# Patient Record
Sex: MALE | Race: ASIAN | NOT HISPANIC OR LATINO
[De-identification: names, ages, dates, MRNs, and addresses within clinical notes are randomized per-mention and may not be internally consistent; named-entity substitution may affect disease eponyms.]

---

## 2017-05-03 ENCOUNTER — APPOINTMENT (OUTPATIENT)
Dept: CARDIOLOGY | Facility: CLINIC | Age: 82
End: 2017-05-03

## 2017-05-03 ENCOUNTER — NON-APPOINTMENT (OUTPATIENT)
Age: 82
End: 2017-05-03

## 2017-05-03 ENCOUNTER — APPOINTMENT (OUTPATIENT)
Dept: CV DIAGNOSITCS | Facility: HOSPITAL | Age: 82
End: 2017-05-03

## 2017-05-03 ENCOUNTER — OUTPATIENT (OUTPATIENT)
Dept: OUTPATIENT SERVICES | Facility: HOSPITAL | Age: 82
LOS: 1 days | End: 2017-05-03
Payer: MEDICARE

## 2017-05-03 VITALS
HEART RATE: 63 BPM | BODY MASS INDEX: 24.66 KG/M2 | DIASTOLIC BLOOD PRESSURE: 63 MMHG | WEIGHT: 148 LBS | SYSTOLIC BLOOD PRESSURE: 127 MMHG | OXYGEN SATURATION: 98 % | HEIGHT: 65 IN

## 2017-05-03 DIAGNOSIS — I48.91 UNSPECIFIED ATRIAL FIBRILLATION: ICD-10-CM

## 2017-05-03 PROCEDURE — 93306 TTE W/DOPPLER COMPLETE: CPT

## 2017-05-03 PROCEDURE — 93306 TTE W/DOPPLER COMPLETE: CPT | Mod: 26

## 2017-09-09 ENCOUNTER — NON-APPOINTMENT (OUTPATIENT)
Age: 82
End: 2017-09-09

## 2017-09-09 ENCOUNTER — APPOINTMENT (OUTPATIENT)
Dept: CARDIOLOGY | Facility: CLINIC | Age: 82
End: 2017-09-09
Payer: MEDICARE

## 2017-09-09 VITALS
OXYGEN SATURATION: 98 % | HEART RATE: 91 BPM | SYSTOLIC BLOOD PRESSURE: 118 MMHG | HEIGHT: 65 IN | DIASTOLIC BLOOD PRESSURE: 67 MMHG

## 2017-09-09 PROCEDURE — 93000 ELECTROCARDIOGRAM COMPLETE: CPT

## 2017-09-09 PROCEDURE — 99214 OFFICE O/P EST MOD 30 MIN: CPT

## 2017-10-19 ENCOUNTER — APPOINTMENT (OUTPATIENT)
Dept: CV DIAGNOSITCS | Facility: HOSPITAL | Age: 82
End: 2017-10-19

## 2017-11-07 ENCOUNTER — APPOINTMENT (OUTPATIENT)
Dept: CV DIAGNOSITCS | Facility: HOSPITAL | Age: 82
End: 2017-11-07

## 2017-11-07 ENCOUNTER — OUTPATIENT (OUTPATIENT)
Dept: OUTPATIENT SERVICES | Facility: HOSPITAL | Age: 82
LOS: 1 days | End: 2017-11-07
Payer: MEDICARE

## 2017-11-07 DIAGNOSIS — I48.91 UNSPECIFIED ATRIAL FIBRILLATION: ICD-10-CM

## 2017-11-07 PROCEDURE — 93306 TTE W/DOPPLER COMPLETE: CPT

## 2017-11-07 PROCEDURE — 93306 TTE W/DOPPLER COMPLETE: CPT | Mod: 26

## 2018-03-05 ENCOUNTER — APPOINTMENT (OUTPATIENT)
Dept: CARDIOLOGY | Facility: CLINIC | Age: 83
End: 2018-03-05
Payer: MEDICARE

## 2018-03-05 ENCOUNTER — NON-APPOINTMENT (OUTPATIENT)
Age: 83
End: 2018-03-05

## 2018-03-05 VITALS
SYSTOLIC BLOOD PRESSURE: 126 MMHG | HEART RATE: 54 BPM | BODY MASS INDEX: 24.32 KG/M2 | WEIGHT: 146 LBS | HEIGHT: 65 IN | DIASTOLIC BLOOD PRESSURE: 74 MMHG | OXYGEN SATURATION: 98 %

## 2018-03-05 PROCEDURE — 99214 OFFICE O/P EST MOD 30 MIN: CPT

## 2018-03-05 PROCEDURE — 93000 ELECTROCARDIOGRAM COMPLETE: CPT

## 2018-03-05 RX ORDER — GLYCOPYRROLATE AND FORMOTEROL FUMARATE 9; 4.8 UG/1; UG/1
9-4.8 AEROSOL, METERED RESPIRATORY (INHALATION)
Qty: 32 | Refills: 0 | Status: DISCONTINUED | COMMUNITY
Start: 2018-02-22

## 2018-03-05 RX ORDER — AZITHROMYCIN 250 MG/1
250 TABLET, FILM COATED ORAL
Qty: 6 | Refills: 0 | Status: DISCONTINUED | COMMUNITY
Start: 2018-01-23

## 2018-03-05 RX ORDER — PREDNISONE 20 MG/1
20 TABLET ORAL
Qty: 10 | Refills: 0 | Status: DISCONTINUED | COMMUNITY
Start: 2018-01-23

## 2018-03-05 RX ORDER — METOPROLOL TARTRATE 25 MG/1
25 TABLET, FILM COATED ORAL
Qty: 90 | Refills: 0 | Status: DISCONTINUED | COMMUNITY
Start: 2017-02-14

## 2018-09-12 ENCOUNTER — NON-APPOINTMENT (OUTPATIENT)
Age: 83
End: 2018-09-12

## 2018-09-12 ENCOUNTER — APPOINTMENT (OUTPATIENT)
Dept: CARDIOLOGY | Facility: CLINIC | Age: 83
End: 2018-09-12
Payer: MEDICARE

## 2018-09-12 VITALS
OXYGEN SATURATION: 98 % | HEART RATE: 61 BPM | DIASTOLIC BLOOD PRESSURE: 75 MMHG | SYSTOLIC BLOOD PRESSURE: 160 MMHG | HEIGHT: 65 IN

## 2018-09-12 PROCEDURE — 99214 OFFICE O/P EST MOD 30 MIN: CPT

## 2018-09-12 PROCEDURE — 93000 ELECTROCARDIOGRAM COMPLETE: CPT

## 2019-03-13 ENCOUNTER — APPOINTMENT (OUTPATIENT)
Dept: CARDIOLOGY | Facility: CLINIC | Age: 84
End: 2019-03-13
Payer: MEDICARE

## 2019-03-13 ENCOUNTER — NON-APPOINTMENT (OUTPATIENT)
Age: 84
End: 2019-03-13

## 2019-03-13 VITALS
HEIGHT: 65 IN | WEIGHT: 145 LBS | SYSTOLIC BLOOD PRESSURE: 160 MMHG | DIASTOLIC BLOOD PRESSURE: 79 MMHG | BODY MASS INDEX: 24.16 KG/M2

## 2019-03-13 VITALS — OXYGEN SATURATION: 97 % | HEART RATE: 59 BPM

## 2019-03-13 PROCEDURE — 99213 OFFICE O/P EST LOW 20 MIN: CPT

## 2019-03-13 PROCEDURE — 93000 ELECTROCARDIOGRAM COMPLETE: CPT

## 2019-03-13 NOTE — DISCUSSION/SUMMARY
[FreeTextEntry1] : Mr. Singleton is an 89  year-old man with a known history of atrial fibrillation, hyperlipidemia  who has been doing well.    \par \par

## 2019-03-13 NOTE — REASON FOR VISIT
[Follow-Up - Clinic] : a clinic follow-up of [Abnormal ECG] : an abnormal ECG [Anticoagulation] : anticoagulation [Atrial Fibrillation] : atrial fibrillation [Coronary Artery Disease] : coronary artery disease [Hyperlipidemia] : hyperlipidemia

## 2019-03-13 NOTE — HISTORY OF PRESENT ILLNESS
[FreeTextEntry1] : Mr. Singleton is an 89 year-old man with a known history of atrial fibrillation, hypertension, asthma and hyperlipidemia who presents today for  evaluation. He has been doing well without any issues. He has no chest pain or sob.  No ELIZA, PND or orthopnea.  He has no palpitations.  No dizziness.  Walks 1/4 mile to the pharmacy. He has bradycardia.  no syncope

## 2019-03-13 NOTE — PHYSICAL EXAM
[General Appearance - Well Developed] : well developed [Normal Appearance] : normal appearance [Well Groomed] : well groomed [General Appearance - Well Nourished] : well nourished [No Deformities] : no deformities [General Appearance - In No Acute Distress] : no acute distress [Normal Conjunctiva] : the conjunctiva exhibited no abnormalities [Eyelids - No Xanthelasma] : the eyelids demonstrated no xanthelasmas [Normal Oral Mucosa] : normal oral mucosa [No Oral Pallor] : no oral pallor [No Oral Cyanosis] : no oral cyanosis [Normal Jugular Venous A Waves Present] : normal jugular venous A waves present [Normal Jugular Venous V Waves Present] : normal jugular venous V waves present [No Jugular Venous Ko A Waves] : no jugular venous ko A waves [Respiration, Rhythm And Depth] : normal respiratory rhythm and effort [Exaggerated Use Of Accessory Muscles For Inspiration] : no accessory muscle use [Auscultation Breath Sounds / Voice Sounds] : lungs were clear to auscultation bilaterally [Heart Rate And Rhythm] : heart rate and rhythm were normal [Heart Sounds] : normal S1 and S2 [Murmurs] : no murmurs present [Abdomen Soft] : soft [Abdomen Tenderness] : non-tender [Abdomen Mass (___ Cm)] : no abdominal mass palpated [Abnormal Walk] : normal gait [Gait - Sufficient For Exercise Testing] : the gait was sufficient for exercise testing [Nail Clubbing] : no clubbing of the fingernails [Cyanosis, Localized] : no localized cyanosis [Petechial Hemorrhages (___cm)] : no petechial hemorrhages [Skin Color & Pigmentation] : normal skin color and pigmentation [] : no rash [No Venous Stasis] : no venous stasis [Skin Lesions] : no skin lesions [No Skin Ulcers] : no skin ulcer [No Xanthoma] : no  xanthoma was observed [Oriented To Time, Place, And Person] : oriented to person, place, and time [Affect] : the affect was normal [Mood] : the mood was normal [No Anxiety] : not feeling anxious

## 2019-10-23 ENCOUNTER — APPOINTMENT (OUTPATIENT)
Dept: CARDIOLOGY | Facility: CLINIC | Age: 84
End: 2019-10-23
Payer: MEDICARE

## 2019-10-23 ENCOUNTER — NON-APPOINTMENT (OUTPATIENT)
Age: 84
End: 2019-10-23

## 2019-10-23 VITALS
DIASTOLIC BLOOD PRESSURE: 73 MMHG | SYSTOLIC BLOOD PRESSURE: 158 MMHG | HEIGHT: 66 IN | OXYGEN SATURATION: 94 % | BODY MASS INDEX: 22.82 KG/M2 | HEART RATE: 70 BPM | WEIGHT: 142 LBS

## 2019-10-23 PROCEDURE — 93000 ELECTROCARDIOGRAM COMPLETE: CPT

## 2019-10-23 PROCEDURE — 99214 OFFICE O/P EST MOD 30 MIN: CPT

## 2019-10-23 RX ORDER — GLYCOPYRROLATE AND FORMOTEROL FUMARATE 9; 4.8 UG/1; UG/1
9-4.8 AEROSOL, METERED RESPIRATORY (INHALATION)
Refills: 0 | Status: DISCONTINUED | COMMUNITY
Start: 2018-03-05 | End: 2019-10-23

## 2019-10-23 NOTE — HISTORY OF PRESENT ILLNESS
[FreeTextEntry1] : Mr. Singleton is an 89 year-old man with a known history of atrial fibrillation, hypertension, asthma and hyperlipidemia who presents today for  evaluation. He has been doing well without any issues. He has no chest pain or sob.  No ELIZA, PND or orthopnea.  He has no palpitations.  No dizziness.  Walks 1/4 mile to the pharmacy. He has bradycardia.  no syncope

## 2019-10-23 NOTE — DISCUSSION/SUMMARY
[FreeTextEntry1] : Mr. Singleton is an 89  year-old man with a known history of atrial fibrillation, hyperlipidemia  who has been doing well.    He is on coumadin and the family wants to keep it going with the understanding of the risk of falls.\par \par

## 2020-08-05 ENCOUNTER — APPOINTMENT (OUTPATIENT)
Dept: CARDIOLOGY | Facility: CLINIC | Age: 85
End: 2020-08-05
Payer: MEDICARE

## 2020-08-05 ENCOUNTER — NON-APPOINTMENT (OUTPATIENT)
Age: 85
End: 2020-08-05

## 2020-08-05 VITALS
HEIGHT: 66 IN | DIASTOLIC BLOOD PRESSURE: 74 MMHG | HEART RATE: 54 BPM | WEIGHT: 139 LBS | SYSTOLIC BLOOD PRESSURE: 147 MMHG | BODY MASS INDEX: 22.34 KG/M2 | OXYGEN SATURATION: 98 %

## 2020-08-05 PROCEDURE — 93000 ELECTROCARDIOGRAM COMPLETE: CPT

## 2020-08-05 PROCEDURE — 99214 OFFICE O/P EST MOD 30 MIN: CPT

## 2020-08-05 NOTE — PHYSICAL EXAM
[Normal Appearance] : normal appearance [General Appearance - Well Developed] : well developed [Well Groomed] : well groomed [General Appearance - Well Nourished] : well nourished [No Deformities] : no deformities [General Appearance - In No Acute Distress] : no acute distress [Normal Conjunctiva] : the conjunctiva exhibited no abnormalities [Eyelids - No Xanthelasma] : the eyelids demonstrated no xanthelasmas [Normal Oral Mucosa] : normal oral mucosa [No Oral Pallor] : no oral pallor [No Oral Cyanosis] : no oral cyanosis [Normal Jugular Venous A Waves Present] : normal jugular venous A waves present [Normal Jugular Venous V Waves Present] : normal jugular venous V waves present [No Jugular Venous Ko A Waves] : no jugular venous ko A waves [Exaggerated Use Of Accessory Muscles For Inspiration] : no accessory muscle use [Respiration, Rhythm And Depth] : normal respiratory rhythm and effort [Auscultation Breath Sounds / Voice Sounds] : lungs were clear to auscultation bilaterally [Heart Rate And Rhythm] : heart rate and rhythm were normal [Murmurs] : no murmurs present [Heart Sounds] : normal S1 and S2 [Abdomen Tenderness] : non-tender [Abdomen Soft] : soft [Abdomen Mass (___ Cm)] : no abdominal mass palpated [Abnormal Walk] : normal gait [Gait - Sufficient For Exercise Testing] : the gait was sufficient for exercise testing [Nail Clubbing] : no clubbing of the fingernails [Cyanosis, Localized] : no localized cyanosis [Petechial Hemorrhages (___cm)] : no petechial hemorrhages [Skin Color & Pigmentation] : normal skin color and pigmentation [] : no rash [No Venous Stasis] : no venous stasis [No Skin Ulcers] : no skin ulcer [Skin Lesions] : no skin lesions [No Xanthoma] : no  xanthoma was observed [Oriented To Time, Place, And Person] : oriented to person, place, and time [Affect] : the affect was normal [Mood] : the mood was normal [No Anxiety] : not feeling anxious

## 2020-08-05 NOTE — REASON FOR VISIT
[Follow-Up - Clinic] : a clinic follow-up of [Anticoagulation] : anticoagulation [Abnormal ECG] : an abnormal ECG [Atrial Fibrillation] : atrial fibrillation [Coronary Artery Disease] : coronary artery disease [Hyperlipidemia] : hyperlipidemia

## 2020-08-05 NOTE — DISCUSSION/SUMMARY
[FreeTextEntry1] : Mr. Singleton is an 89  year-old man with a known history of atrial fibrillation, hyperlipidemia  who has been doing well.    He is on coumadin and the family wants to keep it going with the understanding of the risk of falls.\par \par

## 2020-08-06 ENCOUNTER — APPOINTMENT (OUTPATIENT)
Dept: UROLOGY | Facility: CLINIC | Age: 85
End: 2020-08-06
Payer: MEDICARE

## 2020-08-06 VITALS — TEMPERATURE: 98.2 F

## 2020-08-06 VITALS
DIASTOLIC BLOOD PRESSURE: 67 MMHG | WEIGHT: 139 LBS | RESPIRATION RATE: 16 BRPM | HEART RATE: 60 BPM | SYSTOLIC BLOOD PRESSURE: 137 MMHG | BODY MASS INDEX: 22.34 KG/M2 | HEIGHT: 66 IN

## 2020-08-06 DIAGNOSIS — R33.9 RETENTION OF URINE, UNSPECIFIED: ICD-10-CM

## 2020-08-06 DIAGNOSIS — K46.9 UNSPECIFIED ABDOMINAL HERNIA W/OUT OBSTRUCTION OR GANGRENE: ICD-10-CM

## 2020-08-06 PROCEDURE — 99204 OFFICE O/P NEW MOD 45 MIN: CPT

## 2020-08-06 RX ORDER — TAMSULOSIN HYDROCHLORIDE 0.4 MG/1
0.4 CAPSULE ORAL
Qty: 3 | Refills: 6 | Status: DISCONTINUED | COMMUNITY
Start: 2020-08-06 | End: 2020-08-06

## 2021-02-04 ENCOUNTER — RX RENEWAL (OUTPATIENT)
Age: 86
End: 2021-02-04

## 2021-02-10 ENCOUNTER — APPOINTMENT (OUTPATIENT)
Dept: CARDIOLOGY | Facility: CLINIC | Age: 86
End: 2021-02-10
Payer: MEDICARE

## 2021-02-10 ENCOUNTER — NON-APPOINTMENT (OUTPATIENT)
Age: 86
End: 2021-02-10

## 2021-02-10 VITALS
WEIGHT: 137 LBS | SYSTOLIC BLOOD PRESSURE: 147 MMHG | DIASTOLIC BLOOD PRESSURE: 74 MMHG | BODY MASS INDEX: 22.11 KG/M2 | OXYGEN SATURATION: 99 % | HEART RATE: 50 BPM

## 2021-02-10 DIAGNOSIS — I42.9 CARDIOMYOPATHY, UNSPECIFIED: ICD-10-CM

## 2021-02-10 PROCEDURE — 93000 ELECTROCARDIOGRAM COMPLETE: CPT

## 2021-02-10 PROCEDURE — 99214 OFFICE O/P EST MOD 30 MIN: CPT

## 2021-02-10 NOTE — PHYSICAL EXAM
[General Appearance - Well Developed] : well developed [Normal Appearance] : normal appearance [Well Groomed] : well groomed [General Appearance - Well Nourished] : well nourished [No Deformities] : no deformities [General Appearance - In No Acute Distress] : no acute distress [Normal Conjunctiva] : the conjunctiva exhibited no abnormalities [Eyelids - No Xanthelasma] : the eyelids demonstrated no xanthelasmas [Normal Oral Mucosa] : normal oral mucosa [No Oral Pallor] : no oral pallor [No Oral Cyanosis] : no oral cyanosis [Normal Jugular Venous A Waves Present] : normal jugular venous A waves present [Normal Jugular Venous V Waves Present] : normal jugular venous V waves present [No Jugular Venous Ko A Waves] : no jugular venous ko A waves [Exaggerated Use Of Accessory Muscles For Inspiration] : no accessory muscle use [Respiration, Rhythm And Depth] : normal respiratory rhythm and effort [Auscultation Breath Sounds / Voice Sounds] : lungs were clear to auscultation bilaterally [Heart Rate And Rhythm] : heart rate and rhythm were normal [Murmurs] : no murmurs present [Heart Sounds] : normal S1 and S2 [Abdomen Soft] : soft [Abdomen Tenderness] : non-tender [Abdomen Mass (___ Cm)] : no abdominal mass palpated [Abnormal Walk] : normal gait [Gait - Sufficient For Exercise Testing] : the gait was sufficient for exercise testing [Nail Clubbing] : no clubbing of the fingernails [Cyanosis, Localized] : no localized cyanosis [Petechial Hemorrhages (___cm)] : no petechial hemorrhages [Skin Color & Pigmentation] : normal skin color and pigmentation [] : no rash [No Venous Stasis] : no venous stasis [Skin Lesions] : no skin lesions [No Skin Ulcers] : no skin ulcer [Oriented To Time, Place, And Person] : oriented to person, place, and time [No Xanthoma] : no  xanthoma was observed [Affect] : the affect was normal [Mood] : the mood was normal [No Anxiety] : not feeling anxious

## 2021-02-10 NOTE — DISCUSSION/SUMMARY
[FreeTextEntry1] : Mr. Singleton is an 89  year-old man with a known history of atrial fibrillation, hyperlipidemia  who has been doing well.    He is on coumadin and the family wants to keep it going but have instructed them to stop it due to the risk of falls.\par \par

## 2021-02-10 NOTE — HISTORY OF PRESENT ILLNESS
[FreeTextEntry1] : Mr. Singleton is an 89 year-old man with a known history of atrial fibrillation, hypertension, asthma and hyperlipidemia who presents today for  evaluation. He has been doing well without any issues. He has no chest pain or sob.  No ELIZA, PND or orthopnea.  He has no palpitations.  No dizziness.   He has bradycardia.  no syncope

## 2021-06-05 ENCOUNTER — APPOINTMENT (OUTPATIENT)
Dept: RADIOLOGY | Facility: IMAGING CENTER | Age: 86
End: 2021-06-05
Payer: MEDICARE

## 2021-06-05 ENCOUNTER — OUTPATIENT (OUTPATIENT)
Dept: OUTPATIENT SERVICES | Facility: HOSPITAL | Age: 86
LOS: 1 days | End: 2021-06-05
Payer: MEDICARE

## 2021-06-05 DIAGNOSIS — Z00.8 ENCOUNTER FOR OTHER GENERAL EXAMINATION: ICD-10-CM

## 2021-06-05 PROCEDURE — 71046 X-RAY EXAM CHEST 2 VIEWS: CPT | Mod: 26

## 2021-06-05 PROCEDURE — 71046 X-RAY EXAM CHEST 2 VIEWS: CPT

## 2021-07-09 DIAGNOSIS — R60.0 LOCALIZED EDEMA: ICD-10-CM

## 2021-07-12 RX ORDER — FUROSEMIDE 20 MG/1
20 TABLET ORAL TWICE DAILY
Qty: 60 | Refills: 1 | Status: ACTIVE | COMMUNITY
Start: 2021-07-09 | End: 1900-01-01

## 2021-07-31 ENCOUNTER — APPOINTMENT (OUTPATIENT)
Dept: CT IMAGING | Facility: IMAGING CENTER | Age: 86
End: 2021-07-31
Payer: MEDICARE

## 2021-07-31 ENCOUNTER — OUTPATIENT (OUTPATIENT)
Dept: OUTPATIENT SERVICES | Facility: HOSPITAL | Age: 86
LOS: 1 days | End: 2021-07-31
Payer: MEDICARE

## 2021-07-31 DIAGNOSIS — Z00.8 ENCOUNTER FOR OTHER GENERAL EXAMINATION: ICD-10-CM

## 2021-07-31 PROCEDURE — 71250 CT THORAX DX C-: CPT | Mod: MH

## 2021-07-31 PROCEDURE — 71250 CT THORAX DX C-: CPT | Mod: 26,MH

## 2021-08-11 ENCOUNTER — APPOINTMENT (OUTPATIENT)
Dept: CARDIOLOGY | Facility: CLINIC | Age: 86
End: 2021-08-11
Payer: MEDICARE

## 2021-08-11 ENCOUNTER — NON-APPOINTMENT (OUTPATIENT)
Age: 86
End: 2021-08-11

## 2021-08-11 VITALS
BODY MASS INDEX: 22.5 KG/M2 | OXYGEN SATURATION: 100 % | HEART RATE: 55 BPM | DIASTOLIC BLOOD PRESSURE: 65 MMHG | SYSTOLIC BLOOD PRESSURE: 151 MMHG | WEIGHT: 140 LBS | HEIGHT: 66 IN

## 2021-08-11 PROCEDURE — 93000 ELECTROCARDIOGRAM COMPLETE: CPT

## 2021-08-11 PROCEDURE — 99214 OFFICE O/P EST MOD 30 MIN: CPT

## 2021-08-11 RX ORDER — ATENOLOL 25 MG/1
25 TABLET ORAL DAILY
Qty: 30 | Refills: 3 | Status: DISCONTINUED | COMMUNITY
End: 2021-08-11

## 2021-08-11 NOTE — DISCUSSION/SUMMARY
[FreeTextEntry1] : Mr. Singleton is an 89  year-old man with a known history of atrial fibrillation, hyperlipidemia  who has been doing well.    He is on coumadin and the family wants to keep it going but have instructed them to stop it due to the risk of falls.  Have stopped his atenolol given his bradycardia\par \par

## 2021-10-01 ENCOUNTER — OUTPATIENT (OUTPATIENT)
Dept: OUTPATIENT SERVICES | Facility: HOSPITAL | Age: 86
LOS: 1 days | End: 2021-10-01
Payer: MEDICARE

## 2021-10-28 DIAGNOSIS — Z71.89 OTHER SPECIFIED COUNSELING: ICD-10-CM

## 2021-12-01 PROCEDURE — G9005: CPT

## 2022-02-16 ENCOUNTER — APPOINTMENT (OUTPATIENT)
Dept: CARDIOLOGY | Facility: CLINIC | Age: 87
End: 2022-02-16
Payer: MEDICARE

## 2022-02-16 ENCOUNTER — NON-APPOINTMENT (OUTPATIENT)
Age: 87
End: 2022-02-16

## 2022-02-16 VITALS
DIASTOLIC BLOOD PRESSURE: 71 MMHG | BODY MASS INDEX: 21.21 KG/M2 | WEIGHT: 132 LBS | HEIGHT: 66 IN | SYSTOLIC BLOOD PRESSURE: 136 MMHG | HEART RATE: 79 BPM | OXYGEN SATURATION: 98 %

## 2022-02-16 PROCEDURE — 99214 OFFICE O/P EST MOD 30 MIN: CPT

## 2022-02-16 PROCEDURE — 93000 ELECTROCARDIOGRAM COMPLETE: CPT

## 2022-02-16 RX ORDER — DIGOXIN 250 UG/1
250 TABLET ORAL DAILY
Qty: 30 | Refills: 6 | Status: DISCONTINUED | COMMUNITY
Start: 2018-03-05 | End: 2022-02-16

## 2022-02-16 NOTE — DISCUSSION/SUMMARY
[FreeTextEntry1] : Mr. Singleton is an 89  year-old man with a known history of atrial fibrillation, hyperlipidemia  who has been doing well.    He is on coumadin and the family wants to keep it going but have instructed them to stop it due to the risk of falls.  \par he remains bradycardic despite stopping atenolol. Will stop digoxin

## 2022-02-16 NOTE — PHYSICAL EXAM
[General Appearance - Well Developed] : well developed [Normal Appearance] : normal appearance [Well Groomed] : well groomed [General Appearance - Well Nourished] : well nourished [No Deformities] : no deformities [General Appearance - In No Acute Distress] : no acute distress [Normal Conjunctiva] : the conjunctiva exhibited no abnormalities [Eyelids - No Xanthelasma] : the eyelids demonstrated no xanthelasmas [Normal Oral Mucosa] : normal oral mucosa [No Oral Pallor] : no oral pallor [No Oral Cyanosis] : no oral cyanosis [Normal Jugular Venous A Waves Present] : normal jugular venous A waves present [Normal Jugular Venous V Waves Present] : normal jugular venous V waves present [No Jugular Venous Ko A Waves] : no jugular venous ko A waves [Respiration, Rhythm And Depth] : normal respiratory rhythm and effort [Exaggerated Use Of Accessory Muscles For Inspiration] : no accessory muscle use [Auscultation Breath Sounds / Voice Sounds] : lungs were clear to auscultation bilaterally [Heart Rate And Rhythm] : heart rate and rhythm were normal [Heart Sounds] : normal S1 and S2 [Murmurs] : no murmurs present [Abdomen Tenderness] : non-tender [Abdomen Soft] : soft [Abdomen Mass (___ Cm)] : no abdominal mass palpated [Abnormal Walk] : normal gait [Gait - Sufficient For Exercise Testing] : the gait was sufficient for exercise testing [Nail Clubbing] : no clubbing of the fingernails [Cyanosis, Localized] : no localized cyanosis [Petechial Hemorrhages (___cm)] : no petechial hemorrhages [Skin Color & Pigmentation] : normal skin color and pigmentation [No Venous Stasis] : no venous stasis [] : no rash [Skin Lesions] : no skin lesions [No Skin Ulcers] : no skin ulcer [No Xanthoma] : no  xanthoma was observed [Oriented To Time, Place, And Person] : oriented to person, place, and time [Mood] : the mood was normal [Affect] : the affect was normal [No Anxiety] : not feeling anxious

## 2022-02-16 NOTE — HISTORY OF PRESENT ILLNESS
[FreeTextEntry1] : Mr. Singleton is an 89 year-old man with a known history of atrial fibrillation, hypertension, asthma and hyperlipidemia who presents today for  evaluation. He has been doing well without any issues. He has no chest pain or sob.  No ELIZA, PND or orthopnea.  He has no palpitations.  No dizziness.   He has bradycardia.  no syncope

## 2022-06-14 ENCOUNTER — RESULT CHARGE (OUTPATIENT)
Age: 87
End: 2022-06-14

## 2022-06-15 ENCOUNTER — NON-APPOINTMENT (OUTPATIENT)
Age: 87
End: 2022-06-15

## 2022-06-15 ENCOUNTER — APPOINTMENT (OUTPATIENT)
Dept: CARDIOLOGY | Facility: CLINIC | Age: 87
End: 2022-06-15
Payer: MEDICARE

## 2022-06-15 VITALS
HEART RATE: 108 BPM | DIASTOLIC BLOOD PRESSURE: 95 MMHG | OXYGEN SATURATION: 99 % | SYSTOLIC BLOOD PRESSURE: 148 MMHG | HEIGHT: 66 IN

## 2022-06-15 PROCEDURE — 93000 ELECTROCARDIOGRAM COMPLETE: CPT

## 2022-06-15 PROCEDURE — 99214 OFFICE O/P EST MOD 30 MIN: CPT

## 2022-06-15 RX ORDER — FINASTERIDE 5 MG/1
5 TABLET, FILM COATED ORAL DAILY
Qty: 30 | Refills: 5 | Status: DISCONTINUED | COMMUNITY
Start: 2020-08-06 | End: 2022-06-15

## 2022-06-15 RX ORDER — WARFARIN 5 MG/1
5 TABLET ORAL DAILY
Qty: 30 | Refills: 3 | Status: DISCONTINUED | COMMUNITY
Start: 2018-03-05 | End: 2022-06-15

## 2022-06-15 RX ORDER — TAMSULOSIN HYDROCHLORIDE 0.4 MG/1
0.4 CAPSULE ORAL
Qty: 10 | Refills: 3 | Status: ACTIVE | COMMUNITY

## 2022-06-15 RX ORDER — MONTELUKAST 10 MG/1
10 TABLET, FILM COATED ORAL DAILY
Refills: 0 | Status: ACTIVE | COMMUNITY
Start: 2022-06-15

## 2022-06-15 RX ORDER — PANTOPRAZOLE 40 MG/1
40 TABLET, DELAYED RELEASE ORAL DAILY
Qty: 1 | Refills: 1 | Status: ACTIVE | COMMUNITY
Start: 2022-06-15

## 2022-06-15 NOTE — DISCUSSION/SUMMARY
[FreeTextEntry1] : Mr. Singleton is an 89  year-old man with a known history of atrial fibrillation, hyperlipidemia  who has been doing well.    He is on coumadin and the family wants to keep it going but have instructed them to stop it due to the risk of falls.  Will change to eliquis\par he remains bradycardic despite stopping atenolol. Will stop digoxin

## 2022-06-15 NOTE — PHYSICAL EXAM
[General Appearance - Well Developed] : well developed [Normal Appearance] : normal appearance [Well Groomed] : well groomed [General Appearance - Well Nourished] : well nourished [No Deformities] : no deformities [General Appearance - In No Acute Distress] : no acute distress [Normal Conjunctiva] : the conjunctiva exhibited no abnormalities [Eyelids - No Xanthelasma] : the eyelids demonstrated no xanthelasmas [Normal Oral Mucosa] : normal oral mucosa [No Oral Pallor] : no oral pallor [No Oral Cyanosis] : no oral cyanosis [Normal Jugular Venous A Waves Present] : normal jugular venous A waves present [Normal Jugular Venous V Waves Present] : normal jugular venous V waves present [No Jugular Venous Ko A Waves] : no jugular venous ko A waves [Respiration, Rhythm And Depth] : normal respiratory rhythm and effort [Exaggerated Use Of Accessory Muscles For Inspiration] : no accessory muscle use [Auscultation Breath Sounds / Voice Sounds] : lungs were clear to auscultation bilaterally [Heart Rate And Rhythm] : heart rate and rhythm were normal [Heart Sounds] : normal S1 and S2 [Murmurs] : no murmurs present [Abdomen Tenderness] : non-tender [Abdomen Soft] : soft [Abdomen Mass (___ Cm)] : no abdominal mass palpated [Abnormal Walk] : normal gait [Gait - Sufficient For Exercise Testing] : the gait was sufficient for exercise testing [Nail Clubbing] : no clubbing of the fingernails [Cyanosis, Localized] : no localized cyanosis [Petechial Hemorrhages (___cm)] : no petechial hemorrhages [] : no rash [Skin Color & Pigmentation] : normal skin color and pigmentation [No Venous Stasis] : no venous stasis [Skin Lesions] : no skin lesions [No Skin Ulcers] : no skin ulcer [No Xanthoma] : no  xanthoma was observed [Oriented To Time, Place, And Person] : oriented to person, place, and time [Affect] : the affect was normal [Mood] : the mood was normal [No Anxiety] : not feeling anxious

## 2022-06-15 NOTE — REASON FOR VISIT
[Symptom and Test Evaluation] : symptom and test evaluation [Follow-Up - Clinic] : a clinic follow-up of [Abnormal ECG] : an abnormal ECG [Anticoagulation] : anticoagulation [Atrial Fibrillation] : atrial fibrillation [Coronary Artery Disease] : coronary artery disease [Hyperlipidemia] : hyperlipidemia

## 2022-06-15 NOTE — HISTORY OF PRESENT ILLNESS
[FreeTextEntry1] : Mr. Singleton is an 89 year-old man with a known history of atrial fibrillation, hypertension, asthma and hyperlipidemia who presents today for  evaluation. He has been doing well without any issues. He has no chest pain or sob.  has slightly worsening ELIZA but no PND or orthopnea.  He has no palpitations.  No dizziness.   He has bradycardia.  no syncope

## 2022-12-20 ENCOUNTER — NON-APPOINTMENT (OUTPATIENT)
Age: 87
End: 2022-12-20

## 2022-12-20 ENCOUNTER — APPOINTMENT (OUTPATIENT)
Dept: CARDIOLOGY | Facility: CLINIC | Age: 87
End: 2022-12-20

## 2022-12-20 VITALS
OXYGEN SATURATION: 98 % | HEART RATE: 79 BPM | SYSTOLIC BLOOD PRESSURE: 139 MMHG | DIASTOLIC BLOOD PRESSURE: 75 MMHG | HEIGHT: 66 IN

## 2022-12-20 DIAGNOSIS — I48.91 UNSPECIFIED ATRIAL FIBRILLATION: ICD-10-CM

## 2022-12-20 PROCEDURE — 93000 ELECTROCARDIOGRAM COMPLETE: CPT

## 2022-12-20 PROCEDURE — 99214 OFFICE O/P EST MOD 30 MIN: CPT

## 2022-12-20 NOTE — DISCUSSION/SUMMARY
[FreeTextEntry1] : Mr. Singleton is an 89  year-old man with a known history of atrial fibrillation, hyperlipidemia  who has been doing well.    He is on coumadin and the family wants to keep it going but have instructed them to stop it due to the risk of falls.  Will change to eliquis\par

## 2022-12-20 NOTE — HISTORY OF PRESENT ILLNESS
[FreeTextEntry1] : Mr. Singleton is an 89 year-old man with a known history of atrial fibrillation, hypertension, asthma and hyperlipidemia who presents today for  evaluation. He has been doing well without any issues. He has no chest pain or sob.  has slightly worsening ELIZA but no PND or orthopnea.  He has no palpitations.  No dizziness.   He has bradycardia.  no syncope

## 2022-12-20 NOTE — PHYSICAL EXAM
[General Appearance - Well Developed] : well developed [Normal Appearance] : normal appearance [Well Groomed] : well groomed [General Appearance - Well Nourished] : well nourished [No Deformities] : no deformities [General Appearance - In No Acute Distress] : no acute distress [Normal Conjunctiva] : the conjunctiva exhibited no abnormalities [Eyelids - No Xanthelasma] : the eyelids demonstrated no xanthelasmas [Normal Oral Mucosa] : normal oral mucosa [No Oral Pallor] : no oral pallor [No Oral Cyanosis] : no oral cyanosis [Normal Jugular Venous A Waves Present] : normal jugular venous A waves present [Normal Jugular Venous V Waves Present] : normal jugular venous V waves present [No Jugular Venous Ko A Waves] : no jugular venous ko A waves [Respiration, Rhythm And Depth] : normal respiratory rhythm and effort [Exaggerated Use Of Accessory Muscles For Inspiration] : no accessory muscle use [Auscultation Breath Sounds / Voice Sounds] : lungs were clear to auscultation bilaterally [Heart Rate And Rhythm] : heart rate and rhythm were normal [Heart Sounds] : normal S1 and S2 [Murmurs] : no murmurs present [Abdomen Soft] : soft [Abdomen Tenderness] : non-tender [Abdomen Mass (___ Cm)] : no abdominal mass palpated [Abnormal Walk] : normal gait [Gait - Sufficient For Exercise Testing] : the gait was sufficient for exercise testing [Nail Clubbing] : no clubbing of the fingernails [Cyanosis, Localized] : no localized cyanosis [Petechial Hemorrhages (___cm)] : no petechial hemorrhages [Skin Color & Pigmentation] : normal skin color and pigmentation [No Venous Stasis] : no venous stasis [] : no rash [Skin Lesions] : no skin lesions [No Skin Ulcers] : no skin ulcer [No Xanthoma] : no  xanthoma was observed [Oriented To Time, Place, And Person] : oriented to person, place, and time [Affect] : the affect was normal [Mood] : the mood was normal [No Anxiety] : not feeling anxious

## 2023-01-12 RX ORDER — APIXABAN 2.5 MG/1
2.5 TABLET, FILM COATED ORAL
Qty: 180 | Refills: 3 | Status: ACTIVE | COMMUNITY
Start: 2022-06-15 | End: 1900-01-01

## 2023-05-13 ENCOUNTER — OUTPATIENT (OUTPATIENT)
Dept: OUTPATIENT SERVICES | Facility: HOSPITAL | Age: 88
LOS: 1 days | End: 2023-05-13
Payer: MEDICARE

## 2023-05-13 ENCOUNTER — APPOINTMENT (OUTPATIENT)
Dept: CT IMAGING | Facility: IMAGING CENTER | Age: 88
End: 2023-05-13
Payer: MEDICARE

## 2023-05-13 DIAGNOSIS — Z00.8 ENCOUNTER FOR OTHER GENERAL EXAMINATION: ICD-10-CM

## 2023-05-13 PROCEDURE — 71250 CT THORAX DX C-: CPT | Mod: 26,MH

## 2023-05-13 PROCEDURE — 71250 CT THORAX DX C-: CPT

## 2023-06-03 ENCOUNTER — INPATIENT (INPATIENT)
Facility: HOSPITAL | Age: 88
LOS: 3 days | Discharge: HOME HEALTH | DRG: 372 | End: 2023-06-07
Attending: INTERNAL MEDICINE | Admitting: INTERNAL MEDICINE
Payer: MEDICARE

## 2023-06-03 VITALS
DIASTOLIC BLOOD PRESSURE: 63 MMHG | WEIGHT: 141.98 LBS | TEMPERATURE: 100 F | SYSTOLIC BLOOD PRESSURE: 98 MMHG | RESPIRATION RATE: 16 BRPM | HEIGHT: 66 IN | OXYGEN SATURATION: 96 % | HEART RATE: 135 BPM

## 2023-06-03 DIAGNOSIS — I48.91 UNSPECIFIED ATRIAL FIBRILLATION: ICD-10-CM

## 2023-06-03 DIAGNOSIS — N18.30 CHRONIC KIDNEY DISEASE, STAGE 3 UNSPECIFIED: ICD-10-CM

## 2023-06-03 DIAGNOSIS — K52.9 NONINFECTIVE GASTROENTERITIS AND COLITIS, UNSPECIFIED: ICD-10-CM

## 2023-06-03 DIAGNOSIS — K21.9 GASTRO-ESOPHAGEAL REFLUX DISEASE WITHOUT ESOPHAGITIS: ICD-10-CM

## 2023-06-03 DIAGNOSIS — J44.9 CHRONIC OBSTRUCTIVE PULMONARY DISEASE, UNSPECIFIED: ICD-10-CM

## 2023-06-03 DIAGNOSIS — E78.5 HYPERLIPIDEMIA, UNSPECIFIED: ICD-10-CM

## 2023-06-03 DIAGNOSIS — I10 ESSENTIAL (PRIMARY) HYPERTENSION: ICD-10-CM

## 2023-06-03 DIAGNOSIS — K51.00 ULCERATIVE (CHRONIC) PANCOLITIS WITHOUT COMPLICATIONS: ICD-10-CM

## 2023-06-03 LAB
ALBUMIN SERPL ELPH-MCNC: 2.9 G/DL — LOW (ref 3.3–5)
ALP SERPL-CCNC: 63 U/L — SIGNIFICANT CHANGE UP (ref 40–120)
ALT FLD-CCNC: 16 U/L — SIGNIFICANT CHANGE UP (ref 10–45)
ANION GAP SERPL CALC-SCNC: 13 MMOL/L — SIGNIFICANT CHANGE UP (ref 5–17)
ANISOCYTOSIS BLD QL: SLIGHT — SIGNIFICANT CHANGE UP
APTT BLD: 31.8 SEC — SIGNIFICANT CHANGE UP (ref 27.5–35.5)
AST SERPL-CCNC: 23 U/L — SIGNIFICANT CHANGE UP (ref 10–40)
BASE EXCESS BLDV CALC-SCNC: 2.2 MMOL/L — SIGNIFICANT CHANGE UP (ref -2–3)
BASOPHILS # BLD AUTO: 0 K/UL — SIGNIFICANT CHANGE UP (ref 0–0.2)
BASOPHILS NFR BLD AUTO: 0 % — SIGNIFICANT CHANGE UP (ref 0–2)
BILIRUB SERPL-MCNC: 0.9 MG/DL — SIGNIFICANT CHANGE UP (ref 0.2–1.2)
BUN SERPL-MCNC: 17 MG/DL — SIGNIFICANT CHANGE UP (ref 7–23)
C DIFF GDH STL QL: NEGATIVE — SIGNIFICANT CHANGE UP
C DIFF GDH STL QL: SIGNIFICANT CHANGE UP
CA-I SERPL-SCNC: 1.16 MMOL/L — SIGNIFICANT CHANGE UP (ref 1.15–1.33)
CALCIUM SERPL-MCNC: 8.3 MG/DL — LOW (ref 8.4–10.5)
CAMPYLOBACTER DNA SPEC NAA+PROBE: DETECTED
CHLORIDE BLDV-SCNC: 104 MMOL/L — SIGNIFICANT CHANGE UP (ref 96–108)
CHLORIDE SERPL-SCNC: 102 MMOL/L — SIGNIFICANT CHANGE UP (ref 96–108)
CO2 BLDV-SCNC: 29 MMOL/L — HIGH (ref 22–26)
CO2 SERPL-SCNC: 21 MMOL/L — LOW (ref 22–31)
CREAT SERPL-MCNC: 1.48 MG/DL — HIGH (ref 0.5–1.3)
EGFR: 43 ML/MIN/1.73M2 — LOW
ELLIPTOCYTES BLD QL SMEAR: SLIGHT — SIGNIFICANT CHANGE UP
EOSINOPHIL # BLD AUTO: 0 K/UL — SIGNIFICANT CHANGE UP (ref 0–0.5)
EOSINOPHIL NFR BLD AUTO: 0 % — SIGNIFICANT CHANGE UP (ref 0–6)
GAS PNL BLDV: 133 MMOL/L — LOW (ref 136–145)
GAS PNL BLDV: SIGNIFICANT CHANGE UP
GI PCR PANEL: DETECTED
GLUCOSE BLDV-MCNC: 160 MG/DL — HIGH (ref 70–99)
GLUCOSE SERPL-MCNC: 161 MG/DL — HIGH (ref 70–99)
HCO3 BLDV-SCNC: 27 MMOL/L — SIGNIFICANT CHANGE UP (ref 22–29)
HCT VFR BLD CALC: 29 % — LOW (ref 39–50)
HCT VFR BLDA CALC: 28 % — LOW (ref 39–51)
HGB BLD CALC-MCNC: 9.3 G/DL — LOW (ref 12.6–17.4)
HGB BLD-MCNC: 9 G/DL — LOW (ref 13–17)
HYPOCHROMIA BLD QL: SLIGHT — SIGNIFICANT CHANGE UP
INR BLD: 1.38 RATIO — HIGH (ref 0.88–1.16)
LACTATE BLDV-MCNC: 1.7 MMOL/L — SIGNIFICANT CHANGE UP (ref 0.5–2)
LIDOCAIN IGE QN: 14 U/L — SIGNIFICANT CHANGE UP (ref 7–60)
LYMPHOCYTES # BLD AUTO: 0.35 K/UL — LOW (ref 1–3.3)
LYMPHOCYTES # BLD AUTO: 6.3 % — LOW (ref 13–44)
MAGNESIUM SERPL-MCNC: 1.8 MG/DL — SIGNIFICANT CHANGE UP (ref 1.6–2.6)
MANUAL SMEAR VERIFICATION: SIGNIFICANT CHANGE UP
MCHC RBC-ENTMCNC: 23.4 PG — LOW (ref 27–34)
MCHC RBC-ENTMCNC: 31 GM/DL — LOW (ref 32–36)
MCV RBC AUTO: 75.5 FL — LOW (ref 80–100)
METAMYELOCYTES # FLD: 0.9 % — HIGH (ref 0–0)
MICROCYTES BLD QL: SLIGHT — SIGNIFICANT CHANGE UP
MONOCYTES # BLD AUTO: 0.5 K/UL — SIGNIFICANT CHANGE UP (ref 0–0.9)
MONOCYTES NFR BLD AUTO: 9 % — SIGNIFICANT CHANGE UP (ref 2–14)
NEUTROPHILS # BLD AUTO: 4.64 K/UL — SIGNIFICANT CHANGE UP (ref 1.8–7.4)
NEUTROPHILS NFR BLD AUTO: 77.5 % — HIGH (ref 43–77)
NEUTS BAND # BLD: 6.3 % — SIGNIFICANT CHANGE UP (ref 0–8)
PCO2 BLDV: 44 MMHG — SIGNIFICANT CHANGE UP (ref 42–55)
PH BLDV: 7.4 — SIGNIFICANT CHANGE UP (ref 7.32–7.43)
PLAT MORPH BLD: NORMAL — SIGNIFICANT CHANGE UP
PLATELET # BLD AUTO: 139 K/UL — LOW (ref 150–400)
PO2 BLDV: 38 MMHG — SIGNIFICANT CHANGE UP (ref 25–45)
POIKILOCYTOSIS BLD QL AUTO: SIGNIFICANT CHANGE UP
POLYCHROMASIA BLD QL SMEAR: SLIGHT — SIGNIFICANT CHANGE UP
POTASSIUM BLDV-SCNC: 3.5 MMOL/L — SIGNIFICANT CHANGE UP (ref 3.5–5.1)
POTASSIUM SERPL-MCNC: 3.6 MMOL/L — SIGNIFICANT CHANGE UP (ref 3.5–5.3)
POTASSIUM SERPL-SCNC: 3.6 MMOL/L — SIGNIFICANT CHANGE UP (ref 3.5–5.3)
PROT SERPL-MCNC: 5.6 G/DL — LOW (ref 6–8.3)
PROTHROM AB SERPL-ACNC: 16.1 SEC — HIGH (ref 10.5–13.4)
RBC # BLD: 3.84 M/UL — LOW (ref 4.2–5.8)
RBC # FLD: 20.5 % — HIGH (ref 10.3–14.5)
RBC BLD AUTO: ABNORMAL
SAO2 % BLDV: 66.3 % — LOW (ref 67–88)
SODIUM SERPL-SCNC: 136 MMOL/L — SIGNIFICANT CHANGE UP (ref 135–145)
TARGETS BLD QL SMEAR: SLIGHT — SIGNIFICANT CHANGE UP
WBC # BLD: 5.54 K/UL — SIGNIFICANT CHANGE UP (ref 3.8–10.5)
WBC # FLD AUTO: 5.54 K/UL — SIGNIFICANT CHANGE UP (ref 3.8–10.5)

## 2023-06-03 PROCEDURE — 74177 CT ABD & PELVIS W/CONTRAST: CPT | Mod: 26,MG

## 2023-06-03 PROCEDURE — 99285 EMERGENCY DEPT VISIT HI MDM: CPT | Mod: FS

## 2023-06-03 PROCEDURE — 93010 ELECTROCARDIOGRAM REPORT: CPT

## 2023-06-03 PROCEDURE — 71045 X-RAY EXAM CHEST 1 VIEW: CPT | Mod: 26

## 2023-06-03 PROCEDURE — G1004: CPT

## 2023-06-03 RX ORDER — FLUTICASONE PROPIONATE 50 MCG
1 SPRAY, SUSPENSION NASAL
Refills: 0 | Status: DISCONTINUED | OUTPATIENT
Start: 2023-06-03 | End: 2023-06-07

## 2023-06-03 RX ORDER — METOPROLOL TARTRATE 50 MG
25 TABLET ORAL DAILY
Refills: 0 | Status: DISCONTINUED | OUTPATIENT
Start: 2023-06-03 | End: 2023-06-04

## 2023-06-03 RX ORDER — MONTELUKAST 4 MG/1
10 TABLET, CHEWABLE ORAL DAILY
Refills: 0 | Status: DISCONTINUED | OUTPATIENT
Start: 2023-06-03 | End: 2023-06-07

## 2023-06-03 RX ORDER — METRONIDAZOLE 500 MG
500 TABLET ORAL EVERY 8 HOURS
Refills: 0 | Status: DISCONTINUED | OUTPATIENT
Start: 2023-06-03 | End: 2023-06-04

## 2023-06-03 RX ORDER — ATORVASTATIN CALCIUM 80 MG/1
40 TABLET, FILM COATED ORAL AT BEDTIME
Refills: 0 | Status: DISCONTINUED | OUTPATIENT
Start: 2023-06-03 | End: 2023-06-05

## 2023-06-03 RX ORDER — ACETAMINOPHEN 500 MG
975 TABLET ORAL ONCE
Refills: 0 | Status: COMPLETED | OUTPATIENT
Start: 2023-06-03 | End: 2023-06-03

## 2023-06-03 RX ORDER — TAMSULOSIN HYDROCHLORIDE 0.4 MG/1
0.8 CAPSULE ORAL AT BEDTIME
Refills: 0 | Status: DISCONTINUED | OUTPATIENT
Start: 2023-06-03 | End: 2023-06-07

## 2023-06-03 RX ORDER — CIPROFLOXACIN LACTATE 400MG/40ML
VIAL (ML) INTRAVENOUS
Refills: 0 | Status: DISCONTINUED | OUTPATIENT
Start: 2023-06-03 | End: 2023-06-04

## 2023-06-03 RX ORDER — METOPROLOL TARTRATE 50 MG
2.5 TABLET ORAL ONCE
Refills: 0 | Status: COMPLETED | OUTPATIENT
Start: 2023-06-03 | End: 2023-06-03

## 2023-06-03 RX ORDER — PANTOPRAZOLE SODIUM 20 MG/1
40 TABLET, DELAYED RELEASE ORAL
Refills: 0 | Status: DISCONTINUED | OUTPATIENT
Start: 2023-06-03 | End: 2023-06-07

## 2023-06-03 RX ORDER — CIPROFLOXACIN LACTATE 400MG/40ML
200 VIAL (ML) INTRAVENOUS DAILY
Refills: 0 | Status: DISCONTINUED | OUTPATIENT
Start: 2023-06-04 | End: 2023-06-04

## 2023-06-03 RX ORDER — MULTIVIT-MIN/FERROUS GLUCONATE 9 MG/15 ML
1 LIQUID (ML) ORAL DAILY
Refills: 0 | Status: DISCONTINUED | OUTPATIENT
Start: 2023-06-03 | End: 2023-06-07

## 2023-06-03 RX ORDER — SODIUM CHLORIDE 9 MG/ML
250 INJECTION INTRAMUSCULAR; INTRAVENOUS; SUBCUTANEOUS ONCE
Refills: 0 | Status: COMPLETED | OUTPATIENT
Start: 2023-06-03 | End: 2023-06-03

## 2023-06-03 RX ORDER — VANCOMYCIN HCL 1 G
125 VIAL (EA) INTRAVENOUS ONCE
Refills: 0 | Status: COMPLETED | OUTPATIENT
Start: 2023-06-03 | End: 2023-06-03

## 2023-06-03 RX ORDER — ACETAMINOPHEN 500 MG
650 TABLET ORAL ONCE
Refills: 0 | Status: COMPLETED | OUTPATIENT
Start: 2023-06-03 | End: 2023-06-03

## 2023-06-03 RX ORDER — SODIUM CHLORIDE 9 MG/ML
1000 INJECTION INTRAMUSCULAR; INTRAVENOUS; SUBCUTANEOUS
Refills: 0 | Status: DISCONTINUED | OUTPATIENT
Start: 2023-06-03 | End: 2023-06-04

## 2023-06-03 RX ORDER — APIXABAN 2.5 MG/1
2.5 TABLET, FILM COATED ORAL EVERY 12 HOURS
Refills: 0 | Status: DISCONTINUED | OUTPATIENT
Start: 2023-06-03 | End: 2023-06-07

## 2023-06-03 RX ORDER — TIOTROPIUM BROMIDE 18 UG/1
2 CAPSULE ORAL; RESPIRATORY (INHALATION) DAILY
Refills: 0 | Status: DISCONTINUED | OUTPATIENT
Start: 2023-06-03 | End: 2023-06-07

## 2023-06-03 RX ORDER — SODIUM CHLORIDE 9 MG/ML
500 INJECTION INTRAMUSCULAR; INTRAVENOUS; SUBCUTANEOUS ONCE
Refills: 0 | Status: COMPLETED | OUTPATIENT
Start: 2023-06-03 | End: 2023-06-03

## 2023-06-03 RX ORDER — CIPROFLOXACIN LACTATE 400MG/40ML
200 VIAL (ML) INTRAVENOUS ONCE
Refills: 0 | Status: COMPLETED | OUTPATIENT
Start: 2023-06-03 | End: 2023-06-03

## 2023-06-03 RX ADMIN — SODIUM CHLORIDE 50 MILLILITER(S): 9 INJECTION INTRAMUSCULAR; INTRAVENOUS; SUBCUTANEOUS at 22:50

## 2023-06-03 RX ADMIN — APIXABAN 2.5 MILLIGRAM(S): 2.5 TABLET, FILM COATED ORAL at 17:06

## 2023-06-03 RX ADMIN — Medication 25 MILLIGRAM(S): at 19:00

## 2023-06-03 RX ADMIN — SODIUM CHLORIDE 50 MILLILITER(S): 9 INJECTION INTRAMUSCULAR; INTRAVENOUS; SUBCUTANEOUS at 22:16

## 2023-06-03 RX ADMIN — SODIUM CHLORIDE 500 MILLILITER(S): 9 INJECTION INTRAMUSCULAR; INTRAVENOUS; SUBCUTANEOUS at 22:50

## 2023-06-03 RX ADMIN — Medication 1 TABLET(S): at 17:06

## 2023-06-03 RX ADMIN — Medication 975 MILLIGRAM(S): at 22:50

## 2023-06-03 RX ADMIN — Medication 650 MILLIGRAM(S): at 08:27

## 2023-06-03 RX ADMIN — Medication 2.5 MILLIGRAM(S): at 22:42

## 2023-06-03 RX ADMIN — ATORVASTATIN CALCIUM 40 MILLIGRAM(S): 80 TABLET, FILM COATED ORAL at 22:16

## 2023-06-03 RX ADMIN — Medication 100 MILLIGRAM(S): at 23:50

## 2023-06-03 RX ADMIN — Medication 125 MILLIGRAM(S): at 14:53

## 2023-06-03 RX ADMIN — TAMSULOSIN HYDROCHLORIDE 0.8 MILLIGRAM(S): 0.4 CAPSULE ORAL at 22:16

## 2023-06-03 RX ADMIN — SODIUM CHLORIDE 500 MILLILITER(S): 9 INJECTION INTRAMUSCULAR; INTRAVENOUS; SUBCUTANEOUS at 08:27

## 2023-06-03 RX ADMIN — MONTELUKAST 10 MILLIGRAM(S): 4 TABLET, CHEWABLE ORAL at 17:05

## 2023-06-03 RX ADMIN — Medication 1 SPRAY(S): at 17:06

## 2023-06-03 NOTE — H&P ADULT - HISTORY OF PRESENT ILLNESS
89 yo reid PMHx A-fib on Eliquis CAD, HLD, HTN, asthma presents to the Emergency Department complaining of 4 days of liquid diarrhea.  Patient having multiple episodes per day, today daughter noted faint speckles of blood so brought patient to Emergency Department .  Of note patient was recently on antibiotics for acute bronchitis as well as steroids which she completed full course of.  Denies fever/chills, nausea/vomiting, chest pain, shortness of breath, urinary symptoms, palpitations.

## 2023-06-03 NOTE — ED PROVIDER NOTE - OBJECTIVE STATEMENT
89 yo reid PMHx A-fib on Eliquis CAD, HLD, HTN, asthma presents to the ED complaining of 4 days of liquid diarrhea.  Patient having multiple episodes per day, today daughter noted faint speckles of blood so brought patient to ED.  Of note patient was recently on antibiotics for acute bronchitis as well as steroids which she completed full course of.  Denies fever/chills, nausea/vomiting, chest pain, shortness of breath, urinary symptoms, palpitations. Compliant with Eliquis.

## 2023-06-03 NOTE — ED PROVIDER NOTE - CLINICAL SUMMARY MEDICAL DECISION MAKING FREE TEXT BOX
89-year-old male with a history of atrial fibrillation on Eliquis, history of asthma, recently was treated for acute bronchitis with antibiotics and prednisone, brought by his daughter today complaining of liquid diarrhea multiple times a day since Wednesday, and today she noticed speckles of blood, patient denies any fever chills nausea vomiting, physical examination mild abdominal tenderness in the left side and suprapubic,   concern for C. difficile colitis secondary to antibiotics, versus diverticulitis, ischemic colitis,   will obtain blood work CAT scan of the abdomen IV hydration stool for C. difficile and reassess ZR

## 2023-06-03 NOTE — ED ADULT NURSE NOTE - NSFALLHARMRISKINTERV_ED_ALL_ED

## 2023-06-03 NOTE — H&P ADULT - PROBLEM SELECTOR PLAN 1
unclear etiology  possibly ischemic  will continue to monitor closely  GI evaluation called by me  will send work up for anemia  Type and screen in AM

## 2023-06-03 NOTE — ED PROVIDER NOTE - PROGRESS NOTE DETAILS
pt endorsed to Dr. Banks, +pancolitis on CT. presumed from c-diff, sample obtained, will give dose PO vanco. - RL ChaoC

## 2023-06-03 NOTE — H&P ADULT - NSHPREVIEWOFSYSTEMS_GEN_ALL_CORE
Gen: no loss of wt no loss of appetite  ENT: no dizziness no hearing loss  Ophth: no blurring of vision no loss of vision  Resp: No cough no sputum production  CVS: No chest pain no palpitations no orthopnea  GI: no nausea, vomiting see above HPI   : no dysuria, hematuria  Endo: no polyuria no excessive sweating  Neuro: no weakness no paresthesias  Heme: No petechiae no easy bruising  Msk: No joint pain no swelling  Skin: No rash no itching

## 2023-06-03 NOTE — H&P ADULT - NSICDXPASTMEDICALHX_GEN_ALL_CORE_FT
PAST MEDICAL HISTORY:  Afib     Asthmatic bronchitis , chronic     GERD (gastroesophageal reflux disease)     HLD (hyperlipidemia)     HTN (hypertension)

## 2023-06-03 NOTE — PATIENT PROFILE ADULT - FALL HARM RISK - HARM RISK INTERVENTIONS
Assistance with ambulation/Assistance OOB with selected safe patient handling equipment/Communicate Risk of Fall with Harm to all staff/Monitor gait and stability/Reinforce activity limits and safety measures with patient and family/Tailored Fall Risk Interventions/Use of alarms - bed, chair and/or voice tab/Visual Cue: Yellow wristband and red socks/Bed in lowest position, wheels locked, appropriate side rails in place/Call bell, personal items and telephone in reach/Instruct patient to call for assistance before getting out of bed or chair/Non-slip footwear when patient is out of bed/[place] to call system/Physically safe environment - no spills, clutter or unnecessary equipment/Purposeful Proactive Rounding/Room/bathroom lighting operational, light cord in reach

## 2023-06-03 NOTE — ED ADULT NURSE NOTE - OBJECTIVE STATEMENT
89 y/o M A&Ox3 w/ PMH of aquilino (on Eliquis) and Asthma presents to the ED complaining of abdominal pain and associated bloody diarrhea. PT states that diarrhea started yesterday and he noticed blood in the stool this morning. Pt states that he was taking antibiotics and steroids (unsure of medication) several weeks ago for respiratory infection. Pt abdomen is distended and tender upon palpation. Pt rates pain 6/10 and describes pain as intermittent. Pt endorses frequent urination as well but denies burning, pain or bloody urine. Per pt's daughter, pt has also had swelling in feet bilaterally. On assessment, audible wheezing heard. Pt denies N/V, fever, chills, SOB, headache, urinary symptoms and body aches. Pt placed in own room for r/o c.diff.

## 2023-06-03 NOTE — H&P ADULT - NSHPPHYSICALEXAM_GEN_ALL_CORE
PHYSICAL EXAM: vital signs noted on [place]  in no apparent distress  HEENT: KATTY EOMI  Neck: Supple, no JVD, no thyromegaly  Lungs: no wheeze, no crackles  CVS: S1 S2 no M/R/G  Abdomen: no tenderness, mild diffuse distension no organomegaly, BS present  Neuro: Alert no focal weakness, no sensory abnormalities  Skin: warm, dry  Ext: no cyanosis or clubbing, no edema  Msk: no joint swelling or deformities  Back: no CVA tenderness, no kyphosis/scoliosis

## 2023-06-03 NOTE — H&P ADULT - ASSESSMENT
89 yo reid PMHx A-fib on Eliquis CAD, HLD, HTN, asthma presents to the ED complaining of 4 days of liquid diarrhea clinical presentation consistent with colitis of unclear etiology C diff infection ruled out

## 2023-06-04 LAB
A1C WITH ESTIMATED AVERAGE GLUCOSE RESULT: 6.4 % — HIGH (ref 4–5.6)
ANION GAP SERPL CALC-SCNC: 10 MMOL/L — SIGNIFICANT CHANGE UP (ref 5–17)
BLD GP AB SCN SERPL QL: NEGATIVE — SIGNIFICANT CHANGE UP
BUN SERPL-MCNC: 17 MG/DL — SIGNIFICANT CHANGE UP (ref 7–23)
CALCIUM SERPL-MCNC: 8.1 MG/DL — LOW (ref 8.4–10.5)
CHLORIDE SERPL-SCNC: 107 MMOL/L — SIGNIFICANT CHANGE UP (ref 96–108)
CO2 SERPL-SCNC: 21 MMOL/L — LOW (ref 22–31)
CREAT SERPL-MCNC: 1.3 MG/DL — SIGNIFICANT CHANGE UP (ref 0.5–1.3)
EGFR: 50 ML/MIN/1.73M2 — LOW
ESTIMATED AVERAGE GLUCOSE: 137 MG/DL — HIGH (ref 68–114)
FERRITIN SERPL-MCNC: 63 NG/ML — SIGNIFICANT CHANGE UP (ref 30–400)
FOLATE SERPL-MCNC: 17.4 NG/ML — SIGNIFICANT CHANGE UP
GLUCOSE SERPL-MCNC: 109 MG/DL — HIGH (ref 70–99)
HCT VFR BLD CALC: 28.7 % — LOW (ref 39–50)
HGB BLD-MCNC: 8.9 G/DL — LOW (ref 13–17)
IRON SATN MFR SERPL: 11 UG/DL — LOW (ref 45–165)
IRON SATN MFR SERPL: 4 % — LOW (ref 16–55)
MCHC RBC-ENTMCNC: 23.5 PG — LOW (ref 27–34)
MCHC RBC-ENTMCNC: 31 GM/DL — LOW (ref 32–36)
MCV RBC AUTO: 75.9 FL — LOW (ref 80–100)
NRBC # BLD: 0 /100 WBCS — SIGNIFICANT CHANGE UP (ref 0–0)
PLATELET # BLD AUTO: 142 K/UL — LOW (ref 150–400)
POTASSIUM SERPL-MCNC: 3.4 MMOL/L — LOW (ref 3.5–5.3)
POTASSIUM SERPL-SCNC: 3.4 MMOL/L — LOW (ref 3.5–5.3)
RBC # BLD: 3.78 M/UL — LOW (ref 4.2–5.8)
RBC # FLD: 20.3 % — HIGH (ref 10.3–14.5)
RH IG SCN BLD-IMP: NEGATIVE — SIGNIFICANT CHANGE UP
SODIUM SERPL-SCNC: 138 MMOL/L — SIGNIFICANT CHANGE UP (ref 135–145)
TIBC SERPL-MCNC: 294 UG/DL — SIGNIFICANT CHANGE UP (ref 220–430)
TSH SERPL-MCNC: 1.86 UIU/ML — SIGNIFICANT CHANGE UP (ref 0.27–4.2)
UIBC SERPL-MCNC: 283 UG/DL — SIGNIFICANT CHANGE UP (ref 110–370)
VIT B12 SERPL-MCNC: 401 PG/ML — SIGNIFICANT CHANGE UP (ref 232–1245)
WBC # BLD: 3.47 K/UL — LOW (ref 3.8–10.5)
WBC # FLD AUTO: 3.47 K/UL — LOW (ref 3.8–10.5)

## 2023-06-04 RX ORDER — CHLORHEXIDINE GLUCONATE 213 G/1000ML
1 SOLUTION TOPICAL DAILY
Refills: 0 | Status: DISCONTINUED | OUTPATIENT
Start: 2023-06-04 | End: 2023-06-07

## 2023-06-04 RX ORDER — POTASSIUM CHLORIDE 20 MEQ
20 PACKET (EA) ORAL ONCE
Refills: 0 | Status: COMPLETED | OUTPATIENT
Start: 2023-06-04 | End: 2023-06-04

## 2023-06-04 RX ORDER — METOPROLOL TARTRATE 50 MG
50 TABLET ORAL
Refills: 0 | Status: DISCONTINUED | OUTPATIENT
Start: 2023-06-04 | End: 2023-06-07

## 2023-06-04 RX ORDER — AZITHROMYCIN 500 MG/1
500 TABLET, FILM COATED ORAL DAILY
Refills: 0 | Status: DISCONTINUED | OUTPATIENT
Start: 2023-06-04 | End: 2023-06-07

## 2023-06-04 RX ORDER — METOPROLOL TARTRATE 50 MG
2.5 TABLET ORAL ONCE
Refills: 0 | Status: COMPLETED | OUTPATIENT
Start: 2023-06-04 | End: 2023-06-04

## 2023-06-04 RX ORDER — LACTOBACILLUS ACIDOPHILUS 100MM CELL
1 CAPSULE ORAL
Refills: 0 | Status: DISCONTINUED | OUTPATIENT
Start: 2023-06-04 | End: 2023-06-07

## 2023-06-04 RX ORDER — SIMETHICONE 80 MG/1
80 TABLET, CHEWABLE ORAL ONCE
Refills: 0 | Status: COMPLETED | OUTPATIENT
Start: 2023-06-04 | End: 2023-06-04

## 2023-06-04 RX ADMIN — ATORVASTATIN CALCIUM 40 MILLIGRAM(S): 80 TABLET, FILM COATED ORAL at 21:30

## 2023-06-04 RX ADMIN — Medication 2.5 MILLIGRAM(S): at 10:50

## 2023-06-04 RX ADMIN — Medication 1 TABLET(S): at 11:00

## 2023-06-04 RX ADMIN — APIXABAN 2.5 MILLIGRAM(S): 2.5 TABLET, FILM COATED ORAL at 17:01

## 2023-06-04 RX ADMIN — Medication 100 MILLIGRAM(S): at 05:09

## 2023-06-04 RX ADMIN — Medication 1 TABLET(S): at 17:01

## 2023-06-04 RX ADMIN — TAMSULOSIN HYDROCHLORIDE 0.8 MILLIGRAM(S): 0.4 CAPSULE ORAL at 21:30

## 2023-06-04 RX ADMIN — PANTOPRAZOLE SODIUM 40 MILLIGRAM(S): 20 TABLET, DELAYED RELEASE ORAL at 06:08

## 2023-06-04 RX ADMIN — APIXABAN 2.5 MILLIGRAM(S): 2.5 TABLET, FILM COATED ORAL at 05:07

## 2023-06-04 RX ADMIN — SIMETHICONE 80 MILLIGRAM(S): 80 TABLET, CHEWABLE ORAL at 18:19

## 2023-06-04 RX ADMIN — Medication 20 MILLIEQUIVALENT(S): at 10:50

## 2023-06-04 RX ADMIN — AZITHROMYCIN 500 MILLIGRAM(S): 500 TABLET, FILM COATED ORAL at 10:51

## 2023-06-04 RX ADMIN — Medication 25 MILLIGRAM(S): at 05:06

## 2023-06-04 RX ADMIN — TIOTROPIUM BROMIDE 2 PUFF(S): 18 CAPSULE ORAL; RESPIRATORY (INHALATION) at 11:00

## 2023-06-04 RX ADMIN — Medication 975 MILLIGRAM(S): at 06:05

## 2023-06-04 RX ADMIN — Medication 1 SPRAY(S): at 05:07

## 2023-06-04 RX ADMIN — CHLORHEXIDINE GLUCONATE 1 APPLICATION(S): 213 SOLUTION TOPICAL at 10:55

## 2023-06-04 RX ADMIN — Medication 50 MILLIGRAM(S): at 17:01

## 2023-06-04 RX ADMIN — Medication 1 TABLET(S): at 10:51

## 2023-06-04 RX ADMIN — MONTELUKAST 10 MILLIGRAM(S): 4 TABLET, CHEWABLE ORAL at 10:51

## 2023-06-04 NOTE — PROVIDER CONTACT NOTE (OTHER) - ASSESSMENT
patient a&ox4, tachycardic with heart rate of 126 bpm on noninvasive BP machine (afebrile, BP normal). pt denies pain & no s/s of distress noted.
patient axo 4. noted with abdominal distention. Patient stated that he feels full and bloated and having poor appetite. upon palpation its soft to touch no and denies pain.
pt a&ox4, tachycardic with HR fluctuating between 120-160. afebrile, BP WDL. pt denies pain & no s/s of distress noted.

## 2023-06-04 NOTE — PROVIDER CONTACT NOTE (OTHER) - BACKGROUND
dx- pancolitis w/ diarrhea
patient admitted for pancolitis
pt admitted for pancolitis with hx of afib.

## 2023-06-04 NOTE — PROVIDER CONTACT NOTE (OTHER) - ACTION/TREATMENT ORDERED:
12-Lead EKG done & showed pt to be in active A-fib. cardiac monitor attached to pt showing fluctuating HR. 250 ml NS bolus & PO tylenol administered. IVP Lopressor administered by provider. transfer
Juan Ramon barajas made aware and evaluated patient at bedside. will keep on monitoring
reassess patient once he is more settled

## 2023-06-04 NOTE — PROGRESS NOTE ADULT - PROBLEM SELECTOR PLAN 2
continue apixaban  no evidence of significant blood loss  heart rate controlled continue apixaban  no evidence of significant blood loss  heart rate uncontrolled  will increase metoprolol to tartrate 50 BID with hold parameters

## 2023-06-04 NOTE — PROVIDER CONTACT NOTE (OTHER) - SITUATION
patient received from ED, and vitals show patient to be tachycardic at 126 bpm.
patient's heart rate rechecked once more settled during his admission; patient remains tachycardic with heart rate fluctuating between 120's-160.
abdominal distention

## 2023-06-04 NOTE — PROGRESS NOTE ADULT - ASSESSMENT
89 yo reid PMHx A-fib on Eliquis CAD, HLD, HTN, asthma presents to the ED complaining of 4 days of liquid diarrhea clinical presentation consistent with colitis of unclear etiology C diff infection ruled out 89 yo malignancye PMH A-fib on Eliquis CAD, HLD, HTN, asthma presents to the ED complaining of 4 days of liquid diarrhea clinical presentation consistent with colitis of unclear etiology C diff infection ruled out

## 2023-06-04 NOTE — CONSULT NOTE ADULT - ASSESSMENT
colitis  diarrhea  campylobacter    plan  follow up stool studies campylobacter positive  bacid one tab tid  low residue lactose free diet  if stool negative consider imodium  replete electrolytes as needed   will follow  po antibiotics

## 2023-06-05 LAB
ANION GAP SERPL CALC-SCNC: 10 MMOL/L — SIGNIFICANT CHANGE UP (ref 5–17)
BUN SERPL-MCNC: 14 MG/DL — SIGNIFICANT CHANGE UP (ref 7–23)
CALCIUM SERPL-MCNC: 8.3 MG/DL — LOW (ref 8.4–10.5)
CHLORIDE SERPL-SCNC: 107 MMOL/L — SIGNIFICANT CHANGE UP (ref 96–108)
CO2 SERPL-SCNC: 22 MMOL/L — SIGNIFICANT CHANGE UP (ref 22–31)
CREAT SERPL-MCNC: 1.15 MG/DL — SIGNIFICANT CHANGE UP (ref 0.5–1.3)
CULTURE RESULTS: SIGNIFICANT CHANGE UP
EGFR: 58 ML/MIN/1.73M2 — LOW
GLUCOSE SERPL-MCNC: 93 MG/DL — SIGNIFICANT CHANGE UP (ref 70–99)
HCT VFR BLD CALC: 31.2 % — LOW (ref 39–50)
HGB BLD-MCNC: 9.4 G/DL — LOW (ref 13–17)
MCHC RBC-ENTMCNC: 23.2 PG — LOW (ref 27–34)
MCHC RBC-ENTMCNC: 30.1 GM/DL — LOW (ref 32–36)
MCV RBC AUTO: 76.8 FL — LOW (ref 80–100)
NRBC # BLD: 0 /100 WBCS — SIGNIFICANT CHANGE UP (ref 0–0)
PLATELET # BLD AUTO: 144 K/UL — LOW (ref 150–400)
POTASSIUM SERPL-MCNC: 3.8 MMOL/L — SIGNIFICANT CHANGE UP (ref 3.5–5.3)
POTASSIUM SERPL-SCNC: 3.8 MMOL/L — SIGNIFICANT CHANGE UP (ref 3.5–5.3)
RBC # BLD: 4.06 M/UL — LOW (ref 4.2–5.8)
RBC # FLD: 20.2 % — HIGH (ref 10.3–14.5)
SODIUM SERPL-SCNC: 139 MMOL/L — SIGNIFICANT CHANGE UP (ref 135–145)
SPECIMEN SOURCE: SIGNIFICANT CHANGE UP
WBC # BLD: 5.5 K/UL — SIGNIFICANT CHANGE UP (ref 3.8–10.5)
WBC # FLD AUTO: 5.5 K/UL — SIGNIFICANT CHANGE UP (ref 3.8–10.5)

## 2023-06-05 PROCEDURE — 74018 RADEX ABDOMEN 1 VIEW: CPT | Mod: 26

## 2023-06-05 RX ORDER — BUDESONIDE AND FORMOTEROL FUMARATE DIHYDRATE 160; 4.5 UG/1; UG/1
2 AEROSOL RESPIRATORY (INHALATION)
Refills: 0 | Status: DISCONTINUED | OUTPATIENT
Start: 2023-06-05 | End: 2023-06-07

## 2023-06-05 RX ORDER — ATORVASTATIN CALCIUM 80 MG/1
20 TABLET, FILM COATED ORAL AT BEDTIME
Refills: 0 | Status: DISCONTINUED | OUTPATIENT
Start: 2023-06-05 | End: 2023-06-07

## 2023-06-05 RX ADMIN — Medication 1 SPRAY(S): at 05:22

## 2023-06-05 RX ADMIN — CHLORHEXIDINE GLUCONATE 1 APPLICATION(S): 213 SOLUTION TOPICAL at 11:25

## 2023-06-05 RX ADMIN — Medication 1 TABLET(S): at 13:00

## 2023-06-05 RX ADMIN — APIXABAN 2.5 MILLIGRAM(S): 2.5 TABLET, FILM COATED ORAL at 17:02

## 2023-06-05 RX ADMIN — APIXABAN 2.5 MILLIGRAM(S): 2.5 TABLET, FILM COATED ORAL at 05:22

## 2023-06-05 RX ADMIN — TAMSULOSIN HYDROCHLORIDE 0.8 MILLIGRAM(S): 0.4 CAPSULE ORAL at 21:52

## 2023-06-05 RX ADMIN — AZITHROMYCIN 500 MILLIGRAM(S): 500 TABLET, FILM COATED ORAL at 11:24

## 2023-06-05 RX ADMIN — Medication 50 MILLIGRAM(S): at 17:02

## 2023-06-05 RX ADMIN — ATORVASTATIN CALCIUM 20 MILLIGRAM(S): 80 TABLET, FILM COATED ORAL at 21:53

## 2023-06-05 RX ADMIN — Medication 1 SPRAY(S): at 17:24

## 2023-06-05 RX ADMIN — MONTELUKAST 10 MILLIGRAM(S): 4 TABLET, CHEWABLE ORAL at 11:24

## 2023-06-05 RX ADMIN — Medication 50 MILLIGRAM(S): at 05:22

## 2023-06-05 RX ADMIN — Medication 1 TABLET(S): at 11:24

## 2023-06-05 RX ADMIN — TIOTROPIUM BROMIDE 2 PUFF(S): 18 CAPSULE ORAL; RESPIRATORY (INHALATION) at 11:24

## 2023-06-05 RX ADMIN — Medication 1 TABLET(S): at 17:02

## 2023-06-05 RX ADMIN — PANTOPRAZOLE SODIUM 40 MILLIGRAM(S): 20 TABLET, DELAYED RELEASE ORAL at 05:27

## 2023-06-05 RX ADMIN — Medication 1 TABLET(S): at 09:26

## 2023-06-05 NOTE — DIETITIAN INITIAL EVALUATION ADULT - REASON INDICATOR FOR ASSESSMENT
Consult received for MST score 2 or >   Information obtained from pt, PCA, electronic medical record

## 2023-06-05 NOTE — PROGRESS NOTE ADULT - ASSESSMENT
89 yo malignancy PMH A-fib on Eliquis CAD, HLD, HTN, asthma presents to the ED complaining of 4 days of liquid diarrhea clinical presentation consistent with colitis of unclear etiology C diff infection ruled out

## 2023-06-05 NOTE — DIETITIAN INITIAL EVALUATION ADULT - ADD RECOMMEND
- Will continue to monitor PO intake, weight, labs, skin, GI status, diet.   - Nutrition care plan to remain consistent with pt goals of care  - RD remains available to review diet education and adjust diet recommendations as needed.

## 2023-06-05 NOTE — DIETITIAN INITIAL EVALUATION ADULT - NSFNSPHYEXAMSKINFT_GEN_A_CORE
Pt states UBW ~146 pounds; noted dosing wt 141.9   ? wt loss (3% wt loss vs. inaccurate dosing wt?); pt reports he used to wt ~132 pounds "some time ago"  ->monitor wt trends   100% IBW ( pounds)  Skin: no noted pressure injuries as per flowsheets

## 2023-06-05 NOTE — DIETITIAN INITIAL EVALUATION ADULT - OTHER INFO
Home Medications:  Eliquis 2.5 mg oral tablet: 1 tab(s) orally 2 times a day (03 Jun 2023 13:40)  Flomax 0.4 mg oral capsule: 2 cap(s) orally once a day (03 Jun 2023 13:41)  Flonase 50 mcg/inh nasal spray: 2 spray(s) in each nostril once a day (03 Jun 2023 13:42)  furosemide 20 mg oral tablet: 1 tab(s) orally 2 times a day (03 Jun 2023 13:40)  metoprolol succinate 25 mg oral tablet, extended release: 1 tab(s) orally once a day (03 Jun 2023 13:40)  montelukast 10 mg oral tablet: 1 tab(s) orally once a day (03 Jun 2023 13:40)  OTC: Vitamin D3, Omega 3 1000:  (03 Jun 2023 13:41)  pantoprazole 40 mg oral delayed release tablet: 1 tab(s) orally once a day (03 Jun 2023 13:41)  PreserVision AREDS 2 oral capsule: 1 cap(s) orally once a day (03 Jun 2023 13:41)  simvastatin 20 mg oral tablet: 1 tab(s) orally once a day (03 Jun 2023 13:41)  Trelegy Ellipta 100 mcg-62.5 mcg-25 mcg/inh inhalation powder: 1 puff(s) inhaled once a day (03 Jun 2023 13:40)

## 2023-06-05 NOTE — DIETITIAN INITIAL EVALUATION ADULT - PERSON TAUGHT/METHOD
- RD provided low-fiber nutrition therapy including importance of avoiding  fiber rich foods, fresh fruits/vegetables, whole grains, and added fiber in processed foods. Discussed chewing foods well and adequate hydration and protein intake. Discussed gradual reintroduction of fiber back into diet once cleared by MD. Pt verbalized understanding and accepted written handout. Patient with no nutrition-related questions at this time. Made aware RD remains available as needed.   - Discussed low Na nutrition education. Reviewed foods high in Na  to avoid. Reviewed ways to decrease Na in your diet, discussed meal and snack options; provided written Heart Healthy Eating Nutrition Therapy handout to Pt./verbal instruction/written material/teach back - (Patient repeats in own words)/patient instructed

## 2023-06-05 NOTE — DIETITIAN INITIAL EVALUATION ADULT - NS FNS DIET ORDER
Diet, DASH/TLC:   Sodium & Cholesterol Restricted  Fiber/Residue Restricted (LOWFIBER)  No Lactose (06-04-23)

## 2023-06-05 NOTE — PHARMACOTHERAPY INTERVENTION NOTE - COMMENTS
Home Medications:  Eliquis 2.5 mg oral tablet: 1 tab(s) orally 2 times a day   Flomax 0.4 mg oral capsule: 2 cap(s) orally once a day   Flonase 50 mcg/inh nasal spray: 2 spray(s) in each nostril once a day   furosemide 20 mg oral tablet: 1 tab(s) orally 2 times a day   metoprolol succinate 25 mg oral tablet, extended release: 1 tab(s) orally once a day  montelukast 10 mg oral tablet: 1 tab(s) orally once a day   OTC: Vitamin D3, Omega 3 1000:    pantoprazole 40 mg oral delayed release tablet: 1 tab(s) orally once a day   PreserVision AREDS 2 oral capsule: 1 cap(s) orally once a day   simvastatin 20 mg oral tablet: 1 tab(s) orally once a day   Trelegy Ellipta 100 mcg-62.5 mcg-25 mcg/inh inhalation powder: 1 puff(s) inhaled once a day     Recommend to decrease atorvastatin 40 mg to 20 mg as patient was taking a moderate intensity statin at home (simvastatin 20 mg). In addition, recommend to initiate Symbicort 160/4.5 2 puffs twice daily since pts home Trelegy is not on formulary. Discussed plan with team.    Best regards,  Lara Rock PharmD  PGY-1 Resident   [facility] [id number]/Teams

## 2023-06-05 NOTE — DIETITIAN INITIAL EVALUATION ADULT - ORAL INTAKE PTA/DIET HISTORY
Pt endorses good appetite and PO intake PTA.   Reports not following specific diet/ diet restrictions PTA and confirmed no known food allergies/ food intolerances

## 2023-06-05 NOTE — PROGRESS NOTE ADULT - ASSESSMENT
colitis  diarrhea  campylobacter    plan  follow up stool studies; campylobacter positive  bacid one tab tid  low residue lactose free diet  replete electrolytes as needed   will follow  po antibiotics

## 2023-06-05 NOTE — DIETITIAN INITIAL EVALUATION ADULT - PERTINENT MEDS FT
MEDICATIONS  (STANDING):  apixaban 2.5 milliGRAM(s) Oral every 12 hours  atorvastatin 20 milliGRAM(s) Oral at bedtime  azithromycin   Tablet 500 milliGRAM(s) Oral daily  budesonide 160 MICROgram(s)/formoterol 4.5 MICROgram(s) Inhaler 2 Puff(s) Inhalation two times a day  chlorhexidine 2% Cloths 1 Application(s) Topical daily  fluticasone propionate 50 MICROgram(s)/spray Nasal Spray 1 Spray(s) Both Nostrils two times a day  lactobacillus acidophilus 1 Tablet(s) Oral three times a day with meals  metoprolol tartrate 50 milliGRAM(s) Oral two times a day  montelukast 10 milliGRAM(s) Oral daily  multivitamin/minerals 1 Tablet(s) Oral daily  pantoprazole    Tablet 40 milliGRAM(s) Oral before breakfast  tamsulosin 0.8 milliGRAM(s) Oral at bedtime  tiotropium 2.5 MICROgram(s) Inhaler 2 Puff(s) Inhalation daily    MEDICATIONS  (PRN):

## 2023-06-05 NOTE — DIETITIAN INITIAL EVALUATION ADULT - PERTINENT LABORATORY DATA
06-05    139  |  107  |  14  ----------------------------<  93  3.8   |  22  |  1.15    Ca    8.3<L>      05 Jun 2023 07:05    A1C with Estimated Average Glucose Result: 6.4 % (06-04-23 @ 05:57)  06-05 @ 07:05: Na 139, BUN 14, Cr 1.15, BG 93, K+ 3.8, Phos --, Mg --, Alk Phos --, ALT/SGPT --, AST/SGOT --, HbA1c --

## 2023-06-05 NOTE — DIETITIAN INITIAL EVALUATION ADULT - PATIENT MEETS CRITERIA FOR MALNUTRITION
Call to patient for BP , was 146/82 last night after second dose of amlodipine and 153/75 before morning meds this morning, 149/77 after. States normally runs in 130/70s but hasn't been checking it lately, advised her to monitor and inform us. no

## 2023-06-05 NOTE — DIETITIAN INITIAL EVALUATION ADULT - CONTINUE CURRENT NUTRITION CARE PLAN
- liberalize DASH-> low Na to promote adequate PO  - continue low fiber no diary diet; monitor tolerance and advance as able/tolerated- deferred to team/ GI recs/yes

## 2023-06-05 NOTE — DIETITIAN INITIAL EVALUATION ADULT - NSFNSGIASSESSMENTFT_GEN_A_CORE
- Pt denies nausea, vomiting, or constipation.   - Last BM: had loose stools, today- loose-pasty per PCA; not currently ordered for bowel regimen  - Pt admitted to hospital with diarrhea; denies nausea, vomiting, or constipation at this time   - Last BM: had loose stools, today- loose-pasty per PCA; not currently ordered for bowel regimen

## 2023-06-06 LAB
ANION GAP SERPL CALC-SCNC: 13 MMOL/L — SIGNIFICANT CHANGE UP (ref 5–17)
BUN SERPL-MCNC: 16 MG/DL — SIGNIFICANT CHANGE UP (ref 7–23)
CALCIUM SERPL-MCNC: 8.6 MG/DL — SIGNIFICANT CHANGE UP (ref 8.4–10.5)
CHLORIDE SERPL-SCNC: 106 MMOL/L — SIGNIFICANT CHANGE UP (ref 96–108)
CO2 SERPL-SCNC: 21 MMOL/L — LOW (ref 22–31)
CREAT SERPL-MCNC: 1.19 MG/DL — SIGNIFICANT CHANGE UP (ref 0.5–1.3)
CULTURE RESULTS: SIGNIFICANT CHANGE UP
EGFR: 56 ML/MIN/1.73M2 — LOW
GLUCOSE SERPL-MCNC: 100 MG/DL — HIGH (ref 70–99)
HCT VFR BLD CALC: 32.2 % — LOW (ref 39–50)
HGB BLD-MCNC: 9.6 G/DL — LOW (ref 13–17)
MCHC RBC-ENTMCNC: 22.6 PG — LOW (ref 27–34)
MCHC RBC-ENTMCNC: 29.8 GM/DL — LOW (ref 32–36)
MCV RBC AUTO: 75.8 FL — LOW (ref 80–100)
NRBC # BLD: 0 /100 WBCS — SIGNIFICANT CHANGE UP (ref 0–0)
PLATELET # BLD AUTO: 168 K/UL — SIGNIFICANT CHANGE UP (ref 150–400)
POTASSIUM SERPL-MCNC: 4.2 MMOL/L — SIGNIFICANT CHANGE UP (ref 3.5–5.3)
POTASSIUM SERPL-SCNC: 4.2 MMOL/L — SIGNIFICANT CHANGE UP (ref 3.5–5.3)
RBC # BLD: 4.25 M/UL — SIGNIFICANT CHANGE UP (ref 4.2–5.8)
RBC # FLD: 20.1 % — HIGH (ref 10.3–14.5)
SODIUM SERPL-SCNC: 140 MMOL/L — SIGNIFICANT CHANGE UP (ref 135–145)
SPECIMEN SOURCE: SIGNIFICANT CHANGE UP
WBC # BLD: 6.17 K/UL — SIGNIFICANT CHANGE UP (ref 3.8–10.5)
WBC # FLD AUTO: 6.17 K/UL — SIGNIFICANT CHANGE UP (ref 3.8–10.5)

## 2023-06-06 RX ADMIN — Medication 1 SPRAY(S): at 05:54

## 2023-06-06 RX ADMIN — Medication 1 TABLET(S): at 08:54

## 2023-06-06 RX ADMIN — TIOTROPIUM BROMIDE 2 PUFF(S): 18 CAPSULE ORAL; RESPIRATORY (INHALATION) at 11:39

## 2023-06-06 RX ADMIN — Medication 1 SPRAY(S): at 17:16

## 2023-06-06 RX ADMIN — Medication 50 MILLIGRAM(S): at 17:17

## 2023-06-06 RX ADMIN — Medication 1 TABLET(S): at 11:40

## 2023-06-06 RX ADMIN — ATORVASTATIN CALCIUM 20 MILLIGRAM(S): 80 TABLET, FILM COATED ORAL at 21:18

## 2023-06-06 RX ADMIN — PANTOPRAZOLE SODIUM 40 MILLIGRAM(S): 20 TABLET, DELAYED RELEASE ORAL at 05:54

## 2023-06-06 RX ADMIN — Medication 50 MILLIGRAM(S): at 05:54

## 2023-06-06 RX ADMIN — TAMSULOSIN HYDROCHLORIDE 0.8 MILLIGRAM(S): 0.4 CAPSULE ORAL at 21:18

## 2023-06-06 RX ADMIN — APIXABAN 2.5 MILLIGRAM(S): 2.5 TABLET, FILM COATED ORAL at 05:54

## 2023-06-06 RX ADMIN — BUDESONIDE AND FORMOTEROL FUMARATE DIHYDRATE 2 PUFF(S): 160; 4.5 AEROSOL RESPIRATORY (INHALATION) at 21:18

## 2023-06-06 RX ADMIN — BUDESONIDE AND FORMOTEROL FUMARATE DIHYDRATE 2 PUFF(S): 160; 4.5 AEROSOL RESPIRATORY (INHALATION) at 11:39

## 2023-06-06 RX ADMIN — Medication 1 TABLET(S): at 11:39

## 2023-06-06 RX ADMIN — CHLORHEXIDINE GLUCONATE 1 APPLICATION(S): 213 SOLUTION TOPICAL at 12:36

## 2023-06-06 RX ADMIN — APIXABAN 2.5 MILLIGRAM(S): 2.5 TABLET, FILM COATED ORAL at 17:16

## 2023-06-06 RX ADMIN — AZITHROMYCIN 500 MILLIGRAM(S): 500 TABLET, FILM COATED ORAL at 11:40

## 2023-06-06 RX ADMIN — BUDESONIDE AND FORMOTEROL FUMARATE DIHYDRATE 2 PUFF(S): 160; 4.5 AEROSOL RESPIRATORY (INHALATION) at 08:57

## 2023-06-06 RX ADMIN — Medication 1 TABLET(S): at 17:16

## 2023-06-06 RX ADMIN — MONTELUKAST 10 MILLIGRAM(S): 4 TABLET, CHEWABLE ORAL at 11:39

## 2023-06-06 NOTE — PHYSICAL THERAPY INITIAL EVALUATION ADULT - PERTINENT HX OF CURRENT PROBLEM, REHAB EVAL
89 yo malignancy PMH A-fib on Eliquis CAD, HLD, HTN, asthma presents to the ED complaining of 4 days of liquid diarrhea clinical presentation consistent with colitis of unclear etiology C diff infection ruled out. Dx: Colitis (secondary to campylobacter infection), Afib, Stage 3 chronic kidney disease, Chronic Asthmatic bronchitis, HTN , HLD, GERD.

## 2023-06-06 NOTE — CONSULT NOTE ADULT - ASSESSMENT
Assessment and Plan    1) Afib , rate controlled , c/w metoprolol and eliquis     2) Diarrhea : t/t per GI and primary team     3) DVT PPX Eliquis

## 2023-06-06 NOTE — CONSULT NOTE ADULT - SUBJECTIVE AND OBJECTIVE BOX
Chief Complaint:  Patient is a 89y old  Male who presents with a chief complaint of diarrhea (03 Jun 2023 15:15)      HPI:    Allergies:  No Known Allergies      Medications:  apixaban 2.5 milliGRAM(s) Oral every 12 hours  atorvastatin 40 milliGRAM(s) Oral at bedtime  azithromycin   Tablet 500 milliGRAM(s) Oral daily  chlorhexidine 2% Cloths 1 Application(s) Topical daily  fluticasone propionate 50 MICROgram(s)/spray Nasal Spray 1 Spray(s) Both Nostrils two times a day  metoprolol succinate ER 25 milliGRAM(s) Oral daily  metoprolol tartrate Injectable 2.5 milliGRAM(s) IV Push once  montelukast 10 milliGRAM(s) Oral daily  multivitamin/minerals 1 Tablet(s) Oral daily  pantoprazole    Tablet 40 milliGRAM(s) Oral before breakfast  potassium chloride    Tablet ER 20 milliEquivalent(s) Oral once  sodium chloride 0.9%. 1000 milliLiter(s) IV Continuous <Continuous>  tamsulosin 0.8 milliGRAM(s) Oral at bedtime  tiotropium 2.5 MICROgram(s) Inhaler 2 Puff(s) Inhalation daily      PMHX/PSHX:  Afib    Asthmatic bronchitis , chronic    GERD (gastroesophageal reflux disease)    HLD (hyperlipidemia)    HTN (hypertension)    No significant past surgical history        Family history:  No pertinent family history in first degree relatives        Social History:     ROS:     General:  No wt loss, fevers, chills, night sweats, fatigue,   Eyes:  Good vision, no reported pain  ENT:  No sore throat, pain, runny nose, dysphagia  CV:  No pain, palpitations, hypo/hypertension  Resp:  No dyspnea, cough, tachypnea, wheezing  GI:  No pain, No nausea, No vomiting, No diarrhea, No constipation, No weight loss, No fever, No pruritis, No rectal bleeding, No tarry stools, No dysphagia,  :  No pain, bleeding, incontinence, nocturia  Muscle:  No pain, weakness  Neuro:  No weakness, tingling, memory problems  Psych:  No fatigue, insomnia, mood problems, depression  Endocrine:  No polyuria, polydipsia, cold/heat intolerance  Heme:  No petechiae, ecchymosis, easy bruisability  Skin:  No rash, tattoos, scars, edema      PHYSICAL EXAM:   Vital Signs:  Vital Signs Last 24 Hrs  T(C): 36.4 (04 Jun 2023 04:52), Max: 36.6 (03 Jun 2023 13:41)  T(F): 97.6 (04 Jun 2023 04:52), Max: 97.9 (03 Jun 2023 18:59)  HR: 112 (04 Jun 2023 04:52) (90 - 142)  BP: 119/69 (04 Jun 2023 04:52) (102/65 - 138/82)  BP(mean): --  RR: 18 (04 Jun 2023 04:52) (18 - 18)  SpO2: 99% (04 Jun 2023 04:52) (94% - 100%)    Parameters below as of 04 Jun 2023 04:52  Patient On (Oxygen Delivery Method): room air      Daily     Daily     GENERAL:  Appears stated age, well-groomed, well-nourished, no distress  HEENT:  NC/AT,  conjunctivae clear and pink, no thyromegaly, nodules, adenopathy, no JVD, sclera -anicteric  CHEST:  Full & symmetric excursion, no increased effort, breath sounds clear  HEART:  Regular rhythm, S1, S2, no murmur/rub/S3/S4, no abdominal bruit, no edema  ABDOMEN:  Soft, non-tender, non-distended, normoactive bowel sounds,  no masses ,no hepato-splenomegaly, no signs of chronic liver disease  EXTEREMITIES:  no cyanosis,clubbing or edema  SKIN:  No rash/erythema/ecchymoses/petechiae/wounds/abscess/warm/dry  NEURO:  Alert, oriented, no asterixis, no tremor, no encephalopathy    LABS:                        8.9    3.47  )-----------( 142      ( 04 Jun 2023 05:57 )             28.7     06-04    138  |  107  |  17  ----------------------------<  109<H>  3.4<L>   |  21<L>  |  1.30    Ca    8.1<L>      04 Jun 2023 05:57  Mg     1.8     06-03    TPro  5.6<L>  /  Alb  2.9<L>  /  TBili  0.9  /  DBili  x   /  AST  23  /  ALT  16  /  AlkPhos  63  06-03    LIVER FUNCTIONS - ( 03 Jun 2023 08:13 )  Alb: 2.9 g/dL / Pro: 5.6 g/dL / ALK PHOS: 63 U/L / ALT: 16 U/L / AST: 23 U/L / GGT: x           PT/INR - ( 03 Jun 2023 08:13 )   PT: 16.1 sec;   INR: 1.38 ratio         PTT - ( 03 Jun 2023 08:13 )  PTT:31.8 sec        Imaging:          
  Slick Up MD  Interventional Cardiology / Endovascular Specialist  [place] Office : [phone] [place] [address] [place] [address]  Tel:   [place] Office : [address] [facility] [place] [address]  Tel: [phone]    H&P  89 yo male PMHx A-fib on Eliquis CAD, HLD, HTN, asthma presents to the Emergency Department complaining of 4 days of liquid diarrhea.  Patient having multiple episodes per day, today daughter noted faint speckles of blood so brought patient to Emergency Department .  Of note patient was recently on antibiotics for acute bronchitis as well as steroids which she completed full course of.  Denies fever/chills, nausea/vomiting, chest pain, shortness of breath, urinary symptoms, palpitations.  	  MEDICATIONS:  apixaban 2.5 milliGRAM(s) Oral every 12 hours  metoprolol tartrate 50 milliGRAM(s) Oral two times a day    azithromycin   Tablet 500 milliGRAM(s) Oral daily    budesonide 160 MICROgram(s)/formoterol 4.5 MICROgram(s) Inhaler 2 Puff(s) Inhalation two times a day  montelukast 10 milliGRAM(s) Oral daily  tiotropium 2.5 MICROgram(s) Inhaler 2 Puff(s) Inhalation daily      pantoprazole    Tablet 40 milliGRAM(s) Oral before breakfast    atorvastatin 20 milliGRAM(s) Oral at bedtime    chlorhexidine 2% Cloths 1 Application(s) Topical daily  fluticasone propionate 50 MICROgram(s)/spray Nasal Spray 1 Spray(s) Both Nostrils two times a day  multivitamin/minerals 1 Tablet(s) Oral daily  tamsulosin 0.8 milliGRAM(s) Oral at bedtime      PAST MEDICAL/SURGICAL HISTORY  PAST MEDICAL & SURGICAL HISTORY:  Afib      Asthmatic bronchitis , chronic      GERD (gastroesophageal reflux disease)      HLD (hyperlipidemia)      HTN (hypertension)      No significant past surgical history          SOCIAL HISTORY: Substance Use (street drugs): ( x ) never used  (  ) other:    FAMILY HISTORY:  No pertinent family history in first degree relatives        REVIEW OF SYSTEMS:  CONSTITUTIONAL: No fever, weight loss, or fatigue  EYES: No eye pain, visual disturbances, or discharge  ENMT:  No difficulty hearing, tinnitus, vertigo; No sinus or throat pain  BREASTS: No pain, masses, or nipple discharge  GASTROINTESTINAL: No abdominal or epigastric pain. No nausea, vomiting, or hematemesis; No diarrhea or constipation. No melena or hematochezia.  GENITOURINARY: No dysuria, frequency, hematuria, or incontinence  NEUROLOGICAL: No headaches, memory loss, loss of strength, numbness, or tremors  ENDOCRINE: No heat or cold intolerance; No hair loss  MUSCULOSKELETAL: No joint pain or swelling; No muscle, back, or extremity pain  PSYCHIATRIC: No depression, anxiety, mood swings, or difficulty sleeping  HEME/LYMPH: No easy bruising, or bleeding gums  All others negative    PHYSICAL EXAM:  T(C): 36.3 (06-06-23 @ 21:02), Max: 36.4 (06-06-23 @ 04:43)  HR: 95 (06-06-23 @ 21:02) (81 - 107)  BP: 101/63 (06-06-23 @ 21:02) (101/63 - 126/82)  RR: 18 (06-06-23 @ 21:02) (18 - 18)  SpO2: 98% (06-06-23 @ 21:02) (97% - 99%)  Wt(kg): --  I&O's Summary    05 Jun 2023 07:01  -  06 Jun 2023 07:00  --------------------------------------------------------  IN: 480 mL / OUT: 350 mL / NET: 130 mL          GENERAL: NAD  EYES: conjunctiva and sclera clear  ENMT: No tonsillar erythema, exudates, or enlargement  Cardiovascular: Normal S1 S2, No JVD, systolic murmur +   Respiratory: Lungs clear to auscultation	  Gastrointestinal:  Soft, Non-tender, + BS	  Extremities: No edema                                    9.6    6.17  )-----------( 168      ( 06 Jun 2023 07:47 )             32.2     06-06    140  |  106  |  16  ----------------------------<  100<H>  4.2   |  21<L>  |  1.19    Ca    8.6      06 Jun 2023 07:47      proBNP:   Lipid Profile:   HgA1c:   TSH:     Consultant(s) Notes Reviewed:  [x ] YES  [ ] NO    Care Discussed with Consultants/Other Providers [ x] YES  [ ] NO    Imaging Personally Reviewed independently:  [x] YES  [ ] NO    All labs, radiologic studies, vitals, orders and medications list reviewed. Patient is seen and examined at bedside. Case discussed with medical team.

## 2023-06-06 NOTE — PROGRESS NOTE ADULT - ASSESSMENT
colitis  diarrhea  campylobacter    plan  follow up stool studies; campylobacter positive  bacid one tab tid  low residue lactose free diet  replete electrolytes as needed   will follow  cont po antibiotics  dw pt     I reviewed the overnight course of events on the unit, re-confirming the patient history. I discussed the care with the patient and their family  The plan of care was discussed with the physician assistant and modifications were made to the notation where appropriate.   Differential diagnosis and plan of care discussed with patient after the evaluation  35 minutes spent on total encounter of which more than fifty percent of the encounter was spent counseling and/or coordinating care by the attending physician.  Advanced care planning was discussed with patient and family.  Advanced care planning forms were reviewed and discussed.  Risks, benefits and alternatives of gastroenterologic procedures were discussed in detail and all questions were answered.

## 2023-06-06 NOTE — PHYSICAL THERAPY INITIAL EVALUATION ADULT - ADDITIONAL COMMENTS
Pt states he lives with his wife and daughter in a pvt house with 5 steps to enter and flight of stairs to bedroom. Pt states he was functionally independent prior to admission w/o AD.

## 2023-06-07 ENCOUNTER — TRANSCRIPTION ENCOUNTER (OUTPATIENT)
Age: 88
End: 2023-06-07

## 2023-06-07 VITALS
TEMPERATURE: 98 F | SYSTOLIC BLOOD PRESSURE: 106 MMHG | DIASTOLIC BLOOD PRESSURE: 68 MMHG | OXYGEN SATURATION: 97 % | HEART RATE: 76 BPM | RESPIRATION RATE: 18 BRPM

## 2023-06-07 PROCEDURE — 87177 OVA AND PARASITES SMEARS: CPT

## 2023-06-07 PROCEDURE — 71045 X-RAY EXAM CHEST 1 VIEW: CPT

## 2023-06-07 PROCEDURE — 87040 BLOOD CULTURE FOR BACTERIA: CPT

## 2023-06-07 PROCEDURE — 80053 COMPREHEN METABOLIC PANEL: CPT

## 2023-06-07 PROCEDURE — 86901 BLOOD TYPING SEROLOGIC RH(D): CPT

## 2023-06-07 PROCEDURE — 83550 IRON BINDING TEST: CPT

## 2023-06-07 PROCEDURE — 84295 ASSAY OF SERUM SODIUM: CPT

## 2023-06-07 PROCEDURE — 82435 ASSAY OF BLOOD CHLORIDE: CPT

## 2023-06-07 PROCEDURE — 93005 ELECTROCARDIOGRAM TRACING: CPT

## 2023-06-07 PROCEDURE — 83605 ASSAY OF LACTIC ACID: CPT

## 2023-06-07 PROCEDURE — 82746 ASSAY OF FOLIC ACID SERUM: CPT

## 2023-06-07 PROCEDURE — 85027 COMPLETE CBC AUTOMATED: CPT

## 2023-06-07 PROCEDURE — 87449 NOS EACH ORGANISM AG IA: CPT

## 2023-06-07 PROCEDURE — 83036 HEMOGLOBIN GLYCOSYLATED A1C: CPT

## 2023-06-07 PROCEDURE — 94640 AIRWAY INHALATION TREATMENT: CPT

## 2023-06-07 PROCEDURE — 85610 PROTHROMBIN TIME: CPT

## 2023-06-07 PROCEDURE — 82330 ASSAY OF CALCIUM: CPT

## 2023-06-07 PROCEDURE — 74018 RADEX ABDOMEN 1 VIEW: CPT

## 2023-06-07 PROCEDURE — 80048 BASIC METABOLIC PNL TOTAL CA: CPT

## 2023-06-07 PROCEDURE — 36415 COLL VENOUS BLD VENIPUNCTURE: CPT

## 2023-06-07 PROCEDURE — 82607 VITAMIN B-12: CPT

## 2023-06-07 PROCEDURE — 87046 STOOL CULTR AEROBIC BACT EA: CPT

## 2023-06-07 PROCEDURE — 99285 EMERGENCY DEPT VISIT HI MDM: CPT

## 2023-06-07 PROCEDURE — 83540 ASSAY OF IRON: CPT

## 2023-06-07 PROCEDURE — 84132 ASSAY OF SERUM POTASSIUM: CPT

## 2023-06-07 PROCEDURE — 74177 CT ABD & PELVIS W/CONTRAST: CPT | Mod: MG

## 2023-06-07 PROCEDURE — 85014 HEMATOCRIT: CPT

## 2023-06-07 PROCEDURE — 97161 PT EVAL LOW COMPLEX 20 MIN: CPT

## 2023-06-07 PROCEDURE — 84443 ASSAY THYROID STIM HORMONE: CPT

## 2023-06-07 PROCEDURE — 87077 CULTURE AEROBIC IDENTIFY: CPT

## 2023-06-07 PROCEDURE — G1004: CPT

## 2023-06-07 PROCEDURE — 87507 IADNA-DNA/RNA PROBE TQ 12-25: CPT

## 2023-06-07 PROCEDURE — 83735 ASSAY OF MAGNESIUM: CPT

## 2023-06-07 PROCEDURE — 87324 CLOSTRIDIUM AG IA: CPT

## 2023-06-07 PROCEDURE — 82947 ASSAY GLUCOSE BLOOD QUANT: CPT

## 2023-06-07 PROCEDURE — 83690 ASSAY OF LIPASE: CPT

## 2023-06-07 PROCEDURE — 85018 HEMOGLOBIN: CPT

## 2023-06-07 PROCEDURE — 82803 BLOOD GASES ANY COMBINATION: CPT

## 2023-06-07 PROCEDURE — 85730 THROMBOPLASTIN TIME PARTIAL: CPT

## 2023-06-07 PROCEDURE — 86900 BLOOD TYPING SEROLOGIC ABO: CPT

## 2023-06-07 PROCEDURE — 85025 COMPLETE CBC W/AUTO DIFF WBC: CPT

## 2023-06-07 PROCEDURE — 86850 RBC ANTIBODY SCREEN: CPT

## 2023-06-07 PROCEDURE — 87045 FECES CULTURE AEROBIC BACT: CPT

## 2023-06-07 PROCEDURE — 82728 ASSAY OF FERRITIN: CPT

## 2023-06-07 RX ORDER — METOPROLOL TARTRATE 50 MG
1 TABLET ORAL
Qty: 60 | Refills: 0
Start: 2023-06-07 | End: 2023-07-06

## 2023-06-07 RX ORDER — METOPROLOL TARTRATE 50 MG
1 TABLET ORAL
Refills: 0 | DISCHARGE

## 2023-06-07 RX ADMIN — Medication 50 MILLIGRAM(S): at 17:24

## 2023-06-07 RX ADMIN — BUDESONIDE AND FORMOTEROL FUMARATE DIHYDRATE 2 PUFF(S): 160; 4.5 AEROSOL RESPIRATORY (INHALATION) at 08:22

## 2023-06-07 RX ADMIN — Medication 1 SPRAY(S): at 17:24

## 2023-06-07 RX ADMIN — Medication 50 MILLIGRAM(S): at 05:31

## 2023-06-07 RX ADMIN — APIXABAN 2.5 MILLIGRAM(S): 2.5 TABLET, FILM COATED ORAL at 17:23

## 2023-06-07 RX ADMIN — MONTELUKAST 10 MILLIGRAM(S): 4 TABLET, CHEWABLE ORAL at 11:17

## 2023-06-07 RX ADMIN — PANTOPRAZOLE SODIUM 40 MILLIGRAM(S): 20 TABLET, DELAYED RELEASE ORAL at 05:31

## 2023-06-07 RX ADMIN — Medication 1 TABLET(S): at 11:17

## 2023-06-07 RX ADMIN — Medication 1 TABLET(S): at 17:21

## 2023-06-07 RX ADMIN — Medication 1 SPRAY(S): at 05:31

## 2023-06-07 RX ADMIN — AZITHROMYCIN 500 MILLIGRAM(S): 500 TABLET, FILM COATED ORAL at 11:18

## 2023-06-07 RX ADMIN — Medication 1 TABLET(S): at 09:17

## 2023-06-07 RX ADMIN — CHLORHEXIDINE GLUCONATE 1 APPLICATION(S): 213 SOLUTION TOPICAL at 11:24

## 2023-06-07 RX ADMIN — TIOTROPIUM BROMIDE 2 PUFF(S): 18 CAPSULE ORAL; RESPIRATORY (INHALATION) at 11:17

## 2023-06-07 RX ADMIN — Medication 1 TABLET(S): at 12:25

## 2023-06-07 RX ADMIN — APIXABAN 2.5 MILLIGRAM(S): 2.5 TABLET, FILM COATED ORAL at 05:30

## 2023-06-07 NOTE — DISCHARGE NOTE PROVIDER - NSDCMRMEDTOKEN_GEN_ALL_CORE_FT
azithromycin 500 mg oral tablet: 1 tab(s) orally once a day  Eliquis 2.5 mg oral tablet: 1 tab(s) orally 2 times a day  Flomax 0.4 mg oral capsule: 2 cap(s) orally once a day  Flonase 50 mcg/inh nasal spray: 2 spray(s) in each nostril once a day  furosemide 20 mg oral tablet: 1 tab(s) orally 2 times a day  metoprolol tartrate 50 mg oral tablet: 1 tab(s) orally 2 times a day  montelukast 10 mg oral tablet: 1 tab(s) orally once a day  OTC: Vitamin D3, Omega 3 1000:   pantoprazole 40 mg oral delayed release tablet: 1 tab(s) orally once a day  PreserVision AREDS 2 oral capsule: 1 cap(s) orally once a day  simvastatin 20 mg oral tablet: 1 tab(s) orally once a day  Trelegy Ellipta 100 mcg-62.5 mcg-25 mcg/inh inhalation powder: 1 puff(s) inhaled once a day

## 2023-06-07 NOTE — PROGRESS NOTE ADULT - ASSESSMENT
colitis  diarrhea  campylobacter    plan  follow up stool studies; campylobacter positive  bacid one tab tid  low residue lactose free diet  replete electrolytes as needed   cont po antibiotics  no GI objection to dc planning   dw pt     I reviewed the overnight course of events on the unit, re-confirming the patient history. I discussed the care with the patient and their family  The plan of care was discussed with the physician assistant and modifications were made to the notation where appropriate.   Differential diagnosis and plan of care discussed with patient after the evaluation  35 minutes spent on total encounter of which more than fifty percent of the encounter was spent counseling and/or coordinating care by the attending physician.  Advanced care planning was discussed with patient and family.  Advanced care planning forms were reviewed and discussed.  Risks, benefits and alternatives of gastroenterologic procedures were discussed in detail and all questions were answered.

## 2023-06-07 NOTE — DISCHARGE NOTE PROVIDER - NSDCFUADDAPPT_GEN_ALL_CORE_FT
APPTS ARE READY TO BE MADE: [x ] YES    Best Family or Patient Contact (if needed):    Additional Information about above appointments (if needed):    1:   2:   3:     Other comments or requests:    APPTS ARE READY TO BE MADE: [x ] YES    Best Family or Patient Contact (if needed):    Additional Information about above appointments (if needed):    1:   2:   3:     Other comments or requests:       Patient is scheduled with Dr. Pringle 6/20 8:00AM [address]. APPTS ARE READY TO BE MADE: [x ] YES    Best Family or Patient Contact (if needed):    Additional Information about above appointments (if needed):    1:   2:   3:     Other comments or requests:       Patient is scheduled with Dr. Pringle 6/20 8:00AM [address].    Patient was provided with follow up request details and was advised to call to schedule follow up within specified time frame.

## 2023-06-07 NOTE — DISCHARGE NOTE PROVIDER - NSDCFUSCHEDAPPT_GEN_ALL_CORE_FT
Duncan Pringle  [facility]  CARDIOLOGY [address]  Scheduled Appointment: 06/20/2023

## 2023-06-07 NOTE — DISCHARGE NOTE NURSING/CASE MANAGEMENT/SOCIAL WORK - PATIENT PORTAL LINK FT
You can access the FollowMyHealth Patient Portal offered by [facility] by registering at the following website: [email or web address]. By joining [employer]’s FollowMyHealth portal, you will also be able to view your health information using other applications (apps) compatible with our system.

## 2023-06-07 NOTE — PROGRESS NOTE ADULT - SUBJECTIVE AND OBJECTIVE BOX
Patient is a 89y old  Male who presents with a chief complaint of diarrhea (04 Jun 2023 10:36)      DATE OF SERVICE: 06-04-23 @ 13:33    SUBJECTIVE / OVERNIGHT EVENTS: overnight events noted  "I feel much better"   daughter at bedside     ROS:  Resp: No cough no sputum production  CVS: No chest pain no palpitations no orthopnea  GI: no N/V much less diarrhea   : no dysuria, no hematuria  Neuro: no weakness no paresthesias  Heme: No petechiae no easy bruising  Msk: No joint pain no swelling  Skin: No rash no itching        MEDICATIONS  (STANDING):  apixaban 2.5 milliGRAM(s) Oral every 12 hours  atorvastatin 40 milliGRAM(s) Oral at bedtime  azithromycin   Tablet 500 milliGRAM(s) Oral daily  chlorhexidine 2% Cloths 1 Application(s) Topical daily  fluticasone propionate 50 MICROgram(s)/spray Nasal Spray 1 Spray(s) Both Nostrils two times a day  lactobacillus acidophilus 1 Tablet(s) Oral three times a day with meals  metoprolol succinate ER 25 milliGRAM(s) Oral daily  montelukast 10 milliGRAM(s) Oral daily  multivitamin/minerals 1 Tablet(s) Oral daily  pantoprazole    Tablet 40 milliGRAM(s) Oral before breakfast  sodium chloride 0.9%. 1000 milliLiter(s) (50 mL/Hr) IV Continuous <Continuous>  tamsulosin 0.8 milliGRAM(s) Oral at bedtime  tiotropium 2.5 MICROgram(s) Inhaler 2 Puff(s) Inhalation daily    MEDICATIONS  (PRN):        CAPILLARY BLOOD GLUCOSE        I&O's Summary    04 Jun 2023 07:01  -  04 Jun 2023 13:33  --------------------------------------------------------  IN: 480 mL / OUT: 0 mL / NET: 480 mL        Vital Signs Last 24 Hrs  T(C): 36.9 (04 Jun 2023 10:41), Max: 36.9 (04 Jun 2023 10:41)  T(F): 98.4 (04 Jun 2023 10:41), Max: 98.4 (04 Jun 2023 10:41)  HR: 98 (04 Jun 2023 10:41) (90 - 142)  BP: 116/68 (04 Jun 2023 10:41) (102/65 - 138/82)  BP(mean): --  RR: 18 (04 Jun 2023 10:41) (18 - 18)  SpO2: 98% (04 Jun 2023 10:41) (94% - 100%)    PHYSICAL EXAM:   HEENT: KATTY EOMI  Neck: Supple  Lungs: no wheeze, no crackles  CVS: S1 S2 no M/R/G  Abdomen: no tenderness, mild diffuse distension   Neuro: Alert nonfocal   Ext: no edema  Msk: no joint swelling      LABS:                        8.9    3.47  )-----------( 142      ( 04 Jun 2023 05:57 )             28.7     06-04    138  |  107  |  17  ----------------------------<  109<H>  3.4<L>   |  21<L>  |  1.30    Ca    8.1<L>      04 Jun 2023 05:57  Mg     1.8     06-03    TPro  5.6<L>  /  Alb  2.9<L>  /  TBili  0.9  /  DBili  x   /  AST  23  /  ALT  16  /  AlkPhos  63  06-03    PT/INR - ( 03 Jun 2023 08:13 )   PT: 16.1 sec;   INR: 1.38 ratio         PTT - ( 03 Jun 2023 08:13 )  PTT:31.8 sec            All consultant(s) notes reviewed and care discussed with other providers        Contact Number, Dr Banks [phone]
[facility]  Josh Agrawal PA-C  [address] [place] [address]  [phone]      INTERVAL HPI/OVERNIGHT EVENTS:  pt seen and examined, no new events  diarrhea is better   tolerating diet     MEDICATIONS  (STANDING):  apixaban 2.5 milliGRAM(s) Oral every 12 hours  atorvastatin 40 milliGRAM(s) Oral at bedtime  azithromycin   Tablet 500 milliGRAM(s) Oral daily  chlorhexidine 2% Cloths 1 Application(s) Topical daily  fluticasone propionate 50 MICROgram(s)/spray Nasal Spray 1 Spray(s) Both Nostrils two times a day  lactobacillus acidophilus 1 Tablet(s) Oral three times a day with meals  metoprolol tartrate 50 milliGRAM(s) Oral two times a day  montelukast 10 milliGRAM(s) Oral daily  multivitamin/minerals 1 Tablet(s) Oral daily  pantoprazole    Tablet 40 milliGRAM(s) Oral before breakfast  tamsulosin 0.8 milliGRAM(s) Oral at bedtime  tiotropium 2.5 MICROgram(s) Inhaler 2 Puff(s) Inhalation daily    MEDICATIONS  (PRN):      Allergies    No Known Allergies    Intolerances        ROS:   General:  No wt loss, fevers, chills, night sweats, fatigue,   Eyes:  Good vision, no reported pain  ENT:  No sore throat, pain, runny nose, dysphagia  CV:  No pain, palpitations, hypo/hypertension  Resp:  No dyspnea, cough, tachypnea, wheezing  GI:  No pain, No nausea, No vomiting, No diarrhea, No constipation, No weight loss, No fever, No pruritis, No rectal bleeding, No tarry stools, No dysphagia,  :  No pain, bleeding, incontinence, nocturia  Muscle:  No pain, weakness  Neuro:  No weakness, tingling, memory problems  Psych:  No fatigue, insomnia, mood problems, depression  Endocrine:  No polyuria, polydipsia, cold/heat intolerance  Heme:  No petechiae, ecchymosis, easy bruisability  Skin:  No rash, tattoos, scars, edema      PHYSICAL EXAM:   Vital Signs Last 24 Hrs  T(C): 36.8 (07 Jun 2023 05:04), Max: 36.8 (07 Jun 2023 05:04)  T(F): 98.2 (07 Jun 2023 05:04), Max: 98.2 (07 Jun 2023 05:04)  HR: 106 (07 Jun 2023 05:04) (81 - 107)  BP: 119/68 (07 Jun 2023 05:04) (101/63 - 126/82)  BP(mean): --  RR: 18 (07 Jun 2023 05:04) (18 - 18)  SpO2: 95% (07 Jun 2023 05:04) (95% - 99%)    Parameters below as of 07 Jun 2023 05:04  Patient On (Oxygen Delivery Method): room air    Daily     Daily     GENERAL:  Appears stated age,   HEENT:  NC/AT,    CHEST:  Full & symmetric excursion,   HEART:  Regular rhythm,  ABDOMEN:  Soft, non-tender, non-distended,  EXTEREMITIES:  no cyanosis  SKIN:  No rash  NEURO:  Alert,       LABS:                        9.6    6.17  )-----------( 168      ( 06 Jun 2023 07:47 )             32.2   06-06    140  |  106  |  16  ----------------------------<  100<H>  4.2   |  21<L>  |  1.19    Ca    8.6      06 Jun 2023 07:47      RADIOLOGY & ADDITIONAL TESTS:  
[facility]  Josh Agrawal PA-C  [address] [place] [address]  [phone]      INTERVAL HPI/OVERNIGHT EVENTS:  pt seen and examined, no new events  states diarrhea is improving   less bloating     MEDICATIONS  (STANDING):  apixaban 2.5 milliGRAM(s) Oral every 12 hours  atorvastatin 40 milliGRAM(s) Oral at bedtime  azithromycin   Tablet 500 milliGRAM(s) Oral daily  chlorhexidine 2% Cloths 1 Application(s) Topical daily  fluticasone propionate 50 MICROgram(s)/spray Nasal Spray 1 Spray(s) Both Nostrils two times a day  lactobacillus acidophilus 1 Tablet(s) Oral three times a day with meals  metoprolol tartrate 50 milliGRAM(s) Oral two times a day  montelukast 10 milliGRAM(s) Oral daily  multivitamin/minerals 1 Tablet(s) Oral daily  pantoprazole    Tablet 40 milliGRAM(s) Oral before breakfast  tamsulosin 0.8 milliGRAM(s) Oral at bedtime  tiotropium 2.5 MICROgram(s) Inhaler 2 Puff(s) Inhalation daily    MEDICATIONS  (PRN):      Allergies    No Known Allergies    Intolerances        ROS:   General:  No wt loss, fevers, chills, night sweats, fatigue,   Eyes:  Good vision, no reported pain  ENT:  No sore throat, pain, runny nose, dysphagia  CV:  No pain, palpitations, hypo/hypertension  Resp:  No dyspnea, cough, tachypnea, wheezing  GI:  No pain, No nausea, No vomiting, + diarrhea, No constipation, No weight loss, No fever, No pruritis, No rectal bleeding, No tarry stools, No dysphagia,  :  No pain, bleeding, incontinence, nocturia  Muscle:  No pain, weakness  Neuro:  No weakness, tingling, memory problems  Psych:  No fatigue, insomnia, mood problems, depression  Endocrine:  No polyuria, polydipsia, cold/heat intolerance  Heme:  No petechiae, ecchymosis, easy bruisability  Skin:  No rash, tattoos, scars, edema      PHYSICAL EXAM:   Vital Signs Last 24 Hrs  T(C): 36.1 (06 Jun 2023 11:22), Max: 36.5 (05 Jun 2023 20:44)  T(F): 97 (06 Jun 2023 11:22), Max: 97.7 (05 Jun 2023 20:44)  HR: 81 (06 Jun 2023 11:22) (81 - 120)  BP: 102/58 (06 Jun 2023 11:22) (102/58 - 121/73)  BP(mean): --  RR: 18 (06 Jun 2023 11:22) (18 - 18)  SpO2: 99% (06 Jun 2023 11:22) (98% - 99%)    Parameters below as of 06 Jun 2023 11:22  Patient On (Oxygen Delivery Method): room air      Daily     Daily     GENERAL:  Appears stated age,   HEENT:  NC/AT,    CHEST:  Full & symmetric excursion,   HEART:  Regular rhythm,  ABDOMEN:  Soft, non-tender, non-distended,  EXTEREMITIES:  no cyanosis  SKIN:  No rash  NEURO:  Alert,       LABS:                        9.6    6.17  )-----------( 168      ( 06 Jun 2023 07:47 )             32.2   06-06    140  |  106  |  16  ----------------------------<  100<H>  4.2   |  21<L>  |  1.19    Ca    8.6      06 Jun 2023 07:47          RADIOLOGY & ADDITIONAL TESTS:  
[facility]  Josh Agrawal PA-C  [address] [place] [address]  [phone]      INTERVAL HPI/OVERNIGHT EVENTS:  pt seen and examined, no new events  still with diarrhea  tolerating diet  reports difficulty urinating    MEDICATIONS  (STANDING):  apixaban 2.5 milliGRAM(s) Oral every 12 hours  atorvastatin 40 milliGRAM(s) Oral at bedtime  azithromycin   Tablet 500 milliGRAM(s) Oral daily  chlorhexidine 2% Cloths 1 Application(s) Topical daily  fluticasone propionate 50 MICROgram(s)/spray Nasal Spray 1 Spray(s) Both Nostrils two times a day  lactobacillus acidophilus 1 Tablet(s) Oral three times a day with meals  metoprolol tartrate 50 milliGRAM(s) Oral two times a day  montelukast 10 milliGRAM(s) Oral daily  multivitamin/minerals 1 Tablet(s) Oral daily  pantoprazole    Tablet 40 milliGRAM(s) Oral before breakfast  tamsulosin 0.8 milliGRAM(s) Oral at bedtime  tiotropium 2.5 MICROgram(s) Inhaler 2 Puff(s) Inhalation daily    MEDICATIONS  (PRN):      Allergies    No Known Allergies    Intolerances        ROS:   General:  No wt loss, fevers, chills, night sweats, fatigue,   Eyes:  Good vision, no reported pain  ENT:  No sore throat, pain, runny nose, dysphagia  CV:  No pain, palpitations, hypo/hypertension  Resp:  No dyspnea, cough, tachypnea, wheezing  GI:  No pain, No nausea, No vomiting, + diarrhea, No constipation, No weight loss, No fever, No pruritis, No rectal bleeding, No tarry stools, No dysphagia,  :  No pain, bleeding, incontinence, nocturia  Muscle:  No pain, weakness  Neuro:  No weakness, tingling, memory problems  Psych:  No fatigue, insomnia, mood problems, depression  Endocrine:  No polyuria, polydipsia, cold/heat intolerance  Heme:  No petechiae, ecchymosis, easy bruisability  Skin:  No rash, tattoos, scars, edema      PHYSICAL EXAM:   Vital Signs:  Vital Signs Last 24 Hrs  T(C): 36.6 (05 Jun 2023 10:30), Max: 37.1 (04 Jun 2023 14:25)  T(F): 97.8 (05 Jun 2023 10:30), Max: 98.8 (04 Jun 2023 14:25)  HR: 87 (05 Jun 2023 10:30) (87 - 130)  BP: 115/66 (05 Jun 2023 10:30) (103/61 - 131/86)  BP(mean): --  RR: 18 (05 Jun 2023 10:30) (18 - 18)  SpO2: 98% (05 Jun 2023 10:30) (91% - 100%)    Parameters below as of 05 Jun 2023 10:30  Patient On (Oxygen Delivery Method): room air      Daily     Daily     GENERAL:  Appears stated age,   HEENT:  NC/AT,    CHEST:  Full & symmetric excursion,   HEART:  Regular rhythm,  ABDOMEN:  Soft, non-tender, non-distended,  EXTEREMITIES:  no cyanosis  SKIN:  No rash  NEURO:  Alert,       LABS:                        9.4    5.50  )-----------( 144      ( 05 Jun 2023 07:05 )             31.2     06-05    139  |  107  |  14  ----------------------------<  93  3.8   |  22  |  1.15    Ca    8.3<L>      05 Jun 2023 07:05            RADIOLOGY & ADDITIONAL TESTS:  
Patient is a 89y old  Male who presents with a chief complaint of diarrhea (05 Jun 2023 11:59)      DATE OF SERVICE: 06-05-23 @ 14:56    SUBJECTIVE / OVERNIGHT EVENTS: overnight events noted    ROS:  Resp: No cough no sputum production  CVS: No chest pain no palpitations no orthopnea  GI: no N/V/D  : no dysuria, no hematuria        MEDICATIONS  (STANDING):  apixaban 2.5 milliGRAM(s) Oral every 12 hours  atorvastatin 20 milliGRAM(s) Oral at bedtime  azithromycin   Tablet 500 milliGRAM(s) Oral daily  budesonide 160 MICROgram(s)/formoterol 4.5 MICROgram(s) Inhaler 2 Puff(s) Inhalation two times a day  chlorhexidine 2% Cloths 1 Application(s) Topical daily  fluticasone propionate 50 MICROgram(s)/spray Nasal Spray 1 Spray(s) Both Nostrils two times a day  lactobacillus acidophilus 1 Tablet(s) Oral three times a day with meals  metoprolol tartrate 50 milliGRAM(s) Oral two times a day  montelukast 10 milliGRAM(s) Oral daily  multivitamin/minerals 1 Tablet(s) Oral daily  pantoprazole    Tablet 40 milliGRAM(s) Oral before breakfast  tamsulosin 0.8 milliGRAM(s) Oral at bedtime  tiotropium 2.5 MICROgram(s) Inhaler 2 Puff(s) Inhalation daily    MEDICATIONS  (PRN):        CAPILLARY BLOOD GLUCOSE        I&O's Summary    04 Jun 2023 07:01  -  05 Jun 2023 07:00  --------------------------------------------------------  IN: 480 mL / OUT: 0 mL / NET: 480 mL    05 Jun 2023 07:01  -  05 Jun 2023 14:56  --------------------------------------------------------  IN: 480 mL / OUT: 200 mL / NET: 280 mL        Vital Signs Last 24 Hrs  T(C): 36.6 (05 Jun 2023 10:30), Max: 36.8 (04 Jun 2023 20:50)  T(F): 97.8 (05 Jun 2023 10:30), Max: 98.2 (04 Jun 2023 20:50)  HR: 87 (05 Jun 2023 10:30) (87 - 105)  BP: 115/66 (05 Jun 2023 10:30) (103/61 - 124/67)  BP(mean): --  RR: 18 (05 Jun 2023 10:30) (18 - 18)  SpO2: 98% (05 Jun 2023 10:30) (91% - 98%)      PHYSICAL EXAM:   HEENT: KATTY EOMI  Neck: Supple  Lungs: no wheeze  CVS: S1 S2 no M/R/G  Abdomen: no tenderness  Neuro: Alert nonfocal   Ext: no edema      LABS:                        9.4    5.50  )-----------( 144      ( 05 Jun 2023 07:05 )             31.2     06-05    139  |  107  |  14  ----------------------------<  93  3.8   |  22  |  1.15    Ca    8.3<L>      05 Jun 2023 07:05                  All consultant(s) notes reviewed and care discussed with other providers        Contact Number, Dr Banks [phone]
DATE OF SERVICE: 06-06-23 @ 14:01  CHIEF COMPLAINT:Patient is a 89y old  Male who presents with a chief complaint of diarrhea (06 Jun 2023 11:24)    	        PAST MEDICAL & SURGICAL HISTORY:  Afib      Asthmatic bronchitis , chronic      GERD (gastroesophageal reflux disease)      HLD (hyperlipidemia)      HTN (hypertension)      No significant past surgical history                NECK: No pain or stiffness  RESPIRATORY: No cough, wheezing, chills or hemoptysis; No Shortness of Breath  CARDIOVASCULAR: No chest pain, palpitations,  GASTROINTESTINAL: No abdominal or epigastric pain.     NEUROLOGICAL: No headaches,       Medications:  MEDICATIONS  (STANDING):  apixaban 2.5 milliGRAM(s) Oral every 12 hours  atorvastatin 20 milliGRAM(s) Oral at bedtime  azithromycin   Tablet 500 milliGRAM(s) Oral daily  budesonide 160 MICROgram(s)/formoterol 4.5 MICROgram(s) Inhaler 2 Puff(s) Inhalation two times a day  chlorhexidine 2% Cloths 1 Application(s) Topical daily  fluticasone propionate 50 MICROgram(s)/spray Nasal Spray 1 Spray(s) Both Nostrils two times a day  lactobacillus acidophilus 1 Tablet(s) Oral three times a day with meals  metoprolol tartrate 50 milliGRAM(s) Oral two times a day  montelukast 10 milliGRAM(s) Oral daily  multivitamin/minerals 1 Tablet(s) Oral daily  pantoprazole    Tablet 40 milliGRAM(s) Oral before breakfast  tamsulosin 0.8 milliGRAM(s) Oral at bedtime  tiotropium 2.5 MICROgram(s) Inhaler 2 Puff(s) Inhalation daily    MEDICATIONS  (PRN):    	    PHYSICAL EXAM:  T(C): 36.1 (06-06-23 @ 11:22), Max: 36.5 (06-05-23 @ 20:44)  HR: 81 (06-06-23 @ 11:22) (81 - 120)  BP: 102/58 (06-06-23 @ 11:22) (102/58 - 121/73)  RR: 18 (06-06-23 @ 11:22) (18 - 18)  SpO2: 99% (06-06-23 @ 11:22) (98% - 99%)  Wt(kg): --  I&O's Summary    05 Jun 2023 07:01  -  06 Jun 2023 07:00  --------------------------------------------------------  IN: 480 mL / OUT: 350 mL / NET: 130 mL        Appearance: Normal	  HEENT:   Normal oral mucosa, PERRL, EOMI	    heart rr  Respiratory: Lungs clear to auscultation	    Gastrointestinal:  Soft, Non-tender, + BS	  Skin: No rashes, No ecchymoses, No cyanosis	  Neurologic: Non-focal  Extremities: dec rom     TELEMETRY: 	    ECG:  	  RADIOLOGY:  OTHER: 	  	  LABS:	 	    CARDIAC MARKERS:                                9.6    6.17  )-----------( 168      ( 06 Jun 2023 07:47 )             32.2     06-06    140  |  106  |  16  ----------------------------<  100<H>  4.2   |  21<L>  |  1.19    Ca    8.6      06 Jun 2023 07:47      proBNP:   Lipid Profile:   HgA1c:   TSH:     	        
Patient is a 89y old  Male who presents with a chief complaint of diarrhea (07 Jun 2023 10:34)      DATE OF SERVICE: 06-07-23 @ 14:50    SUBJECTIVE / OVERNIGHT EVENTS: overnight events noted    ROS:  Resp: No cough no sputum production  CVS: No chest pain no palpitations no orthopnea  GI: no N/V/D  : no dysuria, no hematuria  "I feel much better"  "I want to go home"         MEDICATIONS  (STANDING):  apixaban 2.5 milliGRAM(s) Oral every 12 hours  atorvastatin 20 milliGRAM(s) Oral at bedtime  azithromycin   Tablet 500 milliGRAM(s) Oral daily  budesonide 160 MICROgram(s)/formoterol 4.5 MICROgram(s) Inhaler 2 Puff(s) Inhalation two times a day  chlorhexidine 2% Cloths 1 Application(s) Topical daily  fluticasone propionate 50 MICROgram(s)/spray Nasal Spray 1 Spray(s) Both Nostrils two times a day  lactobacillus acidophilus 1 Tablet(s) Oral three times a day with meals  metoprolol tartrate 50 milliGRAM(s) Oral two times a day  montelukast 10 milliGRAM(s) Oral daily  multivitamin/minerals 1 Tablet(s) Oral daily  pantoprazole    Tablet 40 milliGRAM(s) Oral before breakfast  tamsulosin 0.8 milliGRAM(s) Oral at bedtime  tiotropium 2.5 MICROgram(s) Inhaler 2 Puff(s) Inhalation daily    MEDICATIONS  (PRN):        CAPILLARY BLOOD GLUCOSE        I&O's Summary    06 Jun 2023 07:01  -  07 Jun 2023 07:00  --------------------------------------------------------  IN: 0 mL / OUT: 200 mL / NET: -200 mL    07 Jun 2023 07:01  -  07 Jun 2023 14:50  --------------------------------------------------------  IN: 720 mL / OUT: 300 mL / NET: 420 mL        Vital Signs Last 24 Hrs  T(C): 36.7 (07 Jun 2023 11:01), Max: 36.8 (07 Jun 2023 05:04)  T(F): 98 (07 Jun 2023 11:01), Max: 98.2 (07 Jun 2023 05:04)  HR: 76 (07 Jun 2023 11:01) (76 - 107)  BP: 106/68 (07 Jun 2023 11:01) (101/63 - 126/82)  BP(mean): --  RR: 18 (07 Jun 2023 11:01) (18 - 18)  SpO2: 97% (07 Jun 2023 11:01) (95% - 98%)    PHYSICAL EXAM:   HEENT: KATTY EOMI  Neck: Supple  Lungs: no wheeze  CVS: S1 S2 no M/R/G  Abdomen: no tenderness  Neuro: Alert nonfocal   Ext: no edema    LABS:                        9.6    6.17  )-----------( 168      ( 06 Jun 2023 07:47 )             32.2     06-06    140  |  106  |  16  ----------------------------<  100<H>  4.2   |  21<L>  |  1.19    Ca    8.6      06 Jun 2023 07:47                  All consultant(s) notes reviewed and care discussed with other providers        Contact Number, Dr Banks [phone]

## 2023-06-07 NOTE — PROGRESS NOTE ADULT - PROBLEM SELECTOR PLAN 1
secondary to campylobacter infection  continue po azithromycin   GI f/u  ==
secondary to campylobacter infection  continue po azithromycin   resolved
secondary to campylobacter infection  continue po azithromycin   GI help appreciated  continue to follow recommendations   already improved
secondary to campylobacter infection  continue po azithromycin   GI help appreciated   already improved  continue distension likely per GI

## 2023-06-07 NOTE — PROGRESS NOTE ADULT - NSPROGADDITIONALINFOA_GEN_ALL_CORE
discussed with daughter at bedside in detail
likely discharge home 1 - 2 days if cleared by PT
covering for dr mayberry
discussed with covering ACP

## 2023-06-07 NOTE — DISCHARGE NOTE PROVIDER - NSDCCPCAREPLAN_GEN_ALL_CORE_FT
PRINCIPAL DISCHARGE DIAGNOSIS  Diagnosis: Pancolitis  Assessment and Plan of Treatment: secondary to campylobacter infection, resolved.   continue po azithromycin for one more day. Make sure to stay hydrated      SECONDARY DISCHARGE DIAGNOSES  Diagnosis: Afib  Assessment and Plan of Treatment: Atrial fibrillation is the most common heart rhythm problem. It comes with the risk of stroke and heart attack  It helps if you control your blood pressure, not drink more than 1-2 alcohol drinks per day, cut down on caffeine, getting treatment for over active thyroid gland, & getting exercise  Call your doctor if you feel your heart racing or beating unusually, chest tightness or pain, lightheaded, faint, shortness of breath especially with exercise  It is important to take your heart medication as prescribed. A prescription for Metoprolol 50 mg two times a day was sent to your pharmacy.  Taking your medicines as directed can help reduce the chances that your A-fib will cause a stroke.   You may be on anticoagulation which is very important to take as directed - you may need blood work to monitor drug levels

## 2023-06-07 NOTE — PROGRESS NOTE ADULT - PROBLEM SELECTOR PLAN 4
continue home inhalers  no wheeze at this time
continue home inhalers  stable
continue home inhalers  stable
continue home inhalers  no wheeze at this time

## 2023-06-07 NOTE — DISCHARGE NOTE PROVIDER - HOSPITAL COURSE
89 yo malignancy PMH A-fib on Eliquis CAD, HLD, HTN, asthma presents to the ED complaining of 4 days of liquid diarrhea clinical presentation consistent with colitis of unclear etiology C diff infection ruled out       Problem/Plan - 1:  ·  Problem: Colitis.   ·  Plan: secondary to campylobacter infection  continue po azithromycin   resolved.     Problem/Plan - 2:  ·  Problem: Afib.   ·  Plan: continue apixaban  no evidence of significant blood loss  heart rate better  occasionally > 100 but mostly controlled  discussed with cardiology   OK for discharge on current po metoprolol dose.     Problem/Plan - 3:  ·  Problem: Stage 3 chronic kidney disease.   ·  Plan: will continue to monitor   stable.     Problem/Plan - 4:  ·  Problem: Asthmatic bronchitis , chronic.   ·  Plan: continue home inhalers  stable.     Problem/Plan - 5:  ·  Problem: HTN (hypertension).   ·  Plan: continue home meds with hold parameters.     Problem/Plan - 6:  ·  Problem: HLD (hyperlipidemia).   ·  Plan: continue statin.     Problem/Plan - 7:  ·  Problem: GERD (gastroesophageal reflux disease).   ·  Plan: continue pantoprazole. 89 yo malignancy PMH A-fib on Eliquis CAD, HLD, HTN, asthma presents to the ED complaining of 4 days of liquid diarrhea clinical presentation consistent with colitis of unclear etiology C diff infection ruled out       Problem/Plan - 1:  ·  Problem: Colitis.   ·  Plan: secondary to campylobacter infection  continue po azithromycin   resolved.     Problem/Plan - 2:  ·  Problem: Afib   patient admitted with a diagnosis of chronic atrial fibrillation   ·  Plan: continue apixaban  no evidence of significant blood loss  heart rate better  occasionally > 100 but mostly controlled  discussed with cardiology   OK for discharge on current po metoprolol dose.     Problem/Plan - 3:  ·  Problem: Stage 3 chronic kidney disease.   ·  Plan: will continue to monitor   stable.     Problem/Plan - 4:  ·  Problem: Asthmatic bronchitis , chronic.   ·  Plan: continue home inhalers  stable.     Problem/Plan - 5:  ·  Problem: HTN (hypertension).   ·  Plan: continue home meds with hold parameters.     Problem/Plan - 6:  ·  Problem: HLD (hyperlipidemia).   ·  Plan: continue statin.     Problem/Plan - 7:  ·  Problem: GERD (gastroesophageal reflux disease).   ·  Plan: continue pantoprazole.

## 2023-06-07 NOTE — DISCHARGE NOTE PROVIDER - NSDCADMDATE_GEN_ALL_CORE_FT
03-Jun-2023 12:03 Review of Systems:  	•	CONSTITUTIONAL - no fever, no diaphoresis, no chills  	•	SKIN - no rash  	•	HEMATOLOGIC - no bleeding, no bruising  	•	EYES - no eye pain, no blurry vision  	•	ENT - no change in hearing, no sore throat, no ear pain or tinnitus  	•	RESPIRATORY - no shortness of breath, no cough  	•	CARDIAC - no chest pain, no palpitations  	•	GI - no abd pain, + nausea, + vomiting, no diarrhea, no constipation  	•	GENITO-URINARY - no discharge, no dysuria; no hematuria, no increased urinary frequency  	•	MUSCULOSKELETAL - no joint paint, no swelling, no redness  	•	NEUROLOGIC - no weakness, no headache, no paresthesias, no LOC  	•	PSYCH - no anxiety, non suicidal, non homicidal, no hallucination, no depression

## 2023-06-07 NOTE — DISCHARGE NOTE PROVIDER - CARE PROVIDER_API CALL
Sherry Chavez  Internal Medicine  [address], [address]  [place] [address]  Phone: [phone]  Fax: [phone]  Follow Up Time: 1 week    Duncan Pringle  Interventional Cardiology  [address] [place] [id number]  Phone: [phone]  Fax: [phone]  Scheduled Appointment: 06/20/2023

## 2023-06-07 NOTE — PROGRESS NOTE ADULT - PROBLEM SELECTOR PROBLEM 4
Asthmatic bronchitis , chronic

## 2023-06-07 NOTE — PROGRESS NOTE ADULT - PROBLEM SELECTOR PLAN 2
continue apixaban  no evidence of significant blood loss  heart rate better  occasionally > 100 but mostly controlled  discussed with cardiology   OK for discharge on current po metoprolol dose

## 2023-06-07 NOTE — DISCHARGE NOTE PROVIDER - PROVIDER TOKENS
PROVIDER:[TOKEN:[[id number]],FOLLOWUP:[1 week]],PROVIDER:[TOKEN:[[id number]],SCHEDULEDAPPT:[06/20/2023]]

## 2023-06-07 NOTE — PROGRESS NOTE ADULT - PROBLEM SELECTOR PLAN 3
creatinine improved   will continue to monitor   discontinue IVF  continue po diet
will continue to monitor   stable

## 2023-06-08 LAB
CULTURE RESULTS: SIGNIFICANT CHANGE UP
CULTURE RESULTS: SIGNIFICANT CHANGE UP
SPECIMEN SOURCE: SIGNIFICANT CHANGE UP
SPECIMEN SOURCE: SIGNIFICANT CHANGE UP

## 2023-06-08 RX ORDER — AZITHROMYCIN 500 MG/1
1 TABLET, FILM COATED ORAL
Qty: 1 | Refills: 0
Start: 2023-06-08 | End: 2023-06-08

## 2023-06-08 NOTE — CHART NOTE - NSCHARTNOTEFT_GEN_A_CORE
Pt s HR is till going up to 130's and only as low as 117.  Pt denies any dizziness but looks visibly SOB/uncomfortable.    A/P:  Will give another dose of IV lopressor 2.5mg once  Consider Cardiology evaluation, will discuss with Dr. Banks
CC: Afib RVR    Notified by RN of patient w/ elevated HR to 150s on routine vitals. Patient seen and examined bedside; offering no complaints at this time. Per chart review, Patient intermittently tachycardic throughout day.     Vital Signs Last 24 Hrs  T(C): 36.4 (03 Jun 2023 20:58), Max: 37.9 (03 Jun 2023 07:22)  T(F): 97.6 (03 Jun 2023 20:58), Max: 100.2 (03 Jun 2023 07:22)  HR: 142 (03 Jun 2023 22:00) (92 - 142)  BP: 138/82 (03 Jun 2023 20:58) (98/63 - 138/82)  BP(mean): 79 (03 Jun 2023 09:54) (79 - 79)  RR: 18 (03 Jun 2023 20:58) (16 - 20)  SpO2: 94% (03 Jun 2023 20:58) (94% - 100%)    Parameters below as of 03 Jun 2023 20:58  Patient On (Oxygen Delivery Method): room air    PHYSICAL EXAM;  General: Comfortable, in NAD  HEENT: KATTY EOMI  Neck: Supple, no JVD, no thyromegaly  Lungs: no wheeze, no crackles  CVS: S1 S2 no M/R/G; irregularly irregular  Abdomen: no tenderness, mild diffuse distension no organomegaly, BS present  Neuro: Alert no focal weakness, no sensory abnormalities  Skin: warm, dry  Ext: no cyanosis or clubbing, no edema      ASSESSMENT: 89 yo male PMHx A-fib on Eliquis CAD, HLD, HTN, asthma presents to the ED complaining of 4 days of liquid diarrhea clinical presentation consistent with colitis of unclear etiology C diff infection ruled out. Patient now with elevated HR to 150s on vitals machine; afib RVR on EKG.    1. Afib RVR i/s/o dehydration vs infection  - EKG STAT showing Afib RVR w/ rate 140-150s  - C/w Toprol XL 25mg QD; will give 2.5mg IVP x1 STAT for rate control  - Will give 250cc IVF bolus x1 and then restart 50cc/hr maintenance fluid i/s/o dehydration from frequent diarrhea  - Tylenol 975mg x1 given for abdominal pain control (remains afebrile at this time)  - Will initiate Cipro IV 200mg QD, Flagyl IV 500mg q8 for the treatment of colitis  - C/w Eliquis for AC  - Patient transferred to 4M for continuation of care w/ telemetry monitoring; sign out given   - Close monitoring on tele; additional IV lopressor to be given as needed  - Plan discussed w/ Dr. Banks; in agreement with above  - Consider cardio consult if rate remains uncontrolled despite interventions    Shyann Vargas
Left 2 messages for patient in regards to follow up care with callback information.

## 2023-06-20 ENCOUNTER — APPOINTMENT (OUTPATIENT)
Dept: CARDIOLOGY | Facility: CLINIC | Age: 88
End: 2023-06-20

## 2023-07-19 PROBLEM — K21.9 GASTRO-ESOPHAGEAL REFLUX DISEASE WITHOUT ESOPHAGITIS: Chronic | Status: ACTIVE | Noted: 2023-06-03

## 2023-07-19 PROBLEM — I10 ESSENTIAL (PRIMARY) HYPERTENSION: Chronic | Status: ACTIVE | Noted: 2023-06-03

## 2023-07-19 PROBLEM — I48.91 UNSPECIFIED ATRIAL FIBRILLATION: Chronic | Status: ACTIVE | Noted: 2023-06-03

## 2023-07-19 PROBLEM — J44.9 CHRONIC OBSTRUCTIVE PULMONARY DISEASE, UNSPECIFIED: Chronic | Status: ACTIVE | Noted: 2023-06-03

## 2023-07-19 PROBLEM — E78.5 HYPERLIPIDEMIA, UNSPECIFIED: Chronic | Status: ACTIVE | Noted: 2023-06-03

## 2023-07-29 ENCOUNTER — APPOINTMENT (OUTPATIENT)
Dept: CT IMAGING | Facility: IMAGING CENTER | Age: 88
End: 2023-07-29

## 2023-08-07 ENCOUNTER — INPATIENT (INPATIENT)
Facility: HOSPITAL | Age: 88
LOS: 3 days | Discharge: HOME | DRG: 300 | End: 2023-08-11
Attending: INTERNAL MEDICINE | Admitting: INTERNAL MEDICINE
Payer: MEDICARE

## 2023-08-07 VITALS
HEART RATE: 69 BPM | WEIGHT: 138.01 LBS | TEMPERATURE: 98 F | HEIGHT: 65 IN | DIASTOLIC BLOOD PRESSURE: 61 MMHG | SYSTOLIC BLOOD PRESSURE: 109 MMHG | OXYGEN SATURATION: 97 % | RESPIRATION RATE: 18 BRPM

## 2023-08-07 DIAGNOSIS — Z29.9 ENCOUNTER FOR PROPHYLACTIC MEASURES, UNSPECIFIED: ICD-10-CM

## 2023-08-07 DIAGNOSIS — I25.10 ATHEROSCLEROTIC HEART DISEASE OF NATIVE CORONARY ARTERY WITHOUT ANGINA PECTORIS: ICD-10-CM

## 2023-08-07 DIAGNOSIS — R51.9 HEADACHE, UNSPECIFIED: ICD-10-CM

## 2023-08-07 DIAGNOSIS — I48.20 CHRONIC ATRIAL FIBRILLATION, UNSPECIFIED: ICD-10-CM

## 2023-08-07 DIAGNOSIS — K21.9 GASTRO-ESOPHAGEAL REFLUX DISEASE WITHOUT ESOPHAGITIS: ICD-10-CM

## 2023-08-07 DIAGNOSIS — E78.5 HYPERLIPIDEMIA, UNSPECIFIED: ICD-10-CM

## 2023-08-07 DIAGNOSIS — I80.229 PHLEBITIS AND THROMBOPHLEBITIS OF UNSPECIFIED POPLITEAL VEIN: ICD-10-CM

## 2023-08-07 DIAGNOSIS — D50.9 IRON DEFICIENCY ANEMIA, UNSPECIFIED: ICD-10-CM

## 2023-08-07 DIAGNOSIS — I10 ESSENTIAL (PRIMARY) HYPERTENSION: ICD-10-CM

## 2023-08-07 DIAGNOSIS — I82.4Y2 ACUTE EMBOLISM AND THROMBOSIS OF UNSPECIFIED DEEP VEINS OF LEFT PROXIMAL LOWER EXTREMITY: ICD-10-CM

## 2023-08-07 DIAGNOSIS — I82.409 ACUTE EMBOLISM AND THROMBOSIS OF UNSPECIFIED DEEP VEINS OF UNSPECIFIED LOWER EXTREMITY: ICD-10-CM

## 2023-08-07 DIAGNOSIS — N18.30 CHRONIC KIDNEY DISEASE, STAGE 3 UNSPECIFIED: ICD-10-CM

## 2023-08-07 LAB
ALBUMIN SERPL ELPH-MCNC: 3.6 G/DL — SIGNIFICANT CHANGE UP (ref 3.3–5)
ALP SERPL-CCNC: 75 U/L — SIGNIFICANT CHANGE UP (ref 40–120)
ALT FLD-CCNC: 14 U/L — SIGNIFICANT CHANGE UP (ref 10–45)
ANION GAP SERPL CALC-SCNC: 13 MMOL/L — SIGNIFICANT CHANGE UP (ref 5–17)
ANISOCYTOSIS BLD QL: SLIGHT — SIGNIFICANT CHANGE UP
APTT BLD: 33.6 SEC — SIGNIFICANT CHANGE UP (ref 24.5–35.6)
AST SERPL-CCNC: 20 U/L — SIGNIFICANT CHANGE UP (ref 10–40)
BASOPHILS # BLD AUTO: 0.08 K/UL — SIGNIFICANT CHANGE UP (ref 0–0.2)
BASOPHILS NFR BLD AUTO: 1.7 % — SIGNIFICANT CHANGE UP (ref 0–2)
BILIRUB SERPL-MCNC: 0.4 MG/DL — SIGNIFICANT CHANGE UP (ref 0.2–1.2)
BLD GP AB SCN SERPL QL: NEGATIVE — SIGNIFICANT CHANGE UP
BUN SERPL-MCNC: 25 MG/DL — HIGH (ref 7–23)
BURR CELLS BLD QL SMEAR: PRESENT — SIGNIFICANT CHANGE UP
CALCIUM SERPL-MCNC: 8.7 MG/DL — SIGNIFICANT CHANGE UP (ref 8.4–10.5)
CHLORIDE SERPL-SCNC: 102 MMOL/L — SIGNIFICANT CHANGE UP (ref 96–108)
CO2 SERPL-SCNC: 26 MMOL/L — SIGNIFICANT CHANGE UP (ref 22–31)
CREAT SERPL-MCNC: 1.3 MG/DL — SIGNIFICANT CHANGE UP (ref 0.5–1.3)
DACRYOCYTES BLD QL SMEAR: SLIGHT — SIGNIFICANT CHANGE UP
EGFR: 50 ML/MIN/1.73M2 — LOW
ELLIPTOCYTES BLD QL SMEAR: SLIGHT — SIGNIFICANT CHANGE UP
EOSINOPHIL # BLD AUTO: 0.21 K/UL — SIGNIFICANT CHANGE UP (ref 0–0.5)
EOSINOPHIL NFR BLD AUTO: 4.4 % — SIGNIFICANT CHANGE UP (ref 0–6)
GIANT PLATELETS BLD QL SMEAR: PRESENT — SIGNIFICANT CHANGE UP
GLUCOSE SERPL-MCNC: 105 MG/DL — HIGH (ref 70–99)
HCT VFR BLD CALC: 30 % — LOW (ref 39–50)
HGB BLD-MCNC: 8.9 G/DL — LOW (ref 13–17)
INR BLD: 1.15 RATIO — SIGNIFICANT CHANGE UP (ref 0.85–1.18)
LYMPHOCYTES # BLD AUTO: 1.18 K/UL — SIGNIFICANT CHANGE UP (ref 1–3.3)
LYMPHOCYTES # BLD AUTO: 25.4 % — SIGNIFICANT CHANGE UP (ref 13–44)
MACROCYTES BLD QL: SLIGHT — SIGNIFICANT CHANGE UP
MANUAL SMEAR VERIFICATION: SIGNIFICANT CHANGE UP
MCHC RBC-ENTMCNC: 22.6 PG — LOW (ref 27–34)
MCHC RBC-ENTMCNC: 29.7 GM/DL — LOW (ref 32–36)
MCV RBC AUTO: 76.3 FL — LOW (ref 80–100)
MICROCYTES BLD QL: SIGNIFICANT CHANGE UP
MONOCYTES # BLD AUTO: 0.37 K/UL — SIGNIFICANT CHANGE UP (ref 0–0.9)
MONOCYTES NFR BLD AUTO: 7.9 % — SIGNIFICANT CHANGE UP (ref 2–14)
NEUTROPHILS # BLD AUTO: 2.74 K/UL — SIGNIFICANT CHANGE UP (ref 1.8–7.4)
NEUTROPHILS NFR BLD AUTO: 58.8 % — SIGNIFICANT CHANGE UP (ref 43–77)
OVALOCYTES BLD QL SMEAR: SLIGHT — SIGNIFICANT CHANGE UP
PLAT MORPH BLD: ABNORMAL
PLATELET # BLD AUTO: 151 K/UL — SIGNIFICANT CHANGE UP (ref 150–400)
POIKILOCYTOSIS BLD QL AUTO: SLIGHT — SIGNIFICANT CHANGE UP
POLYCHROMASIA BLD QL SMEAR: SLIGHT — SIGNIFICANT CHANGE UP
POTASSIUM SERPL-MCNC: 4.3 MMOL/L — SIGNIFICANT CHANGE UP (ref 3.5–5.3)
POTASSIUM SERPL-SCNC: 4.3 MMOL/L — SIGNIFICANT CHANGE UP (ref 3.5–5.3)
PROT SERPL-MCNC: 6.2 G/DL — SIGNIFICANT CHANGE UP (ref 6–8.3)
PROTHROM AB SERPL-ACNC: 12.6 SEC — SIGNIFICANT CHANGE UP (ref 9.5–13)
RBC # BLD: 3.93 M/UL — LOW (ref 4.2–5.8)
RBC # FLD: 20.1 % — HIGH (ref 10.3–14.5)
RBC BLD AUTO: ABNORMAL
RH IG SCN BLD-IMP: NEGATIVE — SIGNIFICANT CHANGE UP
SCHISTOCYTES BLD QL AUTO: SLIGHT — SIGNIFICANT CHANGE UP
SODIUM SERPL-SCNC: 141 MMOL/L — SIGNIFICANT CHANGE UP (ref 135–145)
VARIANT LYMPHS # BLD: 1.8 % — SIGNIFICANT CHANGE UP (ref 0–6)
WBC # BLD: 4.66 K/UL — SIGNIFICANT CHANGE UP (ref 3.8–10.5)
WBC # FLD AUTO: 4.66 K/UL — SIGNIFICANT CHANGE UP (ref 3.8–10.5)

## 2023-08-07 PROCEDURE — 70450 CT HEAD/BRAIN W/O DYE: CPT | Mod: 26,MA

## 2023-08-07 PROCEDURE — 99223 1ST HOSP IP/OBS HIGH 75: CPT

## 2023-08-07 PROCEDURE — 99285 EMERGENCY DEPT VISIT HI MDM: CPT | Mod: GC

## 2023-08-07 PROCEDURE — 93971 EXTREMITY STUDY: CPT | Mod: 26,LT

## 2023-08-07 RX ORDER — MONTELUKAST 4 MG/1
10 TABLET, CHEWABLE ORAL DAILY
Refills: 0 | Status: DISCONTINUED | OUTPATIENT
Start: 2023-08-07 | End: 2023-08-11

## 2023-08-07 RX ORDER — TAMSULOSIN HYDROCHLORIDE 0.4 MG/1
0.8 CAPSULE ORAL AT BEDTIME
Refills: 0 | Status: DISCONTINUED | OUTPATIENT
Start: 2023-08-07 | End: 2023-08-11

## 2023-08-07 RX ORDER — HEPARIN SODIUM 5000 [USP'U]/ML
5000 INJECTION INTRAVENOUS; SUBCUTANEOUS EVERY 6 HOURS
Refills: 0 | Status: DISCONTINUED | OUTPATIENT
Start: 2023-08-07 | End: 2023-08-10

## 2023-08-07 RX ORDER — HEPARIN SODIUM 5000 [USP'U]/ML
2500 INJECTION INTRAVENOUS; SUBCUTANEOUS EVERY 6 HOURS
Refills: 0 | Status: DISCONTINUED | OUTPATIENT
Start: 2023-08-07 | End: 2023-08-10

## 2023-08-07 RX ORDER — HEPARIN SODIUM 5000 [USP'U]/ML
INJECTION INTRAVENOUS; SUBCUTANEOUS
Qty: 25000 | Refills: 0 | Status: DISCONTINUED | OUTPATIENT
Start: 2023-08-07 | End: 2023-08-10

## 2023-08-07 RX ORDER — SIMVASTATIN 20 MG/1
20 TABLET, FILM COATED ORAL AT BEDTIME
Refills: 0 | Status: DISCONTINUED | OUTPATIENT
Start: 2023-08-07 | End: 2023-08-11

## 2023-08-07 RX ORDER — FUROSEMIDE 40 MG
1 TABLET ORAL
Refills: 0 | DISCHARGE

## 2023-08-07 RX ORDER — HEPARIN SODIUM 5000 [USP'U]/ML
INJECTION INTRAVENOUS; SUBCUTANEOUS
Qty: 25000 | Refills: 0 | Status: DISCONTINUED | OUTPATIENT
Start: 2023-08-07 | End: 2023-08-07

## 2023-08-07 RX ORDER — METOPROLOL TARTRATE 50 MG
12.5 TABLET ORAL
Refills: 0 | Status: DISCONTINUED | OUTPATIENT
Start: 2023-08-07 | End: 2023-08-09

## 2023-08-07 RX ORDER — FUROSEMIDE 40 MG
40 TABLET ORAL DAILY
Refills: 0 | Status: DISCONTINUED | OUTPATIENT
Start: 2023-08-07 | End: 2023-08-11

## 2023-08-07 RX ORDER — FLUTICASONE PROPIONATE 50 MCG
1 SPRAY, SUSPENSION NASAL
Refills: 0 | Status: DISCONTINUED | OUTPATIENT
Start: 2023-08-07 | End: 2023-08-11

## 2023-08-07 RX ORDER — PANTOPRAZOLE SODIUM 20 MG/1
40 TABLET, DELAYED RELEASE ORAL
Refills: 0 | Status: DISCONTINUED | OUTPATIENT
Start: 2023-08-07 | End: 2023-08-11

## 2023-08-07 RX ORDER — BUDESONIDE AND FORMOTEROL FUMARATE DIHYDRATE 160; 4.5 UG/1; UG/1
2 AEROSOL RESPIRATORY (INHALATION)
Refills: 0 | Status: DISCONTINUED | OUTPATIENT
Start: 2023-08-07 | End: 2023-08-11

## 2023-08-07 RX ORDER — ACETAMINOPHEN 500 MG
650 TABLET ORAL ONCE
Refills: 0 | Status: COMPLETED | OUTPATIENT
Start: 2023-08-07 | End: 2023-08-07

## 2023-08-07 RX ORDER — ACETAMINOPHEN 500 MG
650 TABLET ORAL EVERY 6 HOURS
Refills: 0 | Status: DISCONTINUED | OUTPATIENT
Start: 2023-08-07 | End: 2023-08-11

## 2023-08-07 RX ADMIN — HEPARIN SODIUM 1300 UNIT(S)/HR: 5000 INJECTION INTRAVENOUS; SUBCUTANEOUS at 23:44

## 2023-08-07 RX ADMIN — TAMSULOSIN HYDROCHLORIDE 0.8 MILLIGRAM(S): 0.4 CAPSULE ORAL at 23:26

## 2023-08-07 RX ADMIN — SIMVASTATIN 20 MILLIGRAM(S): 20 TABLET, FILM COATED ORAL at 23:27

## 2023-08-07 RX ADMIN — Medication 650 MILLIGRAM(S): at 14:19

## 2023-08-07 RX ADMIN — Medication 650 MILLIGRAM(S): at 17:00

## 2023-08-07 NOTE — ED PROVIDER NOTE - PROGRESS NOTE DETAILS
informed pt of results of DVT study. Pt will be admitted for concerning LE blood clot. will be placed on Lovenox for DVT Patient signed out to me by the prior attending.  The patient's disposition was discussed with the treating team and agreed upon.  Norman Baptiste M.D. (attending) Will consider lovenox vs increasing eliquis after trial of heparin gtt and discussion as an inpatient for IVC filter  Patient endorsed to Dr. Banks at the time of admission. Based on patient's history and physical exam, as well as the results of today's workup, I feel that patient warrants admission to the hospital for further workup/evaluation and continued management. I discussed the findings of today's workup with the patient and addressed the patient's questions and concerns. The patient was agreeable with admission. Our team spoke with the inpatient receiving team who accepted the patient for admission and subsequently took over the patient's care at the time of admission. The receiving team will follow up on pending labs, analgesia, any clinical imaging results, ancillary findings, reassess, and disposition as clinically indicated. Details of patient and plan conveyed to receiving physician team and conveyed back for understanding. There were no questions at this time about the patient's status, disposition, and plan. Patient's care to be taken over by receiving physician team at this time, all decisions regarding the progression of care will be made at their discretion.

## 2023-08-07 NOTE — H&P ADULT - PROBLEM SELECTOR PLAN 1
proximal DVT  uncertain age based upon read, however given sx time course, c/f acute  pt w/ hx of recent fall and head trauma though CTH w/o e/o bleed  - will initiate hep gtt for AC, would c/s cardiology in AM (pt is followed by Dr. Pringle) to discuss risk/benefit of prolonged AC in elderly pt w/ hx fall and determine whether long-term AC vs IVC filter more appropriate moving forward  - hold pt home eliquis in interim proximal DVT  uncertain age based upon read, however given sx time course, c/f acute  pt w/ hx of recent fall and head trauma though CTH w/o e/o bleed  - will increase home eliquis 2.5mg BID -> DVT tx dose 10mg BID, would c/s cardiology in AM (pt is followed by Dr. Pringle) to discuss risk/benefit of prolonged AC in elderly pt w/ hx fall and determine whether long-term AC vs IVC filter placement appropriate proximal DVT  uncertain age based upon read, however given sx time course, c/f acute  pt w/ hx of recent fall and head trauma though CTH w/o e/o bleed  - will initate hep gtt, c/s cardiology in AM (pt is followed by Dr. Pringle) to discuss risk/benefit of prolonged AC in elderly pt w/ hx fall and determine whether long-term AC vs IVC filter placement appropriate proximal DVT L popliteal + posterior tibial peroneal trunk  uncertain age based upon read, and pt w/ LE swelling x months therefore unclear whether acute   pt w/ hx of recent fall and head trauma though CTH w/o e/o bleed  - will initate hep gtt, c/s cardiology in AM (pt is followed by Dr. Pringle) to discuss risk/benefit of prolonged AC in elderly pt w/ hx fall and determine whether long-term AC vs IVC filter placement appropriate - d/w pt and daughter Ho in agreement proximal DVT L popliteal + posterior tibial peroneal trunk  uncertain age based upon read, and pt w/ LE swelling x months therefore unclear whether acute   pt w/ hx of recent mech fall and head trauma though CTH w/o e/o bleed  - will initate hep gtt, c/s cardiology in AM (pt is followed by Dr. Pringle) to discuss risk/benefit of prolonged AC in elderly pt w/ hx fall and determine whether long-term AC vs IVC filter placement appropriate - d/w pt and daughter Ho in agreement

## 2023-08-07 NOTE — PATIENT PROFILE ADULT - FALL HARM RISK - RISK INTERVENTIONS

## 2023-08-07 NOTE — ED PROVIDER NOTE - ATTENDING CONTRIBUTION TO CARE
Dr. Knutson: I have personally performed a face to face bedside history and physical examination of this patient. I have discussed the history, examination, review of systems, assessment and plan of management with the resident. I have reviewed the electronic medical record and amended it to reflect my history, review of systems, physical exam, assessment and plan.    Dr. Knutson: This H&P has been written by myself in its entirety

## 2023-08-07 NOTE — H&P ADULT - ASSESSMENT
89M malignancy, HTN, HLD, CKD3, CAD, AFib (eliquis), asthma, GERD, recent admit 6/3-6/7 for campylobacter+ colitis dc'd on PO azithro, now presents w/ unilateral LLE swelling, and recent fall (evaluated at [facility] found to have undet C3 fx (no NSGY intervention at that time), found to have partially occlusive proximal DVT (L popliteal + posterior tibial peroneal trunk)    89M HTN, HLD, CKD3, CAD, AFib (eliquis), asthma, GERD, recent admit 6/3-6/7 for campylobacter+ colitis dc'd on PO azithro, now presents w/ HA and lower extremity swelling, in context of recent fall (evaluated at [facility] found to have undet C3 fx (no NSGY intervention at that time), found to have partially occlusive proximal DVT (L popliteal + posterior tibial peroneal trunk)    89M HTN, HLD, CKD3, CAD, AFib (eliquis), asthma, GERD, recent admit 6/3-6/7 for campylobacter+ colitis dc'd on PO azithro, now presents w/ HA and lower extremity swelling, in context of recent [facility] fall 7/2023 (evaluated at [facility] found to have undet C3 fx (no NSGY intervention at that time), found to have partially occlusive proximal DVT (L popliteal + posterior tibial peroneal trunk)

## 2023-08-07 NOTE — H&P ADULT - NSHPLABSRESULTS_GEN_ALL_CORE
LABS:                        8.9    4.66  )-----------( 151      ( 07 Aug 2023 17:18 )             30.0     08-07    141  |  102  |  25<H>  ----------------------------<  105<H>  4.3   |  26  |  1.30    Ca    8.7      07 Aug 2023 17:18    TPro  6.2  /  Alb  3.6  /  TBili  0.4  /  DBili  x   /  AST  20  /  ALT  14  /  AlkPhos  75  08-07    PT/INR - ( 07 Aug 2023 17:18 )   PT: 12.6 sec;   INR: 1.15 ratio         PTT - ( 07 Aug 2023 17:18 )  PTT:33.6 sec  Urinalysis Basic - ( 07 Aug 2023 17:18 )    Color: x / Appearance: x / SG: x / pH: x  Gluc: 105 mg/dL / Ketone: x  / Bili: x / Urobili: x   Blood: x / Protein: x / Nitrite: x   Leuk Esterase: x / RBC: x / WBC x   Sq Epi: x / Non Sq Epi: x / Bacteria: x          RADIOLOGY & ADDITIONAL TESTS: LABS:                        8.9    4.66  )-----------( 151      ( 07 Aug 2023 17:18 )             30.0     08-07    141  |  102  |  25<H>  ----------------------------<  105<H>  4.3   |  26  |  1.30    Ca    8.7      07 Aug 2023 17:18    TPro  6.2  /  Alb  3.6  /  TBili  0.4  /  DBili  x   /  AST  20  /  ALT  14  /  AlkPhos  75  08-07    PT/INR - ( 07 Aug 2023 17:18 )   PT: 12.6 sec;   INR: 1.15 ratio         PTT - ( 07 Aug 2023 17:18 )  PTT:33.6 sec  Urinalysis Basic - ( 07 Aug 2023 17:18 )    Color: x / Appearance: x / SG: x / pH: x  Gluc: 105 mg/dL / Ketone: x  / Bili: x / Urobili: x   Blood: x / Protein: x / Nitrite: x   Leuk Esterase: x / RBC: x / WBC x   Sq Epi: x / Non Sq Epi: x / Bacteria: x          RADIOLOGY & ADDITIONAL TESTS:    < from: CT Head No Cont (08.07.23 @ 17:31) >    No CT evidence of acute intracranial hemorrhage, brain edema, or mass   effect.    --- End of Report ---          CHAS MENDENHALL MD; Resident Radiologist  This document has been electronically signed.  PATRICIA BRADEN MD; AttendingRadiologist  This document has been electronically signed. Aug  7 2023  6:20PM    < end of copied text >    < from: [facility] Duplex Lower Ext Vein Scan, Left (08.07.23 @ 15:21) >    There is partially occlusive thrombus in the left popliteal vein and   posterior tibial peroneal trunk.    I am uncertain of the age of this thrombus.    Dr Milak Knutson notified.    --- End of Report ---            ORE HALEY MD; Attending Radiologist  This document has been electronically signed. Aug  7 2023  3:33PM    < end of copied text > LABS:                        8.9    4.66  )-----------( 151      ( 07 Aug 2023 17:18 )             30.0     08-07    141  |  102  |  25<H>  ----------------------------<  105<H>  4.3   |  26  |  1.30    Ca    8.7      07 Aug 2023 17:18    TPro  6.2  /  Alb  3.6  /  TBili  0.4  /  DBili  x   /  AST  20  /  ALT  14  /  AlkPhos  75  08-07    PT/INR - ( 07 Aug 2023 17:18 )   PT: 12.6 sec;   INR: 1.15 ratio         PTT - ( 07 Aug 2023 17:18 )  PTT:33.6 sec  Urinalysis Basic - ( 07 Aug 2023 17:18 )    Color: x / Appearance: x / SG: x / pH: x  Gluc: 105 mg/dL / Ketone: x  / Bili: x / Urobili: x   Blood: x / Protein: x / Nitrite: x   Leuk Esterase: x / RBC: x / WBC x   Sq Epi: x / Non Sq Epi: x / Bacteria: x          RADIOLOGY & ADDITIONAL TESTS:    < from: CT Head No Cont (08.07.23 @ 17:31) >    No CT evidence of acute intracranial hemorrhage, brain edema, or mass   effect.    --- End of Report ---          CHAS MENDENHALL MD; Resident Radiologist  This document has been electronically signed.  PATRICIA BRADEN MD; AttendingRadiologist  This document has been electronically signed. Aug  7 2023  6:20PM    < end of copied text >    < from: VA Duplex Lower Ext Vein Scan, Left (08.07.23 @ 15:21) >    There is partially occlusive thrombus in the left popliteal vein and   posterior tibial peroneal trunk.    I am uncertain of the age of this thrombus.    Dr Milka Knutson notified.    --- End of Report ---            ROE HALEY MD; Attending Radiologist  This document has been electronically signed. Aug  7 2023  3:33PM    < end of copied text >    EKG not ordered by ED, ordering stat. LABS:                        8.9    4.66  )-----------( 151      ( 07 Aug 2023 17:18 )             30.0     08-07    141  |  102  |  25<H>  ----------------------------<  105<H>  4.3   |  26  |  1.30    Ca    8.7      07 Aug 2023 17:18    TPro  6.2  /  Alb  3.6  /  TBili  0.4  /  DBili  x   /  AST  20  /  ALT  14  /  AlkPhos  75  08-07    PT/INR - ( 07 Aug 2023 17:18 )   PT: 12.6 sec;   INR: 1.15 ratio         PTT - ( 07 Aug 2023 17:18 )  PTT:33.6 sec  Urinalysis Basic - ( 07 Aug 2023 17:18 )    Color: x / Appearance: x / SG: x / pH: x  Gluc: 105 mg/dL / Ketone: x  / Bili: x / Urobili: x   Blood: x / Protein: x / Nitrite: x   Leuk Esterase: x / RBC: x / WBC x   Sq Epi: x / Non Sq Epi: x / Bacteria: x          RADIOLOGY & ADDITIONAL TESTS:    < from: CT Head No Cont (08.07.23 @ 17:31) >    No CT evidence of acute intracranial hemorrhage, brain edema, or mass   effect.    --- End of Report ---          CHAS MENDENHALL MD; Resident Radiologist  This document has been electronically signed.  PATRICIA BRADEN MD; AttendingRadiologist  This document has been electronically signed. Aug  7 2023  6:20PM    < end of copied text >    < from: VA Duplex Lower Ext Vein Scan, Left (08.07.23 @ 15:21) >    There is partially occlusive thrombus in the left popliteal vein and   posterior tibial peroneal trunk.    I am uncertain of the age of this thrombus.    Dr Milka Knutson notified.    --- End of Report ---            ROE HALEY MD; Attending Radiologist  This document has been electronically signed. Aug  7 2023  3:33PM    < end of copied text >    EKG not ordered by ED, ordering stat. - EKG personally reviewed via MUSE: poor study, regular rate 84BPM, no discernable p-waves interpret AFib, QTc 469ms, difficult given poor study though no overt pathologic ST segment elevation/depression

## 2023-08-07 NOTE — ED PROVIDER NOTE - OBJECTIVE STATEMENT
Dr. Knutson: 89-year-old male history of A-fib on Eliquis, lives with daughter who is at bedside, extremely active and ambulates on his own, 2 weeks ago while gardening he sustained a mechanical fall and hit left side of his forehead.  No LOC, had a laceration which was repaired at [facility].  Had a CT done at that time as per daughter which was negative.  Here today because as of the last 2 days he has had a progressive gradual onset left-sided headache at the site of impact.  No other injuries.  No LOC, nausea, vomiting, weakness or numbness in arms or legs.  Ambulate normally.  Patient and daughter also states that over the course of the day he develops bilateral lower extremity edema and keeps his legs elevated when he sleeps at night.  However today he noticed the left leg compared to the right and wanted to get it checked out.  No calf pain, fevers, chills, chest pain, shortness of breath, travel.

## 2023-08-07 NOTE — H&P ADULT - PROBLEM SELECTOR PLAN 8
home eliquis, BB  - hep gtt for AC and continuing BB as above home eliquis, BB  - eliquis as above and continuing home BB as above home eliquis, BB  - hep gtt and continuing home BB as above

## 2023-08-07 NOTE — H&P ADULT - NSHPPHYSICALEXAM_GEN_ALL_CORE
PHYSICAL EXAM:  GENERAL: NAD, well-groomed, well-developed, pleasant to interview, supine in ED stretcher w/ HOB elevated   HEAD: +healed laceration frontotemporal area w/o bleed/purulence/hematoma/tenderness  EYES: PERRL, conjunctiva and sclera clear +arcus senilis  ENMT: No tonsillar erythema, exudates, or enlargement; Moist mucous membranes  NECK: Supple w/o JVD  NERVOUS SYSTEM: Alert & Oriented X2 (self, location), Good concentration; Motor Strength 5/5 B/L upper and lower extremities  CHEST/LUNG: Clear to auscultation bilaterally; No rales, rhonchi, wheezing, or rubs  HEART: Regular rate and +irregular rhythm; No murmurs, rubs, or gallops  ABDOMEN: Soft, Nontender, Nondistended; Bowel sounds present. No guarding, rebound tenderness, or rigidity.  EXTREMITIES: 2+ Peripheral Pulses, 1-2+ pitting edema b/l LE below knee w/o erythema/warmth/tenderness to palpation PHYSICAL EXAM:  GENERAL: NAD, well-groomed, well-developed, pleasant to interview, supine in ED stretcher w/ HOB elevated   HEAD: +healed laceration frontotemporal area w/o bleed/purulence/hematoma/tenderness  EYES: PERRL, conjunctiva and sclera clear +arcus senilis  ENMT: No tonsillar erythema, exudates, or enlargement; Moist mucous membranes  NECK: Supple w/o JVD  NERVOUS SYSTEM: Alert & Oriented X2 (self, location), Good concentration; Motor Strength 5/5 B/L upper and lower extremities. Sensation equal/intact b/l LE. CN II-XII grossly intact.   CHEST/LUNG: Clear to auscultation bilaterally; No rales, rhonchi, wheezing, or rubs  HEART: Regular rate and +irregular rhythm; No murmurs, rubs, or gallops  ABDOMEN: Soft, Nontender, Nondistended; Bowel sounds present. No guarding, rebound tenderness, or rigidity.  EXTREMITIES: 2+ Peripheral Pulses, 1-2+ pitting edema b/l LE below knee w/o erythema/warmth/tenderness to palpation

## 2023-08-07 NOTE — H&P ADULT - NSHPREVIEWOFSYSTEMS_GEN_ALL_CORE
REVIEW OF SYSTEMS:  CONSTITUTIONAL: No fever or chills  EYES: No visual disturbances or eye pain  ENMT: No sinus or throat pain  RESPIRATORY: No shortness of breath or cough  CARDIOVASCULAR: No chest pain or dizziness +b/l LE swelling pt relays x months  GASTROINTESTINAL: No abdominal or epigastric pain. No diarrhea or constipation. No melena or hematochezia.   GENITOURINARY: No dysuria or hematuria  NEUROLOGICAL: No loss of strength or numbness +HA as per HPI since fall 7/2023  MUSCULOSKELETAL: No joint pain or swelling; No muscle, back, or extremity pain

## 2023-08-07 NOTE — H&P ADULT - HISTORY OF PRESENT ILLNESS
89M malignancy, HTN, HLD, CKD3, CAD, AFib (eliquis), asthma, GERD, recent admit 6/3-6/7 for campylobacter+ colitis dc'd on PO azithro, now presents w/ HA and unilateral LLE swelling, and recent fall (evaluated at [facility] found to have undet C3 fx (no NSGY intervention at that time)    ED Course  VS: afebrile tamx 36.8C, HR 60s-90s, BP 100s-130s/60s-80s, RR 18-20, Spo2%  RA  Labs: microcytic (MCV 76.3) anemia H/H 8.9/30; BUN/SCr 25/1.30 (GFR 50)   Micro: unavailable  Imaging:  - VA duplex LE vein scan L: There is partially occlusive thrombus in the left popliteal vein and   posterior tibial peroneal trunk. I am uncertain of the age of this thrombus.  - CTH NC No CT evidence of acute intracranial hemorrhage, brain edema, or mass effect.  Received: tylenol 650mg PO    admit to medicine for further eval/mgt  89M malignancy, HTN, HLD, CKD3, CAD, AFib (eliquis), asthma, GERD, recent admit 6/3-6/7 for campylobacter+ colitis dc'd on PO azithro, now presents w/ HA and unilateral LLE swelling, and recent fall (evaluated at [facility] found to have undet C3 fx (no NSGY intervention at that time).     ED Course  VS: afebrile tamx 36.8C, HR 60s-90s, BP 100s-130s/60s-80s, RR 18-20, Spo2%  RA  Labs: microcytic (MCV 76.3) anemia H/H 8.9/30; BUN/SCr 25/1.30 (GFR 50)   Micro: unavailable  Imaging:  - VA duplex LE vein scan L: There is partially occlusive thrombus in the left popliteal vein and   posterior tibial peroneal trunk. I am uncertain of the age of this thrombus.  - CTH NC No CT evidence of acute intracranial hemorrhage, brain edema, or mass effect.  Received: tylenol 650mg PO    admit to medicine for further eval/mgt  89M HTN, HLD, CKD3, CAD, AFib (eliquis), asthma, GERD, recent admit 6/3-6/7 for campylobacter+ colitis dc'd on PO azithro, now presents w/ HA and lower extremity swelling, in context of recent fall (evaluated at [facility] found to have undet C3 fx (no NSGY intervention at that time). Pt relays feeling generally well, apart from persistent unchanged frontotemporal HA at site of injury from fall 7/2023, difficult to characterize, w/o radiation to other area. Additionally he reports b/l LE swelling x months w/o tenderness. Contacted daughter Ho via phone whom he lives with, who confirms above, and denies other complaints including  fever, chills, visual disturbance, rhinorrhea, odynophagia, dysphagia, CP, palpitations, SOB, cough, abd pain, n/v/d, melena/hematochezia, dysuria/hematuria, or other complaint. She relays that though pt has hx of LE swelling, he has never been dx w/ DVT before.     ED Course  VS: afebrile tamx 36.8C, HR 60s-90s, BP 100s-130s/60s-80s, RR 18-20, Spo2%  RA  Labs: microcytic (MCV 76.3) anemia H/H 8.9/30; BUN/SCr 25/1.30 (GFR 50)   Micro: unavailable  Imaging:  - VA duplex LE vein scan L: There is partially occlusive thrombus in the left popliteal vein and   posterior tibial peroneal trunk. I am uncertain of the age of this thrombus.  - CTH NC No CT evidence of acute intracranial hemorrhage, brain edema, or mass effect.  Received: tylenol 650mg PO    admit to medicine for further eval/mgt  89M HTN, HLD, CKD3, CAD, AFib (eliquis), asthma, GERD, recent admit 6/3-6/7 for campylobacter+ colitis dc'd on PO azithro, now presents w/ HA and lower extremity swelling, in context of recent [facility] fall 7/2023 (evaluated at [facility] found to have undet C3 fx (no NSGY intervention at that time). Pt relays feeling generally well, apart from persistent unchanged frontotemporal HA at site of injury from fall 7/2023, difficult to characterize, w/o radiation to other area. Additionally he reports b/l LE swelling x months w/o tenderness. Contacted daughter Ho via phone whom he lives with, who confirms above, and denies other complaints including  fever, chills, visual disturbance, rhinorrhea, odynophagia, dysphagia, CP, palpitations, SOB, cough, abd pain, n/v/d, melena/hematochezia, dysuria/hematuria, or other complaint. She relays that though pt has hx of LE swelling, he has never been dx w/ DVT before.     ED Course  VS: afebrile tamx 36.8C, HR 60s-90s, BP 100s-130s/60s-80s, RR 18-20, Spo2%  RA  Labs: microcytic (MCV 76.3) anemia H/H 8.9/30; BUN/SCr 25/1.30 (GFR 50)   Micro: unavailable  Imaging:  - VA duplex LE vein scan L: There is partially occlusive thrombus in the left popliteal vein and   posterior tibial peroneal trunk. I am uncertain of the age of this thrombus.  - CTH NC No CT evidence of acute intracranial hemorrhage, brain edema, or mass effect.  Received: tylenol 650mg PO    admit to medicine for further eval/mgt

## 2023-08-07 NOTE — ED PROVIDER NOTE - PHYSICAL EXAMINATION
Gen: No acute distress, extremely well-appearing and active.  HEENT: Mucous membranes moist, pink conjunctivae, EOMI  CV: Irregularly irregular, 1+ pitting edema RLE, 2+ pitting edema LLE  Resp: CTAB  GI: Abdomen soft, NT, ND. Normal BS. No rebound, no guarding  : No CVAT  Neuro: A&O x 3, moving all 4 extremities  MSK: No spine or joint tenderness to palpation  Skin: well healed left forehead lac

## 2023-08-07 NOTE — H&P ADULT - PROBLEM SELECTOR PLAN 7
BUN/SCr 25/1.30 (GFR 50)  - monitor BMP  - dose per GFR, avoid nephrotoxins  - Is/Os  - daily standing wt

## 2023-08-07 NOTE — ED PROVIDER NOTE - SKIN NEGATIVE STATEMENT, MLM
no abrasions, no jaundice, no lesions, no pruritis, and no rashes. healed laceration over the L. forehead

## 2023-08-07 NOTE — ED ADULT NURSE NOTE - NSFALLHARMRISKINTERV_ED_ALL_ED
Assistance OOB with selected safe patient handling equipment if applicable/Assistance with ambulation/Communicate risk of Fall with Harm to all staff, patient, and family/Monitor gait and stability/Provide visual cue: red socks, yellow wristband, yellow gown, etc/Reinforce activity limits and safety measures with patient and family/Bed in lowest position, wheels locked, appropriate side rails in place/Call bell, personal items and telephone in reach/Instruct patient to call for assistance before getting out of bed/chair/stretcher/Non-slip footwear applied when patient is off stretcher/[place] to call system/Physically safe environment - no spills, clutter or unnecessary equipment/Purposeful Proactive Rounding/Room/bathroom lighting operational, light cord in reach

## 2023-08-07 NOTE — ED ADULT TRIAGE NOTE - AS TEMP SITE
oral Consent was obtained from the patient. The risks and benefits to therapy were discussed in detail. Specifically, the risks of infection, scarring, bleeding, prolonged wound healing, incomplete removal, allergy to anesthesia, nerve injury and recurrence were addressed. Prior to the procedure, the treatment site was clearly identified and confirmed by the patient. All components of Universal Protocol/PAUSE Rule completed.

## 2023-08-07 NOTE — PATIENT PROFILE ADULT - NSPRESCRALCFREQ_GEN_A_NUR
Progress Note - Infectious Disease   Nia Adan 80 y o  female MRN: [id number]  Unit/Bed#: -01 Encounter: [id number]      Impression/Plan:  1  Community-acquired pneumonia with pleural effusions   Possibly with lung abscess   Patient presents at this time with progressive weakness and shortness of breath at home along with leukocytosis on admission   Patient remains hemodynamically stable, procalcitonin normalized and leukocytosis down trending   Her respiratory status remains stable  Antibiotics changed due to worsening liver function tests while on ceftriaxone   Very limited antibiotic options as the patient is also allergic to penicillins  Continue ertapenem through 8/16/2019 to complete 3 weeks total treatment  Monitor CBC with diff and CMP  Supportive care     2  Complicated parapneumonic effusion-possible empyema based upon very low pH and very high LDH  The pleural cultures are negative however the patient had been on antibiotics for quite some time   Chest tube was removed as it was not in a appropriate location   Now status post new chest tube placement  Repeat CT of the chest is improved with minimal fluid remaining  -antibiotics as above  -discontinue chest tube as per thoracic  -recheck chest x-ray     3  Fever, persistent leukocytosis   No recurrence of the fever spike  The white cell count remains elevated    Unclear etiology   Possibly all related to the patient's complicated effusion  There are no other obvious sources for elevated white count on her exam  Speech evaluation without signs of aspiration   Procalcitonin level is normal    Monitor CBC with diff  If fever recurs check blood cultures x2 sets  Continue antibiotics as above  Additional workup as needed     4   Liver function test abnormalities-unclear etiology   The patient appears to be mildly symptomatic with notable nausea   Perhaps related to the ceftriaxone   Right upper quadrant ultrasound without source   Hepatitis panel is negative   Liver function test have modestly improved since stopping the ceftriaxone but remain quite abnormal   -monitor liver function test  -GI evaluation underway  -additional workup as needed     5  Dementia   Patient seems to be at baseline cognition  Continue monitor mental status  Continue antibiotics as above  Additional care as per primary    Antibiotics:  Ertapenem 7  Antibiotics 18    Subjective:  Patient has no fever, chills, sweats; no nausea, vomiting, diarrhea; no cough, shortness of breath; no pain  No new symptoms  She is feeling better overall  Objective:  Vitals:  Temp:  [97 7 °F (36 5 °C)-97 9 °F (36 6 °C)] 97 8 °F (36 6 °C)  HR:  [68-83] 83  Resp:  [17-18] 17  BP: (104-134)/(48-68) 134/68  SpO2:  [97 %-98 %] 97 %  Temp (24hrs), Av 8 °F (36 6 °C), Min:97 7 °F (36 5 °C), Max:97 9 °F (36 6 °C)  Current: Temperature: 97 8 °F (36 6 °C)    Physical Exam:   General Appearance:  Alert, interactive, nontoxic, no acute distress  Throat: Oropharynx moist without lesions  Lungs:   Decreased breath sounds bilaterally; no wheezes, rhonchi or rales; respirations unlabored  Left-sided chest tube in place   Heart:  RRR; no murmur, rub or gallop   Abdomen:   Soft, non-tender, non-distended, positive bowel sounds  Extremities: No clubbing, cyanosis or edema   Skin: No new rashes or lesions  No draining wounds noted         Labs, Imaging, & Other studies:   All pertinent labs and imaging studies were personally reviewed  Results from last 7 days   Lab Units 19  0527 08/10/19  0428 08/08/19  0637   WBC Thousand/uL 8 86 16 51* 19 19*   HEMOGLOBIN g/dL 10 9* 10 5* 10 4*   PLATELETS Thousands/uL 435* 431* 400*     Results from last 7 days   Lab Units 19  0526 08/10/19  0428 19  0515   SODIUM mmol/L 134* 132* 132*   POTASSIUM mmol/L 4 6 4 8 4 3   CHLORIDE mmol/L 100 97* 100   CO2 mmol/L 28 29 26   BUN mg/dL 28* 33* 30*   CREATININE mg/dL 0 86 0 81 0 72   EGFR ml/min/1 73sq m 61 65 75 CALCIUM mg/dL 9 0 8 6 8 5   AST U/L 102* 76* 94*   ALT U/L 125* 126* 146*   ALK PHOS U/L 158* 145* 148*     Results from last 7 days   Lab Units 08/07/19  1519   GRAM STAIN RESULT  1+ Polys  No bacteria seen   BODY FLUID CULTURE, STERILE  No growth     Results from last 7 days   Lab Units 08/07/19  0514   PROCALCITONIN ng/ml 0 10 Never

## 2023-08-07 NOTE — ED PROVIDER NOTE - CLINICAL SUMMARY MEDICAL DECISION MAKING FREE TEXT BOX
Pt with head injury on Eliquis, will get CT head  Will get RLE duplex r/o dvt, although likely chronic venous insufficiency

## 2023-08-07 NOTE — H&P ADULT - PROBLEM SELECTOR PLAN 2
recent fall w/ L frontotemporal trauma eval at [place], found to have undet C3 fx w/o NSGY intervention; again w/o e/o bleed on CTH NC here  - pain control  - fall precaution  - neuro checks recent fall w/ L frontotemporal trauma eval at [facility], found to have undet C3 fx w/o NSGY intervention, w/o acute abn on CTH NC here  - pain control  - fall precaution  - neuro checks

## 2023-08-07 NOTE — ED ADULT NURSE NOTE - OBJECTIVE STATEMENT
89 y/o M with PMH of afib on eliquis, HTN presents to ED complaining of a headache. Pt had a mechanical fall 2 weeks ago, hit his left forehead on ground. Pt went to [place]. 89 y/o M with PMH of afib on Eliquis, HTN presents to ED complaining of a headache. Pt had a mechanical fall 2 weeks ago, hit his left forehead on ground. Pt went to [facility] had a CT and was discharged home. Over past few days pt has developed a left sided headahce. Upon arrival, pt is A&Ox4. satting well on RA.  Normal BP. Full strength and sensation in all extremities. Ambulates with 1 assist at baseline. Pt also has bilateral lower extremity swelling which pt  reports gets worse over course of the day.  Denies dizziness, vision changes, chest pain, shortness of breath, abdominal pain, nausea, vomiting, diarrhea, fevers, chills, dysuria, hematuria, recent illness travel.

## 2023-08-07 NOTE — H&P ADULT - PROBLEM SELECTOR PLAN 10
chem VTE ppx: hep gtt  precaution: fall  diet: pending dysphagia screen chem VTE ppx: eliquis  precaution: fall  diet: pending dysphagia screen

## 2023-08-07 NOTE — H&P ADULT - PROBLEM SELECTOR PLAN 4
home lasix 20mg BID, lopressor 50mg BID  BP acceptable on admit  - c/w home meds home lasix 20mg BID, toprol 25mg QD  BP acceptable on admit  - c/w home meds home lasix 40mg QD, toprol 25mg QD  BP acceptable on admit  - c/w home meds

## 2023-08-07 NOTE — H&P ADULT - NSHPSOCIALHISTORY_GEN_ALL_CORE
Denies tobacco use, former social EtOH (no longer), denies illicit drug use. Lives w/ daughter. Born in [employer] [facility] [employer], former .

## 2023-08-07 NOTE — H&P ADULT - NSICDXFAMILYHX_GEN_ALL_CORE_FT
FAMILY HISTORY:  No pertinent family history in first degree relatives     FAMILY HISTORY:  Mother  Still living? Unknown  FH: stroke, Age at diagnosis: Age Unknown    Sibling  Still living? Unknown  FH: stroke, Age at diagnosis: Age Unknown

## 2023-08-08 LAB
ANION GAP SERPL CALC-SCNC: 11 MMOL/L — SIGNIFICANT CHANGE UP (ref 5–17)
APTT BLD: 167.5 SEC — CRITICAL HIGH (ref 24.5–35.6)
APTT BLD: 172.6 SEC — CRITICAL HIGH (ref 24.5–35.6)
APTT BLD: 173.8 SEC — CRITICAL HIGH (ref 24.5–35.6)
APTT BLD: 192.3 SEC — CRITICAL HIGH (ref 24.5–35.6)
BASOPHILS # BLD AUTO: 0 K/UL — SIGNIFICANT CHANGE UP (ref 0–0.2)
BASOPHILS NFR BLD AUTO: 0 % — SIGNIFICANT CHANGE UP (ref 0–2)
BUN SERPL-MCNC: 21 MG/DL — SIGNIFICANT CHANGE UP (ref 7–23)
CALCIUM SERPL-MCNC: 8.7 MG/DL — SIGNIFICANT CHANGE UP (ref 8.4–10.5)
CHLORIDE SERPL-SCNC: 104 MMOL/L — SIGNIFICANT CHANGE UP (ref 96–108)
CO2 SERPL-SCNC: 25 MMOL/L — SIGNIFICANT CHANGE UP (ref 22–31)
CREAT SERPL-MCNC: 1.13 MG/DL — SIGNIFICANT CHANGE UP (ref 0.5–1.3)
EGFR: 59 ML/MIN/1.73M2 — LOW
EOSINOPHIL # BLD AUTO: 0.45 K/UL — SIGNIFICANT CHANGE UP (ref 0–0.5)
EOSINOPHIL NFR BLD AUTO: 9 % — HIGH (ref 0–6)
GIANT PLATELETS BLD QL SMEAR: PRESENT — SIGNIFICANT CHANGE UP
GLUCOSE SERPL-MCNC: 92 MG/DL — SIGNIFICANT CHANGE UP (ref 70–99)
HCT VFR BLD CALC: 30.8 % — LOW (ref 39–50)
HCT VFR BLD CALC: 30.9 % — LOW (ref 39–50)
HGB BLD-MCNC: 9 G/DL — LOW (ref 13–17)
HGB BLD-MCNC: 9.1 G/DL — LOW (ref 13–17)
INR BLD: 1.13 RATIO — SIGNIFICANT CHANGE UP (ref 0.85–1.18)
LYMPHOCYTES # BLD AUTO: 2.19 K/UL — SIGNIFICANT CHANGE UP (ref 1–3.3)
LYMPHOCYTES # BLD AUTO: 44.2 % — HIGH (ref 13–44)
MAGNESIUM SERPL-MCNC: 2.3 MG/DL — SIGNIFICANT CHANGE UP (ref 1.6–2.6)
MANUAL SMEAR VERIFICATION: SIGNIFICANT CHANGE UP
MCHC RBC-ENTMCNC: 22.4 PG — LOW (ref 27–34)
MCHC RBC-ENTMCNC: 23 PG — LOW (ref 27–34)
MCHC RBC-ENTMCNC: 29.1 GM/DL — LOW (ref 32–36)
MCHC RBC-ENTMCNC: 29.5 GM/DL — LOW (ref 32–36)
MCV RBC AUTO: 76.9 FL — LOW (ref 80–100)
MCV RBC AUTO: 77.8 FL — LOW (ref 80–100)
MONOCYTES # BLD AUTO: 0.49 K/UL — SIGNIFICANT CHANGE UP (ref 0–0.9)
MONOCYTES NFR BLD AUTO: 9.9 % — SIGNIFICANT CHANGE UP (ref 2–14)
NEUTROPHILS # BLD AUTO: 1.74 K/UL — LOW (ref 1.8–7.4)
NEUTROPHILS NFR BLD AUTO: 35.1 % — LOW (ref 43–77)
NRBC # BLD: 0 /100 WBCS — SIGNIFICANT CHANGE UP (ref 0–0)
PHOSPHATE SERPL-MCNC: 3.3 MG/DL — SIGNIFICANT CHANGE UP (ref 2.5–4.5)
PLAT MORPH BLD: NORMAL — SIGNIFICANT CHANGE UP
PLATELET # BLD AUTO: 139 K/UL — LOW (ref 150–400)
PLATELET # BLD AUTO: 140 K/UL — LOW (ref 150–400)
POTASSIUM SERPL-MCNC: 4.2 MMOL/L — SIGNIFICANT CHANGE UP (ref 3.5–5.3)
POTASSIUM SERPL-SCNC: 4.2 MMOL/L — SIGNIFICANT CHANGE UP (ref 3.5–5.3)
PROTHROM AB SERPL-ACNC: 12.4 SEC — SIGNIFICANT CHANGE UP (ref 9.5–13)
RBC # BLD: 3.96 M/UL — LOW (ref 4.2–5.8)
RBC # BLD: 4.02 M/UL — LOW (ref 4.2–5.8)
RBC # FLD: 20.1 % — HIGH (ref 10.3–14.5)
RBC # FLD: 20.3 % — HIGH (ref 10.3–14.5)
RBC BLD AUTO: SIGNIFICANT CHANGE UP
SMUDGE CELLS # BLD: PRESENT — SIGNIFICANT CHANGE UP
SODIUM SERPL-SCNC: 140 MMOL/L — SIGNIFICANT CHANGE UP (ref 135–145)
VARIANT LYMPHS # BLD: 1.8 % — SIGNIFICANT CHANGE UP (ref 0–6)
WBC # BLD: 4.68 K/UL — SIGNIFICANT CHANGE UP (ref 3.8–10.5)
WBC # BLD: 4.96 K/UL — SIGNIFICANT CHANGE UP (ref 3.8–10.5)
WBC # FLD AUTO: 4.68 K/UL — SIGNIFICANT CHANGE UP (ref 3.8–10.5)
WBC # FLD AUTO: 4.96 K/UL — SIGNIFICANT CHANGE UP (ref 3.8–10.5)

## 2023-08-08 PROCEDURE — 93010 ELECTROCARDIOGRAM REPORT: CPT

## 2023-08-08 RX ADMIN — HEPARIN SODIUM 1100 UNIT(S)/HR: 5000 INJECTION INTRAVENOUS; SUBCUTANEOUS at 07:54

## 2023-08-08 RX ADMIN — HEPARIN SODIUM 0 UNIT(S)/HR: 5000 INJECTION INTRAVENOUS; SUBCUTANEOUS at 23:23

## 2023-08-08 RX ADMIN — Medication 40 MILLIGRAM(S): at 05:21

## 2023-08-08 RX ADMIN — BUDESONIDE AND FORMOTEROL FUMARATE DIHYDRATE 2 PUFF(S): 160; 4.5 AEROSOL RESPIRATORY (INHALATION) at 05:22

## 2023-08-08 RX ADMIN — HEPARIN SODIUM 0 UNIT(S)/HR: 5000 INJECTION INTRAVENOUS; SUBCUTANEOUS at 07:08

## 2023-08-08 RX ADMIN — Medication 12.5 MILLIGRAM(S): at 05:21

## 2023-08-08 RX ADMIN — Medication 1 SPRAY(S): at 05:21

## 2023-08-08 RX ADMIN — BUDESONIDE AND FORMOTEROL FUMARATE DIHYDRATE 2 PUFF(S): 160; 4.5 AEROSOL RESPIRATORY (INHALATION) at 17:11

## 2023-08-08 RX ADMIN — HEPARIN SODIUM 0 UNIT(S)/HR: 5000 INJECTION INTRAVENOUS; SUBCUTANEOUS at 06:50

## 2023-08-08 RX ADMIN — HEPARIN SODIUM 900 UNIT(S)/HR: 5000 INJECTION INTRAVENOUS; SUBCUTANEOUS at 16:59

## 2023-08-08 RX ADMIN — HEPARIN SODIUM 0 UNIT(S)/HR: 5000 INJECTION INTRAVENOUS; SUBCUTANEOUS at 15:55

## 2023-08-08 RX ADMIN — PANTOPRAZOLE SODIUM 40 MILLIGRAM(S): 20 TABLET, DELAYED RELEASE ORAL at 07:10

## 2023-08-08 RX ADMIN — HEPARIN SODIUM 900 UNIT(S)/HR: 5000 INJECTION INTRAVENOUS; SUBCUTANEOUS at 19:08

## 2023-08-08 RX ADMIN — Medication 12.5 MILLIGRAM(S): at 17:10

## 2023-08-08 RX ADMIN — Medication 1 SPRAY(S): at 17:10

## 2023-08-08 RX ADMIN — MONTELUKAST 10 MILLIGRAM(S): 4 TABLET, CHEWABLE ORAL at 11:08

## 2023-08-08 NOTE — CONSULT NOTE ADULT - ASSESSMENT
EKG - Afib   Echo - outpt 3/23= Normal LV function mild AS    a/p     1) s/p fall - fell about 3 weeks ago , went to [facility] workup was negative, CT head ok for CT neck , pt had mechanical fall no LOC, out pt echo shows normal LV mild AS    2) Afib - chronic, on eliquis now on IV heparin     3) Left pop vein DVT - age indeterminant on iV heparin  EKG - Afib   Echo - outpt 3/23= Normal LV function mild AS    a/p     1) s/p fall - fell about 3 weeks ago , went to [facility] workup was negative, CT head ok for CT neck , pt had mechanical fall no LOC, out pt echo shows normal LV mild AS    2) Afib - chronic, on eliquis now on IV heparin increase metoprolol to 25mg q12, heart rate elevated     3) Left pop vein DVT - age indeterminant on iV heparin

## 2023-08-08 NOTE — PHYSICAL THERAPY INITIAL EVALUATION ADULT - TRANSFER SAFETY CONCERNS NOTED: SIT/STAND, REHAB EVAL
losing balance Epidermal Autograft Text: The defect edges were debeveled with a #15 scalpel blade.  Given the location of the defect, shape of the defect and the proximity to free margins an epidermal autograft was deemed most appropriate.  Using a sterile surgical marker, the primary defect shape was transferred to the donor site. The epidermal graft was then harvested.  The skin graft was then placed in the primary defect and oriented appropriately.

## 2023-08-08 NOTE — PHYSICAL THERAPY INITIAL EVALUATION ADULT - PERTINENT HX OF CURRENT PROBLEM, REHAB EVAL
89 y/oM admitted 8/7 p/w HA and LE swelling. Found to have partially occlusive proximal DVT (L popliteal + posterior tibial peroneal trunk). CT of head with no acute abnormality. PMH HTN, HLD, CKD3, CAD, AFib (eliquis), asthma, GERD, recent admit 6/3-6/7 for campylobacter+ colitis dc'd on PO azithro. As per H&P, In context of recent [facility] fall 7/2023 (evaluated at [facility] found to have undet C3 fx (no NSGY intervention at that time). Pt relays feeling generally well, apart from persistent unchanged frontotemporal HA at site of injury from fall 7/2023, difficult to characterize, w/o radiation to other area. Additionally he reports b/l LE swelling x months w/o tenderness. She relays that though pt has hx of LE swelling, he has never been dx w/ DVT before.

## 2023-08-08 NOTE — PHYSICAL THERAPY INITIAL EVALUATION ADULT - ADDITIONAL COMMENTS
Pt lives with wife and daughter, daughter is around during the day. 4 stairs to enter, 1 flight to bedroom. Did not use an assistive device pta, has a walker. Is able to negotiate stairs independently.

## 2023-08-08 NOTE — CONSULT NOTE ADULT - SUBJECTIVE AND OBJECTIVE BOX
Slick Up MD  Interventional Cardiology / Advance Heart Failure and Cardiac Transplant Specialist  [place] Office : [address] [place] [address] [place] [address]  Tel:   [place] Office : [address] [facility] [place] [address]  Tel: [phone]         HISTORY OF PRESENTING ILLNESS:  HPI:  89M HTN, HLD, CKD3, CAD, AFib (eliquis), asthma, GERD, recent admit 6/3-6/7 for campylobacter+ colitis dc'd on PO azithro, now presents w/ HA and lower extremity swelling, in context of recent [facility] fall 7/2023 (evaluated at [facility] found to have undet C3 fx (no NSGY intervention at that time). Pt relays feeling generally well, apart from persistent unchanged frontotemporal HA at site of injury from fall 7/2023, difficult to characterize, w/o radiation to other area. Additionally he reports b/l LE swelling x months w/o tenderness. Contacted daughter Ho via phone whom he lives with, who confirms above, and denies other complaints including  fever, chills, visual disturbance, rhinorrhea, odynophagia, dysphagia, CP, palpitations, SOB, cough, abd pain, n/v/d, melena/hematochezia, dysuria/hematuria, or other complaint. She relays that though pt has hx of LE swelling, he has never been dx w/ DVT before.     PAST MEDICAL & SURGICAL HISTORY:  Afib      Asthmatic bronchitis , chronic      GERD (gastroesophageal reflux disease)      HLD (hyperlipidemia)      HTN (hypertension)      No significant past surgical history          SOCIAL HISTORY: Substance Use (street drugs): ( x ) never used  (  ) other:    FAMILY HISTORY:  FH: stroke (Mother, Sibling)        MEDICATIONS:  furosemide    Tablet 40 milliGRAM(s) Oral daily  heparin   Injectable 2500 Unit(s) IV Push every 6 hours PRN  heparin   Injectable 5000 Unit(s) IV Push every 6 hours PRN  heparin  Infusion.  Unit(s)/Hr IV Continuous <Continuous>  metoprolol tartrate 12.5 milliGRAM(s) Oral two times a day  budesonide  80 MICROgram(s)/formoterol 4.5 MICROgram(s) Inhaler 2 Puff(s) Inhalation two times a day  montelukast 10 milliGRAM(s) Oral daily  acetaminophen     Tablet .. 650 milliGRAM(s) Oral every 6 hours PRN  pantoprazole    Tablet 40 milliGRAM(s) Oral before breakfast  simvastatin 20 milliGRAM(s) Oral at bedtime  fluticasone propionate 50 MICROgram(s)/spray Nasal Spray 1 Spray(s) Both Nostrils two times a day  tamsulosin 0.8 milliGRAM(s) Oral at bedtime      FAMILY HISTORY:  FH: stroke (Mother, Sibling)          Allergies    No Known Allergies    Intolerances    	      PHYSICAL EXAM:  T(C): 36.4 (08-08-23 @ 12:11), Max: 36.5 (08-07-23 @ 18:09)  HR: 55 (08-08-23 @ 14:46) (55 - 99)  BP: 143/85 (08-08-23 @ 14:46) (104/60 - 143/85)  RR: 18 (08-08-23 @ 12:11) (18 - 20)  SpO2: 95% (08-08-23 @ 14:46) (94% - 99%)  Wt(kg): --  I&O's Summary    07 Aug 2023 07:01  -  08 Aug 2023 07:00  --------------------------------------------------------  IN: 0 mL / OUT: 400 mL / NET: -400 mL    08 Aug 2023 07:01  -  08 Aug 2023 16:59  --------------------------------------------------------  IN: 480 mL / OUT: 1600 mL / NET: -1120 mL        GENERAL: NAD   EYES: EOMI, PERRLA, conjunctiva and sclera clear  ENMT: No tonsillar erythema, exudates, or enlargement; Moist mucous membranes, Good dentition, No lesions  Cardiovascular: Normal S1 S2, No JVD, No murmurs, No edema  Respiratory: Lungs clear to auscultation	  Gastrointestinal:  Soft, Non-tender, + BS	  Extremities: no edema      LABS:	 	    CARDIAC MARKERS:                                  9.1    4.96  )-----------( 140      ( 08 Aug 2023 07:21 )             30.8     08-08    140  |  104  |  21  ----------------------------<  92  4.2   |  25  |  1.13    Ca    8.7      08 Aug 2023 07:20  Phos  3.3     08-08  Mg     2.3     08-08    TPro  6.2  /  Alb  3.6  /  TBili  0.4  /  DBili  x   /  AST  20  /  ALT  14  /  AlkPhos  75  08-07    proBNP:   Lipid Profile:   HgA1c:   TSH:     Consultant(s) Notes Reviewed:  [x ] YES  [ ] NO    Care Discussed with Consultants/Other Providers [ x] YES  [ ] NO    Imaging Personally Reviewed independently:  [x] YES  [ ] NO    All labs, radiologic studies, vitals, orders and medications list reviewed. Patient is seen and examined at bedside. Case discussed with medical team.    ASSESSMENT/PLAN:

## 2023-08-08 NOTE — PROVIDER CONTACT NOTE (CRITICAL VALUE NOTIFICATION) - RECOMMENDATIONS
hold heparin drip based on heparin nomogram for 1 hour and decrease 2 cc/hr
Will continue to monitor. Follow Hep drip nomogram.

## 2023-08-08 NOTE — PHYSICAL THERAPY INITIAL EVALUATION ADULT - PLANNED THERAPY INTERVENTIONS, PT EVAL
stair training- 1 flight with rail independent, 4 wks/balance training/gait training/transfer training

## 2023-08-08 NOTE — PHYSICAL THERAPY INITIAL EVALUATION ADULT - SIT-TO-STAND BALANCE
If you are a smoker, it is important for your health to stop smoking. Please be aware that second hand smoke is also harmful.
with no UE support/fair minus

## 2023-08-09 LAB
ANION GAP SERPL CALC-SCNC: 13 MMOL/L — SIGNIFICANT CHANGE UP (ref 5–17)
APTT BLD: 118.3 SEC — HIGH (ref 24.5–35.6)
APTT BLD: 124.3 SEC — CRITICAL HIGH (ref 24.5–35.6)
APTT BLD: 48.2 SEC — HIGH (ref 24.5–35.6)
BUN SERPL-MCNC: 26 MG/DL — HIGH (ref 7–23)
CALCIUM SERPL-MCNC: 8.7 MG/DL — SIGNIFICANT CHANGE UP (ref 8.4–10.5)
CHLORIDE SERPL-SCNC: 99 MMOL/L — SIGNIFICANT CHANGE UP (ref 96–108)
CO2 SERPL-SCNC: 27 MMOL/L — SIGNIFICANT CHANGE UP (ref 22–31)
CREAT SERPL-MCNC: 1.2 MG/DL — SIGNIFICANT CHANGE UP (ref 0.5–1.3)
EGFR: 55 ML/MIN/1.73M2 — LOW
GLUCOSE SERPL-MCNC: 110 MG/DL — HIGH (ref 70–99)
HCT VFR BLD CALC: 30.4 % — LOW (ref 39–50)
HGB BLD-MCNC: 9.2 G/DL — LOW (ref 13–17)
MCHC RBC-ENTMCNC: 22.7 PG — LOW (ref 27–34)
MCHC RBC-ENTMCNC: 30.3 GM/DL — LOW (ref 32–36)
MCV RBC AUTO: 75.1 FL — LOW (ref 80–100)
NRBC # BLD: 0 /100 WBCS — SIGNIFICANT CHANGE UP (ref 0–0)
PLATELET # BLD AUTO: 138 K/UL — LOW (ref 150–400)
POTASSIUM SERPL-MCNC: 3.8 MMOL/L — SIGNIFICANT CHANGE UP (ref 3.5–5.3)
POTASSIUM SERPL-SCNC: 3.8 MMOL/L — SIGNIFICANT CHANGE UP (ref 3.5–5.3)
RBC # BLD: 4.05 M/UL — LOW (ref 4.2–5.8)
RBC # FLD: 19.9 % — HIGH (ref 10.3–14.5)
SODIUM SERPL-SCNC: 139 MMOL/L — SIGNIFICANT CHANGE UP (ref 135–145)
WBC # BLD: 5.16 K/UL — SIGNIFICANT CHANGE UP (ref 3.8–10.5)
WBC # FLD AUTO: 5.16 K/UL — SIGNIFICANT CHANGE UP (ref 3.8–10.5)

## 2023-08-09 PROCEDURE — 72125 CT NECK SPINE W/O DYE: CPT | Mod: 26

## 2023-08-09 RX ORDER — METOPROLOL TARTRATE 50 MG
25 TABLET ORAL EVERY 12 HOURS
Refills: 0 | Status: DISCONTINUED | OUTPATIENT
Start: 2023-08-09 | End: 2023-08-11

## 2023-08-09 RX ADMIN — BUDESONIDE AND FORMOTEROL FUMARATE DIHYDRATE 2 PUFF(S): 160; 4.5 AEROSOL RESPIRATORY (INHALATION) at 06:05

## 2023-08-09 RX ADMIN — HEPARIN SODIUM 700 UNIT(S)/HR: 5000 INJECTION INTRAVENOUS; SUBCUTANEOUS at 13:53

## 2023-08-09 RX ADMIN — BUDESONIDE AND FORMOTEROL FUMARATE DIHYDRATE 2 PUFF(S): 160; 4.5 AEROSOL RESPIRATORY (INHALATION) at 17:35

## 2023-08-09 RX ADMIN — TAMSULOSIN HYDROCHLORIDE 0.8 MILLIGRAM(S): 0.4 CAPSULE ORAL at 21:34

## 2023-08-09 RX ADMIN — HEPARIN SODIUM 500 UNIT(S)/HR: 5000 INJECTION INTRAVENOUS; SUBCUTANEOUS at 07:29

## 2023-08-09 RX ADMIN — Medication 40 MILLIGRAM(S): at 06:03

## 2023-08-09 RX ADMIN — PANTOPRAZOLE SODIUM 40 MILLIGRAM(S): 20 TABLET, DELAYED RELEASE ORAL at 06:05

## 2023-08-09 RX ADMIN — HEPARIN SODIUM 500 UNIT(S)/HR: 5000 INJECTION INTRAVENOUS; SUBCUTANEOUS at 06:47

## 2023-08-09 RX ADMIN — Medication 25 MILLIGRAM(S): at 17:36

## 2023-08-09 RX ADMIN — SIMVASTATIN 20 MILLIGRAM(S): 20 TABLET, FILM COATED ORAL at 21:33

## 2023-08-09 RX ADMIN — MONTELUKAST 10 MILLIGRAM(S): 4 TABLET, CHEWABLE ORAL at 13:20

## 2023-08-09 RX ADMIN — Medication 12.5 MILLIGRAM(S): at 06:03

## 2023-08-09 RX ADMIN — HEPARIN SODIUM 500 UNIT(S)/HR: 5000 INJECTION INTRAVENOUS; SUBCUTANEOUS at 20:18

## 2023-08-09 RX ADMIN — HEPARIN SODIUM 700 UNIT(S)/HR: 5000 INJECTION INTRAVENOUS; SUBCUTANEOUS at 00:21

## 2023-08-09 RX ADMIN — Medication 1 SPRAY(S): at 17:36

## 2023-08-09 RX ADMIN — Medication 1 SPRAY(S): at 06:05

## 2023-08-09 RX ADMIN — HEPARIN SODIUM 2500 UNIT(S): 5000 INJECTION INTRAVENOUS; SUBCUTANEOUS at 13:55

## 2023-08-10 LAB
APTT BLD: 45 SEC — HIGH (ref 24.5–35.6)
APTT BLD: 59.5 SEC — HIGH (ref 24.5–35.6)
HCT VFR BLD CALC: 32 % — LOW (ref 39–50)
HGB BLD-MCNC: 9.7 G/DL — LOW (ref 13–17)
MCHC RBC-ENTMCNC: 22.8 PG — LOW (ref 27–34)
MCHC RBC-ENTMCNC: 30.3 GM/DL — LOW (ref 32–36)
MCV RBC AUTO: 75.1 FL — LOW (ref 80–100)
NRBC # BLD: 0 /100 WBCS — SIGNIFICANT CHANGE UP (ref 0–0)
PLATELET # BLD AUTO: 152 K/UL — SIGNIFICANT CHANGE UP (ref 150–400)
RBC # BLD: 4.26 M/UL — SIGNIFICANT CHANGE UP (ref 4.2–5.8)
RBC # FLD: 20 % — HIGH (ref 10.3–14.5)
WBC # BLD: 5.27 K/UL — SIGNIFICANT CHANGE UP (ref 3.8–10.5)
WBC # FLD AUTO: 5.27 K/UL — SIGNIFICANT CHANGE UP (ref 3.8–10.5)

## 2023-08-10 RX ORDER — APIXABAN 2.5 MG/1
2.5 TABLET, FILM COATED ORAL EVERY 12 HOURS
Refills: 0 | Status: DISCONTINUED | OUTPATIENT
Start: 2023-08-10 | End: 2023-08-11

## 2023-08-10 RX ORDER — FINASTERIDE 5 MG/1
5 TABLET, FILM COATED ORAL DAILY
Refills: 0 | Status: DISCONTINUED | OUTPATIENT
Start: 2023-08-10 | End: 2023-08-11

## 2023-08-10 RX ORDER — SENNA PLUS 8.6 MG/1
2 TABLET ORAL AT BEDTIME
Refills: 0 | Status: DISCONTINUED | OUTPATIENT
Start: 2023-08-10 | End: 2023-08-11

## 2023-08-10 RX ADMIN — PANTOPRAZOLE SODIUM 40 MILLIGRAM(S): 20 TABLET, DELAYED RELEASE ORAL at 05:31

## 2023-08-10 RX ADMIN — Medication 25 MILLIGRAM(S): at 05:31

## 2023-08-10 RX ADMIN — Medication 25 MILLIGRAM(S): at 17:10

## 2023-08-10 RX ADMIN — HEPARIN SODIUM 700 UNIT(S)/HR: 5000 INJECTION INTRAVENOUS; SUBCUTANEOUS at 12:05

## 2023-08-10 RX ADMIN — BUDESONIDE AND FORMOTEROL FUMARATE DIHYDRATE 2 PUFF(S): 160; 4.5 AEROSOL RESPIRATORY (INHALATION) at 17:08

## 2023-08-10 RX ADMIN — TAMSULOSIN HYDROCHLORIDE 0.8 MILLIGRAM(S): 0.4 CAPSULE ORAL at 22:45

## 2023-08-10 RX ADMIN — FINASTERIDE 5 MILLIGRAM(S): 5 TABLET, FILM COATED ORAL at 17:09

## 2023-08-10 RX ADMIN — APIXABAN 2.5 MILLIGRAM(S): 2.5 TABLET, FILM COATED ORAL at 18:17

## 2023-08-10 RX ADMIN — SENNA PLUS 2 TABLET(S): 8.6 TABLET ORAL at 22:46

## 2023-08-10 RX ADMIN — Medication 40 MILLIGRAM(S): at 05:32

## 2023-08-10 RX ADMIN — Medication 1 SPRAY(S): at 17:08

## 2023-08-10 RX ADMIN — SIMVASTATIN 20 MILLIGRAM(S): 20 TABLET, FILM COATED ORAL at 22:45

## 2023-08-10 RX ADMIN — BUDESONIDE AND FORMOTEROL FUMARATE DIHYDRATE 2 PUFF(S): 160; 4.5 AEROSOL RESPIRATORY (INHALATION) at 05:36

## 2023-08-10 RX ADMIN — HEPARIN SODIUM 500 UNIT(S)/HR: 5000 INJECTION INTRAVENOUS; SUBCUTANEOUS at 04:38

## 2023-08-10 RX ADMIN — HEPARIN SODIUM 500 UNIT(S)/HR: 5000 INJECTION INTRAVENOUS; SUBCUTANEOUS at 07:14

## 2023-08-10 RX ADMIN — MONTELUKAST 10 MILLIGRAM(S): 4 TABLET, CHEWABLE ORAL at 17:09

## 2023-08-10 NOTE — PROGRESS NOTE ADULT - NSPROGADDITIONALINFOA_GEN_ALL_CORE
I reviewed the overnight course of events on the unit, re-confirming the patient history. I discussed the care with the patient and their family. The plan of care was discussed with the ACP team and modifications were made to the notation where appropriate. Differential diagnosis and plan of care discussed with patient after the evaluation. Advanced care planning was discussed with patient and family.  Advanced care planning forms were reviewed and discussed.  Risks, benefits and alternatives of cardiac procedures were discussed in detail and all questions were answered. 35 minutes spent on total encounter of which more than fifty percent of the encounter was spent counseling and/or coordinating care by the attending physician.
I reviewed the overnight course of events on the unit, re-confirming the patient history. I discussed the care with the patient and their family. The plan of care was discussed with the ACP team and modifications were made to the notation where appropriate. Differential diagnosis and plan of care discussed with patient after the evaluation. Advanced care planning was discussed with patient and family.  Advanced care planning forms were reviewed and discussed.  Risks, benefits and alternatives of cardiac procedures were discussed in detail and all questions were answered. 35 minutes spent on total encounter of which more than fifty percent of the encounter was spent counseling and/or coordinating care by the attending physician.
discussed with covering ACP  repeat CT cervical spine for ? C7 fracture
discussed with daughter at bedside in detail  discussed with daughter Gene over the phone in detail  discharge tomorrow if OK  refusing Subacute Rehab

## 2023-08-11 ENCOUNTER — TRANSCRIPTION ENCOUNTER (OUTPATIENT)
Age: 88
End: 2023-08-11

## 2023-08-11 VITALS
RESPIRATION RATE: 18 BRPM | DIASTOLIC BLOOD PRESSURE: 67 MMHG | SYSTOLIC BLOOD PRESSURE: 124 MMHG | HEART RATE: 82 BPM | OXYGEN SATURATION: 93 % | TEMPERATURE: 98 F

## 2023-08-11 LAB
ANION GAP SERPL CALC-SCNC: 12 MMOL/L — SIGNIFICANT CHANGE UP (ref 5–17)
BUN SERPL-MCNC: 21 MG/DL — SIGNIFICANT CHANGE UP (ref 7–23)
CALCIUM SERPL-MCNC: 9.1 MG/DL — SIGNIFICANT CHANGE UP (ref 8.4–10.5)
CHLORIDE SERPL-SCNC: 103 MMOL/L — SIGNIFICANT CHANGE UP (ref 96–108)
CO2 SERPL-SCNC: 24 MMOL/L — SIGNIFICANT CHANGE UP (ref 22–31)
CREAT SERPL-MCNC: 1.19 MG/DL — SIGNIFICANT CHANGE UP (ref 0.5–1.3)
EGFR: 56 ML/MIN/1.73M2 — LOW
GLUCOSE SERPL-MCNC: 90 MG/DL — SIGNIFICANT CHANGE UP (ref 70–99)
HCT VFR BLD CALC: 32 % — LOW (ref 39–50)
HGB BLD-MCNC: 9.4 G/DL — LOW (ref 13–17)
MCHC RBC-ENTMCNC: 22.7 PG — LOW (ref 27–34)
MCHC RBC-ENTMCNC: 29.4 GM/DL — LOW (ref 32–36)
MCV RBC AUTO: 77.1 FL — LOW (ref 80–100)
NRBC # BLD: 0 /100 WBCS — SIGNIFICANT CHANGE UP (ref 0–0)
PLATELET # BLD AUTO: 143 K/UL — LOW (ref 150–400)
POTASSIUM SERPL-MCNC: 4 MMOL/L — SIGNIFICANT CHANGE UP (ref 3.5–5.3)
POTASSIUM SERPL-SCNC: 4 MMOL/L — SIGNIFICANT CHANGE UP (ref 3.5–5.3)
RBC # BLD: 4.15 M/UL — LOW (ref 4.2–5.8)
RBC # FLD: 20.3 % — HIGH (ref 10.3–14.5)
SODIUM SERPL-SCNC: 139 MMOL/L — SIGNIFICANT CHANGE UP (ref 135–145)
WBC # BLD: 4.46 K/UL — SIGNIFICANT CHANGE UP (ref 3.8–10.5)
WBC # FLD AUTO: 4.46 K/UL — SIGNIFICANT CHANGE UP (ref 3.8–10.5)

## 2023-08-11 PROCEDURE — 85610 PROTHROMBIN TIME: CPT

## 2023-08-11 PROCEDURE — 93005 ELECTROCARDIOGRAM TRACING: CPT

## 2023-08-11 PROCEDURE — 83735 ASSAY OF MAGNESIUM: CPT

## 2023-08-11 PROCEDURE — 85730 THROMBOPLASTIN TIME PARTIAL: CPT

## 2023-08-11 PROCEDURE — 84100 ASSAY OF PHOSPHORUS: CPT

## 2023-08-11 PROCEDURE — 80048 BASIC METABOLIC PNL TOTAL CA: CPT

## 2023-08-11 PROCEDURE — 70450 CT HEAD/BRAIN W/O DYE: CPT | Mod: MA

## 2023-08-11 PROCEDURE — 85027 COMPLETE CBC AUTOMATED: CPT

## 2023-08-11 PROCEDURE — 85025 COMPLETE CBC W/AUTO DIFF WBC: CPT

## 2023-08-11 PROCEDURE — 72125 CT NECK SPINE W/O DYE: CPT

## 2023-08-11 PROCEDURE — 96374 THER/PROPH/DIAG INJ IV PUSH: CPT

## 2023-08-11 PROCEDURE — 86850 RBC ANTIBODY SCREEN: CPT

## 2023-08-11 PROCEDURE — 94640 AIRWAY INHALATION TREATMENT: CPT

## 2023-08-11 PROCEDURE — 97116 GAIT TRAINING THERAPY: CPT

## 2023-08-11 PROCEDURE — 99285 EMERGENCY DEPT VISIT HI MDM: CPT

## 2023-08-11 PROCEDURE — 86901 BLOOD TYPING SEROLOGIC RH(D): CPT

## 2023-08-11 PROCEDURE — 93971 EXTREMITY STUDY: CPT

## 2023-08-11 PROCEDURE — 86900 BLOOD TYPING SEROLOGIC ABO: CPT

## 2023-08-11 PROCEDURE — 36415 COLL VENOUS BLD VENIPUNCTURE: CPT

## 2023-08-11 PROCEDURE — 97161 PT EVAL LOW COMPLEX 20 MIN: CPT

## 2023-08-11 PROCEDURE — 80053 COMPREHEN METABOLIC PANEL: CPT

## 2023-08-11 RX ORDER — FINASTERIDE 5 MG/1
1 TABLET, FILM COATED ORAL
Qty: 0 | Refills: 0 | DISCHARGE
Start: 2023-08-11

## 2023-08-11 RX ORDER — FINASTERIDE 5 MG/1
1 TABLET, FILM COATED ORAL
Qty: 30 | Refills: 0
Start: 2023-08-11 | End: 2023-09-09

## 2023-08-11 RX ADMIN — Medication 1 SPRAY(S): at 17:01

## 2023-08-11 RX ADMIN — Medication 25 MILLIGRAM(S): at 06:38

## 2023-08-11 RX ADMIN — PANTOPRAZOLE SODIUM 40 MILLIGRAM(S): 20 TABLET, DELAYED RELEASE ORAL at 06:38

## 2023-08-11 RX ADMIN — BUDESONIDE AND FORMOTEROL FUMARATE DIHYDRATE 2 PUFF(S): 160; 4.5 AEROSOL RESPIRATORY (INHALATION) at 06:38

## 2023-08-11 RX ADMIN — APIXABAN 2.5 MILLIGRAM(S): 2.5 TABLET, FILM COATED ORAL at 17:01

## 2023-08-11 RX ADMIN — FINASTERIDE 5 MILLIGRAM(S): 5 TABLET, FILM COATED ORAL at 17:02

## 2023-08-11 RX ADMIN — Medication 25 MILLIGRAM(S): at 17:01

## 2023-08-11 RX ADMIN — Medication 1 SPRAY(S): at 06:39

## 2023-08-11 RX ADMIN — BUDESONIDE AND FORMOTEROL FUMARATE DIHYDRATE 2 PUFF(S): 160; 4.5 AEROSOL RESPIRATORY (INHALATION) at 17:01

## 2023-08-11 RX ADMIN — APIXABAN 2.5 MILLIGRAM(S): 2.5 TABLET, FILM COATED ORAL at 06:42

## 2023-08-11 RX ADMIN — MONTELUKAST 10 MILLIGRAM(S): 4 TABLET, CHEWABLE ORAL at 17:02

## 2023-08-11 RX ADMIN — Medication 40 MILLIGRAM(S): at 06:38

## 2023-08-11 NOTE — DISCHARGE NOTE NURSING/CASE MANAGEMENT/SOCIAL WORK - PATIENT PORTAL LINK FT
You can access the FollowMyHealth Patient Portal offered by [facility] by registering at the following website: [email or web address]. By joining [facility]’s FollowMyHealth portal, you will also be able to view your health information using other applications (apps) compatible with our system.

## 2023-08-11 NOTE — PROGRESS NOTE ADULT - PROBLEM SELECTOR PROBLEM 4
Awake/Alert/Cooperative
Chronic hypertension

## 2023-08-11 NOTE — PROGRESS NOTE ADULT - PROBLEM SELECTOR PLAN 4
continue furosemide 40 and metoprolol ER 25 mg   BP acceptable on admit
continue furosemide 40 and metoprolol ER 25 mg   BP acceptable on admit
home lasix 40mg QD, toprol 25mg QD  BP acceptable on admit  - c/w home meds
continue furosemide 40 and metoprolol ER 25 mg   BP acceptable on admit

## 2023-08-11 NOTE — PROGRESS NOTE ADULT - PROBLEM SELECTOR PROBLEM 1
Deep vein thrombosis (DVT) of proximal vein of left lower extremity

## 2023-08-11 NOTE — PROGRESS NOTE ADULT - ASSESSMENT
89M HTN, HLD, CKD3, CAD, AFib (eliquis), asthma, GERD, recent admit 6/3-6/7 for campylobacter+ colitis dc'd on PO azithro, now presents w/ HA and lower extremity swelling, in context of recent [facility] fall 7/2023 (evaluated at [facility] found to have undet C3 fx (no NSGY intervention at that time), found to have partially occlusive proximal DVT (L popliteal + posterior tibial peroneal trunk)   
EKG - Afib   Echo - outpt 3/23= Normal LV function mild AS    a/p     1) s/p fall - fell about 3 weeks ago , went to [facility] workup was negative, CT head ok for CT neck , pt had mechanical fall no LOC, out pt echo shows normal LV mild AS    2) Afib - chronic, on eliquis now on IV heparin increase metoprolol to 25mg q12, heart rate elevated     3) Left pop vein DVT - age indeterminant on iV heparin 
EKG - Afib   Echo - outpt 3/23= Normal LV function mild AS    a/p     1) s/p fall - fell about 3 weeks ago , went to [facility] workup was negative, CT head ok for CT neck , pt had mechanical fall no LOC, out pt echo shows normal LV mild AS    2) Afib - chronic, on eliquis      3) Left pop vein DVT - on eliquis 
89M HTN, HLD, CKD3, CAD, AFib (eliquis), asthma, GERD, recent admit 6/3-6/7 for campylobacter+ colitis dc'd on PO azithro, now presents w/ HA and lower extremity swelling, in context of recent [facility] fall 7/2023 (evaluated at [facility] found to have undet C3 fx (no NSGY intervention at that time), found to have partially occlusive proximal DVT (L popliteal + posterior tibial peroneal trunk)   
89M HTN, HLD, CKD3, CAD, AFib (eliquis), asthma, GERD, recent admit 6/3-6/7 for campylobacter+ colitis dc'd on PO azithro, now presents w/ HA and lower extremity swelling, in context of recent [facility] fall 7/2023 (evaluated at [facility] found to have undet C3 fx (no NSGY intervention at that time), found to have partially occlusive proximal DVT (L popliteal + posterior tibial peroneal trunk)   
89M HTN, HLD, CKD3, CAD, AFib (eliquis), asthma, GERD, recent admit 6/3-6/7 for campylobacter+ colitis dc'd on PO azithro, now presents w/ HA and lower extremity swelling, in context of recent [facility] fall 7/2023 (evaluated at [facility] found to have undet C3 fx (no NSGY intervention at that time), found to have partially occlusive proximal DVT (L popliteal + posterior tibial peroneal trunk)

## 2023-08-11 NOTE — DISCHARGE NOTE PROVIDER - NSDCMRMEDTOKEN_GEN_ALL_CORE_FT
Eliquis 2.5 mg oral tablet: 1 tab(s) orally 2 times a day  Flomax 0.4 mg oral capsule: 2 cap(s) orally once a day  Flonase 50 mcg/inh nasal spray: 2 spray(s) in each nostril once a day  Lasix 40 mg oral tablet: 1 tab(s) orally once a day  montelukast 10 mg oral tablet: 1 tab(s) orally once a day  OTC: Vitamin D3, Omega 3 1000:   pantoprazole 40 mg oral delayed release tablet: 1 tab(s) orally once a day  PreserVision AREDS 2 oral capsule: 1 cap(s) orally once a day  Rehab at home: 3 x a week for 2 weeks  simvastatin 20 mg oral tablet: 1 tab(s) orally once a day  Toprol-XL 25 mg oral tablet, extended release: 1 tab(s) orally once a day  Trelegy Ellipta 100 mcg-62.5 mcg-25 mcg/inh inhalation powder: 1 puff(s) inhaled once a day   Eliquis 2.5 mg oral tablet: 1 tab(s) orally 2 times a day  finasteride 5 mg oral tablet: 1 tab(s) orally once a day  Flomax 0.4 mg oral capsule: 2 cap(s) orally once a day  Flonase 50 mcg/inh nasal spray: 2 spray(s) in each nostril once a day  Lasix 40 mg oral tablet: 1 tab(s) orally once a day  montelukast 10 mg oral tablet: 1 tab(s) orally once a day  pantoprazole 40 mg oral delayed release tablet: 1 tab(s) orally once a day  PreserVision AREDS 2 oral capsule: 1 cap(s) orally once a day  simvastatin 20 mg oral tablet: 1 tab(s) orally once a day  Toprol-XL 25 mg oral tablet, extended release: 1 tab(s) orally once a day  Trelegy Ellipta 100 mcg-62.5 mcg-25 mcg/inh inhalation powder: 1 puff(s) inhaled once a day

## 2023-08-11 NOTE — DISCHARGE NOTE PROVIDER - NSDCCPCAREPLAN_GEN_ALL_CORE_FT
PRINCIPAL DISCHARGE DIAGNOSIS  Diagnosis: DVT, lower extremity  Assessment and Plan of Treatment: Take your "blood thinners" as prescribed.  Walking is encouraged, increase activity as tolerated.  If you develop new leg pain, swelling, and/or redness contact your healthcare provider.  If you develop new chest pain with difficulty breathing, a rapid heart rate and/or a feeling of passing out call emergency medical services 911.        SECONDARY DISCHARGE DIAGNOSES  Diagnosis: CAD (coronary artery disease)  Assessment and Plan of Treatment: Coronary artery disease is a condition where the arteries the supply the heart muscle get clogges with fatty deposits & puts you at risk for a heart attack  Call your doctor if you have any new pain, pressure, or discomfort in the center of your chest, pain, tingling or discomfort in arms, back, neck, jaw, or stomach, shortness of breath, nausea, vomiting, burping or heartburn, sweating, cold and clammy skin, racing or abnormal heartbeat for more than 10 minutes or if they keep coming & going.  Call 911 and do not tr to get to hospital by care  You can help yourself with lefestyle changes (quitting smoking if you smoke), eat lots of fruits & vegetables & low fat dairy products, not a lot of meat & fatty foods, walk or some form of physical activity most days of the week, lose weight if you are overweight  Take your cardiac medication as prescribed to lower cholesterol, to lower blood pressure, aspirin to prevent blood clots, and diabetes control  Make sure to keep appointments with doctor for cardiac follow up care      Diagnosis: Chronic atrial fibrillation  Assessment and Plan of Treatment: Atrial fibrillation is the most common heart rhythm problem.  The condition puts you at risk for has stroke and heart attack  It helps if you control your blood pressure, not drink more than 1-2 alcohol drinks per day, cut down on caffeine, getting treatment for over active thyroid gland, and get regular exercise  Call your doctor if you feel your heart racing or beating unusually, chest tightness or pain, lightheaded, faint, shortness of breath especially with exercise  It is important to take your heart medication as prescribed  You may be on anticoagulation which is very important to take as directed - you may need blood work to monitor drug levels      Diagnosis: GERD (gastroesophageal reflux disease)  Assessment and Plan of Treatment: continue home meds    Diagnosis: Stage 3 chronic kidney disease  Assessment and Plan of Treatment: Avoid taking (NSAIDs) - (ex: Ibuprofen, Advil, Celebrex, Naprosyn)  Avoid taking any nephrotoxic agents (can harm kidneys) - Intravenous contrast for diagnostic testing, combination cold medications.  Have all medications adjusted for your renal function by your Health Care Provider.  Blood pressure control is important.  Take all medication as prescribed.      Diagnosis: Microcytic anemia  Assessment and Plan of Treatment: continue supplement    Diagnosis: Chronic hypertension  Assessment and Plan of Treatment: continue home meds    Diagnosis: Headache  Assessment and Plan of Treatment: Improved

## 2023-08-11 NOTE — PROGRESS NOTE ADULT - PROBLEM SELECTOR PROBLEM 7
Stage 3 chronic kidney disease
No
Stage 3 chronic kidney disease

## 2023-08-11 NOTE — DISCHARGE NOTE PROVIDER - CARE PROVIDER_API CALL
Sherry Chavez  Internal Medicine  [address], [address]  [place] [address]  Phone: [phone]  Fax: [phone]  Follow Up Time: 1-3 days    Slick Up  Interventional Cardiology  [address] [place] [address]  Phone: [phone]  Fax: [phone]  Follow Up Time: 1 week

## 2023-08-11 NOTE — PROGRESS NOTE ADULT - PROBLEM SELECTOR PLAN 2
CT head and cervical spine negative fracture  fall precautions
recent fall w/ L frontotemporal trauma eval at [facility], found to have undet C3 fx w/o NSGY intervention, w/o acute abn on CTH NC here

## 2023-08-11 NOTE — PROGRESS NOTE ADULT - PROBLEM SELECTOR PLAN 5
home simvastatin 20mg  - c/w home med
home simvastatin 20mg  continue home meds

## 2023-08-11 NOTE — PROGRESS NOTE ADULT - PROBLEM SELECTOR PLAN 7
stable stage 3 disease  continue to monitor
stable stage 3 disease  continue to monitor  mild urinary retention  family adamant against Raucsh and I agree   it would be hazardous in this patient
stable stage 3 disease  continue to monitor  urinary retention resolved   continue finasteride on discharge
BUN/SCr 25/1.30 (GFR 50)  - monitor BMP  - dose per GFR, avoid nephrotoxins  - Is/Os  - daily standing wt

## 2023-08-11 NOTE — DISCHARGE NOTE PROVIDER - NSDCFUSCHEDAPPT_GEN_ALL_CORE_FT
Tomi Patel  [facility] Physician [facility]  UROLOGY [address] [facility]  Scheduled Appointment: 08/31/2023

## 2023-08-11 NOTE — PROGRESS NOTE ADULT - SUBJECTIVE AND OBJECTIVE BOX
Slick Up MD  Interventional Cardiology / Advance Heart Failure and Cardiac Transplant Specialist  [place] Office : [address] [place] [address] [facility] [address]  Tel:   [place] Office : [address] [facility] [place] [address]  Tel: [phone]       Pt is lying in bed comfortable not in distress, no chest pains no SOB no palpitations  	  MEDICATIONS:  apixaban 2.5 milliGRAM(s) Oral every 12 hours  furosemide    Tablet 40 milliGRAM(s) Oral daily  metoprolol tartrate 25 milliGRAM(s) Oral every 12 hours      budesonide  80 MICROgram(s)/formoterol 4.5 MICROgram(s) Inhaler 2 Puff(s) Inhalation two times a day  montelukast 10 milliGRAM(s) Oral daily    acetaminophen     Tablet .. 650 milliGRAM(s) Oral every 6 hours PRN    pantoprazole    Tablet 40 milliGRAM(s) Oral before breakfast  senna 2 Tablet(s) Oral at bedtime    finasteride 5 milliGRAM(s) Oral daily  simvastatin 20 milliGRAM(s) Oral at bedtime    fluticasone propionate 50 MICROgram(s)/spray Nasal Spray 1 Spray(s) Both Nostrils two times a day  tamsulosin 0.8 milliGRAM(s) Oral at bedtime      PAST MEDICAL/SURGICAL HISTORY  PAST MEDICAL & SURGICAL HISTORY:  Afib      Asthmatic bronchitis , chronic      GERD (gastroesophageal reflux disease)      HLD (hyperlipidemia)      HTN (hypertension)      No significant past surgical history          SOCIAL HISTORY: Substance Use (street drugs): ( x ) never used  (  ) other:    FAMILY HISTORY:  FH: stroke (Mother, Sibling)      PHYSICAL EXAM:  T(C): 36.8 (08-10-23 @ 20:19), Max: 36.8 (08-10-23 @ 20:19)  HR: 67 (08-10-23 @ 20:19) (64 - 95)  BP: 111/67 (08-10-23 @ 20:19) (111/67 - 144/70)  RR: 18 (08-10-23 @ 20:19) (18 - 18)  SpO2: 97% (08-10-23 @ 20:19) (96% - 100%)  Wt(kg): --  I&O's Summary    09 Aug 2023 07:01  -  10 Aug 2023 07:00  --------------------------------------------------------  IN: 1152 mL / OUT: 2400 mL / NET: -1248 mL    10 Aug 2023 07:01  -  10 Aug 2023 20:40  --------------------------------------------------------  IN: 0 mL / OUT: 200 mL / NET: -200 mL          EYES:   PERRLA   ENMT:   Moist mucous membranes, Good dentition, No lesions  Cardiovascular: Normal S1 S2, No JVD, No murmurs, No edema  Respiratory: Lungs clear to auscultation	  Gastrointestinal:  Soft, Non-tender, + BS	  Extremities: no edema                                    9.7    5.27  )-----------( 152      ( 10 Aug 2023 04:10 )             32.0     08-09    139  |  99  |  26<H>  ----------------------------<  110<H>  3.8   |  27  |  1.20    Ca    8.7      09 Aug 2023 06:11      proBNP:   Lipid Profile:   HgA1c:   TSH:     Consultant(s) Notes Reviewed:  [x ] YES  [ ] NO    Care Discussed with Consultants/Other Providers [ x] YES  [ ] NO    Imaging Personally Reviewed independently:  [x] YES  [ ] NO    All labs, radiologic studies, vitals, orders and medications list reviewed. Patient is seen and examined at bedside. Case discussed with medical team.        
Slick Up MD  Interventional Cardiology / Advance Heart Failure and Cardiac Transplant Specialist  [place] Office : [address] [place] [address] [place] [address]  Tel:   [place] Office : [address] [facility] [place] [address]  Tel: [phone]       Pt is lying in bed comfortable not in distress, no chest pains no SOB no palpitations  	  MEDICATIONS:  furosemide    Tablet 40 milliGRAM(s) Oral daily  heparin   Injectable 5000 Unit(s) IV Push every 6 hours PRN  heparin   Injectable 2500 Unit(s) IV Push every 6 hours PRN  heparin  Infusion.  Unit(s)/Hr IV Continuous <Continuous>  metoprolol tartrate 25 milliGRAM(s) Oral every 12 hours      budesonide  80 MICROgram(s)/formoterol 4.5 MICROgram(s) Inhaler 2 Puff(s) Inhalation two times a day  montelukast 10 milliGRAM(s) Oral daily    acetaminophen     Tablet .. 650 milliGRAM(s) Oral every 6 hours PRN    pantoprazole    Tablet 40 milliGRAM(s) Oral before breakfast    simvastatin 20 milliGRAM(s) Oral at bedtime    fluticasone propionate 50 MICROgram(s)/spray Nasal Spray 1 Spray(s) Both Nostrils two times a day  tamsulosin 0.8 milliGRAM(s) Oral at bedtime      PAST MEDICAL/SURGICAL HISTORY  PAST MEDICAL & SURGICAL HISTORY:  Afib      Asthmatic bronchitis , chronic      GERD (gastroesophageal reflux disease)      HLD (hyperlipidemia)      HTN (hypertension)      No significant past surgical history          SOCIAL HISTORY: Substance Use (street drugs): ( x ) never used  (  ) other:    FAMILY HISTORY:  FH: stroke (Mother, Sibling)        REVIEW OF SYSTEMS:  CONSTITUTIONAL: No fever, weight loss, or fatigue  EYES: No eye pain, visual disturbances, or discharge  ENMT:  No difficulty hearing, tinnitus, vertigo; No sinus or throat pain  BREASTS: No pain, masses, or nipple discharge  GASTROINTESTINAL: No abdominal or epigastric pain. No nausea, vomiting, or hematemesis; No diarrhea or constipation. No melena or hematochezia.  GENITOURINARY: No dysuria, frequency, hematuria, or incontinence  NEUROLOGICAL: No headaches, memory loss, loss of strength, numbness, or tremors  ENDOCRINE: No heat or cold intolerance; No hair loss  MUSCULOSKELETAL: No joint pain or swelling; No muscle, back, or extremity pain  PSYCHIATRIC: No depression, anxiety, mood swings, or difficulty sleeping  HEME/LYMPH: No easy bruising, or bleeding gums       PHYSICAL EXAM:  T(C): 36.9 (08-09-23 @ 12:30), Max: 36.9 (08-09-23 @ 12:30)  HR: 93 (08-09-23 @ 12:30) (69 - 103)  BP: 142/88 (08-09-23 @ 12:30) (92/50 - 148/71)  RR: 18 (08-09-23 @ 12:30) (16 - 18)  SpO2: 96% (08-09-23 @ 12:30) (93% - 100%)  Wt(kg): --  I&O's Summary    08 Aug 2023 07:01  -  09 Aug 2023 07:00  --------------------------------------------------------  IN: 840 mL / OUT: 2625 mL / NET: -1785 mL    09 Aug 2023 07:01  -  09 Aug 2023 16:27  --------------------------------------------------------  IN: 531 mL / OUT: 900 mL / NET: -369 mL          GENERAL: NAD  EYES:   PERRLA   ENMT:   Moist mucous membranes, Good dentition, No lesions  Cardiovascular: Normal S1 S2, No JVD, No murmurs, No edema, irregularly irregular   Respiratory: Lungs clear to auscultation	  Gastrointestinal:  Soft, Non-tender, + BS	  Extremities: no edema                                    9.2    5.16  )-----------( 138      ( 09 Aug 2023 06:12 )             30.4     08-09    139  |  99  |  26<H>  ----------------------------<  110<H>  3.8   |  27  |  1.20    Ca    8.7      09 Aug 2023 06:11  Phos  3.3     08-08  Mg     2.3     08-08    TPro  6.2  /  Alb  3.6  /  TBili  0.4  /  DBili  x   /  AST  20  /  ALT  14  /  AlkPhos  75  08-07    proBNP:   Lipid Profile:   HgA1c:   TSH:     Consultant(s) Notes Reviewed:  [x ] YES  [ ] NO    Care Discussed with Consultants/Other Providers [ x] YES  [ ] NO    Imaging Personally Reviewed independently:  [x] YES  [ ] NO    All labs, radiologic studies, vitals, orders and medications list reviewed. Patient is seen and examined at bedside. Case discussed with medical team.        
Patient is a 89y old  Male who presents with a chief complaint of DVT (08 Aug 2023 16:52)      DATE OF SERVICE: 08-09-23 @ 16:11    SUBJECTIVE / OVERNIGHT EVENTS: overnight events noted    ROS:  Resp: No cough no sputum production  CVS: No chest pain no palpitations no orthopnea  GI: no N/V/D        MEDICATIONS  (STANDING):  budesonide  80 MICROgram(s)/formoterol 4.5 MICROgram(s) Inhaler 2 Puff(s) Inhalation two times a day  fluticasone propionate 50 MICROgram(s)/spray Nasal Spray 1 Spray(s) Both Nostrils two times a day  furosemide    Tablet 40 milliGRAM(s) Oral daily  heparin  Infusion.  Unit(s)/Hr (13 mL/Hr) IV Continuous <Continuous>  metoprolol tartrate 25 milliGRAM(s) Oral every 12 hours  montelukast 10 milliGRAM(s) Oral daily  pantoprazole    Tablet 40 milliGRAM(s) Oral before breakfast  simvastatin 20 milliGRAM(s) Oral at bedtime  tamsulosin 0.8 milliGRAM(s) Oral at bedtime    MEDICATIONS  (PRN):  acetaminophen     Tablet .. 650 milliGRAM(s) Oral every 6 hours PRN Temp greater or equal to 38C (100.4F), Mild Pain (1 - 3)  heparin   Injectable 5000 Unit(s) IV Push every 6 hours PRN For aPTT less than 40  heparin   Injectable 2500 Unit(s) IV Push every 6 hours PRN For aPTT between 40 - 57        CAPILLARY BLOOD GLUCOSE        I&O's Summary    08 Aug 2023 07:01  -  09 Aug 2023 07:00  --------------------------------------------------------  IN: 840 mL / OUT: 2625 mL / NET: -1785 mL    09 Aug 2023 07:01  -  09 Aug 2023 16:11  --------------------------------------------------------  IN: 531 mL / OUT: 900 mL / NET: -369 mL        Vital Signs Last 24 Hrs  T(C): 36.9 (09 Aug 2023 12:30), Max: 36.9 (09 Aug 2023 12:30)  T(F): 98.4 (09 Aug 2023 12:30), Max: 98.4 (09 Aug 2023 12:30)  HR: 93 (09 Aug 2023 12:30) (69 - 103)  BP: 142/88 (09 Aug 2023 12:30) (92/50 - 148/71)  BP(mean): --  RR: 18 (09 Aug 2023 12:30) (16 - 18)  SpO2: 96% (09 Aug 2023 12:30) (93% - 100%)    PHYSICAL EXAM:  EYES: EOMI, PERRLA  NECK: Supple, No JVD  CHEST/LUNG: clear   HEART: S1 S2; no murmurs   ABDOMEN: Soft, Nontender,  EXTREMITIES: no edema  NEUROLOGY: AO x 3 non-focal  SKIN: No rashes or lesions    LABS:                        9.2    5.16  )-----------( 138      ( 09 Aug 2023 06:12 )             30.4     08-09    139  |  99  |  26<H>  ----------------------------<  110<H>  3.8   |  27  |  1.20    Ca    8.7      09 Aug 2023 06:11  Phos  3.3     08-08  Mg     2.3     08-08    TPro  6.2  /  Alb  3.6  /  TBili  0.4  /  DBili  x   /  AST  20  /  ALT  14  /  AlkPhos  75  08-07    PT/INR - ( 08 Aug 2023 07:22 )   PT: 12.4 sec;   INR: 1.13 ratio         PTT - ( 09 Aug 2023 13:10 )  PTT:48.2 sec      Urinalysis Basic - ( 09 Aug 2023 06:11 )    Color: x / Appearance: x / SG: x / pH: x  Gluc: 110 mg/dL / Ketone: x  / Bili: x / Urobili: x   Blood: x / Protein: x / Nitrite: x   Leuk Esterase: x / RBC: x / WBC x   Sq Epi: x / Non Sq Epi: x / Bacteria: x          All consultant(s) notes reviewed and care discussed with other providers        Contact Number, Dr Banks [phone]
Patient is a 89y old  Male who presents with a chief complaint of DVT (11 Aug 2023 14:14)      DATE OF SERVICE: 08-11-23 @ 14:29    SUBJECTIVE / OVERNIGHT EVENTS: overnight events noted    ROS:  Resp: No cough no sputum production  CVS: No chest pain no palpitations no orthopnea  GI: no N/V/D  "I want to go home"         MEDICATIONS  (STANDING):  apixaban 2.5 milliGRAM(s) Oral every 12 hours  budesonide  80 MICROgram(s)/formoterol 4.5 MICROgram(s) Inhaler 2 Puff(s) Inhalation two times a day  finasteride 5 milliGRAM(s) Oral daily  fluticasone propionate 50 MICROgram(s)/spray Nasal Spray 1 Spray(s) Both Nostrils two times a day  furosemide    Tablet 40 milliGRAM(s) Oral daily  metoprolol tartrate 25 milliGRAM(s) Oral every 12 hours  montelukast 10 milliGRAM(s) Oral daily  pantoprazole    Tablet 40 milliGRAM(s) Oral before breakfast  senna 2 Tablet(s) Oral at bedtime  simvastatin 20 milliGRAM(s) Oral at bedtime  tamsulosin 0.8 milliGRAM(s) Oral at bedtime    MEDICATIONS  (PRN):  acetaminophen     Tablet .. 650 milliGRAM(s) Oral every 6 hours PRN Temp greater or equal to 38C (100.4F), Mild Pain (1 - 3)        CAPILLARY BLOOD GLUCOSE        I&O's Summary    10 Aug 2023 07:01  -  11 Aug 2023 07:00  --------------------------------------------------------  IN: 240 mL / OUT: 500 mL / NET: -260 mL        Vital Signs Last 24 Hrs  T(C): 36.7 (11 Aug 2023 13:43), Max: 36.8 (10 Aug 2023 20:19)  T(F): 98 (11 Aug 2023 13:43), Max: 98.2 (10 Aug 2023 20:19)  HR: 82 (11 Aug 2023 13:43) (67 - 82)  BP: 124/67 (11 Aug 2023 13:43) (111/67 - 124/67)  BP(mean): --  RR: 18 (11 Aug 2023 13:43) (18 - 18)  SpO2: 93% (11 Aug 2023 13:43) (93% - 97%)    PHYSICAL EXAM:  EYES: EOMI, PERRLA  NECK: Supple, No JVD  CHEST/LUNG: clear   HEART: S1 S2; no murmurs   ABDOMEN: Soft, Nontender,  EXTREMITIES: no edema  NEUROLOGY: AO x 3 non-focal  SKIN: No rashes or lesions    LABS:                        9.4    4.46  )-----------( 143      ( 11 Aug 2023 07:19 )             32.0     08-11    139  |  103  |  21  ----------------------------<  90  4.0   |  24  |  1.19    Ca    9.1      11 Aug 2023 07:20      PTT - ( 10 Aug 2023 11:34 )  PTT:45.0 sec      Urinalysis Basic - ( 11 Aug 2023 07:20 )    Color: x / Appearance: x / SG: x / pH: x  Gluc: 90 mg/dL / Ketone: x  / Bili: x / Urobili: x   Blood: x / Protein: x / Nitrite: x   Leuk Esterase: x / RBC: x / WBC x   Sq Epi: x / Non Sq Epi: x / Bacteria: x          All consultant(s) notes reviewed and care discussed with other providers        Contact Number, Dr Banks [phone]
Patient is a 89y old  Male who presents with a chief complaint of DVT (07 Aug 2023 20:15)      DATE OF SERVICE: 08-08-23 @ 16:52    SUBJECTIVE / OVERNIGHT EVENTS: overnight events noted    ROS:  Resp: No cough no sputum production  CVS: No chest pain no palpitations no orthopnea  GI: no N/V/D        MEDICATIONS  (STANDING):  budesonide  80 MICROgram(s)/formoterol 4.5 MICROgram(s) Inhaler 2 Puff(s) Inhalation two times a day  fluticasone propionate 50 MICROgram(s)/spray Nasal Spray 1 Spray(s) Both Nostrils two times a day  furosemide    Tablet 40 milliGRAM(s) Oral daily  heparin  Infusion.  Unit(s)/Hr (13 mL/Hr) IV Continuous <Continuous>  metoprolol tartrate 12.5 milliGRAM(s) Oral two times a day  montelukast 10 milliGRAM(s) Oral daily  pantoprazole    Tablet 40 milliGRAM(s) Oral before breakfast  simvastatin 20 milliGRAM(s) Oral at bedtime  tamsulosin 0.8 milliGRAM(s) Oral at bedtime    MEDICATIONS  (PRN):  acetaminophen     Tablet .. 650 milliGRAM(s) Oral every 6 hours PRN Temp greater or equal to 38C (100.4F), Mild Pain (1 - 3)  heparin   Injectable 2500 Unit(s) IV Push every 6 hours PRN For aPTT between 40 - 57  heparin   Injectable 5000 Unit(s) IV Push every 6 hours PRN For aPTT less than 40        CAPILLARY BLOOD GLUCOSE        I&O's Summary    07 Aug 2023 07:01  -  08 Aug 2023 07:00  --------------------------------------------------------  IN: 0 mL / OUT: 400 mL / NET: -400 mL    08 Aug 2023 07:01  -  08 Aug 2023 16:52  --------------------------------------------------------  IN: 480 mL / OUT: 1600 mL / NET: -1120 mL        Vital Signs Last 24 Hrs  T(C): 36.4 (08 Aug 2023 12:11), Max: 36.5 (07 Aug 2023 18:09)  T(F): 97.5 (08 Aug 2023 12:11), Max: 97.7 (07 Aug 2023 18:09)  HR: 55 (08 Aug 2023 14:46) (55 - 99)  BP: 143/85 (08 Aug 2023 14:46) (104/60 - 143/85)  BP(mean): 84 (07 Aug 2023 21:28) (84 - 93)  RR: 18 (08 Aug 2023 12:11) (18 - 20)  SpO2: 95% (08 Aug 2023 14:46) (94% - 99%)    PHYSICAL EXAM:  EYES: EOMI, PERRLA  NECK: Supple, No JVD  CHEST/LUNG: clear   HEART: S1 S2; no murmurs   ABDOMEN: Soft, Nontender,  EXTREMITIES: no edema  NEUROLOGY: AO x 3 non-focal  SKIN: No rashes or lesions    LABS:                        9.1    4.96  )-----------( 140      ( 08 Aug 2023 07:21 )             30.8     08-08    140  |  104  |  21  ----------------------------<  92  4.2   |  25  |  1.13    Ca    8.7      08 Aug 2023 07:20  Phos  3.3     08-08  Mg     2.3     08-08    TPro  6.2  /  Alb  3.6  /  TBili  0.4  /  DBili  x   /  AST  20  /  ALT  14  /  AlkPhos  75  08-07    PT/INR - ( 08 Aug 2023 07:22 )   PT: 12.4 sec;   INR: 1.13 ratio         PTT - ( 08 Aug 2023 14:26 )  PTT:192.3 sec      Urinalysis Basic - ( 08 Aug 2023 07:20 )    Color: x / Appearance: x / SG: x / pH: x  Gluc: 92 mg/dL / Ketone: x  / Bili: x / Urobili: x   Blood: x / Protein: x / Nitrite: x   Leuk Esterase: x / RBC: x / WBC x   Sq Epi: x / Non Sq Epi: x / Bacteria: x          All consultant(s) notes reviewed and care discussed with other providers        Contact Number, Dr Banks [phone]
Patient is a 89y old  Male who presents with a chief complaint of DVT (09 Aug 2023 16:10)      DATE OF SERVICE: 08-10-23 @ 13:39    SUBJECTIVE / OVERNIGHT EVENTS: overnight events noted    ROS:  Resp: No cough no sputum production  CVS: No chest pain no palpitations no orthopnea  GI: no N/V/D  : no dysuria, no hematuria        MEDICATIONS  (STANDING):  budesonide  80 MICROgram(s)/formoterol 4.5 MICROgram(s) Inhaler 2 Puff(s) Inhalation two times a day  fluticasone propionate 50 MICROgram(s)/spray Nasal Spray 1 Spray(s) Both Nostrils two times a day  furosemide    Tablet 40 milliGRAM(s) Oral daily  heparin  Infusion.  Unit(s)/Hr (13 mL/Hr) IV Continuous <Continuous>  metoprolol tartrate 25 milliGRAM(s) Oral every 12 hours  montelukast 10 milliGRAM(s) Oral daily  pantoprazole    Tablet 40 milliGRAM(s) Oral before breakfast  simvastatin 20 milliGRAM(s) Oral at bedtime  tamsulosin 0.8 milliGRAM(s) Oral at bedtime    MEDICATIONS  (PRN):  acetaminophen     Tablet .. 650 milliGRAM(s) Oral every 6 hours PRN Temp greater or equal to 38C (100.4F), Mild Pain (1 - 3)  heparin   Injectable 2500 Unit(s) IV Push every 6 hours PRN For aPTT between 40 - 57  heparin   Injectable 5000 Unit(s) IV Push every 6 hours PRN For aPTT less than 40        CAPILLARY BLOOD GLUCOSE        I&O's Summary    09 Aug 2023 07:01  -  10 Aug 2023 07:00  --------------------------------------------------------  IN: 1152 mL / OUT: 2400 mL / NET: -1248 mL    10 Aug 2023 07:01  -  10 Aug 2023 13:39  --------------------------------------------------------  IN: 0 mL / OUT: 200 mL / NET: -200 mL        Vital Signs Last 24 Hrs  T(C): 36.4 (10 Aug 2023 12:31), Max: 36.7 (09 Aug 2023 20:12)  T(F): 97.5 (10 Aug 2023 12:31), Max: 98 (09 Aug 2023 20:12)  HR: 68 (10 Aug 2023 12:31) (64 - 109)  BP: 144/70 (10 Aug 2023 12:31) (109/67 - 147/85)  BP(mean): --  RR: 18 (10 Aug 2023 12:31) (18 - 18)  SpO2: 99% (10 Aug 2023 12:31) (96% - 100%)    PHYSICAL EXAM:  EYES: EOMI, PERRLA  NECK: Supple, No JVD  CHEST/LUNG: clear   HEART: S1 S2; no murmurs   ABDOMEN: Soft, Nontender,  EXTREMITIES: no edema  NEUROLOGY: AO x 3 non-focal  SKIN: No rashes or lesions    LABS:                        9.7    5.27  )-----------( 152      ( 10 Aug 2023 04:10 )             32.0     08-09    139  |  99  |  26<H>  ----------------------------<  110<H>  3.8   |  27  |  1.20    Ca    8.7      09 Aug 2023 06:11      PTT - ( 10 Aug 2023 11:34 )  PTT:45.0 sec      Urinalysis Basic - ( 09 Aug 2023 06:11 )    Color: x / Appearance: x / SG: x / pH: x  Gluc: 110 mg/dL / Ketone: x  / Bili: x / Urobili: x   Blood: x / Protein: x / Nitrite: x   Leuk Esterase: x / RBC: x / WBC x   Sq Epi: x / Non Sq Epi: x / Bacteria: x          All consultant(s) notes reviewed and care discussed with other providers        Contact Number, Dr Banks [phone]

## 2023-08-11 NOTE — DISCHARGE NOTE NURSING/CASE MANAGEMENT/SOCIAL WORK - NSDCPEFALRISK_GEN_ALL_CORE
For information on Fall & Injury Prevention, visit: [email or web address] OR  [email or web address] OR  https://www.cdc.gov/steadi/patient.html

## 2023-08-11 NOTE — DISCHARGE NOTE PROVIDER - PROVIDER TOKENS
PROVIDER:[TOKEN:[[id number]],FOLLOWUP:[1-3 days]],PROVIDER:[TOKEN:[[id number]],FOLLOWUP:[1 week]]

## 2023-08-11 NOTE — PROGRESS NOTE ADULT - PROBLEM SELECTOR PLAN 8
switch to apixaban
switch to apixaban
- hep gtt and continuing home BB as above
continue heparin gtt and continuing home BB as above

## 2023-08-11 NOTE — PROGRESS NOTE ADULT - PROBLEM SELECTOR PLAN 6
- c/w home antihypertensive/BB, statin as above

## 2023-08-11 NOTE — PROGRESS NOTE ADULT - PROBLEM SELECTOR PLAN 1
proximal DVT L popliteal + posterior tibial peroneal trunk  uncertain age based upon read, and pt w/ LE swelling x months therefore unclear whether acute   pt w/ hx of recent mech fall and head trauma though CTH w/o e/o bleed  - will initate hep gtt, c/s cardiology in AM (pt is followed by Dr. Pringle) to discuss risk/benefit of prolonged AC in elderly pt w/ hx fall and determine whether long-term AC vs IVC filter placement appropriate - d/w pt and daughter Ho in agreement
proximal DVT L popliteal + posterior tibial peroneal trunk likely subacute  transition to apixaban   discussed with cardiology
continue apixaban   no evidence of bleeding
proximal DVT L popliteal + posterior tibial peroneal trunk  on IV heparin  continue to follow cardiology recommendations

## 2023-08-11 NOTE — PROGRESS NOTE ADULT - PROBLEM SELECTOR PLAN 3
continue to monitor   hemoglobin stable on full anticoagulation
ctn  hemoglobin stable on full anticoagulation
continue to monitor   hemoglobin stable on full anticoagulation
microcytic (MCV 76.3) anemia H/H 8.9/30  at bl per last admit  - monitor CBC on AC

## 2023-08-11 NOTE — DISCHARGE NOTE PROVIDER - HOSPITAL COURSE
89M HTN, HLD, CKD3, CAD, AFib (eliquis), asthma, GERD, recent admit 6/3-6/7 for campylobacter+ colitis dc'd on PO azithro, now presents w/ HA and lower extremity swelling, in context of recent [facility] fall 7/2023 (evaluated at [facility] found to have undet C3 fx (no NSGY intervention at that time), found to have partially occlusive proximal DVT (L popliteal + posterior tibial peroneal trunk)     > Deep vein thrombosis (DVT) of proximal vein of left lower extremity.   ·  Plan: proximal DVT L popliteal + posterior tibial peroneal trunk likely subacute  transition to apixaban   discussed with cardiology.    >  Headache.   ·  Plan: CT head and cervical spine negative fracture  fall precautions.    >  Microcytic anemia.   ·  Plan: continue to monitor   hemoglobin stable on full anticoagulation.    >  Chronic hypertension.   ·  Plan: continue furosemide 40 and metoprolol ER 25 mg   BP acceptable on admit.    > Hyperlipidemia.   ·  Plan: home simvastatin 20mg  - c/w home med.    >  CAD (coronary artery disease).   ·  Plan: - c/w home antihypertensive/BB, statin as above.    >  Stage 3 chronic kidney disease.   ·  Plan: stable stage 3 disease  continue to monitor  mild urinary retention  family adamant against Rausch and I agree   it would be hazardous in this patient.    > Chronic atrial fibrillation.   ·  Plan: switch to apixaban.    >  GERD (gastroesophageal reflux disease).   ·  Plan: home pantoprazole 40mg QD  - c/w home med.  Cleared for discharge

## 2023-08-11 NOTE — PROGRESS NOTE ADULT - PROBLEM SELECTOR PLAN 9
home pantoprazole 40mg QD  - c/w home med
pantoprazole 40mg QD

## 2023-08-31 ENCOUNTER — APPOINTMENT (OUTPATIENT)
Dept: UROLOGY | Facility: CLINIC | Age: 88
End: 2023-08-31
Payer: MEDICARE

## 2023-08-31 VITALS
SYSTOLIC BLOOD PRESSURE: 136 MMHG | DIASTOLIC BLOOD PRESSURE: 77 MMHG | WEIGHT: 132 LBS | BODY MASS INDEX: 21.21 KG/M2 | RESPIRATION RATE: 17 BRPM | HEART RATE: 71 BPM | HEIGHT: 66 IN

## 2023-08-31 DIAGNOSIS — R35.0 FREQUENCY OF MICTURITION: ICD-10-CM

## 2023-08-31 DIAGNOSIS — R35.1 BENIGN PROSTATIC HYPERPLASIA WITH LOWER URINARY TRACT SYMPMS: ICD-10-CM

## 2023-08-31 DIAGNOSIS — N40.1 BENIGN PROSTATIC HYPERPLASIA WITH LOWER URINARY TRACT SYMPMS: ICD-10-CM

## 2023-08-31 PROCEDURE — 99204 OFFICE O/P NEW MOD 45 MIN: CPT

## 2023-08-31 RX ORDER — OXYBUTYNIN CHLORIDE 10 MG/1
10 TABLET, EXTENDED RELEASE ORAL
Qty: 90 | Refills: 4 | Status: ACTIVE | COMMUNITY
Start: 2023-08-31 | End: 1900-01-01

## 2023-08-31 RX ORDER — SOLIFENACIN SUCCINATE 5 MG/1
5 TABLET ORAL
Qty: 90 | Refills: 3 | Status: ACTIVE | COMMUNITY
Start: 2023-08-31 | End: 1900-01-01

## 2023-08-31 RX ORDER — FINASTERIDE 5 MG/1
5 TABLET, FILM COATED ORAL
Qty: 90 | Refills: 3 | Status: ACTIVE | COMMUNITY
Start: 2023-08-31 | End: 1900-01-01

## 2023-08-31 NOTE — ASSESSMENT
[FreeTextEntry1] : 89 yo M with BPH/LUTS and urinary frequency, emptying well  #LUTS/BPH - Continue tamulosin - Rx of finasteride 5 mg to pharmacy  #Urinary frequency - Trial of solifenacin 5 mg  - AE d/w patient and daughter  RPA 6 weeks

## 2023-08-31 NOTE — HISTORY OF PRESENT ILLNESS
[FreeTextEntry1] : : Oct 28 1925  Referring Provider: SELF,REFERRED   HPI: Mr. ROBBIE CERNA is a 89 year yo M with a PMHx notable for  complaint of hesitancy, intermittency, slow stream, sensation of incomplete emptying as well as nocturia every hour.. He was seen by his primary care doctor and started on Flomax 2 tabs daily a couple of weeks ago. He has not noticed a significant difference in his voiding symptoms. He denies any other irritative or obstructive voiding symptoms. He previously saw Dr. Vargas, and was prescribed finasteride. HE was in the hospital recently after a fall in July and also found to have a proximal DVT in L popliteal and posterior tibial DVT - now started on Eliquis. PVR today 74 cc. Now states his frequency is most bothersome.  Anticoagulation: Eliquis All: NKDA Social: nonsmoker, social ETOH, , here today with daughter PMHx: asthma, afib FHx: None PSHx: None  [Urinary Incontinence] : no urinary incontinence [Urinary Retention] : no urinary retention [Urinary Urgency] : urinary urgency [Urinary Frequency] : urinary frequency
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
## 2023-11-10 NOTE — ED ADULT NURSE NOTE - NS ED NURSE RECORD ANOTHER HT AND WT
"Goal Outcome Evaluation:      Plan of Care Reviewed With: patient    Overall Patient Progress: no changeOverall Patient Progress: no change    Alert and confused, agitated when schedule doses of Oxy are due, swatting at staff and yelling, afterwards, patient would become pleasant, oral cares provided, at 0150, patient would yell out \"help me help me my back hurts,\" gave scheduled Oxy solution and repositioned, appeared comfortable afterwards. Dsg CDI, repositioned as tolerable, pure wick in place, voiding, dark simone urine, encouraged fluid intake, Mepilex on bottom changed, Awaiting placement   " Yes

## 2023-11-21 ENCOUNTER — APPOINTMENT (OUTPATIENT)
Dept: UROLOGY | Facility: CLINIC | Age: 88
End: 2023-11-21

## 2024-01-22 ENCOUNTER — APPOINTMENT (OUTPATIENT)
Dept: UROLOGY | Facility: CLINIC | Age: 89
End: 2024-01-22

## 2024-03-27 ENCOUNTER — INPATIENT (INPATIENT)
Facility: HOSPITAL | Age: 89
LOS: 8 days | Discharge: HOME HEALTH | DRG: 64 | End: 2024-04-05
Attending: INTERNAL MEDICINE | Admitting: INTERNAL MEDICINE
Payer: MEDICARE

## 2024-03-27 VITALS
SYSTOLIC BLOOD PRESSURE: 128 MMHG | HEIGHT: 64 IN | HEART RATE: 115 BPM | OXYGEN SATURATION: 98 % | DIASTOLIC BLOOD PRESSURE: 80 MMHG | RESPIRATION RATE: 16 BRPM | WEIGHT: 139.99 LBS | TEMPERATURE: 98 F

## 2024-03-27 DIAGNOSIS — R53.1 WEAKNESS: ICD-10-CM

## 2024-03-27 DIAGNOSIS — I67.9 CEREBROVASCULAR DISEASE, UNSPECIFIED: ICD-10-CM

## 2024-03-27 DIAGNOSIS — I48.20 CHRONIC ATRIAL FIBRILLATION, UNSPECIFIED: ICD-10-CM

## 2024-03-27 DIAGNOSIS — J45.909 UNSPECIFIED ASTHMA, UNCOMPLICATED: ICD-10-CM

## 2024-03-27 DIAGNOSIS — Z87.898 PERSONAL HISTORY OF OTHER SPECIFIED CONDITIONS: ICD-10-CM

## 2024-03-27 DIAGNOSIS — Z29.9 ENCOUNTER FOR PROPHYLACTIC MEASURES, UNSPECIFIED: ICD-10-CM

## 2024-03-27 DIAGNOSIS — D64.9 ANEMIA, UNSPECIFIED: ICD-10-CM

## 2024-03-27 DIAGNOSIS — Z02.9 ENCOUNTER FOR ADMINISTRATIVE EXAMINATIONS, UNSPECIFIED: ICD-10-CM

## 2024-03-27 LAB
ACANTHOCYTES BLD QL SMEAR: SLIGHT — SIGNIFICANT CHANGE UP
ALBUMIN SERPL ELPH-MCNC: 3.4 G/DL — SIGNIFICANT CHANGE UP (ref 3.3–5)
ALP SERPL-CCNC: 71 U/L — SIGNIFICANT CHANGE UP (ref 40–120)
ALT FLD-CCNC: 9 U/L — LOW (ref 10–45)
ANION GAP SERPL CALC-SCNC: 11 MMOL/L — SIGNIFICANT CHANGE UP (ref 5–17)
ANISOCYTOSIS BLD QL: SLIGHT — SIGNIFICANT CHANGE UP
APPEARANCE UR: CLEAR — SIGNIFICANT CHANGE UP
APTT BLD: 30.5 SEC — SIGNIFICANT CHANGE UP (ref 24.5–35.6)
AST SERPL-CCNC: 19 U/L — SIGNIFICANT CHANGE UP (ref 10–40)
BACTERIA # UR AUTO: NEGATIVE /HPF — SIGNIFICANT CHANGE UP
BASE EXCESS BLDV CALC-SCNC: 2.3 MMOL/L — SIGNIFICANT CHANGE UP (ref -2–3)
BASOPHILS # BLD AUTO: 0 K/UL — SIGNIFICANT CHANGE UP (ref 0–0.2)
BASOPHILS NFR BLD AUTO: 0 % — SIGNIFICANT CHANGE UP (ref 0–2)
BILIRUB SERPL-MCNC: 0.6 MG/DL — SIGNIFICANT CHANGE UP (ref 0.2–1.2)
BILIRUB UR-MCNC: NEGATIVE — SIGNIFICANT CHANGE UP
BUN SERPL-MCNC: 22 MG/DL — SIGNIFICANT CHANGE UP (ref 7–23)
CA-I SERPL-SCNC: 1.19 MMOL/L — SIGNIFICANT CHANGE UP (ref 1.15–1.33)
CALCIUM SERPL-MCNC: 9 MG/DL — SIGNIFICANT CHANGE UP (ref 8.4–10.5)
CAST: 1 /LPF — SIGNIFICANT CHANGE UP (ref 0–4)
CHLORIDE BLDV-SCNC: 104 MMOL/L — SIGNIFICANT CHANGE UP (ref 96–108)
CHLORIDE SERPL-SCNC: 101 MMOL/L — SIGNIFICANT CHANGE UP (ref 96–108)
CO2 BLDV-SCNC: 30 MMOL/L — HIGH (ref 22–26)
CO2 SERPL-SCNC: 25 MMOL/L — SIGNIFICANT CHANGE UP (ref 22–31)
COLOR SPEC: YELLOW — SIGNIFICANT CHANGE UP
CREAT SERPL-MCNC: 1.17 MG/DL — SIGNIFICANT CHANGE UP (ref 0.5–1.3)
DACRYOCYTES BLD QL SMEAR: SLIGHT — SIGNIFICANT CHANGE UP
DIFF PNL FLD: ABNORMAL
EGFR: 56 ML/MIN/1.73M2 — LOW
ELLIPTOCYTES BLD QL SMEAR: SLIGHT — SIGNIFICANT CHANGE UP
EOSINOPHIL # BLD AUTO: 0.28 K/UL — SIGNIFICANT CHANGE UP (ref 0–0.5)
EOSINOPHIL NFR BLD AUTO: 4.4 % — SIGNIFICANT CHANGE UP (ref 0–6)
FLUAV AG NPH QL: SIGNIFICANT CHANGE UP
FLUBV AG NPH QL: SIGNIFICANT CHANGE UP
GAS PNL BLDV: 137 MMOL/L — SIGNIFICANT CHANGE UP (ref 136–145)
GAS PNL BLDV: SIGNIFICANT CHANGE UP
GIANT PLATELETS BLD QL SMEAR: PRESENT — SIGNIFICANT CHANGE UP
GLUCOSE BLDV-MCNC: 124 MG/DL — HIGH (ref 70–99)
GLUCOSE SERPL-MCNC: 128 MG/DL — HIGH (ref 70–99)
GLUCOSE UR QL: NEGATIVE MG/DL — SIGNIFICANT CHANGE UP
HCO3 BLDV-SCNC: 28 MMOL/L — SIGNIFICANT CHANGE UP (ref 22–29)
HCT VFR BLD CALC: 29.4 % — LOW (ref 39–50)
HCT VFR BLDA CALC: 25 % — LOW (ref 39–51)
HGB BLD CALC-MCNC: 8.4 G/DL — LOW (ref 12.6–17.4)
HGB BLD-MCNC: 8.5 G/DL — LOW (ref 13–17)
HYPOCHROMIA BLD QL: SLIGHT — SIGNIFICANT CHANGE UP
INR BLD: 1.05 RATIO — SIGNIFICANT CHANGE UP (ref 0.85–1.18)
KETONES UR-MCNC: NEGATIVE MG/DL — SIGNIFICANT CHANGE UP
LACTATE BLDV-MCNC: 1.7 MMOL/L — SIGNIFICANT CHANGE UP (ref 0.5–2)
LEUKOCYTE ESTERASE UR-ACNC: NEGATIVE — SIGNIFICANT CHANGE UP
LYMPHOCYTES # BLD AUTO: 1.29 K/UL — SIGNIFICANT CHANGE UP (ref 1–3.3)
LYMPHOCYTES # BLD AUTO: 20.2 % — SIGNIFICANT CHANGE UP (ref 13–44)
MACROCYTES BLD QL: SLIGHT — SIGNIFICANT CHANGE UP
MANUAL SMEAR VERIFICATION: SIGNIFICANT CHANGE UP
MCHC RBC-ENTMCNC: 22.1 PG — LOW (ref 27–34)
MCHC RBC-ENTMCNC: 28.9 GM/DL — LOW (ref 32–36)
MCV RBC AUTO: 76.6 FL — LOW (ref 80–100)
MICROCYTES BLD QL: SLIGHT — SIGNIFICANT CHANGE UP
MONOCYTES # BLD AUTO: 0.5 K/UL — SIGNIFICANT CHANGE UP (ref 0–0.9)
MONOCYTES NFR BLD AUTO: 7.9 % — SIGNIFICANT CHANGE UP (ref 2–14)
NEUTROPHILS # BLD AUTO: 4.31 K/UL — SIGNIFICANT CHANGE UP (ref 1.8–7.4)
NEUTROPHILS NFR BLD AUTO: 67.5 % — SIGNIFICANT CHANGE UP (ref 43–77)
NITRITE UR-MCNC: NEGATIVE — SIGNIFICANT CHANGE UP
OVALOCYTES BLD QL SMEAR: SLIGHT — SIGNIFICANT CHANGE UP
PCO2 BLDV: 50 MMHG — SIGNIFICANT CHANGE UP (ref 42–55)
PH BLDV: 7.36 — SIGNIFICANT CHANGE UP (ref 7.32–7.43)
PH UR: 7 — SIGNIFICANT CHANGE UP (ref 5–8)
PLAT MORPH BLD: ABNORMAL
PLATELET # BLD AUTO: 182 K/UL — SIGNIFICANT CHANGE UP (ref 150–400)
PO2 BLDV: 41 MMHG — SIGNIFICANT CHANGE UP (ref 25–45)
POIKILOCYTOSIS BLD QL AUTO: SLIGHT — SIGNIFICANT CHANGE UP
POLYCHROMASIA BLD QL SMEAR: SLIGHT — SIGNIFICANT CHANGE UP
POTASSIUM BLDV-SCNC: 4 MMOL/L — SIGNIFICANT CHANGE UP (ref 3.5–5.1)
POTASSIUM SERPL-MCNC: 3.9 MMOL/L — SIGNIFICANT CHANGE UP (ref 3.5–5.3)
POTASSIUM SERPL-SCNC: 3.9 MMOL/L — SIGNIFICANT CHANGE UP (ref 3.5–5.3)
PROT SERPL-MCNC: 6.5 G/DL — SIGNIFICANT CHANGE UP (ref 6–8.3)
PROT UR-MCNC: NEGATIVE MG/DL — SIGNIFICANT CHANGE UP
PROTHROM AB SERPL-ACNC: 11.5 SEC — SIGNIFICANT CHANGE UP (ref 9.5–13)
RBC # BLD: 3.84 M/UL — LOW (ref 4.2–5.8)
RBC # FLD: 20.6 % — HIGH (ref 10.3–14.5)
RBC BLD AUTO: ABNORMAL
RBC CASTS # UR COMP ASSIST: 8 /HPF — HIGH (ref 0–4)
RSV RNA NPH QL NAA+NON-PROBE: SIGNIFICANT CHANGE UP
SAO2 % BLDV: 65 % — LOW (ref 67–88)
SARS-COV-2 RNA SPEC QL NAA+PROBE: SIGNIFICANT CHANGE UP
SODIUM SERPL-SCNC: 137 MMOL/L — SIGNIFICANT CHANGE UP (ref 135–145)
SP GR SPEC: 1.01 — SIGNIFICANT CHANGE UP (ref 1–1.03)
SQUAMOUS # UR AUTO: 0 /HPF — SIGNIFICANT CHANGE UP (ref 0–5)
UROBILINOGEN FLD QL: 0.2 MG/DL — SIGNIFICANT CHANGE UP (ref 0.2–1)
WBC # BLD: 6.39 K/UL — SIGNIFICANT CHANGE UP (ref 3.8–10.5)
WBC # FLD AUTO: 6.39 K/UL — SIGNIFICANT CHANGE UP (ref 3.8–10.5)
WBC UR QL: 3 /HPF — SIGNIFICANT CHANGE UP (ref 0–5)

## 2024-03-27 PROCEDURE — 70498 CT ANGIOGRAPHY NECK: CPT | Mod: 26,MC

## 2024-03-27 PROCEDURE — 71045 X-RAY EXAM CHEST 1 VIEW: CPT | Mod: 26

## 2024-03-27 PROCEDURE — 70450 CT HEAD/BRAIN W/O DYE: CPT | Mod: 26,MC,XU

## 2024-03-27 PROCEDURE — 99223 1ST HOSP IP/OBS HIGH 75: CPT

## 2024-03-27 PROCEDURE — 99497 ADVNCD CARE PLAN 30 MIN: CPT | Mod: 25

## 2024-03-27 PROCEDURE — 70496 CT ANGIOGRAPHY HEAD: CPT | Mod: 26,MC

## 2024-03-27 PROCEDURE — 99285 EMERGENCY DEPT VISIT HI MDM: CPT | Mod: FS

## 2024-03-27 RX ORDER — IPRATROPIUM/ALBUTEROL SULFATE 18-103MCG
3 AEROSOL WITH ADAPTER (GRAM) INHALATION EVERY 6 HOURS
Refills: 0 | Status: DISCONTINUED | OUTPATIENT
Start: 2024-03-27 | End: 2024-04-05

## 2024-03-27 RX ORDER — DEXTROSE 50 % IN WATER 50 %
25 SYRINGE (ML) INTRAVENOUS ONCE
Refills: 0 | Status: DISCONTINUED | OUTPATIENT
Start: 2024-03-27 | End: 2024-04-03

## 2024-03-27 RX ORDER — FINASTERIDE 5 MG/1
5 TABLET, FILM COATED ORAL DAILY
Refills: 0 | Status: DISCONTINUED | OUTPATIENT
Start: 2024-03-27 | End: 2024-04-05

## 2024-03-27 RX ORDER — DEXTROSE 50 % IN WATER 50 %
15 SYRINGE (ML) INTRAVENOUS ONCE
Refills: 0 | Status: DISCONTINUED | OUTPATIENT
Start: 2024-03-27 | End: 2024-04-03

## 2024-03-27 RX ORDER — BUDESONIDE AND FORMOTEROL FUMARATE DIHYDRATE 160; 4.5 UG/1; UG/1
2 AEROSOL RESPIRATORY (INHALATION)
Refills: 0 | Status: DISCONTINUED | OUTPATIENT
Start: 2024-03-27 | End: 2024-04-05

## 2024-03-27 RX ORDER — SIMVASTATIN 20 MG/1
20 TABLET, FILM COATED ORAL AT BEDTIME
Refills: 0 | Status: DISCONTINUED | OUTPATIENT
Start: 2024-03-27 | End: 2024-03-27

## 2024-03-27 RX ORDER — ENOXAPARIN SODIUM 100 MG/ML
40 INJECTION SUBCUTANEOUS EVERY 24 HOURS
Refills: 0 | Status: DISCONTINUED | OUTPATIENT
Start: 2024-03-27 | End: 2024-03-31

## 2024-03-27 RX ORDER — GLUCAGON INJECTION, SOLUTION 0.5 MG/.1ML
1 INJECTION, SOLUTION SUBCUTANEOUS ONCE
Refills: 0 | Status: DISCONTINUED | OUTPATIENT
Start: 2024-03-27 | End: 2024-04-05

## 2024-03-27 RX ORDER — TAMSULOSIN HYDROCHLORIDE 0.4 MG/1
0.8 CAPSULE ORAL AT BEDTIME
Refills: 0 | Status: DISCONTINUED | OUTPATIENT
Start: 2024-03-27 | End: 2024-04-05

## 2024-03-27 RX ORDER — INSULIN LISPRO 100/ML
VIAL (ML) SUBCUTANEOUS EVERY 6 HOURS
Refills: 0 | Status: DISCONTINUED | OUTPATIENT
Start: 2024-03-27 | End: 2024-04-03

## 2024-03-27 RX ORDER — FUROSEMIDE 40 MG
20 TABLET ORAL DAILY
Refills: 0 | Status: DISCONTINUED | OUTPATIENT
Start: 2024-03-28 | End: 2024-04-05

## 2024-03-27 RX ORDER — DEXTROSE 50 % IN WATER 50 %
12.5 SYRINGE (ML) INTRAVENOUS ONCE
Refills: 0 | Status: DISCONTINUED | OUTPATIENT
Start: 2024-03-27 | End: 2024-04-03

## 2024-03-27 RX ORDER — FUROSEMIDE 40 MG
1 TABLET ORAL
Refills: 0 | DISCHARGE

## 2024-03-27 RX ORDER — SODIUM CHLORIDE 9 MG/ML
1000 INJECTION, SOLUTION INTRAVENOUS
Refills: 0 | Status: DISCONTINUED | OUTPATIENT
Start: 2024-03-27 | End: 2024-04-03

## 2024-03-27 RX ORDER — ATORVASTATIN CALCIUM 80 MG/1
80 TABLET, FILM COATED ORAL AT BEDTIME
Refills: 0 | Status: DISCONTINUED | OUTPATIENT
Start: 2024-03-27 | End: 2024-04-05

## 2024-03-27 RX ORDER — PANTOPRAZOLE SODIUM 20 MG/1
40 TABLET, DELAYED RELEASE ORAL
Refills: 0 | Status: DISCONTINUED | OUTPATIENT
Start: 2024-03-27 | End: 2024-04-05

## 2024-03-27 RX ORDER — METOPROLOL TARTRATE 50 MG
1 TABLET ORAL
Refills: 0 | DISCHARGE

## 2024-03-27 RX ORDER — ACETAMINOPHEN 500 MG
1000 TABLET ORAL ONCE
Refills: 0 | Status: COMPLETED | OUTPATIENT
Start: 2024-03-27 | End: 2024-03-27

## 2024-03-27 RX ORDER — DILTIAZEM HCL 120 MG
120 CAPSULE, EXT RELEASE 24 HR ORAL AT BEDTIME
Refills: 0 | Status: DISCONTINUED | OUTPATIENT
Start: 2024-03-27 | End: 2024-04-05

## 2024-03-27 RX ORDER — MONTELUKAST 4 MG/1
10 TABLET, CHEWABLE ORAL DAILY
Refills: 0 | Status: DISCONTINUED | OUTPATIENT
Start: 2024-03-27 | End: 2024-04-05

## 2024-03-27 RX ADMIN — ATORVASTATIN CALCIUM 80 MILLIGRAM(S): 80 TABLET, FILM COATED ORAL at 21:33

## 2024-03-27 RX ADMIN — Medication 3 MILLILITER(S): at 20:08

## 2024-03-27 RX ADMIN — Medication 120 MILLIGRAM(S): at 21:33

## 2024-03-27 RX ADMIN — Medication 400 MILLIGRAM(S): at 13:35

## 2024-03-27 RX ADMIN — TAMSULOSIN HYDROCHLORIDE 0.8 MILLIGRAM(S): 0.4 CAPSULE ORAL at 21:34

## 2024-03-27 NOTE — ED ADULT TRIAGE NOTE - CHIEF COMPLAINT QUOTE
As per family, patient has been confused and unable to hold fork and spoon since Sunday. On exam at triage, patient follows commnads, no facial droop or pronator drift and leg drift but strength is diminished in the rt hand. Also c/o headache

## 2024-03-27 NOTE — H&P ADULT - REASON FOR ADMISSION
Generalized weakness since Sunday night but with increased confusion and difficulty with using fork on non-dominant (RIGHT) hand since Sunday night.  Intermittent cough.

## 2024-03-27 NOTE — H&P ADULT - PROBLEM SELECTOR PLAN 3
Patient with suspected asthma exacerbation but no present requirement for systemic steroids, will check RVP screen and Duoneb therapy and Symbicort equivalent.       Would consider formal Pulmonary evaluation in the AM.

## 2024-03-27 NOTE — ED PROVIDER NOTE - NSICDXFAMILYHX_GEN_ALL_CORE_FT
FAMILY HISTORY:  Mother  Still living? Unknown  FH: stroke, Age at diagnosis: Age Unknown    Sibling  Still living? Unknown  FH: stroke, Age at diagnosis: Age Unknown

## 2024-03-27 NOTE — ED PROVIDER NOTE - PHYSICAL EXAMINATION
Neuro:   Face symmetrical, tongue midline, RUE 4/5 compared to LUE 5/5, bilateral LE 5/5, no pronator drift

## 2024-03-27 NOTE — H&P ADULT - PROBLEM SELECTOR PLAN 2
CTT head and angiogram nondiagnostic but with radiographic evidence of past CVA>> I have upgraded patient to telemetry to exclude ventricular arrhythmia and proper monitoring for possible cerebrovascular event.       I have contacted Neurology this evening for an evaluation.   Will assume aspiration risk for now--NPO x medications.   Dysphagia screening and S/S evaluation.   FS to monitor for hypoglycemia.

## 2024-03-27 NOTE — CONSULT NOTE ADULT - SUBJECTIVE AND OBJECTIVE BOX
Slick Up MD  Interventional Cardiology / Endovascular Specialist  [place] Office : [address] [place] [address] [place] [address]  Tel:   [place] Office : [address] [facility] [place] [address]  Tel: [phone]        HISTORY OF PRESENTING ILLNESS:  89 y/o M--patient followed by his office physicians above--patient with a history of essential HTN maintained on bedtime diltiazem, presumed GERD, chronic atrial fibrillation previously on apixaban (stopped apparently one week ago by patient's cardiologist due to concerns of falls and patient's age), BPH< chronic B/L LE oedema on Lasix 20 mg daily, asthma on Trelegy, Montelukast, but not on rescue inhalers, undifferentiated anaemia,  with an admission to [facility] in June 2023 for campylobacter colitis, CKD3, asthma exacerbation, with LLE DVT noted on admission in Aug 2023 at the time with course of apixaban, with patient with CAD, chronic atrial fibrillation previously on apixaban until about a week ago per daughter with patient recommended the direct anticoagulant discontinued due to concerns for fall risk at patient's age.    Family self refers patient to the ER tonight apparently following generalized weakness since 3/24/24 evening at dinner, with daughters noting increased confusion and difficulty with using his fork (dropping fork)--patient is LEFT dominant--with his RIGHT hand.  Family notes no further episodes of dropping utensils but noted generalized decrease in ADL over baseline, with patient using a walker with assistance.   Patient also noted now resolved generalized headache.    Patient/daughters also note persistent nonproductive coughing, especially at night.  No fever, no chills, no rigors.   Daughter notes patient with last COVID-19 booster jab in 2022.    Patient with daughters in attendance (Ho by Face Time phone) deferring to daughters during interview.    PAST MEDICAL & SURGICAL HISTORY:  Afib      Asthmatic bronchitis , chronic      GERD (gastroesophageal reflux disease)      HLD (hyperlipidemia)      HTN (hypertension)      No significant past surgical history          SOCIAL HISTORY: Substance Use (street drugs): ( x ) never used  (  ) other:    FAMILY HISTORY:  FH: stroke (Mother, Sibling)        REVIEW OF SYSTEMS:  CONSTITUTIONAL: No fever, weight loss, or fatigue  EYES: No eye pain, visual disturbances, or discharge  ENMT:  No difficulty hearing, tinnitus, vertigo; No sinus or throat pain  BREASTS: No pain, masses, or nipple discharge  GASTROINTESTINAL: No abdominal or epigastric pain. No nausea, vomiting, or hematemesis; No diarrhea or constipation. No melena or hematochezia.  GENITOURINARY: No dysuria, frequency, hematuria, or incontinence  NEUROLOGICAL: No headaches, memory loss, loss of strength, numbness, or tremors  ENDOCRINE: No heat or cold intolerance; No hair loss  MUSCULOSKELETAL: No joint pain or swelling; No muscle, back, or extremity pain  PSYCHIATRIC: No depression, anxiety, mood swings, or difficulty sleeping  HEME/LYMPH: No easy bruising, or bleeding gums  All others negative    MEDICATIONS:  aspirin enteric coated 81 milliGRAM(s) Oral daily  diltiazem    milliGRAM(s) Oral at bedtime  enoxaparin Injectable 40 milliGRAM(s) SubCutaneous every 24 hours  furosemide    Tablet 20 milliGRAM(s) Oral daily      albuterol/ipratropium for Nebulization 3 milliLiter(s) Nebulizer every 6 hours  budesonide 160 MICROgram(s)/formoterol 4.5 MICROgram(s) Inhaler 2 Puff(s) Inhalation two times a day  montelukast 10 milliGRAM(s) Oral daily    acetaminophen     Tablet .. 650 milliGRAM(s) Oral every 6 hours PRN    pantoprazole    Tablet 40 milliGRAM(s) Oral before breakfast    atorvastatin 80 milliGRAM(s) Oral at bedtime  dextrose 50% Injectable 12.5 Gram(s) IV Push once  dextrose 50% Injectable 25 Gram(s) IV Push once  dextrose 50% Injectable 25 Gram(s) IV Push once  dextrose Oral Gel 15 Gram(s) Oral once PRN  finasteride 5 milliGRAM(s) Oral daily  glucagon  Injectable 1 milliGRAM(s) IntraMuscular once  insulin lispro (ADMELOG) corrective regimen sliding scale   SubCutaneous every 6 hours    dextrose 5%. 1000 milliLiter(s) IV Continuous <Continuous>  dextrose 5%. 1000 milliLiter(s) IV Continuous <Continuous>  tamsulosin 0.8 milliGRAM(s) Oral at bedtime      FAMILY HISTORY:  FH: stroke (Mother, Sibling)          Allergies    No Known Allergies    Intolerances    	      PHYSICAL EXAM:  T(C): 36.8 (03-28-24 @ 08:00), Max: 37.3 (03-27-24 @ 12:00)  HR: 117 (03-28-24 @ 08:00) (60 - 117)  BP: 134/78 (03-28-24 @ 08:00) (115/64 - 148/89)  RR: 18 (03-28-24 @ 08:00) (16 - 19)  SpO2: 96% (03-28-24 @ 08:00) (96% - 99%)  Wt(kg): --  I&O's Summary    27 Mar 2024 07:01  -  28 Mar 2024 07:00  --------------------------------------------------------  IN: 0 mL / OUT: 450 mL / NET: -450 mL          LABS:	 	    CARDIAC MARKERS:    GENERAL: NAD  EYES:   PERRLA   ENMT:   Moist mucous membranes, Good dentition, No lesions  Cardiovascular: Normal S1 S2, No JVD, No murmurs, No edema, irregularly irregular   Respiratory: Lungs clear to auscultation	  Gastrointestinal:  Soft, Non-tender, + BS	  Extremities: no edema                              9.0    9.32  )-----------( 167      ( 28 Mar 2024 07:36 )             30.5     03-28    141  |  103  |  19  ----------------------------<  98  4.0   |  27  |  1.17    Ca    9.0      28 Mar 2024 07:38    TPro  6.5  /  Alb  3.4  /  TBili  0.6  /  DBili  x   /  AST  19  /  ALT  9<L>  /  AlkPhos  71  03-27    proBNP:   Lipid Profile:   HgA1c:   TSH:     Consultant(s) Notes Reviewed:  [x ] YES  [ ] NO    Care Discussed with Consultants/Other Providers [ x] YES  [ ] NO    Imaging Personally Reviewed independently:  [x] YES  [ ] NO    All labs, radiologic studies, vitals, orders and medications list reviewed. Patient is seen and examined at bedside. Case discussed with medical team.    ASSESSMENT/PLAN:

## 2024-03-27 NOTE — GOALS OF CARE CONVERSATION - ADVANCED CARE PLANNING - TREATMENT GUIDELINE COMMENT
The patient and Health Care Proxy wish to continue with FULL Cardiopulmonary Resuscitative status if clinically required and wish no limitation in medical interventions.

## 2024-03-27 NOTE — CONSULT NOTE ADULT - SUBJECTIVE AND OBJECTIVE BOX
Neurology - Consult Note    -  Spectra: [phone] ([facility]), [phone] ([facility]). For new consults, please page: [phone] ([facility]), [phone] ([facility]).  -    HPI: Patient ROBBIE CERNA is a 89y (28-Oct-1925) LEFT handed man who presented to [facility] ED on 3/27/2024, with c/o weakness.    PMH significant for: chronic Afib (taken off apixaban 1-2 weeks ago d/t recurrent falls), HTN, HLD, presumed GERD, BPH, asthma, anemia    Per chart review:  Patient's daughter reports that symptoms began on Sunday, 3/24/2024, around 18-19:00 PM. After dinner, patient appeared confused. Not able to use high R hand for his fork/spoon and also unable to ambulate independently since then. Patient normally walks with a cane. Over the past few days, he has been using a cane and requiring assistance to ambulated. Trickling urine. +Cough. Patient was on apixaban for Afib, but this was apparently stopped 1-2 weeks ago by patient's cardiologist d/t patient's age and recurrent falls. In the ED, patient noted to have difficulty urinating. Needed to be straight catheterized with 300 mL of urine returned.    Review of Systems:  INCOMPLETE   All other review of systems is negative unless indicated above.    Allergies:  No Known Allergies      PMHx/PSHx/Family Hx: As above, otherwise see below   Afib    Asthmatic bronchitis , chronic    GERD (gastroesophageal reflux disease)    HLD (hyperlipidemia)    HTN (hypertension)        Social Hx:  Per HPI    Medications:  MEDICATIONS  (STANDING):  albuterol/ipratropium for Nebulization 3 milliLiter(s) Nebulizer every 6 hours  atorvastatin 80 milliGRAM(s) Oral at bedtime  budesonide 160 MICROgram(s)/formoterol 4.5 MICROgram(s) Inhaler 2 Puff(s) Inhalation two times a day  dextrose 5%. 1000 milliLiter(s) (50 mL/Hr) IV Continuous <Continuous>  dextrose 5%. 1000 milliLiter(s) (100 mL/Hr) IV Continuous <Continuous>  dextrose 50% Injectable 25 Gram(s) IV Push once  dextrose 50% Injectable 25 Gram(s) IV Push once  dextrose 50% Injectable 12.5 Gram(s) IV Push once  diltiazem    milliGRAM(s) Oral at bedtime  enoxaparin Injectable 40 milliGRAM(s) SubCutaneous every 24 hours  finasteride 5 milliGRAM(s) Oral daily  glucagon  Injectable 1 milliGRAM(s) IntraMuscular once  insulin lispro (ADMELOG) corrective regimen sliding scale   SubCutaneous every 6 hours  montelukast 10 milliGRAM(s) Oral daily  pantoprazole    Tablet 40 milliGRAM(s) Oral before breakfast  tamsulosin 0.8 milliGRAM(s) Oral at bedtime    MEDICATIONS  (PRN):  dextrose Oral Gel 15 Gram(s) Oral once PRN Blood Glucose LESS THAN 70 milliGRAM(s)/deciliter      Vitals:  T(C): 37.2 (03-27-24 @ 19:32), Max: 37.3 (03-27-24 @ 12:00)  HR: 84 (03-27-24 @ 19:32) (80 - 115)  BP: 148/89 (03-27-24 @ 19:32) (128/80 - 148/89)  RR: 19 (03-27-24 @ 19:32) (16 - 19)  SpO2: 98% (03-27-24 @ 19:32) (98% - 98%)    Physical Examination: INCOMPLETE  General - Sitting up on ED cart  Cardiovascular - No LE edema  Eyes - Fundoscopy not performed due to safety precautions in the setting of infection risk, non-injected conjunctivae, anicteric sclerae    Neurologic Exam:  Mental status:  - Awake, Alert  - Oriented to: person, place, and time  - Speech: fluent  - Repetition and naming: intact   - Follows simple and complex commands   - Attention/concentration: intact  - Recent and remote memory (including registration and recall): registration intact, 3/3 on 3-word recall  - Fund of knowledge: intact    Cranial nerves - PERRL, VFF on confrontational testing, EOMI - no nystagmus, face sensation (V1-V3) intact b/l, facial strength intact without asymmetry b/l, hearing intact b/l with finger rub test, palate with symmetric elevation, shoulder shrug intact b/l, tongue midline on protrusion with full lateral movement  Dysarthria: ***    Motor - Normal bulk and tone throughout. No pronator drift.  Strength testing (R/L)  Deltoid:  5/5  Biceps:  5/5        Triceps:  5/5       Wrist Extension:  5/5      Wrist Flexion:  5/5       Interossei:  5/5        :  5/5    Hip Flexion:  5/5  Hip Extension:  5/5      Knee Flexion:  5/5      Knee Extension:  5/5      Dorsiflexion:  5/5      Plantar Flexion:  5/5    Sensation - Light touch/vibration intact throughout    DTRs (R/L)  Biceps:  2+/2+        Triceps:  2+/2+       Brachioradialis:  2+/2+        Patellar:  2+/2+      Ankle:  2+/2+      Plantar response:  Down/Down    Coordination - Finger to Nose, Heel to Shin intact b/l. No tremors appreciated.    Gait and station - Unable to assess d/t fall risk/safety concerns.    Labs:                        8.5    6.39  )-----------( 182      ( 27 Mar 2024 12:21 )             29.4     03-27    137  |  101  |  22  ----------------------------<  128<H>  3.9   |  25  |  1.17    Ca    9.0      27 Mar 2024 12:21    TPro  6.5  /  Alb  3.4  /  TBili  0.6  /  DBili  x   /  AST  19  /  ALT  9<L>  /  AlkPhos  71  03-27    CAPILLARY BLOOD GLUCOSE        LIVER FUNCTIONS - ( 27 Mar 2024 12:21 )  Alb: 3.4 g/dL / Pro: 6.5 g/dL / ALK PHOS: 71 U/L / ALT: 9 U/L / AST: 19 U/L / GGT: x             PT/INR - ( 27 Mar 2024 12:21 )   PT: 11.5 sec;   INR: 1.05 ratio         PTT - ( 27 Mar 2024 12:21 )  PTT:30.5 sec  CSF:                  Radiology:  CT ANGIO BRAIN (W)AW IC  CT ANGIO NECK (W)AW IC  CT BRAIN     PROCEDURE DATE:  03/27/2024      INTERPRETATION:  CT angiography of the brain and neck    CLINICAL INDICATION: Right-sided weakness, stopped Elequis 2 weeks ago    TECHNIQUE: Direct axial CT scanning of the brain and neck was obtained   from the vertex to the level of the clavicular heads after the dynamic   intravenous injection of 70 cc of Omnipaque 300. 30 cc discarded.   Sagittal and coronal maximum intensity projection reformats were   provided.  Three-dimensional reconstructions were performed by the   radiologist using the [employer] workstation.    COMPARISON: CT brain 8/7/2023    FINDINGS:    CT BRAIN:    No hydrocephalus, midline shift, or effacement of the basal cisterns.    No acute parenchymal hemorrhage or brain edema.    Redemonstration of encephalomalacia and gliosis involving the bilateral   inferior frontal and left occipitotemporal regions, likely chronic   infarcts.    Mild white matter microvascular ischemic disease.    Bilateral ethmoid and maxillary sinus polypoid mucosal thickening.    Mastoid air cells clear.    CTA BRAIN:    Moderate short segment stenosis of the left mid supraclinoid ICA due to   calcified plaque. Normal flow-related enhancement of the more proximal   and distal skull base/intracranial internal carotid artery.    Normal flow-related enhancement of the right skull base/intracranial   internal carotid artery.    Normal flow-related enhancement of the bilateral anterior, middle, and   posterior cerebral arteries.    Normal flow-related enhancement of the bilateral intradural vertebral   arteries and the basilar artery.    No flow-limiting stenosis or vascular aneurysm. No AVM.    Venous system is well opacified, no evidence for venous sinus or cortical   vein thrombosis.    CTA NECK:    Normal flow-related enhancement of the bilateral common and internal   carotid arteries.    Normal flow-related enhancement of the bilateral vertebral arteries.    No flow-limiting stenosis, evidence for arterial dissection, or vascular   aneurysm.    IMPRESSION:    CT brain:  No hydrocephalus, acute intracranial hemorrhage, mass effect, or brain   edema.    Redemonstration of encephalomalacia and gliosis involving the bilateral   inferior frontal and left occipitotemporal regions, likely chronic   infarcts.    CTA brain:  No flow-limiting stenosis or vascular aneurysm. No AVM.    Venous system is well opacified, no evidence for venous sinus or cortical   vein thrombosis.    CTA neck:  No flow-limiting stenosis, evidence for arterial dissection, or vascular   aneurysm. Neurology - Consult Note    -  Spectra: [phone] ([facility]), [phone] ([facility]). For new consults, please page: [phone] ([facility]), [phone] ([facility]).  -    HPI: Patient ROBBIE CERNA is a 89y (28-Oct-1925) LEFT handed man who presented to [facility] ED on 3/27/2024, with c/o weakness.    PMH significant for: chronic Afib (taken off apixaban 1-2 weeks ago d/t recurrent falls), HTN, HLD, presumed GERD, BPH, asthma, anemia    Per chart review:  Patient's daughter reports that symptoms began on Sunday, 3/24/2024, around 18-19:00 PM. After dinner, patient appeared confused. Not able to use his R hand for his fork/spoon and also unable to ambulate independently since then. Patient normally walks with a cane. Over the past few days, he has been using a cane and requiring assistance to ambulated. Trickling urine. +Cough. Patient was on apixaban for Afib, but this was apparently stopped 1-2 weeks ago by patient's cardiologist d/t patient's age and recurrent falls. In the ED, patient noted to have difficulty urinating. Needed to be straight catheterized with 300 mL of urine returned.    Patient interview:  Patient is accompanied by his daughter (not the daughter with whom he lives and had accompanied him earlier). She says that on Sunday, patient was having trouble "finding his mouth" with his spoon. He was not weak and did not drop the spoon. Normally feeds himself. He also was having trouble walking. Also did not know where he was, what was happening. There were no witnessed convulsions, bowel/bladder incontinence, tongue biting. Normally walks with a 4-prong cane, but without assistance. Walking has improved somewhat, but family is keeping a close eye on him when he walks. His hand/arm movements have seemingly improved. Normally able to toilet himself/wears diapers. Has an aide to help him bathe. Can climb stairs with assistance. No recent illness. +Chronic cough.    Patient had a fall last year with head strike that landed him in the hospital. Fell several times last year. Daughter does not know how often he is falling now. She says she is not aware of any hemorrhage history. Not aware of any previous strokes. Denies patient having previous MI. Denies tobacco/alcohol/recreational drug use. Lives with his daughter (not present at this time).    Daughter concerned that patient is unable to obtain an MRI - says he cannot lie still.    Review of Systems:  CHAD d/t mental status    Allergies:  No Known Allergies      PMHx/PSHx/Family Hx: As above, otherwise see below   Afib    Asthmatic bronchitis , chronic    GERD (gastroesophageal reflux disease)    HLD (hyperlipidemia)    HTN (hypertension)        Social Hx:  Per HPI    Medications:  MEDICATIONS  (STANDING):  albuterol/ipratropium for Nebulization 3 milliLiter(s) Nebulizer every 6 hours  atorvastatin 80 milliGRAM(s) Oral at bedtime  budesonide 160 MICROgram(s)/formoterol 4.5 MICROgram(s) Inhaler 2 Puff(s) Inhalation two times a day  dextrose 5%. 1000 milliLiter(s) (50 mL/Hr) IV Continuous <Continuous>  dextrose 5%. 1000 milliLiter(s) (100 mL/Hr) IV Continuous <Continuous>  dextrose 50% Injectable 25 Gram(s) IV Push once  dextrose 50% Injectable 25 Gram(s) IV Push once  dextrose 50% Injectable 12.5 Gram(s) IV Push once  diltiazem    milliGRAM(s) Oral at bedtime  enoxaparin Injectable 40 milliGRAM(s) SubCutaneous every 24 hours  finasteride 5 milliGRAM(s) Oral daily  glucagon  Injectable 1 milliGRAM(s) IntraMuscular once  insulin lispro (ADMELOG) corrective regimen sliding scale   SubCutaneous every 6 hours  montelukast 10 milliGRAM(s) Oral daily  pantoprazole    Tablet 40 milliGRAM(s) Oral before breakfast  tamsulosin 0.8 milliGRAM(s) Oral at bedtime    MEDICATIONS  (PRN):  dextrose Oral Gel 15 Gram(s) Oral once PRN Blood Glucose LESS THAN 70 milliGRAM(s)/deciliter      Vitals:  T(C): 37.2 (03-27-24 @ 19:32), Max: 37.3 (03-27-24 @ 12:00)  HR: 84 (03-27-24 @ 19:32) (80 - 115)  BP: 148/89 (03-27-24 @ 19:32) (128/80 - 148/89)  RR: 19 (03-27-24 @ 19:32) (16 - 19)  SpO2: 98% (03-27-24 @ 19:32) (98% - 98%)    Physical Examination:  General - Lying on ED cart, receiving a breathing treatment, appears stated age, in NAD, +chronic cough  Cardiovascular - No LE edema  Eyes - Non-injected conjunctivae, anicteric sclerae    Neurologic Exam:  Mental status:  - Awake, Alert  - Oriented to: person. Knows he is in the hospital. Does not know the month/date/year.  - Speech: fluent  - Repetition and naming: intact   - Follows simple commands    Cranial nerves - pupils are pinpoint/minimally reactive, BTT b/l, EOMI - no nystagmus, face sensation (V1-V3) intact b/l, R NLF flattening - unclear chronicity, very [place], palate with symmetric elevation, shoulder shrug intact b/l, tongue midline on protrusion with full lateral movement  Dysarthria: per daughter, speech is slightly slurred    Motor - +[place]at[place] throughout. Normal bulk for age throughout. No pronator drift.   Strength testing (R/L)  Deltoid:  5/5  Biceps:  x       Triceps:  x     Wrist Extension:  x      Wrist Flexion:  x       Interossei:  5/5        :  5/5    x - does not cooperate    Hip Flexion:  5/5    Knee Flexion:  5/5      Knee Extension:  5/5      Dorsiflexion:  5/5      Plantar Flexion:  5/5    Sensation - Light touch intact throughout    DTRs (R/L)  Deferred d/t focused neurologic exam    Coordination - Finger to Nose intact b/l. No tremors appreciated.    Gait and station - Unable to assess d/t fall risk/safety concerns.    Labs:                        8.5    6.39  )-----------( 182      ( 27 Mar 2024 12:21 )             29.4     03-27    137  |  101  |  22  ----------------------------<  128<H>  3.9   |  25  |  1.17    Ca    9.0      27 Mar 2024 12:21    TPro  6.5  /  Alb  3.4  /  TBili  0.6  /  DBili  x   /  AST  19  /  ALT  9<L>  /  AlkPhos  71  03-27    CAPILLARY BLOOD GLUCOSE        LIVER FUNCTIONS - ( 27 Mar 2024 12:21 )  Alb: 3.4 g/dL / Pro: 6.5 g/dL / ALK PHOS: 71 U/L / ALT: 9 U/L / AST: 19 U/L / GGT: x             PT/INR - ( 27 Mar 2024 12:21 )   PT: 11.5 sec;   INR: 1.05 ratio         PTT - ( 27 Mar 2024 12:21 )  PTT:30.5 sec  CSF:                  Radiology:  CT ANGIO BRAIN (W)AW IC  CT ANGIO NECK (W)AW IC  CT BRAIN     PROCEDURE DATE:  03/27/2024      INTERPRETATION:  CT angiography of the brain and neck    CLINICAL INDICATION: Right-sided weakness, stopped Elequis 2 weeks ago    TECHNIQUE: Direct axial CT scanning of the brain and neck was obtained   from the vertex to the level of the clavicular heads after the dynamic   intravenous injection of 70 cc of Omnipaque 300. 30 cc discarded.   Sagittal and coronal maximum intensity projection reformats were   provided.  Three-dimensional reconstructions were performed by the   radiologist using the [employer] workstation.    COMPARISON: CT brain 8/7/2023    FINDINGS:    CT BRAIN:    No hydrocephalus, midline shift, or effacement of the basal cisterns.    No acute parenchymal hemorrhage or brain edema.    Redemonstration of encephalomalacia and gliosis involving the bilateral   inferior frontal and left occipitotemporal regions, likely chronic   infarcts.    Mild white matter microvascular ischemic disease.    Bilateral ethmoid and maxillary sinus polypoid mucosal thickening.    Mastoid air cells clear.    CTA BRAIN:    Moderate short segment stenosis of the left mid supraclinoid ICA due to   calcified plaque. Normal flow-related enhancement of the more proximal   and distal skull base/intracranial internal carotid artery.    Normal flow-related enhancement of the right skull base/intracranial   internal carotid artery.    Normal flow-related enhancement of the bilateral anterior, middle, and   posterior cerebral arteries.    Normal flow-related enhancement of the bilateral intradural vertebral   arteries and the basilar artery.    No flow-limiting stenosis or vascular aneurysm. No AVM.    Venous system is well opacified, no evidence for venous sinus or cortical   vein thrombosis.    CTA NECK:    Normal flow-related enhancement of the bilateral common and internal   carotid arteries.    Normal flow-related enhancement of the bilateral vertebral arteries.    No flow-limiting stenosis, evidence for arterial dissection, or vascular   aneurysm.    IMPRESSION:    CT brain:  No hydrocephalus, acute intracranial hemorrhage, mass effect, or brain   edema.    Redemonstration of encephalomalacia and gliosis involving the bilateral   inferior frontal and left occipitotemporal regions, likely chronic   infarcts.    CTA brain:  No flow-limiting stenosis or vascular aneurysm. No AVM.    Venous system is well opacified, no evidence for venous sinus or cortical   vein thrombosis.    CTA neck:  No flow-limiting stenosis, evidence for arterial dissection, or vascular   aneurysm.

## 2024-03-27 NOTE — H&P ADULT - NSHPADDITIONALINFOADULT_GEN_ALL_CORE
NIGHT HOSPITALIST:     Patient/daughters in attendance aware of course and agree with plan/care as above.   Given patient's comorbidities, patient's long term prognosis is guarded.   Emotional support provided to patient/family.   The patient/ HCP wishes to continue with FULL CODE status and wish no limitation in medical interventions.   Care reviewed with covering NP/PA for endorsement to Dr. Vaca.    Roe Coburn MD  Available on [employer] Teams. NIGHT HOSPITALIST:     Patient/daughters in attendance aware of course and agree with plan/care as above.   Given patient's comorbidities, patient's long term prognosis is guarded.   Emotional support provided to patient/family.   The patient/ HCP wishes to continue with FULL CODE status and wish no limitation in medical interventions.   Care reviewed with covering NP/NGOC Jackson for endorsement to Dr. Vaca.    Roe Coburn MD  Available on [employer] Teams.

## 2024-03-27 NOTE — H&P ADULT - ASSESSMENT
NIGHT HOSPITALIST:    NIGHT HOSPITALIST:     Self referral of patient by daughters in the ER with symptoms from Sunday evening of generalized weakness, mild confusional state with difficulty using his RIGHT hand for the fork (patient is LEFT dominant) and decreased ADL in the setting of patient with HTN< chronic atrial fibrillation previously on apixaban until a week ago, recommended discontinuation the direct anticoagulant due to risk of falling and patient's age>>CTT head and angiogram nondiagnostic but with radiographic evidence of past CVA>> I have upgraded patient to telemetry to exclude ventricular arrhythmia and proper monitoring for possible cerebrovascular event.   I have contacted Neurology this evening for an evaluation.   Will assume aspiration risk for now--NPO x medications.   Dysphagia screening and S/S evaluation.   FS to monitor for hypoglycemia.    Would consider formal Cardiology evaluation in the AM (Dr. MONICA Up)    Patient with suspected asthma exacerbation but no present requirement for systemic steroids, will check RVP screen and Duoneb therapy and Symbicort equivalent.   Would consider formal Pulmonary evaluation in the AM.    UA negative.   Will continue with patient's Rx and obtain kidney and bladder US.    Will obtain iron studies with undifferentiated anaemia.    Patient/family agree with pharmacologic DVT prophylaxis.  Will obtain B/L venous Duplex studies.

## 2024-03-27 NOTE — ED PROVIDER NOTE - CLINICAL SUMMARY MEDICAL DECISION MAKING FREE TEXT BOX
Jeremiah MARQUEZ: Jeremiah MARQUEZ: Patient is a 89-year-old male with a history of hypertension, hyperlipidemia, GERD, A-fib ((no longer on Eliquis as of 2 weeks ago) brought in by daughter for complaint of weakness and confusion.  Per daughter, symptoms started on Sunday evening around 6–7 PM.  She states after dinner, patient.,  Patient appeared to be confused, was not able to use his right hand for his fork/spoon and has been unable to ambulate independently since.   At baseline at baseline, patient walks with a cane.  However over the past few days, he has required a cane and additional assistance. She states that patient is having trickling urine.  Patient also has a cough.  No known sick contacts.  No fevers or chills.  Patient is able to answer some questions.  He states he has a headache.  He denies chest pain or shortness of breath.  He denies abdominal pain.  No past history of stroke.  Daughter states that Eliquis was discontinued secondary to concerns of fall and patient's age. on exam, patient has RUE weakness compared to LUE,, dysmetria on right finger-to-nose, unable to do rapid RAPID alternating movements.  Patient also complains of headache.  Plan for CVA workup.  Patient also has dribbling urine.  Will rule out UTI.  Patient is also coughing, will get chest x-ray. Jeremiah MARQUEZ: Patient is a 89-year-old male with a history of hypertension, hyperlipidemia, GERD, A-fib ((no longer on Eliquis as of 2 weeks ago) brought in by daughter for complaint of weakness and confusion.  Per daughter, symptoms started on Sunday evening around 6–7 PM.  She states after dinner, patient.,  Patient appeared to be confused, was not able to use his right hand for his fork/spoon and has been unable to ambulate independently since.   At baseline at baseline, patient walks with a cane.  However over the past few days, he has required a cane and additional assistance. She states that patient is having trickling urine.  Patient also has a cough.  No known sick contacts.  No fevers or chills.  Patient is able to answer some questions.  He states he has a headache.  He denies chest pain or shortness of breath.  He denies abdominal pain.  No past history of stroke.  Daughter states that Eliquis was discontinued secondary to concerns of fall and patient's age. on exam, patient has RUE weakness compared to LUE,, dysmetria on right finger-to-nose, unable to do rapid RAPID alternating movements.  Patient also complains of headache.  Plan for CVA workup.  Patient also has dribbling urine.  Will rule out UTI.  Patient is also coughing, will get chest x-ray.

## 2024-03-27 NOTE — ED PROVIDER NOTE - OBJECTIVE STATEMENT
Jeremiah MARQUEZ: Patient is a 89-year-old male with a history of hypertension, hyperlipidemia, GERD, A-fib ((no longer on Eliquis as of 2 weeks ago) brought in by daughter for complaint of weakness and confusion.  Per daughter, symptoms started on Sunday evening around 6–7 PM.  She states after dinner, patient.,  Patient appeared to be confused, was not able to use his right hand for his fork/spoon and has been unable to ambulate independently since.   At baseline at baseline, patient walks with a cane.  However over the past few days, he has required a cane and additional assistance. She states that patient is having trickling urine.  Patient also has a cough.  No known sick contacts.  No fevers or chills.  Patient is able to answer some questions.  He states he has a headache.  He denies chest pain or shortness of breath.  He denies abdominal pain.  No past history of stroke.  Daughter states that Eliquis was discontinued secondary to concerns of fall and patient's age.

## 2024-03-27 NOTE — ED PROVIDER NOTE - PROGRESS NOTE DETAILS
Subjective:    Home Medications:  acetaminophen 325 mg oral tablet: 2 tab(s) orally every 6 hours, As Needed (22 Nov 2021 17:35)  Aldactone 25 mg oral tablet: 0.5 tab(s) orally once a day  Hold for SBP&lt;100 (22 Nov 2021 17:29)  Aquaphor topical ointment: Apply topically to affected area once a day (22 Nov 2021 17:35)  aspirin 81 mg oral delayed release tablet: 1 tab(s) orally once a day (22 Nov 2021 17:29)  bisacodyl 5 mg oral delayed release tablet: 1 tab(s) orally every 12 hours, As needed, Constipation (22 Nov 2021 17:29)  Cardizem 60 mg oral tablet: 1 tab(s) orally every 8 hours 0600, 1400, and 2200 (22 Nov 2021 17:29)  digoxin 250 mcg (0.25 mg) oral tablet: 1 tab(s) orally once a day  Hold for apical pulse&lt;60 (22 Nov 2021 17:29)  folic acid 1 mg oral tablet: 1 tab(s) orally once a day (22 Nov 2021 17:29)  hydrOXYzine hydrochloride 25 mg oral tablet: 1 tab(s) orally once a day (22 Nov 2021 17:29)  Milk of Magnesia 8% oral suspension: 30 milliliter(s) orally once a day (at bedtime), As Needed (22 Nov 2021 17:35)  Multiple Vitamins with Minerals oral tablet: 1 tab(s) orally once a day (22 Nov 2021 17:35)  oxybutynin 5 mg oral tablet: 1 tab(s) orally once a day (22 Nov 2021 17:29)  polyethylene glycol 3350 oral powder for reconstitution: 17 gram(s) orally once a day (22 Nov 2021 17:29)  Vitamin B12 1000 mcg oral tablet: 1 tab(s) orally once a day (22 Nov 2021 17:29)    MEDICATIONS  (STANDING):  apixaban 2.5 milliGRAM(s) Oral every 12 hours  aspirin enteric coated 81 milliGRAM(s) Oral daily  cyanocobalamin 1000 MICROGram(s) Oral daily  digoxin     Tablet 250 MICROGram(s) Oral daily  diltiazem    Tablet 60 milliGRAM(s) Oral every 8 hours  folic acid 1 milliGRAM(s) Oral daily  furosemide    Tablet 40 milliGRAM(s) Oral daily  lidocaine   4% Patch 1 Patch Transdermal daily  magnesium oxide 400 milliGRAM(s) Oral daily  metoprolol succinate  milliGRAM(s) Oral daily  multivitamin/minerals 1 Tablet(s) Oral daily  oxybutynin 5 milliGRAM(s) Oral daily  polyethylene glycol 3350 17 Gram(s) Oral daily  spironolactone 25 milliGRAM(s) Oral daily  zinc oxide 40% Ointment 1 Application(s) Topical two times a day    MEDICATIONS  (PRN):  acetaminophen     Tablet .. 650 milliGRAM(s) Oral every 6 hours PRN Temp greater or equal to 38C (100.4F), Mild Pain (1 - 3)  aluminum hydroxide/magnesium hydroxide/simethicone Suspension 30 milliLiter(s) Oral every 4 hours PRN Dyspepsia  bisacodyl 5 milliGRAM(s) Oral every 12 hours PRN Constipation  hydrOXYzine hydrochloride 25 milliGRAM(s) Oral daily PRN Anxiety  magnesium hydroxide Suspension 30 milliLiter(s) Oral at bedtime PRN Constipation  melatonin 3 milliGRAM(s) Oral at bedtime PRN Insomnia  ondansetron Injectable 4 milliGRAM(s) IV Push every 8 hours PRN Nausea and/or Vomiting      Allergies    Darvocet-N 50 (Unknown)  sulfa drugs (Other)    Intolerances        Vital Signs Last 24 Hrs  T(C): 36.9 (07 Dec 2021 08:10), Max: 36.9 (07 Dec 2021 08:10)  T(F): 98.4 (07 Dec 2021 08:10), Max: 98.4 (07 Dec 2021 08:10)  HR: 80 (07 Dec 2021 08:10) (56 - 89)  BP: 137/51 (07 Dec 2021 08:10) (137/51 - 153/62)  BP(mean): 77 (07 Dec 2021 08:10) (77 - 77)  RR: 19 (07 Dec 2021 08:10) (19 - 19)  SpO2: 94% (07 Dec 2021 08:10) (94% - 95%)      PHYSICAL EXAMINATION:    NECK:  Supple. No lymphadenopathy. Jugular venous pressure not elevated. Carotids equal.   HEART:   The cardiac impulse has a normal quality. Reg., Nl S1 and S2.  There are no murmurs, rubs or gallops noted  CHEST:  Chest is clear to auscultation. Normal respiratory effort.  ABDOMEN:  Soft and nontender.   EXTREMITIES:  There is no edema.       LABS:                        11.1   11.76 )-----------( 425      ( 07 Dec 2021 07:34 )             34.6     12-07    137  |  96  |  28<H>  ----------------------------<  102<H>  3.3<L>   |  38<H>  |  0.96    Ca    8.5      07 Dec 2021 07:34  Phos  2.4     12-07  Mg     2.0     12-07                 Subjective:    pat better, lying in the bed, no new complaint.    Home Medications:  acetaminophen 325 mg oral tablet: 2 tab(s) orally every 6 hours, As Needed (22 Nov 2021 17:35)  Aldactone 25 mg oral tablet: 0.5 tab(s) orally once a day  Hold for SBP&lt;100 (22 Nov 2021 17:29)  Aquaphor topical ointment: Apply topically to affected area once a day (22 Nov 2021 17:35)  aspirin 81 mg oral delayed release tablet: 1 tab(s) orally once a day (22 Nov 2021 17:29)  bisacodyl 5 mg oral delayed release tablet: 1 tab(s) orally every 12 hours, As needed, Constipation (22 Nov 2021 17:29)  Cardizem 60 mg oral tablet: 1 tab(s) orally every 8 hours 0600, 1400, and 2200 (22 Nov 2021 17:29)  digoxin 250 mcg (0.25 mg) oral tablet: 1 tab(s) orally once a day  Hold for apical pulse&lt;60 (22 Nov 2021 17:29)  folic acid 1 mg oral tablet: 1 tab(s) orally once a day (22 Nov 2021 17:29)  hydrOXYzine hydrochloride 25 mg oral tablet: 1 tab(s) orally once a day (22 Nov 2021 17:29)  Milk of Magnesia 8% oral suspension: 30 milliliter(s) orally once a day (at bedtime), As Needed (22 Nov 2021 17:35)  Multiple Vitamins with Minerals oral tablet: 1 tab(s) orally once a day (22 Nov 2021 17:35)  oxybutynin 5 mg oral tablet: 1 tab(s) orally once a day (22 Nov 2021 17:29)  polyethylene glycol 3350 oral powder for reconstitution: 17 gram(s) orally once a day (22 Nov 2021 17:29)  Vitamin B12 1000 mcg oral tablet: 1 tab(s) orally once a day (22 Nov 2021 17:29)    MEDICATIONS  (STANDING):  apixaban 2.5 milliGRAM(s) Oral every 12 hours  aspirin enteric coated 81 milliGRAM(s) Oral daily  cyanocobalamin 1000 MICROGram(s) Oral daily  digoxin     Tablet 250 MICROGram(s) Oral daily  diltiazem    Tablet 60 milliGRAM(s) Oral every 8 hours  folic acid 1 milliGRAM(s) Oral daily  furosemide    Tablet 40 milliGRAM(s) Oral daily  lidocaine   4% Patch 1 Patch Transdermal daily  magnesium oxide 400 milliGRAM(s) Oral daily  metoprolol succinate  milliGRAM(s) Oral daily  multivitamin/minerals 1 Tablet(s) Oral daily  oxybutynin 5 milliGRAM(s) Oral daily  polyethylene glycol 3350 17 Gram(s) Oral daily  spironolactone 25 milliGRAM(s) Oral daily  zinc oxide 40% Ointment 1 Application(s) Topical two times a day    MEDICATIONS  (PRN):  acetaminophen     Tablet .. 650 milliGRAM(s) Oral every 6 hours PRN Temp greater or equal to 38C (100.4F), Mild Pain (1 - 3)  aluminum hydroxide/magnesium hydroxide/simethicone Suspension 30 milliLiter(s) Oral every 4 hours PRN Dyspepsia  bisacodyl 5 milliGRAM(s) Oral every 12 hours PRN Constipation  hydrOXYzine hydrochloride 25 milliGRAM(s) Oral daily PRN Anxiety  magnesium hydroxide Suspension 30 milliLiter(s) Oral at bedtime PRN Constipation  melatonin 3 milliGRAM(s) Oral at bedtime PRN Insomnia  ondansetron Injectable 4 milliGRAM(s) IV Push every 8 hours PRN Nausea and/or Vomiting      Allergies    Darvocet-N 50 (Unknown)  sulfa drugs (Other)    Intolerances        Vital Signs Last 24 Hrs  T(C): 36.9 (07 Dec 2021 08:10), Max: 36.9 (07 Dec 2021 08:10)  T(F): 98.4 (07 Dec 2021 08:10), Max: 98.4 (07 Dec 2021 08:10)  HR: 80 (07 Dec 2021 08:10) (56 - 89)  BP: 137/51 (07 Dec 2021 08:10) (137/51 - 153/62)  BP(mean): 77 (07 Dec 2021 08:10) (77 - 77)  RR: 19 (07 Dec 2021 08:10) (19 - 19)  SpO2: 94% (07 Dec 2021 08:10) (94% - 95%)      PHYSICAL EXAMINATION:    NECK:  Supple. No lymphadenopathy. Jugular venous pressure not elevated. Carotids equal.   HEART:   The cardiac impulse has a normal quality. Reg., Nl S1 and S2.  There are no murmurs, rubs or gallops noted  CHEST:  Chest is clear to auscultation. Normal respiratory effort.  ABDOMEN:  Soft and nontender.   EXTREMITIES:  There is no edema.       LABS:                        11.1   11.76 )-----------( 425      ( 07 Dec 2021 07:34 )             34.6     12-07    137  |  96  |  28<H>  ----------------------------<  102<H>  3.3<L>   |  38<H>  |  0.96    Ca    8.5      07 Dec 2021 07:34  Phos  2.4     12-07  Mg     2.0     12-07                 Signed out to evening attending pending neuro and likely admission.

## 2024-03-27 NOTE — H&P ADULT - HISTORY OF PRESENT ILLNESS
NIGHT HOSPITALIST:    Patient UNKNOWN to me previously, assigned to me at this point via the ER and by Dr. Vaca to admit this 89 y/o M--patient  NIGHT HOSPITALIST:    Patient UNKNOWN to me previously, assigned to me at this point via the ER and by Dr. Vaca to admit this 89 y/o M--patient followed by his office physicians above--patient with a history of essential HTN maintained on bedtime diltiazem, presumed GERD, chronic atrial fibrillation previously on apixaban (stopped apparently one week ago by patient's cardiologist due to concerns of falls and patient's age), BPH< chronic B/L LE oedema on Lasix 20 mg daily, asthma on Trelegy, Montelukast, but not on rescue inhalers, undifferentiated anaemia,  with an admission to [place] in June 2023 for campylobacter colitis, CKD3, asthma exacerbation, with LLE DVT noted on admission in Aug 2023 at the time with course of apixaban, with patient with CAD, chronic atrial fibrillation previously on apixaban until about a week ago per daughter with patient recommended the direct anticoagulant discontinued due to concerns for fall risk at patient's age.    Family self refers patient to the ER tonight apparently following generalized weakness since 3/24/24 evening at dinner, with daughters noting increased confusion and difficulty with using his fork (dropping fork)--patient is LEFT dominant--with his RIGHT hand.  Family notes no further episodes of dropping utensils but noted generalized decrease in ADL over baseline, with patient using a walker with assistance.   Patient also noted now resolved generalized headache.    Patient/daughters also note persistent nonproductive coughing, especially at night.  No fever, no chills, no rigors.   Daughter notes patient with last COVID-19 booster jab in 2022.    Patient with daughters in attendance (Ho by Face Time phone) deferring to daughters during interview.

## 2024-03-27 NOTE — H&P ADULT - NSHPSOCIALHISTORY_GEN_ALL_CORE
No tobacco or ethanol use.    Supportive daughters, patient lives with Ho Eid (daughter and Health Care Proxy) [phone].   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

## 2024-03-27 NOTE — ED ADULT NURSE REASSESSMENT NOTE - NS ED NURSE REASSESS COMMENT FT1
pt found to have 280 mL in his bladder, pt states that he is attempting to use urinal but states that he cannot produce urine, MD castro aware, states pt needs to be straight catheterized for urine, straight cath completed, 2 RNs present, sterile technique utilized, aseptic equipment, tolerated well , 300 mL drained immediately upon insertion, urine appeared clear and yellow

## 2024-03-27 NOTE — H&P ADULT - NSHPOUTPATIENTPROVIDERS_GEN_ALL_CORE
Sherry Chavez MD (PCP) [phone]  Slick Up MD (Cardiology)   Oscar Olivo MD (Pulmonary) [phone] Sherry Chavez MD (PCP) [phone]  Slick Up MD (Cardiology)   Oscar Olivo MD (Pulmonary) [phone]  Tomi Patel MD (urology) [phone]

## 2024-03-27 NOTE — GOALS OF CARE CONVERSATION - ADVANCED CARE PLANNING - CONVERSATION DETAILS
Patient is a 89 y/o M with a history of essential HTN, GERD, BPH, chronic atrial fibrillation no longer on full AC with self referral with family for mild confusion, weakness, and difficulty from 3 days ago with holding his fork with his right hand and decreased ADL.    I have spent a total of 16 minutes reviewing with patient and patient's daughter/ Health Care Proxy Ho Eid Advance Directive Planning.    The patient and Health Care Proxy wish to continue with FULL Cardiopulmonary Resuscitative status if clinically required and wish no limitation in medical interventions.

## 2024-03-27 NOTE — ED ADULT NURSE NOTE - OBJECTIVE STATEMENT
89 y.o. male coming in from home via private car for weakness x 3 days. pt wife bedside states that pt was eating dinner Sunday and states that he wasn't able to hold his utensils properly, pt wife states that he has been having persistent left sided weakness since Sunday. pt wife states that he has a fib and was recently stopped on his AC r/t unsteady gait and increased risk of fall on AC. PMH A fib, HTN, HLD, Gerd. A&Ox2 to person and place, neruo (PERRL 3 b/l, decreased strength in left upper and lower extremities, no facial asymmetry, denies decreased sensation), pt denies CP/SOB/dizziness/fevers/chills/N/V/D/urinary symptoms, no other complaints at this time, bed in lowest position, call bell in reach and teaching on use completed. wife at bedside.

## 2024-03-27 NOTE — H&P ADULT - NSHPSOURCEINFOTX_GEN_ALL_CORE
Adult daughter in attendance with patient's daughter/Health Care Proxy present by Face Time application of sister's I phone. Adult daughter in attendance with patient's daughter/Health Care Proxy, Ho Ragini,   present by Face Time application of sister's I phone.

## 2024-03-27 NOTE — ED ADULT NURSE REASSESSMENT NOTE - NS ED NURSE REASSESS COMMENT FT1
pt states that he cannot urinate, pt found to have minimal urine in urinal, pt bladder scanned, 385 mL found in bladder, inpatient provider [place] at [phone], states that as per policy states that we need to straight catheterize him one more time prior to a pantoja catheter being placed on pt, pending order

## 2024-03-27 NOTE — H&P ADULT - PROBLEM SELECTOR PLAN 1
Chronic atrial fibrillation previously on apixaban until a week ago, recommended discontinuation the direct anticoagulant due to risk of falling and patient's age.    Would consider formal Cardiology evaluation in the AM (Dr. MONICA Up)

## 2024-03-27 NOTE — H&P ADULT - PROBLEM SELECTOR PLAN 7
Transitions of Care Status:  1.  Name of PCP:     Sherry Chavez MD (PCP) [phone]  2.  PCP Contacted on Admission: [ ] Y    [x ] N    3.  PCP contacted at Discharge: [ ] Y    [ ] N    [ ] N/A  4.  Post-Discharge Appointment Date and Location:  5.  Summary of Handoff given to PCP:

## 2024-03-27 NOTE — H&P ADULT - MENTAL STATUS
Alert, oriented to person/ hospital.    Speech fluent.  Hard of hearing.   Defers predominant interview to daughters in attendance.

## 2024-03-27 NOTE — CONSULT NOTE ADULT - ASSESSMENT
EKG - Afib   Echo - < from: Transthoracic Echocardiogram (11.07.17 @ 13:41) >  1. Normal left ventricular systolic function. No segmental  wall motion abnormalities.  2. The right ventricle is not well visualized; grossly  normal right ventricular systolic function.  3. No pericardial effusion seen.      < end of copied text >      a/p     1) s/p fall - fell about 3 weeks ago , pt had mechanical fall no LOC, out pt echo shows normal LV mild AS, now off a/c     2) Afib - chronic, off a/c c/w dilt     3) DVT ppx sub q  heparin

## 2024-03-27 NOTE — H&P ADULT - BIRTH SEX
Hungarian speaking. history from daughter. pt with alzheimer's. reported fever this am with c/o abd pains.headache.  denies vomiting. decreased appetite. fs 251 Male

## 2024-03-27 NOTE — CONSULT NOTE ADULT - ASSESSMENT
Patient remains FULL CODE per admitting hospitalist's discussion with family. ROBBIE CERNA is a 89y (28-Oct-1925) LEFT handed man, with PMH significant for chronic Afib (taken off apixaban 1-2 weeks ago d/t recurrent falls), HTN, HLD, presumed GERD, BPH, asthma, anemia. who presented to [facility] ED on 3/27/2024, with c/o weakness/difficulty ambulating/confusion since Sunday, 3/24/2024.    ED vitals notable for: afebrile, HR to 115, BP to 148/89, RR 16-19, SpO2 98% on RA. Labs notable for: Hg 8.5, MCV 76.6, RDW 20.6, glucose 128, limited RVP negative for viruses tested. 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     NIHSS on admission: 3 (+1 questions, +1 R facial, +1 subjective dysarthria)  LKN: 3/24/2024, 18:00  pre-MRS: 3    Impression: Acute encephalopathy with difficulty ambulating and subjective dysarthria. R NLF flattening appears chronic. Could be new ischemic infarct vs. toxic/metabolic/infectious process. Recently discontinued apixaban for Afib d/t "falls and age."    Recommendations:  [] Admitted to Medicine  [] Telemetry monitoring  [] Dysphagia screen  [] SBP goal - OK to target normotension  [] Start aspirin 81mg daily NOW - will need to have risk/benefit discussion with patient/family about restarting apixaban for stroke prevention  [] Continue atorvastatin 80mg (goal LDL<70)  [] A1c, lipid panel  [] MRI brain can be considered - per daughter, patient unlikely to tolerate, and really would not  (patient ideally should be on therapeutic AC for Afib); CTH at ~72 hours from symptom onset does not suggest new infarction (although could be missed)  [] TTE has been ordered by primary team  [] Can obtain the following labs to evaluate for reversible causes of encephalopathy: TSH, ESR, CRP, vitamin B1/B6/B12/D, folate, ammonia  [] Can consider EEG if no obvious cause of encephalopathy is identified  [] Neurochecks, vitals q4h  [] Fall and aspiration precautions  [] PT/OT/SLP/CM  [] Diet: target is DASH/TLC  [] DVT prophylaxis: enoxaparin 40mg q24h (unless medically contraindicated) AND SCDs  [] Stroke education provided  [] Smoking cessation education not needed - non-smoker  [] Please document MRS on discharge  [] Patient remains FULL CODE per admitting hospitalist's discussion with family. I did discuss with daughter at bedside that ongoing GOC discussions among family and patient are encouraged given patient unlikely to do well with CPR/intubation.    NO tenecteplase d/t outside therapeutic window.  NO thrombectomy d/t no LVO.    Patient/plan d/w Stroke fellow, Dr. Espino, under the supervision of attending vascular neurologist Dr. Cortes. To be seen by attending in the AM with attestation to follow. Plan is not formalized until attending attestation is complete. ROBBIE CERNA is a 89y (28-Oct-1925) LEFT handed man, with PMH significant for chronic Afib (taken off apixaban 1-2 weeks ago d/t recurrent falls), HTN, HLD, presumed GERD, BPH, asthma, anemia. who presented to [facility] ED on 3/27/2024, with c/o weakness/difficulty ambulating/confusion since Sunday, 3/24/2024.    ED vitals notable for: afebrile, HR to 115, BP to 148/89, RR 16-19, SpO2 98% on RA. Labs notable for: Hg 8.5, MCV 76.6, RDW 20.6, glucose 128, limited RVP negative for viruses tested. 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     NIHSS on admission: 3 (+1 questions, +1 R facial, +1 subjective dysarthria)  LKN: 3/24/2024, 18:00  pre-MRS: 3    Impression: Acute encephalopathy with difficulty ambulating and subjective dysarthria. R NLF flattening appears chronic. Could be new ischemic infarct vs. toxic/metabolic/infectious process. Recently discontinued apixaban for Afib d/t "falls and age."    Recommendations:  [] Admitted to Medicine  [] Telemetry monitoring  [] Dysphagia screen  [] SBP goal - OK to target normotension  [] Start aspirin 81mg daily NOW - will need to have risk/benefit discussion with patient/family about restarting apixaban for stroke prevention  [] Continue atorvastatin 80mg (goal LDL<70)  [] A1c, lipid panel  [] MRI brain can be considered - per daughter, patient unlikely to tolerate, and really would not  (patient ideally should be on therapeutic AC for Afib, if did not have new infarct d/t Afib now, could always have one in the future); CTH at ~72 hours from symptom onset does not suggest new infarction (although could be missed)  [] TTE has been ordered by primary team  [] Can obtain the following labs to evaluate for reversible causes of encephalopathy: TSH, ESR, CRP, vitamin B1/B6/B12/D, folate, ammonia  [] Can consider EEG if no obvious cause of encephalopathy is identified  [] Neurochecks, vitals q4h  [] Fall and aspiration precautions  [] PT/OT/SLP/CM  [] Diet: target is DASH/TLC  [] DVT prophylaxis: enoxaparin 40mg q24h (unless medically contraindicated) AND SCDs  [] Stroke education provided  [] Smoking cessation education not needed - non-smoker  [] Please document MRS on discharge  [] Patient remains FULL CODE per admitting hospitalist's discussion with family. I did discuss with daughter at bedside that ongoing GOC discussions among family and patient are encouraged given patient unlikely to do well with CPR/intubation.    NO tenecteplase d/t outside therapeutic window.  NO thrombectomy d/t no LVO.    Patient/plan d/w Stroke fellow, Dr. Espino, under the supervision of attending vascular neurologist Dr. Cortes. To be seen by attending in the AM with attestation to follow. Plan is not formalized until attending attestation is complete.

## 2024-03-27 NOTE — H&P ADULT - NSHPLABSRESULTS_GEN_ALL_CORE
WBC 6.3  67N    Hgb 8.5    Platelets of 182K    Random glucose of 128    Cr 1.1    K+ 3.9    e GFR 56    UA negative.    Chest radiograph interpreted by me with no acute infiltrate or effusion.    EKG tracing interpreted by me with atrial fibrillation at 83 with Q V1 V4.    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

## 2024-03-27 NOTE — H&P ADULT - NSHPREVIEWOFSYSTEMS_GEN_ALL_CORE
ROS partially from daughters, with patient deferring to family during interview.    NO present focal weakness.  NO chest pain/pressure.  NO palpitations.    + cough.  NO dyspnoea.   + wheezing.    NO abdominal pain, no red blood per rectum or melena.  NO back pain, no tearing back pain.    No rash.  NO SI/HI>  NO thyroid symptoms.    + urinary incontinence.    No node swelling.

## 2024-03-28 ENCOUNTER — RESULT REVIEW (OUTPATIENT)
Age: 89
End: 2024-03-28

## 2024-03-28 LAB
A1C WITH ESTIMATED AVERAGE GLUCOSE RESULT: 6.2 % — HIGH (ref 4–5.6)
ANION GAP SERPL CALC-SCNC: 11 MMOL/L — SIGNIFICANT CHANGE UP (ref 5–17)
BASOPHILS # BLD AUTO: 0.04 K/UL — SIGNIFICANT CHANGE UP (ref 0–0.2)
BASOPHILS NFR BLD AUTO: 0.4 % — SIGNIFICANT CHANGE UP (ref 0–2)
BUN SERPL-MCNC: 19 MG/DL — SIGNIFICANT CHANGE UP (ref 7–23)
CALCIUM SERPL-MCNC: 9 MG/DL — SIGNIFICANT CHANGE UP (ref 8.4–10.5)
CHLORIDE SERPL-SCNC: 103 MMOL/L — SIGNIFICANT CHANGE UP (ref 96–108)
CHOLEST SERPL-MCNC: 130 MG/DL — SIGNIFICANT CHANGE UP
CO2 SERPL-SCNC: 27 MMOL/L — SIGNIFICANT CHANGE UP (ref 22–31)
CREAT SERPL-MCNC: 1.17 MG/DL — SIGNIFICANT CHANGE UP (ref 0.5–1.3)
CRP SERPL-MCNC: <3 MG/L — SIGNIFICANT CHANGE UP (ref 0–4)
CULTURE RESULTS: SIGNIFICANT CHANGE UP
EGFR: 56 ML/MIN/1.73M2 — LOW
EOSINOPHIL # BLD AUTO: 0.21 K/UL — SIGNIFICANT CHANGE UP (ref 0–0.5)
EOSINOPHIL NFR BLD AUTO: 2.3 % — SIGNIFICANT CHANGE UP (ref 0–6)
ERYTHROCYTE [SEDIMENTATION RATE] IN BLOOD: 57 MM/HR — HIGH (ref 0–20)
ESTIMATED AVERAGE GLUCOSE: 131 MG/DL — HIGH (ref 68–114)
FERRITIN SERPL-MCNC: 21 NG/ML — LOW (ref 30–400)
FOLATE SERPL-MCNC: 19.1 NG/ML — SIGNIFICANT CHANGE UP
GLUCOSE BLDC GLUCOMTR-MCNC: 104 MG/DL — HIGH (ref 70–99)
GLUCOSE BLDC GLUCOMTR-MCNC: 108 MG/DL — HIGH (ref 70–99)
GLUCOSE BLDC GLUCOMTR-MCNC: 124 MG/DL — HIGH (ref 70–99)
GLUCOSE BLDC GLUCOMTR-MCNC: 128 MG/DL — HIGH (ref 70–99)
GLUCOSE BLDC GLUCOMTR-MCNC: 145 MG/DL — HIGH (ref 70–99)
GLUCOSE SERPL-MCNC: 98 MG/DL — SIGNIFICANT CHANGE UP (ref 70–99)
HCT VFR BLD CALC: 30.5 % — LOW (ref 39–50)
HDLC SERPL-MCNC: 53 MG/DL — SIGNIFICANT CHANGE UP
HGB BLD-MCNC: 9 G/DL — LOW (ref 13–17)
IMM GRANULOCYTES NFR BLD AUTO: 0.4 % — SIGNIFICANT CHANGE UP (ref 0–0.9)
IRON SATN MFR SERPL: 26 UG/DL — LOW (ref 45–165)
IRON SATN MFR SERPL: 7 % — LOW (ref 16–55)
LIPID PNL WITH DIRECT LDL SERPL: 65 MG/DL — SIGNIFICANT CHANGE UP
LYMPHOCYTES # BLD AUTO: 1.29 K/UL — SIGNIFICANT CHANGE UP (ref 1–3.3)
LYMPHOCYTES # BLD AUTO: 13.8 % — SIGNIFICANT CHANGE UP (ref 13–44)
MCHC RBC-ENTMCNC: 22.2 PG — LOW (ref 27–34)
MCHC RBC-ENTMCNC: 29.5 GM/DL — LOW (ref 32–36)
MCV RBC AUTO: 75.3 FL — LOW (ref 80–100)
MONOCYTES # BLD AUTO: 0.87 K/UL — SIGNIFICANT CHANGE UP (ref 0–0.9)
MONOCYTES NFR BLD AUTO: 9.3 % — SIGNIFICANT CHANGE UP (ref 2–14)
NEUTROPHILS # BLD AUTO: 6.87 K/UL — SIGNIFICANT CHANGE UP (ref 1.8–7.4)
NEUTROPHILS NFR BLD AUTO: 73.8 % — SIGNIFICANT CHANGE UP (ref 43–77)
NON HDL CHOLESTEROL: 76 MG/DL — SIGNIFICANT CHANGE UP
NRBC # BLD: 0 /100 WBCS — SIGNIFICANT CHANGE UP (ref 0–0)
PLATELET # BLD AUTO: 167 K/UL — SIGNIFICANT CHANGE UP (ref 150–400)
POTASSIUM SERPL-MCNC: 4 MMOL/L — SIGNIFICANT CHANGE UP (ref 3.5–5.3)
POTASSIUM SERPL-SCNC: 4 MMOL/L — SIGNIFICANT CHANGE UP (ref 3.5–5.3)
RBC # BLD: 4.05 M/UL — LOW (ref 4.2–5.8)
RBC # FLD: 20.4 % — HIGH (ref 10.3–14.5)
SODIUM SERPL-SCNC: 141 MMOL/L — SIGNIFICANT CHANGE UP (ref 135–145)
SPECIMEN SOURCE: SIGNIFICANT CHANGE UP
TIBC SERPL-MCNC: 385 UG/DL — SIGNIFICANT CHANGE UP (ref 220–430)
TRIGL SERPL-MCNC: 51 MG/DL — SIGNIFICANT CHANGE UP
TSH SERPL-MCNC: 1.29 UIU/ML — SIGNIFICANT CHANGE UP (ref 0.27–4.2)
UIBC SERPL-MCNC: 359 UG/DL — SIGNIFICANT CHANGE UP (ref 110–370)
VIT B12 SERPL-MCNC: 442 PG/ML — SIGNIFICANT CHANGE UP (ref 232–1245)
WBC # BLD: 9.32 K/UL — SIGNIFICANT CHANGE UP (ref 3.8–10.5)
WBC # FLD AUTO: 9.32 K/UL — SIGNIFICANT CHANGE UP (ref 3.8–10.5)

## 2024-03-28 PROCEDURE — 76770 US EXAM ABDO BACK WALL COMP: CPT | Mod: 26

## 2024-03-28 PROCEDURE — 74018 RADEX ABDOMEN 1 VIEW: CPT | Mod: 26

## 2024-03-28 PROCEDURE — 93306 TTE W/DOPPLER COMPLETE: CPT | Mod: 26

## 2024-03-28 PROCEDURE — 51703 INSERT BLADDER CATH COMPLEX: CPT

## 2024-03-28 PROCEDURE — 93970 EXTREMITY STUDY: CPT | Mod: 26

## 2024-03-28 RX ORDER — LIDOCAINE HCL 20 MG/ML
5 VIAL (ML) INJECTION ONCE
Refills: 0 | Status: COMPLETED | OUTPATIENT
Start: 2024-03-28 | End: 2024-03-28

## 2024-03-28 RX ORDER — ACETAMINOPHEN 500 MG
650 TABLET ORAL EVERY 6 HOURS
Refills: 0 | Status: DISCONTINUED | OUTPATIENT
Start: 2024-03-28 | End: 2024-04-05

## 2024-03-28 RX ORDER — LANOLIN ALCOHOL/MO/W.PET/CERES
3 CREAM (GRAM) TOPICAL AT BEDTIME
Refills: 0 | Status: DISCONTINUED | OUTPATIENT
Start: 2024-03-28 | End: 2024-04-05

## 2024-03-28 RX ORDER — GABAPENTIN 400 MG/1
100 CAPSULE ORAL ONCE
Refills: 0 | Status: COMPLETED | OUTPATIENT
Start: 2024-03-28 | End: 2024-03-28

## 2024-03-28 RX ORDER — PHENAZOPYRIDINE HCL 100 MG
100 TABLET ORAL ONCE
Refills: 0 | Status: COMPLETED | OUTPATIENT
Start: 2024-03-28 | End: 2024-03-28

## 2024-03-28 RX ORDER — ACETAMINOPHEN 500 MG
1000 TABLET ORAL ONCE
Refills: 0 | Status: COMPLETED | OUTPATIENT
Start: 2024-03-28 | End: 2024-03-28

## 2024-03-28 RX ORDER — ASPIRIN/CALCIUM CARB/MAGNESIUM 324 MG
81 TABLET ORAL DAILY
Refills: 0 | Status: DISCONTINUED | OUTPATIENT
Start: 2024-03-28 | End: 2024-03-28

## 2024-03-28 RX ORDER — ASPIRIN/CALCIUM CARB/MAGNESIUM 324 MG
81 TABLET ORAL DAILY
Refills: 0 | Status: DISCONTINUED | OUTPATIENT
Start: 2024-03-28 | End: 2024-03-31

## 2024-03-28 RX ADMIN — Medication 3 MILLILITER(S): at 02:14

## 2024-03-28 RX ADMIN — Medication 3 MILLILITER(S): at 05:41

## 2024-03-28 RX ADMIN — ATORVASTATIN CALCIUM 80 MILLIGRAM(S): 80 TABLET, FILM COATED ORAL at 22:06

## 2024-03-28 RX ADMIN — Medication 100 MILLIGRAM(S): at 06:23

## 2024-03-28 RX ADMIN — PANTOPRAZOLE SODIUM 40 MILLIGRAM(S): 20 TABLET, DELAYED RELEASE ORAL at 05:41

## 2024-03-28 RX ADMIN — Medication 3 MILLILITER(S): at 23:30

## 2024-03-28 RX ADMIN — Medication 20 MILLIGRAM(S): at 05:42

## 2024-03-28 RX ADMIN — Medication 3 MILLILITER(S): at 10:32

## 2024-03-28 RX ADMIN — ENOXAPARIN SODIUM 40 MILLIGRAM(S): 100 INJECTION SUBCUTANEOUS at 05:41

## 2024-03-28 RX ADMIN — FINASTERIDE 5 MILLIGRAM(S): 5 TABLET, FILM COATED ORAL at 10:31

## 2024-03-28 RX ADMIN — TAMSULOSIN HYDROCHLORIDE 0.8 MILLIGRAM(S): 0.4 CAPSULE ORAL at 22:06

## 2024-03-28 RX ADMIN — Medication 120 MILLIGRAM(S): at 22:06

## 2024-03-28 RX ADMIN — Medication 81 MILLIGRAM(S): at 03:28

## 2024-03-28 RX ADMIN — MONTELUKAST 10 MILLIGRAM(S): 4 TABLET, CHEWABLE ORAL at 10:31

## 2024-03-28 RX ADMIN — Medication 3 MILLILITER(S): at 17:02

## 2024-03-28 RX ADMIN — BUDESONIDE AND FORMOTEROL FUMARATE DIHYDRATE 2 PUFF(S): 160; 4.5 AEROSOL RESPIRATORY (INHALATION) at 05:41

## 2024-03-28 RX ADMIN — Medication 400 MILLIGRAM(S): at 02:31

## 2024-03-28 RX ADMIN — Medication 1000 MILLIGRAM(S): at 03:00

## 2024-03-28 RX ADMIN — GABAPENTIN 100 MILLIGRAM(S): 400 CAPSULE ORAL at 23:27

## 2024-03-28 RX ADMIN — Medication 3 MILLIGRAM(S): at 22:06

## 2024-03-28 RX ADMIN — BUDESONIDE AND FORMOTEROL FUMARATE DIHYDRATE 2 PUFF(S): 160; 4.5 AEROSOL RESPIRATORY (INHALATION) at 17:02

## 2024-03-28 NOTE — PHYSICAL THERAPY INITIAL EVALUATION ADULT - MANUAL MUSCLE TESTING RESULTS, REHAB EVAL
It was great seeing you today!   - I have placed a referral to general surgery for evaluation  - Please continue the antibiotics until the prescription is completed  - Please follow up if you have worsening pain, fevers, redness, swelling, or drainage  If you have any questions or concerns, please do not hesitate to call or schedule another appointment!     grossly 3/5 b/l UE and LE extremities/grossly assessed due to

## 2024-03-28 NOTE — PHYSICAL THERAPY INITIAL EVALUATION ADULT - WEIGHT-BEARING RESTRICTIONS: STAND/SIT, REHAB EVAL
Patient presents to walk-in with complaint of allergic reaction. Patient's signs and symptoms were discussed with Dr. ASHER Palm PAC; report called to ER and given to RN. Patient agrees to be transferred to the ER for further evaluation and was transported via wheelchair by Kimmie GARCIA with no complications, patient is on 3 liters oxygen.  Patient was accompanied by his wife.   full weight-bearing

## 2024-03-28 NOTE — SWALLOW BEDSIDE ASSESSMENT ADULT - ORAL PREPARATORY PHASE
Dgtr reports loose fitting upper dentures although Pt eating regular solids pta and overall skills WFL/Within functional limits

## 2024-03-28 NOTE — PHYSICAL THERAPY INITIAL EVALUATION ADULT - PREDICTED DURATION OF THERAPY (DAYS/WKS), PT EVAL
Left message for patient to call back regarding pre-visit planning. Please transfer call to [phone]. Did pt go to [facility] for ER visit? Lab orders?       2 weeks.

## 2024-03-28 NOTE — SWALLOW BEDSIDE ASSESSMENT ADULT - ASR SWALLOW ASPIRATION MONITOR
*If evident, report to MD immediately and reconsult this dept/change of breathing pattern/cough/gurgly voice/fever/pneumonia/throat clearing/upper respiratory infection

## 2024-03-28 NOTE — PROCEDURE NOTE - ADDITIONAL PROCEDURE DETAILS
called to help place pantoja. pt in retention. straight cath x2 this admission. RN unable to pass indwelling secondary to resistance. pt with c/o difficulty voiding and suprapubic pain.   using sterile technique. area prepped and draped. lidocaine urojet instilled into urethra. a 18f coude advanced to hub. clear yellow urine drained. 10cc sterile water in balloon. secured with stat lock. tolerated procedure well.

## 2024-03-28 NOTE — PHYSICAL THERAPY INITIAL EVALUATION ADULT - PERTINENT HX OF CURRENT PROBLEM, REHAB EVAL
89-year-old male with a history of hypertension, hyperlipidemia, GERD, A-fib ((no longer on Eliquis as of 2 weeks ago) brought in by daughter for complaint of weakness and confusion.  Per daughter, symptoms started on Sunday evening around 6–7 PM.  She states after dinner, patient.,  Patient appeared to be confused, was not able to use his right hand for his fork/spoon and has been unable to ambulate independently since.   At baseline at baseline, patient walks with a cane.  However over the past few days, he has required a cane and additional assistance. She states that patient is having trickling urine.s/p fall - fell about 3 weeks ago , pt had mechanical fall no LOC. Per neurology note- Acute encephalopathy with difficulty ambulating and subjective dysarthria. R NLF flattening appears chronic. Could be new ischemic infarct vs. toxic/metabolic/infectious process. Recently discontinued apixaban for Afib d/t "falls and age."  No hydrocephalus, acute intracranial hemorrhage, mass effect, or brain edema.

## 2024-03-28 NOTE — SWALLOW BEDSIDE ASSESSMENT ADULT - ADDITIONAL RECOMMENDATIONS
Restorative swallow therapy f/u 1 x week; Pt will tolerate diet with no s/s of aspiration. Will continue to follow while patient is in-house; swallowing therapy post d/c not indicated at this time

## 2024-03-28 NOTE — SWALLOW BEDSIDE ASSESSMENT ADULT - PHARYNGEAL PHASE
No signs or symptoms of laryngeal penetration/aspiration evident with any consistency presented.  Pharyngeal swallow judged to be timely; clear vocal quality post swallow.

## 2024-03-28 NOTE — SWALLOW BEDSIDE ASSESSMENT ADULT - SWALLOW EVAL: DIAGNOSIS
Pt is a 89 y/o male with c/o weakness who presents with overtly functional oropharyngeal swallowing skills. Formation, control and transfer of the bolus were judged to be adequate.  No signs or symptoms of laryngeal penetration/aspiration evident with any consistency presented.  Pharyngeal swallow judged to be timely and laryngeal elevation was palpated.

## 2024-03-28 NOTE — PATIENT PROFILE ADULT - HAVE YOU RECENTLY LOST WEIGHT WITHOUT TRYING?
Good fetal movement  Glucola and Hgb today, Rh positive  Discussed [place] contractions  
No (0)

## 2024-03-28 NOTE — SWALLOW BEDSIDE ASSESSMENT ADULT - SLP PERTINENT HISTORY OF CURRENT PROBLEM
89y (28-Oct-1925) LEFT handed man, with PMH significant for chronic Afib (taken off apixaban 1-2 weeks ago d/t recurrent falls), HTN, HLD, presumed GERD, BPH, asthma, anemia. who presented to [facility] ED on 3/27/2024, with c/o weakness/difficulty ambulating/confusion since Sunday, 3/24/2024.

## 2024-03-28 NOTE — SWALLOW BEDSIDE ASSESSMENT ADULT - COMMENTS
89y (28-Oct-1925) LEFT handed man, with PMH significant for chronic Afib (taken off apixaban 1-2 weeks ago d/t recurrent falls), HTN, HLD, presumed GERD, BPH, asthma, anemia. who presented to [facility] ED on 3/27/2024, with c/o weakness/difficulty ambulating/confusion since Sunday, 3/24/2024. 3/27/24 IMPRESSION: CT brain: No hydrocephalus, acute intracranial hemorrhage, mass effect, or brain edema. 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 CXR: clear lungs.  3/28/24 Patient complaining of pain in bladder and abdominal distention. XRAY Abd: IMPRESSION: Nonobstructive bowel gas pattern. Per d/w ACP Alimario, Pt cleared for PO intake,

## 2024-03-28 NOTE — CHART NOTE - NSCHARTNOTEFT_GEN_A_CORE
Notified by RN pt c/o abdominal/pelvic pain and distention despite having pantoja placed. Pt and vitals hemodynamically stable. Pt seen and examined at bedside visibly in pain. Pt states he is experiencing severe lower abdominal pain and pain around the penis/urethra. Pt also experiencing dysuria and urgency to urinate since the pantoja was inserted. Denies N/V/D, SOB, chest pain. Urinalysis done on admission which was negative. Pt w/o leukocytosis or fevers. On exam, pt w/ abdominal distention w/ hyperactive bowel sounds and TTP to lower pelvic region. Pt w/o guarding or rebound tenderness. Pantoja catheter with approximately 100 cc of urine, draining clear urine. Repeat bladder scan ordered to make sure pt's bladder is properly draining. Pyridium 100 mg PO x1 ordered for dysuria and lidocaine jelly 2% ordered for urethral pain. Urgent abdominal x-ray ordered for abdominal distention. If no improvement, will f/u w/ urology to reassess pt and pantoja catheter. Will continue to monitor and reassess. Will endorse to day team in AM, attending to follow.     ICU Vital Signs Last 24 Hrs  T(C): 36.6 (28 Mar 2024 04:48), Max: 37.3 (27 Mar 2024 12:00)  T(F): 97.9 (28 Mar 2024 04:48), Max: 99.2 (27 Mar 2024 12:00)  HR: 109 (28 Mar 2024 04:48) (60 - 115)  BP: 143/82 (28 Mar 2024 04:48) (115/64 - 148/89)  BP(mean): 102 (27 Mar 2024 19:32) (102 - 102)  ABP: --  ABP(mean): --  RR: 18 (28 Mar 2024 04:48) (16 - 19)  SpO2: 99% (28 Mar 2024 04:48) (96% - 99%)    O2 Parameters below as of 28 Mar 2024 04:48  Patient On (Oxygen Delivery Method): room air                          8.5    6.39  )-----------( 182      ( 27 Mar 2024 12:21 )             29.4     03-    137  |  101  |  22  ----------------------------<  128<H>  3.9   |  25  |  1.17    Ca    9.0      27 Mar 2024 12:21    TPro  6.5  /  Alb  3.4  /  TBili  0.6  /  DBili  x   /  AST  19  /  ALT  9<L>  /  AlkPhos  71        Urinalysis Basic - ( 27 Mar 2024 13:28 )    Color: Yellow / Appearance: Clear / S.013 / pH: x  Gluc: x / Ketone: Negative mg/dL  / Bili: Negative / Urobili: 0.2 mg/dL   Blood: x / Protein: Negative mg/dL / Nitrite: Negative   Leuk Esterase: Negative / RBC: 8 /HPF / WBC 3 /HPF   Sq Epi: x / Non Sq Epi: 0 /HPF / Bacteria: Negative /HPF      Manuel Kolb PA-C  Department of Medicine Notified by RN pt c/o abdominal/pelvic pain and distention despite having pantoja placed. Pt and vitals hemodynamically stable. Pt seen and examined at bedside visibly in pain. Pt states he is experiencing severe lower abdominal pain and pain around the penis/urethra. Pt also experiencing dysuria and urgency to urinate since the pantoja was inserted. Denies N/V/D, SOB, chest pain. Urinalysis done on admission which was negative. Pt w/o leukocytosis or fevers. On exam, pt w/ abdominal distention w/ hyperactive bowel sounds and TTP to lower pelvic region. Pt w/o guarding or rebound tenderness. Pantoja catheter draining clear urine. Repeat bladder scan ordered to make sure pt's bladder is properly draining. Pyridium 100 mg PO x1 ordered for dysuria and lidocaine jelly 2% ordered for urethral pain. Urgent abdominal x-ray ordered for abdominal distention. If no improvement, will f/u w/ urology to reassess pt and pantoja catheter. Will continue to monitor and reassess. Will endorse to day team in AM, attending to follow.     ICU Vital Signs Last 24 Hrs  T(C): 36.6 (28 Mar 2024 04:48), Max: 37.3 (27 Mar 2024 12:00)  T(F): 97.9 (28 Mar 2024 04:48), Max: 99.2 (27 Mar 2024 12:00)  HR: 109 (28 Mar 2024 04:48) (60 - 115)  BP: 143/82 (28 Mar 2024 04:48) (115/64 - 148/89)  BP(mean): 102 (27 Mar 2024 19:32) (102 - 102)  ABP: --  ABP(mean): --  RR: 18 (28 Mar 2024 04:48) (16 - 19)  SpO2: 99% (28 Mar 2024 04:48) (96% - 99%)    O2 Parameters below as of 28 Mar 2024 04:48  Patient On (Oxygen Delivery Method): room air                          8.5    6.39  )-----------( 182      ( 27 Mar 2024 12:21 )             29.4     03-    137  |  101  |  22  ----------------------------<  128<H>  3.9   |  25  |  1.17    Ca    9.0      27 Mar 2024 12:21    TPro  6.5  /  Alb  3.4  /  TBili  0.6  /  DBili  x   /  AST  19  /  ALT  9<L>  /  AlkPhos  71        Urinalysis Basic - ( 27 Mar 2024 13:28 )    Color: Yellow / Appearance: Clear / S.013 / pH: x  Gluc: x / Ketone: Negative mg/dL  / Bili: Negative / Urobili: 0.2 mg/dL   Blood: x / Protein: Negative mg/dL / Nitrite: Negative   Leuk Esterase: Negative / RBC: 8 /HPF / WBC 3 /HPF   Sq Epi: x / Non Sq Epi: 0 /HPF / Bacteria: Negative /HPF      Manuel Kolb PA-C  Department of Medicine      ADDENDUM: Repeat bladder scan w/ 0 mL of urine w/ urinary output of 450 mL per pantoja catheter.

## 2024-03-28 NOTE — PHYSICAL THERAPY INITIAL EVALUATION ADULT - NSPTDMEREC_GEN_A_CORE
Patient will require RW at home due to dx of        to help complete mobility related activities for daily living. Pt will require 3:1 commode as patient confined to a single level/single room w/o a bathroom and pt maintains decrease standing tolerance./rolling walker Patient will require RW at home to help complete mobility related activities for daily living. Pt will require 3:1 commode as patient confined to a single level/single room w/o a bathroom and pt maintains decrease standing tolerance./rolling walker

## 2024-03-28 NOTE — PHYSICAL THERAPY INITIAL EVALUATION ADULT - ADDITIONAL COMMENTS
At baseline at baseline, patient walks with a cane.  However over the past few days, he has required a cane and additional assistance. s/p fall - fell about 3 weeks ago , pt had mechanical fall no LOC, Patient lives in pvt house with family  4 steps to enter.  14 steps inside.  Patient ambulated with quad cane independent.  At baseline at baseline, patient walks with a quad cane.  However over the past few days, he has required a cane and additional assistance. s/p fall - fell about 3 weeks ago , pt had mechanical fall no LOC. pt owns no dme s at home.

## 2024-03-29 LAB
ANION GAP SERPL CALC-SCNC: 14 MMOL/L — SIGNIFICANT CHANGE UP (ref 5–17)
BUN SERPL-MCNC: 19 MG/DL — SIGNIFICANT CHANGE UP (ref 7–23)
CALCIUM SERPL-MCNC: 8.6 MG/DL — SIGNIFICANT CHANGE UP (ref 8.4–10.5)
CHLORIDE SERPL-SCNC: 103 MMOL/L — SIGNIFICANT CHANGE UP (ref 96–108)
CO2 SERPL-SCNC: 25 MMOL/L — SIGNIFICANT CHANGE UP (ref 22–31)
CREAT SERPL-MCNC: 1.42 MG/DL — HIGH (ref 0.5–1.3)
EGFR: 45 ML/MIN/1.73M2 — LOW
GLUCOSE BLDC GLUCOMTR-MCNC: 135 MG/DL — HIGH (ref 70–99)
GLUCOSE BLDC GLUCOMTR-MCNC: 143 MG/DL — HIGH (ref 70–99)
GLUCOSE BLDC GLUCOMTR-MCNC: 144 MG/DL — HIGH (ref 70–99)
GLUCOSE BLDC GLUCOMTR-MCNC: 149 MG/DL — HIGH (ref 70–99)
GLUCOSE SERPL-MCNC: 101 MG/DL — HIGH (ref 70–99)
HCT VFR BLD CALC: 28.1 % — LOW (ref 39–50)
HGB BLD-MCNC: 8.3 G/DL — LOW (ref 13–17)
MCHC RBC-ENTMCNC: 22 PG — LOW (ref 27–34)
MCHC RBC-ENTMCNC: 29.5 GM/DL — LOW (ref 32–36)
MCV RBC AUTO: 74.5 FL — LOW (ref 80–100)
NRBC # BLD: 0 /100 WBCS — SIGNIFICANT CHANGE UP (ref 0–0)
PLATELET # BLD AUTO: 175 K/UL — SIGNIFICANT CHANGE UP (ref 150–400)
POTASSIUM SERPL-MCNC: 3.7 MMOL/L — SIGNIFICANT CHANGE UP (ref 3.5–5.3)
POTASSIUM SERPL-SCNC: 3.7 MMOL/L — SIGNIFICANT CHANGE UP (ref 3.5–5.3)
RBC # BLD: 3.77 M/UL — LOW (ref 4.2–5.8)
RBC # FLD: 20.2 % — HIGH (ref 10.3–14.5)
SODIUM SERPL-SCNC: 142 MMOL/L — SIGNIFICANT CHANGE UP (ref 135–145)
VIT D25+D1,25 OH+D1,25 PNL SERPL-MCNC: 41.3 PG/ML — SIGNIFICANT CHANGE UP (ref 19.9–79.3)
WBC # BLD: 8.61 K/UL — SIGNIFICANT CHANGE UP (ref 3.8–10.5)
WBC # FLD AUTO: 8.61 K/UL — SIGNIFICANT CHANGE UP (ref 3.8–10.5)

## 2024-03-29 PROCEDURE — 95816 EEG AWAKE AND DROWSY: CPT | Mod: 26

## 2024-03-29 RX ORDER — GABAPENTIN 400 MG/1
100 CAPSULE ORAL ONCE
Refills: 0 | Status: COMPLETED | OUTPATIENT
Start: 2024-03-29 | End: 2024-03-29

## 2024-03-29 RX ORDER — SENNA PLUS 8.6 MG/1
2 TABLET ORAL AT BEDTIME
Refills: 0 | Status: DISCONTINUED | OUTPATIENT
Start: 2024-03-29 | End: 2024-04-05

## 2024-03-29 RX ADMIN — Medication 81 MILLIGRAM(S): at 11:28

## 2024-03-29 RX ADMIN — TAMSULOSIN HYDROCHLORIDE 0.8 MILLIGRAM(S): 0.4 CAPSULE ORAL at 20:56

## 2024-03-29 RX ADMIN — Medication 3 MILLIGRAM(S): at 20:58

## 2024-03-29 RX ADMIN — BUDESONIDE AND FORMOTEROL FUMARATE DIHYDRATE 2 PUFF(S): 160; 4.5 AEROSOL RESPIRATORY (INHALATION) at 06:02

## 2024-03-29 RX ADMIN — Medication 3 MILLILITER(S): at 06:01

## 2024-03-29 RX ADMIN — PANTOPRAZOLE SODIUM 40 MILLIGRAM(S): 20 TABLET, DELAYED RELEASE ORAL at 06:02

## 2024-03-29 RX ADMIN — Medication 3 MILLILITER(S): at 17:19

## 2024-03-29 RX ADMIN — MONTELUKAST 10 MILLIGRAM(S): 4 TABLET, CHEWABLE ORAL at 11:28

## 2024-03-29 RX ADMIN — FINASTERIDE 5 MILLIGRAM(S): 5 TABLET, FILM COATED ORAL at 11:28

## 2024-03-29 RX ADMIN — SENNA PLUS 2 TABLET(S): 8.6 TABLET ORAL at 21:31

## 2024-03-29 RX ADMIN — GABAPENTIN 100 MILLIGRAM(S): 400 CAPSULE ORAL at 21:31

## 2024-03-29 RX ADMIN — Medication 3 MILLILITER(S): at 11:28

## 2024-03-29 RX ADMIN — Medication 120 MILLIGRAM(S): at 20:57

## 2024-03-29 RX ADMIN — BUDESONIDE AND FORMOTEROL FUMARATE DIHYDRATE 2 PUFF(S): 160; 4.5 AEROSOL RESPIRATORY (INHALATION) at 17:19

## 2024-03-29 RX ADMIN — ENOXAPARIN SODIUM 40 MILLIGRAM(S): 100 INJECTION SUBCUTANEOUS at 06:02

## 2024-03-29 RX ADMIN — ATORVASTATIN CALCIUM 80 MILLIGRAM(S): 80 TABLET, FILM COATED ORAL at 20:55

## 2024-03-29 RX ADMIN — Medication 20 MILLIGRAM(S): at 06:02

## 2024-03-29 NOTE — DIETITIAN INITIAL EVALUATION ADULT - NSFNSGIASSESSMENTFT_GEN_A_CORE
No GI distress reported at present. No documented BM in flowsheet, not on any bowel regimen. will continue to monitor.

## 2024-03-29 NOTE — DIETITIAN INITIAL EVALUATION ADULT - PROBLEM SELECTOR PLAN 7
Transitions of Care Status:  1.  Name of PCP:     Sherry Chavez MD (PCP) [phone]  2.  PCP Contacted on Admission: [ ] Y    [x ] N    3.  PCP contacted at Discharge: [ ] Y    [ ] N    [ ] N/A  4.  Post-Discharge Appointment Date and Location:  5.  Summary of Handoff given to PCP:

## 2024-03-29 NOTE — DIETITIAN INITIAL EVALUATION ADULT - CONTINUE CURRENT NUTRITION CARE PLAN
Continue diet free of therapeutic restriction due to advance age, diet texture per SLP/team. RD remains available to adjust diet as needed./yes

## 2024-03-29 NOTE — DIETITIAN INITIAL EVALUATION ADULT - ORAL NUTRITION SUPPLEMENTS
Recommend Glucerna 1x daily (220kcals, 10g protein) to provide additional calories and nutrients to encourage PO intake while in house.

## 2024-03-29 NOTE — DIETITIAN INITIAL EVALUATION ADULT - PERTINENT MEDS FT
MEDICATIONS  (STANDING):  albuterol/ipratropium for Nebulization 3 milliLiter(s) Nebulizer every 6 hours  aspirin enteric coated 81 milliGRAM(s) Oral daily  atorvastatin 80 milliGRAM(s) Oral at bedtime  budesonide 160 MICROgram(s)/formoterol 4.5 MICROgram(s) Inhaler 2 Puff(s) Inhalation two times a day  dextrose 5%. 1000 milliLiter(s) (50 mL/Hr) IV Continuous <Continuous>  dextrose 5%. 1000 milliLiter(s) (100 mL/Hr) IV Continuous <Continuous>  dextrose 50% Injectable 25 Gram(s) IV Push once  dextrose 50% Injectable 25 Gram(s) IV Push once  dextrose 50% Injectable 12.5 Gram(s) IV Push once  diltiazem    milliGRAM(s) Oral at bedtime  enoxaparin Injectable 40 milliGRAM(s) SubCutaneous every 24 hours  finasteride 5 milliGRAM(s) Oral daily  furosemide    Tablet 20 milliGRAM(s) Oral daily  glucagon  Injectable 1 milliGRAM(s) IntraMuscular once  insulin lispro (ADMELOG) corrective regimen sliding scale   SubCutaneous every 6 hours  melatonin 3 milliGRAM(s) Oral at bedtime  montelukast 10 milliGRAM(s) Oral daily  pantoprazole    Tablet 40 milliGRAM(s) Oral before breakfast  tamsulosin 0.8 milliGRAM(s) Oral at bedtime    MEDICATIONS  (PRN):  acetaminophen     Tablet .. 650 milliGRAM(s) Oral every 6 hours PRN Temp greater or equal to 38C (100.4F), Mild Pain (1 - 3)  dextrose Oral Gel 15 Gram(s) Oral once PRN Blood Glucose LESS THAN 70 milliGRAM(s)/deciliter

## 2024-03-29 NOTE — DIETITIAN INITIAL EVALUATION ADULT - ADD RECOMMEND
-- Monitor PO intake, GI tolerance, skin integrity, labs, weight, and bowel movement regularity.   -- Will honor food and beverage preferences to maximize level of nutrient intake.  -- Assist with meals PRN and encourage PO intake.

## 2024-03-29 NOTE — DIETITIAN INITIAL EVALUATION ADULT - OTHER INFO
-- Most recent HbA1c 6.2 on 3/28, no history of DM per daughter, indicates pre-DM range, on ISS to regulate blood glucose while in house.   -- On Lasix  -- GI: on Protonix   -- Most recent lab on 3/29 revealed abnormal Cr 1.42H, GFR 45L, dx CKD. will continue to monitor labs.

## 2024-03-29 NOTE — DIETITIAN INITIAL EVALUATION ADULT - ORAL INTAKE PTA/DIET HISTORY
Daughter reports pt with a good appetite most of the time, has some decreased appetite since Sunday due to weakness and confusion. Pt was not following therapeutic diet, eats well-balanced meals. Confirms no known food allergies. Denies Hx of chewing or swallowing issues. Denies GI distress. Denies micronutrient use, reports taking Glucerna once in a while (mother has DM so they have Glucerna shake at home).

## 2024-03-29 NOTE — EEG REPORT - NS EEG TEXT BOX
REPORT OF ROUTINE EEG WITH VIDEO    [facility]: [address], [address] [place] [address], Phone: [phone]  [facility]: [address] [place] [address], [place] [address], Phone: [phone]  Office: [address] [place] [address], [place] [address], [place] [address], Phone: [phone]    Patient Name: Ragini Singleton    Age: 89 year  : 10/28/1925    EEG #: [id number]  Study Date: 3/29/2024   Start Time: 4:19:50 PM     Study Duration: 21.0 min  		    Technical Information:					  On Instrument: [id number]  Placement and Labeling of Electrodes:  The EEG was performed utilizing 20 channels referential EEG connections (coronal over temporal over parasagittal montage) using all standard 10-20 electrode placements with EKG.  Recording was at a sampling rate of 256 samples per second per channel.  Time synchronized digital video recording was done simultaneously with EEG recording.  A low light infrared camera was used for low light recording.  Renzo and seizure detection algorithms were utilized.  CSA Technical Component:  Quantitative EEG analysis using a separate Compressed Spectral Array (CSA) software package was conducted in real-time and run at bedside after set up by the technician, digitally displaying the power of electrographic frequencies included in the 1-30Hz band using a graded color map.  This data was reviewed and interpreted independently, and is reported in a separate section below.    History:  - 89 year old Male with AMS is here to rule out seizures      Medication  PROSCAR    PROTONIX       Study Interpretation:    FINDINGS:  The background was continuous, spontaneously variable and reactive.  During wakefulness, the posteriorly dominant rhythm consisted of Asymmetric, poorly modulated 7-9  Hz activity, with an amplitude to 30 uV, that attenuated to eye opening. PDR over left hemisphere is slower. Low amplitude central beta was not noted in wakefulness.    Background Slowing:  Generalized slowing: present. Intermittent diffuse slowing consisting of delta theta activity up to 7 Hz    Focal slowing: none was present.    Sleep Background:  -Drowsiness was characterized by fragmentation, attenuation, and slowing of the background activity.    -Stage II sleep transients were not recorded.    Non-epileptiform activity:  None.    Epileptiform Activity:   No epileptiform discharges were present.    Events:  No clinical events were recorded.  No seizures were recorded.    Activation Procedures:   -Hyperventilation was not performed.     -Photic stimulation was performed and did not elicit any abnormalities.        Artifacts:  Intermittent myogenic and movement artifacts were noted.    ECG:  Irregular rhythm noted    EEG Classification / Summary:      Abnormal EEG in the awake, drowsy and asleep states.    1- Background slowing- Mild  2- Intermittent focal polymorphic delta theta slowing over left hemisphere region    Clinical Impression:     Structural or functional abnormality in the left hemisphere more prominent posteriorly  Mild nonspecific diffuse or multifocal cerebral dysfunction.     No epileptiform pattern or seizure recorded.    ________________________________________    TACO Canales  Attending Physician, [facility]    ------------------------------------  EEG Reading Room: [phone]  On Call Service After Hours: [phone]

## 2024-03-29 NOTE — DIETITIAN INITIAL EVALUATION ADULT - ENERGY INTAKE
Adequate (%) Pt was NPO from 3/27 to 3/28 due to weakness. On regular diet since 3/28. Pt observed with >75% of foods consumed upon visit. Daughter reports pt with pretty good PO intake most time, did not voice any food preferences at present. Daughter made aware of menu ordering procedure in house with menu provided, encourage daughter to order preferred foods as needed to optimize PO intake while in house. Agree for pt to get Glucerna 1x daily in hospital.

## 2024-03-29 NOTE — DIETITIAN INITIAL EVALUATION ADULT - REASON INDICATOR FOR ASSESSMENT
Pt seen for consult for nutrition support team. Pt is eating lunch upon visit, information mostly obtained from electronic medical record and daughter (Severo) at bedside. Chart reviewed, events noted.

## 2024-03-29 NOTE — DIETITIAN INITIAL EVALUATION ADULT - PHYSCIAL ASSESSMENT
Drug Dosing Weight  Height (cm): 162.6 (27 Mar 2024 10:50)  Weight (kg): 63.5 (27 Mar 2024 10:50)- stated wt upon admission   BMI (kg/m2): 24 (27 Mar 2024 10:50)    Daily wt (standing or bed scale):: 65.9 (03-29 @ 08:01)  Wt obtained by RD: unable to assess as pt out of bed to recliner upon visit.     UBW: ~132-140lb per daughter, denies major wt change PTA  Wt history from previous RD notes: 64.4kg (6/5/23)  Wt history per [facility] HIE: 64.4kg (6/3/23), 59.9kg (2/16/22), 63.5kg (8/11/21), 62.1kg (2/10/21)      ** Some wt fluctuation might due to pt on Lasix and edema. RD will continue to monitor wt trends as available/able.     IBW: 130lb, 108% IBW

## 2024-03-30 LAB
ALBUMIN SERPL ELPH-MCNC: 3.4 G/DL — SIGNIFICANT CHANGE UP (ref 3.3–5)
ALP SERPL-CCNC: 72 U/L — SIGNIFICANT CHANGE UP (ref 40–120)
ALT FLD-CCNC: 10 U/L — SIGNIFICANT CHANGE UP (ref 10–45)
AMMONIA BLD-MCNC: 31 UMOL/L — SIGNIFICANT CHANGE UP (ref 11–55)
ANION GAP SERPL CALC-SCNC: 13 MMOL/L — SIGNIFICANT CHANGE UP (ref 5–17)
AST SERPL-CCNC: 19 U/L — SIGNIFICANT CHANGE UP (ref 10–40)
BILIRUB SERPL-MCNC: 0.6 MG/DL — SIGNIFICANT CHANGE UP (ref 0.2–1.2)
BUN SERPL-MCNC: 22 MG/DL — SIGNIFICANT CHANGE UP (ref 7–23)
CALCIUM SERPL-MCNC: 8.5 MG/DL — SIGNIFICANT CHANGE UP (ref 8.4–10.5)
CHLORIDE SERPL-SCNC: 104 MMOL/L — SIGNIFICANT CHANGE UP (ref 96–108)
CO2 SERPL-SCNC: 24 MMOL/L — SIGNIFICANT CHANGE UP (ref 22–31)
CREAT SERPL-MCNC: 1.37 MG/DL — HIGH (ref 0.5–1.3)
EGFR: 47 ML/MIN/1.73M2 — LOW
GLUCOSE BLDC GLUCOMTR-MCNC: 109 MG/DL — HIGH (ref 70–99)
GLUCOSE BLDC GLUCOMTR-MCNC: 114 MG/DL — HIGH (ref 70–99)
GLUCOSE BLDC GLUCOMTR-MCNC: 116 MG/DL — HIGH (ref 70–99)
GLUCOSE BLDC GLUCOMTR-MCNC: 144 MG/DL — HIGH (ref 70–99)
GLUCOSE SERPL-MCNC: 104 MG/DL — HIGH (ref 70–99)
HCT VFR BLD CALC: 28.3 % — LOW (ref 39–50)
HGB BLD-MCNC: 8.5 G/DL — LOW (ref 13–17)
MCHC RBC-ENTMCNC: 22.3 PG — LOW (ref 27–34)
MCHC RBC-ENTMCNC: 30 GM/DL — LOW (ref 32–36)
MCV RBC AUTO: 74.3 FL — LOW (ref 80–100)
NRBC # BLD: 0 /100 WBCS — SIGNIFICANT CHANGE UP (ref 0–0)
PLATELET # BLD AUTO: 179 K/UL — SIGNIFICANT CHANGE UP (ref 150–400)
POTASSIUM SERPL-MCNC: 3.6 MMOL/L — SIGNIFICANT CHANGE UP (ref 3.5–5.3)
POTASSIUM SERPL-SCNC: 3.6 MMOL/L — SIGNIFICANT CHANGE UP (ref 3.5–5.3)
PROT SERPL-MCNC: 6.4 G/DL — SIGNIFICANT CHANGE UP (ref 6–8.3)
RBC # BLD: 3.81 M/UL — LOW (ref 4.2–5.8)
RBC # FLD: 20.5 % — HIGH (ref 10.3–14.5)
SODIUM SERPL-SCNC: 141 MMOL/L — SIGNIFICANT CHANGE UP (ref 135–145)
WBC # BLD: 7.85 K/UL — SIGNIFICANT CHANGE UP (ref 3.8–10.5)
WBC # FLD AUTO: 7.85 K/UL — SIGNIFICANT CHANGE UP (ref 3.8–10.5)

## 2024-03-30 PROCEDURE — 70553 MRI BRAIN STEM W/O & W/DYE: CPT | Mod: 26

## 2024-03-30 PROCEDURE — 93010 ELECTROCARDIOGRAM REPORT: CPT

## 2024-03-30 RX ORDER — POLYETHYLENE GLYCOL 3350 17 G/17G
17 POWDER, FOR SOLUTION ORAL DAILY
Refills: 0 | Status: DISCONTINUED | OUTPATIENT
Start: 2024-03-30 | End: 2024-04-05

## 2024-03-30 RX ORDER — GABAPENTIN 400 MG/1
100 CAPSULE ORAL ONCE
Refills: 0 | Status: COMPLETED | OUTPATIENT
Start: 2024-03-30 | End: 2024-03-30

## 2024-03-30 RX ADMIN — Medication 3 MILLILITER(S): at 04:57

## 2024-03-30 RX ADMIN — Medication 3 MILLILITER(S): at 12:34

## 2024-03-30 RX ADMIN — Medication 81 MILLIGRAM(S): at 12:34

## 2024-03-30 RX ADMIN — MONTELUKAST 10 MILLIGRAM(S): 4 TABLET, CHEWABLE ORAL at 12:34

## 2024-03-30 RX ADMIN — BUDESONIDE AND FORMOTEROL FUMARATE DIHYDRATE 2 PUFF(S): 160; 4.5 AEROSOL RESPIRATORY (INHALATION) at 04:58

## 2024-03-30 RX ADMIN — POLYETHYLENE GLYCOL 3350 17 GRAM(S): 17 POWDER, FOR SOLUTION ORAL at 17:12

## 2024-03-30 RX ADMIN — Medication 200 MILLIGRAM(S): at 21:24

## 2024-03-30 RX ADMIN — FINASTERIDE 5 MILLIGRAM(S): 5 TABLET, FILM COATED ORAL at 12:34

## 2024-03-30 RX ADMIN — ENOXAPARIN SODIUM 40 MILLIGRAM(S): 100 INJECTION SUBCUTANEOUS at 04:57

## 2024-03-30 RX ADMIN — Medication 3 MILLIGRAM(S): at 21:25

## 2024-03-30 RX ADMIN — Medication 20 MILLIGRAM(S): at 04:58

## 2024-03-30 RX ADMIN — BUDESONIDE AND FORMOTEROL FUMARATE DIHYDRATE 2 PUFF(S): 160; 4.5 AEROSOL RESPIRATORY (INHALATION) at 17:10

## 2024-03-30 RX ADMIN — PANTOPRAZOLE SODIUM 40 MILLIGRAM(S): 20 TABLET, DELAYED RELEASE ORAL at 04:58

## 2024-03-30 RX ADMIN — SENNA PLUS 2 TABLET(S): 8.6 TABLET ORAL at 21:24

## 2024-03-30 RX ADMIN — TAMSULOSIN HYDROCHLORIDE 0.8 MILLIGRAM(S): 0.4 CAPSULE ORAL at 22:43

## 2024-03-30 RX ADMIN — Medication 3 MILLILITER(S): at 17:10

## 2024-03-30 RX ADMIN — ATORVASTATIN CALCIUM 80 MILLIGRAM(S): 80 TABLET, FILM COATED ORAL at 21:24

## 2024-03-30 RX ADMIN — Medication 120 MILLIGRAM(S): at 21:24

## 2024-03-30 RX ADMIN — Medication 200 MILLIGRAM(S): at 08:31

## 2024-03-31 LAB
GLUCOSE BLDC GLUCOMTR-MCNC: 108 MG/DL — HIGH (ref 70–99)
GLUCOSE BLDC GLUCOMTR-MCNC: 110 MG/DL — HIGH (ref 70–99)
GLUCOSE BLDC GLUCOMTR-MCNC: 121 MG/DL — HIGH (ref 70–99)
GLUCOSE BLDC GLUCOMTR-MCNC: 121 MG/DL — HIGH (ref 70–99)
GLUCOSE BLDC GLUCOMTR-MCNC: 124 MG/DL — HIGH (ref 70–99)
GLUCOSE BLDC GLUCOMTR-MCNC: 157 MG/DL — HIGH (ref 70–99)

## 2024-03-31 RX ORDER — APIXABAN 2.5 MG/1
2.5 TABLET, FILM COATED ORAL
Refills: 0 | Status: DISCONTINUED | OUTPATIENT
Start: 2024-03-31 | End: 2024-04-01

## 2024-03-31 RX ADMIN — FINASTERIDE 5 MILLIGRAM(S): 5 TABLET, FILM COATED ORAL at 11:09

## 2024-03-31 RX ADMIN — Medication 3 MILLILITER(S): at 11:09

## 2024-03-31 RX ADMIN — TAMSULOSIN HYDROCHLORIDE 0.8 MILLIGRAM(S): 0.4 CAPSULE ORAL at 22:43

## 2024-03-31 RX ADMIN — Medication 3 MILLILITER(S): at 00:03

## 2024-03-31 RX ADMIN — Medication 81 MILLIGRAM(S): at 11:12

## 2024-03-31 RX ADMIN — BUDESONIDE AND FORMOTEROL FUMARATE DIHYDRATE 2 PUFF(S): 160; 4.5 AEROSOL RESPIRATORY (INHALATION) at 05:40

## 2024-03-31 RX ADMIN — GABAPENTIN 100 MILLIGRAM(S): 400 CAPSULE ORAL at 00:04

## 2024-03-31 RX ADMIN — POLYETHYLENE GLYCOL 3350 17 GRAM(S): 17 POWDER, FOR SOLUTION ORAL at 11:10

## 2024-03-31 RX ADMIN — ATORVASTATIN CALCIUM 80 MILLIGRAM(S): 80 TABLET, FILM COATED ORAL at 22:44

## 2024-03-31 RX ADMIN — APIXABAN 2.5 MILLIGRAM(S): 2.5 TABLET, FILM COATED ORAL at 17:30

## 2024-03-31 RX ADMIN — Medication 1: at 17:31

## 2024-03-31 RX ADMIN — Medication 3 MILLILITER(S): at 22:47

## 2024-03-31 RX ADMIN — Medication 120 MILLIGRAM(S): at 22:44

## 2024-03-31 RX ADMIN — MONTELUKAST 10 MILLIGRAM(S): 4 TABLET, CHEWABLE ORAL at 11:12

## 2024-03-31 RX ADMIN — Medication 3 MILLILITER(S): at 17:31

## 2024-03-31 RX ADMIN — Medication 3 MILLIGRAM(S): at 22:43

## 2024-03-31 RX ADMIN — PANTOPRAZOLE SODIUM 40 MILLIGRAM(S): 20 TABLET, DELAYED RELEASE ORAL at 05:39

## 2024-03-31 RX ADMIN — BUDESONIDE AND FORMOTEROL FUMARATE DIHYDRATE 2 PUFF(S): 160; 4.5 AEROSOL RESPIRATORY (INHALATION) at 17:31

## 2024-03-31 RX ADMIN — Medication 20 MILLIGRAM(S): at 05:39

## 2024-03-31 RX ADMIN — Medication 200 MILLIGRAM(S): at 05:39

## 2024-03-31 RX ADMIN — Medication 3 MILLILITER(S): at 05:40

## 2024-03-31 RX ADMIN — ENOXAPARIN SODIUM 40 MILLIGRAM(S): 100 INJECTION SUBCUTANEOUS at 05:40

## 2024-04-01 ENCOUNTER — TRANSCRIPTION ENCOUNTER (OUTPATIENT)
Age: 89
End: 2024-04-01

## 2024-04-01 LAB
ANION GAP SERPL CALC-SCNC: 12 MMOL/L — SIGNIFICANT CHANGE UP (ref 5–17)
BUN SERPL-MCNC: 25 MG/DL — HIGH (ref 7–23)
CALCIUM SERPL-MCNC: 8.7 MG/DL — SIGNIFICANT CHANGE UP (ref 8.4–10.5)
CHLORIDE SERPL-SCNC: 104 MMOL/L — SIGNIFICANT CHANGE UP (ref 96–108)
CO2 SERPL-SCNC: 25 MMOL/L — SIGNIFICANT CHANGE UP (ref 22–31)
CREAT SERPL-MCNC: 1.31 MG/DL — HIGH (ref 0.5–1.3)
EGFR: 49 ML/MIN/1.73M2 — LOW
GLUCOSE BLDC GLUCOMTR-MCNC: 111 MG/DL — HIGH (ref 70–99)
GLUCOSE BLDC GLUCOMTR-MCNC: 116 MG/DL — HIGH (ref 70–99)
GLUCOSE BLDC GLUCOMTR-MCNC: 121 MG/DL — HIGH (ref 70–99)
GLUCOSE BLDC GLUCOMTR-MCNC: 138 MG/DL — HIGH (ref 70–99)
GLUCOSE SERPL-MCNC: 107 MG/DL — HIGH (ref 70–99)
HCT VFR BLD CALC: 29.2 % — LOW (ref 39–50)
HGB BLD-MCNC: 8.7 G/DL — LOW (ref 13–17)
MCHC RBC-ENTMCNC: 22.2 PG — LOW (ref 27–34)
MCHC RBC-ENTMCNC: 29.8 GM/DL — LOW (ref 32–36)
MCV RBC AUTO: 74.5 FL — LOW (ref 80–100)
NRBC # BLD: 0 /100 WBCS — SIGNIFICANT CHANGE UP (ref 0–0)
PLATELET # BLD AUTO: 181 K/UL — SIGNIFICANT CHANGE UP (ref 150–400)
POTASSIUM SERPL-MCNC: 3.9 MMOL/L — SIGNIFICANT CHANGE UP (ref 3.5–5.3)
POTASSIUM SERPL-SCNC: 3.9 MMOL/L — SIGNIFICANT CHANGE UP (ref 3.5–5.3)
RBC # BLD: 3.92 M/UL — LOW (ref 4.2–5.8)
RBC # FLD: 20.5 % — HIGH (ref 10.3–14.5)
SODIUM SERPL-SCNC: 141 MMOL/L — SIGNIFICANT CHANGE UP (ref 135–145)
VIT B1 SERPL-MCNC: 83.4 NMOL/L — SIGNIFICANT CHANGE UP (ref 66.5–200)
WBC # BLD: 6.47 K/UL — SIGNIFICANT CHANGE UP (ref 3.8–10.5)
WBC # FLD AUTO: 6.47 K/UL — SIGNIFICANT CHANGE UP (ref 3.8–10.5)

## 2024-04-01 PROCEDURE — 93010 ELECTROCARDIOGRAM REPORT: CPT

## 2024-04-01 RX ORDER — GABAPENTIN 400 MG/1
0 CAPSULE ORAL
Refills: 0 | DISCHARGE

## 2024-04-01 RX ORDER — FLUTICASONE PROPIONATE 50 MCG
2 SPRAY, SUSPENSION NASAL
Refills: 0 | DISCHARGE

## 2024-04-01 RX ORDER — APIXABAN 2.5 MG/1
5 TABLET, FILM COATED ORAL
Refills: 0 | Status: DISCONTINUED | OUTPATIENT
Start: 2024-04-01 | End: 2024-04-03

## 2024-04-01 RX ADMIN — Medication 3 MILLILITER(S): at 05:22

## 2024-04-01 RX ADMIN — ATORVASTATIN CALCIUM 80 MILLIGRAM(S): 80 TABLET, FILM COATED ORAL at 22:28

## 2024-04-01 RX ADMIN — Medication 120 MILLIGRAM(S): at 22:29

## 2024-04-01 RX ADMIN — SENNA PLUS 2 TABLET(S): 8.6 TABLET ORAL at 22:29

## 2024-04-01 RX ADMIN — Medication 3 MILLIGRAM(S): at 22:28

## 2024-04-01 RX ADMIN — Medication 650 MILLIGRAM(S): at 05:21

## 2024-04-01 RX ADMIN — FINASTERIDE 5 MILLIGRAM(S): 5 TABLET, FILM COATED ORAL at 11:30

## 2024-04-01 RX ADMIN — Medication 3 MILLILITER(S): at 11:31

## 2024-04-01 RX ADMIN — Medication 20 MILLIGRAM(S): at 05:19

## 2024-04-01 RX ADMIN — Medication 3 MILLILITER(S): at 17:54

## 2024-04-01 RX ADMIN — APIXABAN 5 MILLIGRAM(S): 2.5 TABLET, FILM COATED ORAL at 17:53

## 2024-04-01 RX ADMIN — BUDESONIDE AND FORMOTEROL FUMARATE DIHYDRATE 2 PUFF(S): 160; 4.5 AEROSOL RESPIRATORY (INHALATION) at 17:53

## 2024-04-01 RX ADMIN — MONTELUKAST 10 MILLIGRAM(S): 4 TABLET, CHEWABLE ORAL at 11:32

## 2024-04-01 RX ADMIN — PANTOPRAZOLE SODIUM 40 MILLIGRAM(S): 20 TABLET, DELAYED RELEASE ORAL at 05:20

## 2024-04-01 RX ADMIN — Medication 3 MILLILITER(S): at 23:59

## 2024-04-01 RX ADMIN — TAMSULOSIN HYDROCHLORIDE 0.8 MILLIGRAM(S): 0.4 CAPSULE ORAL at 22:28

## 2024-04-01 RX ADMIN — POLYETHYLENE GLYCOL 3350 17 GRAM(S): 17 POWDER, FOR SOLUTION ORAL at 11:30

## 2024-04-01 RX ADMIN — BUDESONIDE AND FORMOTEROL FUMARATE DIHYDRATE 2 PUFF(S): 160; 4.5 AEROSOL RESPIRATORY (INHALATION) at 05:29

## 2024-04-01 RX ADMIN — APIXABAN 2.5 MILLIGRAM(S): 2.5 TABLET, FILM COATED ORAL at 05:20

## 2024-04-01 NOTE — PROVIDER CONTACT NOTE (OTHER) - BACKGROUND
3/27 - admitted for weakness and confusion - CT head encephalomalacia and chronic infarcts.
Patient is 89y M, admitted for weakness and confusion
Pt admitted for weakness
Pt admitted for weakness, Pt requesting for gabapentin and melatonin to help sleep.

## 2024-04-01 NOTE — DISCHARGE NOTE PROVIDER - CARE PROVIDER_API CALL
Juventino Bashir  Neurology  [address], [address]  [place] [address]  Phone: [phone]  Fax: [phone]  Follow Up Time: 2 weeks   Juventino Bashir  Neurology  [address], [address]  [place] [address]  Phone: [phone]  Fax: [phone]  Follow Up Time: 2 weeks    Sherry Chavez  Internal Medicine  [address], [address]  [place] [address]  Phone: [phone]  Fax: [phone]  Follow Up Time: 1 week

## 2024-04-01 NOTE — DISCHARGE NOTE PROVIDER - NSDCCPCAREPLAN_GEN_ALL_CORE_FT
PRINCIPAL DISCHARGE DIAGNOSIS  Diagnosis: Generalized weakness  Assessment and Plan of Treatment: Weakness and confusion in setting of acute stroke. CT head with no evidence of strokes, however MRI with evidence of acute infarct. Patient at risk for CVA in setting of atrial fibrillation while off ELiquis. Continue with atorvastatin and Eliquis.      SECONDARY DISCHARGE DIAGNOSES  Diagnosis: Cerebrovascular disease  Assessment and Plan of Treatment: Follow as above. Follow up with neurologist and cardiologist    Diagnosis: Chronic atrial fibrillation  Assessment and Plan of Treatment:     Diagnosis: Diabetes  Assessment and Plan of Treatment:     Diagnosis: Urinary retention  Assessment and Plan of Treatment:      PRINCIPAL DISCHARGE DIAGNOSIS  Diagnosis: Generalized weakness  Assessment and Plan of Treatment: Weakness and confusion in setting of acute stroke. CT head with no evidence of strokes, however MRI with evidence of acute infarct. Patient at risk for CVA in setting of atrial fibrillation while off Eliquis. Continue with atorvastatin and Eliquis.      SECONDARY DISCHARGE DIAGNOSES  Diagnosis: Cerebrovascular disease  Assessment and Plan of Treatment: Follow as above. Follow up with neurologist and cardiologist.  Eliquis changes to 5 mg, take 1 tablet 2 times a day. your medication to prevent future plaque buildup in setting of hyperlipidemia and stroke was changed to Atorvastatin 80 mg.    Diagnosis: Chronic atrial fibrillation  Assessment and Plan of Treatment: Follow up with cardiologist, continue current medication list.    Diagnosis: Diabetes  Assessment and Plan of Treatment: Your HgbA1C is slightly elevated indicating prediabetes. Please follow up with your primary care doctor regarding medication management vs. lifestyle changes.    Diagnosis: Urinary retention  Assessment and Plan of Treatment: you were retaining urine on admission and had a pantoja catheter placed to remove utine from your bladder. Prior to discharge we removed the pantoja to evaluate if you would urinate freely, however retained urine again. Pantoja placed back in, you would have to follow up with the urology clinic in about 1 week fof ruther management.     PRINCIPAL DISCHARGE DIAGNOSIS  Diagnosis: Generalized weakness  Assessment and Plan of Treatment: Weakness and confusion in setting of acute stroke. CT head with no evidence of strokes, however MRI with evidence of acute infarct. Patient at risk for CVA in setting of atrial fibrillation while off Eliquis. Continue with atorvastatin and Eliquis.      SECONDARY DISCHARGE DIAGNOSES  Diagnosis: Chronic atrial fibrillation  Assessment and Plan of Treatment: Follow up with cardiologist, continue current medication list.    Diagnosis: Cerebrovascular disease  Assessment and Plan of Treatment: Follow as above. Follow up with neurologist and cardiologist.   your medication to prevent future plaque buildup in setting of hyperlipidemia and stroke was changed to Atorvastatin 80 mg.    Diagnosis: Diabetes  Assessment and Plan of Treatment: Your HgbA1C is slightly elevated indicating prediabetes. Please follow up with your primary care doctor regarding medication management vs. lifestyle changes.    Diagnosis: Urinary retention  Assessment and Plan of Treatment: you were retaining urine on admission and had a pantoja catheter placed to remove utine from your bladder. Prior to discharge we removed the pantoja to evaluate if you would urinate freely, however retained urine again. Pantoja placed back in, you would have to follow up with the urology clinic in about 1 week fof cata management.

## 2024-04-01 NOTE — DISCHARGE NOTE PROVIDER - NSDCMRMEDTOKEN_GEN_ALL_CORE_FT
DilTIAZem (Eqv-Dilacor XR) 120 mg/24 hours oral capsule, extended release: 1 cap(s) orally once a day (at bedtime)  Eliquis 2.5 mg oral tablet: 1 tab(s) orally 2 times a day  finasteride 5 mg oral tablet: 1 tab(s) orally once a day  Flomax 0.4 mg oral capsule: 2 cap(s) orally once a day  Lasix 20 mg oral tablet: 1 tab(s) orally once a day  montelukast 10 mg oral tablet: 1 tab(s) orally once a day  pantoprazole 40 mg oral delayed release tablet: 1 tab(s) orally once a day  PreserVision AREDS 2 oral capsule: 1 cap(s) orally once a day  simvastatin 20 mg oral tablet: 1 tab(s) orally once a day  Trelegy Ellipta 100 mcg-62.5 mcg-25 mcg/inh inhalation powder: 1 puff(s) inhaled once a day   apixaban 5 mg oral tablet: 1 tab(s) orally 2 times a day  atorvastatin 80 mg oral tablet: 1 tab(s) orally once a day (at bedtime)  DilTIAZem (Eqv-Dilacor XR) 120 mg/24 hours oral capsule, extended release: 1 cap(s) orally once a day (at bedtime)  finasteride 5 mg oral tablet: 1 tab(s) orally once a day  Flomax 0.4 mg oral capsule: 2 cap(s) orally once a day  gabapentin 100 mg oral capsule: 1 cap(s) orally once a day (at bedtime)  Lasix 20 mg oral tablet: 1 tab(s) orally once a day  montelukast 10 mg oral tablet: 1 tab(s) orally once a day  pantoprazole 40 mg oral delayed release tablet: 1 tab(s) orally once a day  PreserVision AREDS 2 oral capsule: 1 cap(s) orally once a day  Trelegy Ellipta 100 mcg-62.5 mcg-25 mcg/inh inhalation powder: 1 puff(s) inhaled once a day   atorvastatin 80 mg oral tablet: 1 tab(s) orally once a day (at bedtime)  dilTIAZem 120 mg/24 hours oral capsule, extended release: 1 cap(s) orally once a day (at bedtime)  finasteride 5 mg oral tablet: 1 tab(s) orally once a day  Flomax 0.4 mg oral capsule: 2 cap(s) orally once a day  gabapentin 100 mg oral capsule: 1 cap(s) orally once a day (at bedtime)  Keppra 1000 mg oral tablet: 1 tab(s) orally 2 times a day  Lasix 20 mg oral tablet: 1 tab(s) orally once a day  montelukast 10 mg oral tablet: 1 tab(s) orally once a day  pantoprazole 40 mg oral delayed release tablet: 1 tab(s) orally once a day  PreserVision AREDS 2 oral capsule: 1 cap(s) orally once a day  Trelegy Ellipta 100 mcg-62.5 mcg-25 mcg/inh inhalation powder: 1 puff(s) inhaled once a day  ZyPREXA 2.5 mg oral tablet: 1 tab(s) orally as needed for  agitation

## 2024-04-01 NOTE — DISCHARGE NOTE PROVIDER - NSDCFUSCHEDAPPT_GEN_ALL_CORE_FT
Tomi Patel  [facility] Physician [facility]  UROLOGY [address] [place] [address]  Scheduled Appointment: 04/29/2024

## 2024-04-01 NOTE — DISCHARGE NOTE PROVIDER - NSDCFUADDAPPT_GEN_ALL_CORE_FT
APPTS ARE READY TO BE MADE: [x ] YES    Best Family or Patient Contact (if needed):    Additional Information about above appointments (if needed):    1: urology appointment at clinic   2: primary care doctor  3: neurology    Other comments or requests:    APPTS ARE READY TO BE MADE: [x ] YES    Best Family or Patient Contact (if needed):    Additional Information about above appointments (if needed):    1: urology appointment at clinic   2: primary care doctor  3: neurology    Other comments or requests:     Prior to outreaching the patient, it was visible that the patient has secured a follow up appointment which was not scheduled by our team. Patient is scheduled to see Dr. Patel at 10:50AM on 4/29 at [address] [place] [address] APPTS ARE READY TO BE MADE: [x ] YES    Best Family or Patient Contact (if needed):    Additional Information about above appointments (if needed):    1: urology appointment at clinic   2: primary care doctor  3: neurology    Other comments or requests:     Prior to outreaching the patient, it was visible that the patient has secured a follow up appointment which was not scheduled by our team. Patient is scheduled to see Dr. Patel at 10:50AM on 4/29 at [address] [place] [address]    Provided patient with provider referral information, however patient prefers to schedule the appointments on their own. Patient will follow-up with PCP. APPTS ARE READY TO BE MADE: [x ] YES    Best Family or Patient Contact (if needed):    Additional Information about above appointments (if needed):    1: urology appointment at clinic   2: primary care doctor  3: neurology    Other comments or requests:     Prior to outreaching the patient, it was visible that the patient has secured a follow up appointment which was not scheduled by our team. Patient is scheduled to see Dr. Patel at 10:50AM on 4/29 at [address] [place] [address]    Provided patient with provider referral information, however patient prefers to schedule the appointments on their own. Patient will follow-up with PCP.    Patient informed us they already have secured a follow up appointment which is not visible on [facility] with Dr. Bashir 4/17 1:30pm [facility]

## 2024-04-01 NOTE — DISCHARGE NOTE PROVIDER - HOSPITAL COURSE
HPI:  NIGHT HOSPITALIST:    Patient UNKNOWN to me previously, assigned to me at this point via the ER and by Dr. Vaca to admit this 89 y/o M--patient followed by his office physicians above--patient with a history of essential HTN maintained on bedtime diltiazem, presumed GERD, chronic atrial fibrillation previously on apixaban (stopped apparently one week ago by patient's cardiologist due to concerns of falls and patient's age), BPH< chronic B/L LE oedema on Lasix 20 mg daily, asthma on Trelegy, Montelukast, but not on rescue inhalers, undifferentiated anaemia,  with an admission to [place] in June 2023 for campylobacter colitis, CKD3, asthma exacerbation, with LLE DVT noted on admission in Aug 2023 at the time with course of apixaban, with patient with CAD, chronic atrial fibrillation previously on apixaban until about a week ago per daughter with patient recommended the direct anticoagulant discontinued due to concerns for fall risk at patient's age.    Family self refers patient to the ER tonight apparently following generalized weakness since 3/24/24 evening at dinner, with daughters noting increased confusion and difficulty with using his fork (dropping fork)--patient is LEFT dominant--with his RIGHT hand.  Family notes no further episodes of dropping utensils but noted generalized decrease in ADL over baseline, with patient using a walker with assistance.   Patient also noted now resolved generalized headache.    Patient/daughters also note persistent nonproductive coughing, especially at night.  No fever, no chills, no rigors.   Daughter notes patient with last COVID-19 booster jab in 2022.    Patient with daughters in attendance (Ho by Face Time phone) deferring to daughters during interview. (27 Mar 2024 18:34)    Hospital Course:  MRI with acute strokes in R MCA terriotry     Impression: 1) Acute encephalopathy with difficulty ambulating and subjective dysarthria. R NLF flattening appears chronic. Could be new ischemic infarct vs. toxic/metabolic/infectious process. Recently discontinued apixaban for Afib d/t "falls and age."  2) Falls and weakness  3) chronic strokes, embolic   4) new R MCA embolic infarct     Recommendations:   -  aspirin 81mg daily NOW - suggest restarting apixaban for stroke prevention; spoke with daughter states he does not fall on a dialy basis.  would stop asa and place on DOAC for AF as asa and elqiuis have similar bleeding risk. stroke literature suggest that unless he is falling > 300x/year that the benefits of DOAC outweigh risks of falling   - Continue atorvastatin 80mg (goal LDL<70)   - Neurochecks, vitals q4h  - Fall and aspiration precautions  - PT/OT/SLP/CM  - Diet: target is DASH/TLC  - DVT prophylaxis: enoxaparin 40mg q24h (unless medically contraindicated) AND SCDs  - Stroke education provided    Important Medication Changes and Reason:    Active or Pending Issues Requiring Follow-up:    Advanced Directives:   [ x] Full code  [ ] DNR  [ ] Hospice    Discharge Diagnoses:         HPI:  NIGHT HOSPITALIST:    Patient UNKNOWN to me previously, assigned to me at this point via the ER and by Dr. Vaca to admit this 89 y/o M--patient followed by his office physicians above--patient with a history of essential HTN maintained on bedtime diltiazem, presumed GERD, chronic atrial fibrillation previously on apixaban (stopped apparently one week ago by patient's cardiologist due to concerns of falls and patient's age), BPH< chronic B/L LE oedema on Lasix 20 mg daily, asthma on Trelegy, Montelukast, but not on rescue inhalers, undifferentiated anaemia,  with an admission to [place] in June 2023 for campylobacter colitis, CKD3, asthma exacerbation, with LLE DVT noted on admission in Aug 2023 at the time with course of apixaban, with patient with CAD, chronic atrial fibrillation previously on apixaban until about a week ago per daughter with patient recommended the direct anticoagulant discontinued due to concerns for fall risk at patient's age.    Family self refers patient to the ER tonight apparently following generalized weakness since 3/24/24 evening at dinner, with daughters noting increased confusion and difficulty with using his fork (dropping fork)--patient is LEFT dominant--with his RIGHT hand.  Family notes no further episodes of dropping utensils but noted generalized decrease in ADL over baseline, with patient using a walker with assistance.   Patient also noted now resolved generalized headache.    Patient/daughters also note persistent nonproductive coughing, especially at night.  No fever, no chills, no rigors.   Daughter notes patient with last COVID-19 booster jab in 2022.    Patient with daughters in attendance (Ho by Face Time phone) deferring to daughters during interview. (27 Mar 2024 18:34)    Hospital Course:  MRI with acute strokes in R MCA terriotry     Impression: 1) Acute encephalopathy with difficulty ambulating and subjective dysarthria. R NLF flattening appears chronic. Could be new ischemic infarct vs. toxic/metabolic/infectious process. Recently discontinued apixaban for Afib d/t "falls and age."  2) Falls and weakness  3) chronic strokes, embolic   4) new R MCA embolic infarct - Eliquis restarted per neuro    Recommendations:   -  aspirin 81mg daily NOW - suggest restarting apixaban for stroke prevention; spoke with daughter states he does not fall on a dialy basis.  would stop asa and place on DOAC for AF as asa and elqiuis have similar bleeding risk. stroke literature suggest that unless he is falling > 300x/year that the benefits of DOAC outweigh risks of falling   - Continue atorvastatin 80mg (goal LDL<70)   - Neurochecks, vitals q4h  - Fall and aspiration precautions  - PT/OT/SLP/CM  - Diet: target is DASH/TLC  - DVT prophylaxis: enoxaparin 40mg q24h (unless medically contraindicated) AND SCDs  - Stroke education provided    TOV on 4/1 - patient failed - pantoja replaced. pt will follow up with urology outpatient. Patient recommended for [facility], however family and patient want home PT.   medically cleared for dc.     Important Medication Changes and Reason: Eliquis restarted     Active or Pending Issues Requiring Follow-up:    Advanced Directives:   [ x] Full code  [ ] DNR  [ ] Hospice    Discharge Diagnoses:  CVA         Problem: Chronic atrial fibrillation.   ·  Plan: Chronic atrial fibrillation previously on apixaban until a week ago, recommended discontinuation the direct anticoagulant due to risk of falling and patient's age.   chronic, restarted ac as per neuro sec to multiple infarcts after neuro had discussion with pts daughter risk and benefits of ac ,  pts daughter agreed and ac was started  but pt today found to have confusion with involuntary movements , NP discussed with neuro and initially Neuro doesn't recommended head ct , ordered eeg but pt became more confused , agitated and daughter requested writer to give sedation for pt   writer told NP to order head ct stat and pt found to have < from: CT Head No Cont (04.03.24 @ 18:21) >    Acute left convexity subdural hematoma with large convex hemorrhagic clot   overlying the left parietal convexity with epidural component not   excluded. No overlying skull fracture. Regional mass effect produces 3 mm   rightward shift.    as per neuro surgery , no surgical intervention.     Problem/Plan - 2:  ·  Problem: Cerebrovascular disease.   ·  Plan: CTT head and angiogram nondiagnostic  Structural or functional abnormality in the left hemisphere more prominent posteriorly  Mild nonspecific diffuse or multifocal cerebral dysfunction.     No epileptiform pattern or seizure recorded.  < from: MR Head w/wo IV Cont (03.30.24 @ 13:57) >    IMPRESSION: Acute distal right MCA territory infarctions.    < end of copied text >.     Problem/Plan - 3:  ·  Problem: Asthma.   ·  Plan: Patient with suspected asthma exacerbation but no present requirement for systemic steroids,    RVP screen and Duoneb therapy and Symbicort equivalent.     Problem/Plan - 4:  ·  Problem: History of urinary incontinence.   ·  Plan: UA negative.  pt failed void trial  pantoja placed.     Problem/Plan - 5:  ·  Problem: Anemia.

## 2024-04-01 NOTE — DISCHARGE NOTE PROVIDER - PROVIDER TOKENS
PROVIDER:[TOKEN:[[id number]],FOLLOWUP:[2 weeks]] PROVIDER:[TOKEN:[[id number]],FOLLOWUP:[2 weeks]],PROVIDER:[TOKEN:[[id number]],FOLLOWUP:[1 week]]

## 2024-04-01 NOTE — DISCHARGE NOTE PROVIDER - NSFOLLOWUPCLINICS_GEN_ALL_ED_FT
[facility] [place]  Urology  [address] [place] [address]  Phone: [phone]  Fax:   Follow Up Time: 1 week

## 2024-04-02 LAB
ANION GAP SERPL CALC-SCNC: 10 MMOL/L — SIGNIFICANT CHANGE UP (ref 5–17)
APPEARANCE UR: CLEAR — SIGNIFICANT CHANGE UP
BACTERIA # UR AUTO: NEGATIVE /HPF — SIGNIFICANT CHANGE UP
BILIRUB UR-MCNC: NEGATIVE — SIGNIFICANT CHANGE UP
BUN SERPL-MCNC: 27 MG/DL — HIGH (ref 7–23)
CALCIUM SERPL-MCNC: 9.3 MG/DL — SIGNIFICANT CHANGE UP (ref 8.4–10.5)
CAST: 1 /LPF — SIGNIFICANT CHANGE UP (ref 0–4)
CHLORIDE SERPL-SCNC: 104 MMOL/L — SIGNIFICANT CHANGE UP (ref 96–108)
CO2 SERPL-SCNC: 28 MMOL/L — SIGNIFICANT CHANGE UP (ref 22–31)
COLOR SPEC: YELLOW — SIGNIFICANT CHANGE UP
CREAT SERPL-MCNC: 1.34 MG/DL — HIGH (ref 0.5–1.3)
DIFF PNL FLD: ABNORMAL
EGFR: 48 ML/MIN/1.73M2 — LOW
GLUCOSE BLDC GLUCOMTR-MCNC: 109 MG/DL — HIGH (ref 70–99)
GLUCOSE BLDC GLUCOMTR-MCNC: 122 MG/DL — HIGH (ref 70–99)
GLUCOSE BLDC GLUCOMTR-MCNC: 123 MG/DL — HIGH (ref 70–99)
GLUCOSE BLDC GLUCOMTR-MCNC: 159 MG/DL — HIGH (ref 70–99)
GLUCOSE SERPL-MCNC: 121 MG/DL — HIGH (ref 70–99)
GLUCOSE UR QL: NEGATIVE MG/DL — SIGNIFICANT CHANGE UP
HCT VFR BLD CALC: 30.4 % — LOW (ref 39–50)
HGB BLD-MCNC: 8.8 G/DL — LOW (ref 13–17)
KETONES UR-MCNC: NEGATIVE MG/DL — SIGNIFICANT CHANGE UP
LEUKOCYTE ESTERASE UR-ACNC: NEGATIVE — SIGNIFICANT CHANGE UP
MCHC RBC-ENTMCNC: 21.6 PG — LOW (ref 27–34)
MCHC RBC-ENTMCNC: 28.9 GM/DL — LOW (ref 32–36)
MCV RBC AUTO: 74.7 FL — LOW (ref 80–100)
NITRITE UR-MCNC: NEGATIVE — SIGNIFICANT CHANGE UP
NRBC # BLD: 0 /100 WBCS — SIGNIFICANT CHANGE UP (ref 0–0)
PH UR: 7.5 — SIGNIFICANT CHANGE UP (ref 5–8)
PLATELET # BLD AUTO: 203 K/UL — SIGNIFICANT CHANGE UP (ref 150–400)
POTASSIUM SERPL-MCNC: 4.1 MMOL/L — SIGNIFICANT CHANGE UP (ref 3.5–5.3)
POTASSIUM SERPL-SCNC: 4.1 MMOL/L — SIGNIFICANT CHANGE UP (ref 3.5–5.3)
PROT UR-MCNC: NEGATIVE MG/DL — SIGNIFICANT CHANGE UP
RBC # BLD: 4.07 M/UL — LOW (ref 4.2–5.8)
RBC # FLD: 20.4 % — HIGH (ref 10.3–14.5)
RBC CASTS # UR COMP ASSIST: 8 /HPF — HIGH (ref 0–4)
SODIUM SERPL-SCNC: 142 MMOL/L — SIGNIFICANT CHANGE UP (ref 135–145)
SP GR SPEC: 1.01 — SIGNIFICANT CHANGE UP (ref 1–1.03)
SQUAMOUS # UR AUTO: 0 /HPF — SIGNIFICANT CHANGE UP (ref 0–5)
UROBILINOGEN FLD QL: 0.2 MG/DL — SIGNIFICANT CHANGE UP (ref 0.2–1)
WBC # BLD: 8.13 K/UL — SIGNIFICANT CHANGE UP (ref 3.8–10.5)
WBC # FLD AUTO: 8.13 K/UL — SIGNIFICANT CHANGE UP (ref 3.8–10.5)
WBC UR QL: 0 /HPF — SIGNIFICANT CHANGE UP (ref 0–5)

## 2024-04-02 PROCEDURE — 74176 CT ABD & PELVIS W/O CONTRAST: CPT | Mod: 26

## 2024-04-02 RX ORDER — LIDOCAINE HCL 20 MG/ML
5 VIAL (ML) INJECTION ONCE
Refills: 0 | Status: COMPLETED | OUTPATIENT
Start: 2024-04-02 | End: 2024-04-02

## 2024-04-02 RX ORDER — ACETAMINOPHEN 500 MG
325 TABLET ORAL ONCE
Refills: 0 | Status: COMPLETED | OUTPATIENT
Start: 2024-04-02 | End: 2024-04-02

## 2024-04-02 RX ORDER — GABAPENTIN 400 MG/1
100 CAPSULE ORAL AT BEDTIME
Refills: 0 | Status: DISCONTINUED | OUTPATIENT
Start: 2024-04-02 | End: 2024-04-05

## 2024-04-02 RX ORDER — GABAPENTIN 400 MG/1
100 CAPSULE ORAL ONCE
Refills: 0 | Status: COMPLETED | OUTPATIENT
Start: 2024-04-02 | End: 2024-04-02

## 2024-04-02 RX ADMIN — GABAPENTIN 100 MILLIGRAM(S): 400 CAPSULE ORAL at 21:14

## 2024-04-02 RX ADMIN — Medication 3 MILLILITER(S): at 05:17

## 2024-04-02 RX ADMIN — APIXABAN 5 MILLIGRAM(S): 2.5 TABLET, FILM COATED ORAL at 05:18

## 2024-04-02 RX ADMIN — BUDESONIDE AND FORMOTEROL FUMARATE DIHYDRATE 2 PUFF(S): 160; 4.5 AEROSOL RESPIRATORY (INHALATION) at 05:18

## 2024-04-02 RX ADMIN — TAMSULOSIN HYDROCHLORIDE 0.8 MILLIGRAM(S): 0.4 CAPSULE ORAL at 21:14

## 2024-04-02 RX ADMIN — Medication 3 MILLILITER(S): at 12:28

## 2024-04-02 RX ADMIN — ATORVASTATIN CALCIUM 80 MILLIGRAM(S): 80 TABLET, FILM COATED ORAL at 21:14

## 2024-04-02 RX ADMIN — Medication 325 MILLIGRAM(S): at 21:58

## 2024-04-02 RX ADMIN — Medication 650 MILLIGRAM(S): at 21:00

## 2024-04-02 RX ADMIN — Medication 120 MILLIGRAM(S): at 21:14

## 2024-04-02 RX ADMIN — Medication 650 MILLIGRAM(S): at 10:46

## 2024-04-02 RX ADMIN — Medication 325 MILLIGRAM(S): at 22:34

## 2024-04-02 RX ADMIN — Medication 3 MILLILITER(S): at 17:34

## 2024-04-02 RX ADMIN — SENNA PLUS 2 TABLET(S): 8.6 TABLET ORAL at 21:14

## 2024-04-02 RX ADMIN — GABAPENTIN 100 MILLIGRAM(S): 400 CAPSULE ORAL at 01:23

## 2024-04-02 RX ADMIN — MONTELUKAST 10 MILLIGRAM(S): 4 TABLET, CHEWABLE ORAL at 12:27

## 2024-04-02 RX ADMIN — Medication 3 MILLIGRAM(S): at 21:14

## 2024-04-02 RX ADMIN — Medication 650 MILLIGRAM(S): at 19:38

## 2024-04-02 RX ADMIN — Medication 1: at 12:27

## 2024-04-02 RX ADMIN — Medication 20 MILLIGRAM(S): at 05:18

## 2024-04-02 RX ADMIN — APIXABAN 5 MILLIGRAM(S): 2.5 TABLET, FILM COATED ORAL at 17:35

## 2024-04-02 RX ADMIN — PANTOPRAZOLE SODIUM 40 MILLIGRAM(S): 20 TABLET, DELAYED RELEASE ORAL at 05:18

## 2024-04-02 RX ADMIN — POLYETHYLENE GLYCOL 3350 17 GRAM(S): 17 POWDER, FOR SOLUTION ORAL at 12:28

## 2024-04-02 RX ADMIN — BUDESONIDE AND FORMOTEROL FUMARATE DIHYDRATE 2 PUFF(S): 160; 4.5 AEROSOL RESPIRATORY (INHALATION) at 17:35

## 2024-04-02 RX ADMIN — FINASTERIDE 5 MILLIGRAM(S): 5 TABLET, FILM COATED ORAL at 12:28

## 2024-04-02 RX ADMIN — Medication 5 MILLILITER(S): at 23:22

## 2024-04-02 RX ADMIN — Medication 650 MILLIGRAM(S): at 12:21

## 2024-04-02 NOTE — PROGRESS NOTE ADULT - PROBLEM SELECTOR PLAN 5
Will obtain iron studies with undifferentiated anaemia.

## 2024-04-03 ENCOUNTER — TRANSCRIPTION ENCOUNTER (OUTPATIENT)
Age: 89
End: 2024-04-03

## 2024-04-03 LAB
ALBUMIN SERPL ELPH-MCNC: 3.5 G/DL — SIGNIFICANT CHANGE UP (ref 3.3–5)
ALP SERPL-CCNC: 70 U/L — SIGNIFICANT CHANGE UP (ref 40–120)
ALT FLD-CCNC: 18 U/L — SIGNIFICANT CHANGE UP (ref 10–45)
ANION GAP SERPL CALC-SCNC: 14 MMOL/L — SIGNIFICANT CHANGE UP (ref 5–17)
APPEARANCE UR: CLEAR — SIGNIFICANT CHANGE UP
AST SERPL-CCNC: 21 U/L — SIGNIFICANT CHANGE UP (ref 10–40)
BACTERIA # UR AUTO: NEGATIVE /HPF — SIGNIFICANT CHANGE UP
BASE EXCESS BLDV CALC-SCNC: 2.1 MMOL/L — SIGNIFICANT CHANGE UP (ref -2–3)
BASOPHILS # BLD AUTO: 0.03 K/UL — SIGNIFICANT CHANGE UP (ref 0–0.2)
BASOPHILS NFR BLD AUTO: 0.4 % — SIGNIFICANT CHANGE UP (ref 0–2)
BILIRUB SERPL-MCNC: 0.6 MG/DL — SIGNIFICANT CHANGE UP (ref 0.2–1.2)
BILIRUB UR-MCNC: NEGATIVE — SIGNIFICANT CHANGE UP
BUN SERPL-MCNC: 24 MG/DL — HIGH (ref 7–23)
CA-I SERPL-SCNC: 1.17 MMOL/L — SIGNIFICANT CHANGE UP (ref 1.15–1.33)
CALCIUM SERPL-MCNC: 8.9 MG/DL — SIGNIFICANT CHANGE UP (ref 8.4–10.5)
CAST: 1 /LPF — SIGNIFICANT CHANGE UP (ref 0–4)
CHLORIDE BLDV-SCNC: 99 MMOL/L — SIGNIFICANT CHANGE UP (ref 96–108)
CHLORIDE SERPL-SCNC: 100 MMOL/L — SIGNIFICANT CHANGE UP (ref 96–108)
CK SERPL-CCNC: 119 U/L — SIGNIFICANT CHANGE UP (ref 30–200)
CO2 BLDV-SCNC: 30 MMOL/L — HIGH (ref 22–26)
CO2 SERPL-SCNC: 24 MMOL/L — SIGNIFICANT CHANGE UP (ref 22–31)
COLOR SPEC: YELLOW — SIGNIFICANT CHANGE UP
CREAT SERPL-MCNC: 1.08 MG/DL — SIGNIFICANT CHANGE UP (ref 0.5–1.3)
CULTURE RESULTS: ABNORMAL
DIFF PNL FLD: ABNORMAL
EGFR: 62 ML/MIN/1.73M2 — SIGNIFICANT CHANGE UP
EOSINOPHIL # BLD AUTO: 0.13 K/UL — SIGNIFICANT CHANGE UP (ref 0–0.5)
EOSINOPHIL NFR BLD AUTO: 1.5 % — SIGNIFICANT CHANGE UP (ref 0–6)
GAS PNL BLDV: 134 MMOL/L — LOW (ref 136–145)
GAS PNL BLDV: SIGNIFICANT CHANGE UP
GAS PNL BLDV: SIGNIFICANT CHANGE UP
GLUCOSE BLDC GLUCOMTR-MCNC: 114 MG/DL — HIGH (ref 70–99)
GLUCOSE BLDC GLUCOMTR-MCNC: 119 MG/DL — HIGH (ref 70–99)
GLUCOSE BLDC GLUCOMTR-MCNC: 121 MG/DL — HIGH (ref 70–99)
GLUCOSE BLDC GLUCOMTR-MCNC: 124 MG/DL — HIGH (ref 70–99)
GLUCOSE BLDC GLUCOMTR-MCNC: 141 MG/DL — HIGH (ref 70–99)
GLUCOSE BLDC GLUCOMTR-MCNC: 142 MG/DL — HIGH (ref 70–99)
GLUCOSE BLDC GLUCOMTR-MCNC: 178 MG/DL — HIGH (ref 70–99)
GLUCOSE BLDV-MCNC: 163 MG/DL — HIGH (ref 70–99)
GLUCOSE SERPL-MCNC: 157 MG/DL — HIGH (ref 70–99)
GLUCOSE UR QL: NEGATIVE MG/DL — SIGNIFICANT CHANGE UP
HCO3 BLDV-SCNC: 28 MMOL/L — SIGNIFICANT CHANGE UP (ref 22–29)
HCT VFR BLD CALC: 29 % — LOW (ref 39–50)
HCT VFR BLDA CALC: 31 % — LOW (ref 39–51)
HGB BLD CALC-MCNC: 10.4 G/DL — LOW (ref 12.6–17.4)
HGB BLD-MCNC: 8.5 G/DL — LOW (ref 13–17)
IMM GRANULOCYTES NFR BLD AUTO: 0.5 % — SIGNIFICANT CHANGE UP (ref 0–0.9)
KETONES UR-MCNC: NEGATIVE MG/DL — SIGNIFICANT CHANGE UP
LACTATE BLDV-MCNC: 2.2 MMOL/L — HIGH (ref 0.5–2)
LACTATE SERPL-SCNC: 1.3 MMOL/L — SIGNIFICANT CHANGE UP (ref 0.5–2)
LEUKOCYTE ESTERASE UR-ACNC: ABNORMAL
LYMPHOCYTES # BLD AUTO: 1.1 K/UL — SIGNIFICANT CHANGE UP (ref 1–3.3)
LYMPHOCYTES # BLD AUTO: 12.9 % — LOW (ref 13–44)
MAGNESIUM SERPL-MCNC: 2.1 MG/DL — SIGNIFICANT CHANGE UP (ref 1.6–2.6)
MCHC RBC-ENTMCNC: 21.9 PG — LOW (ref 27–34)
MCHC RBC-ENTMCNC: 29.3 GM/DL — LOW (ref 32–36)
MCV RBC AUTO: 74.6 FL — LOW (ref 80–100)
MONOCYTES # BLD AUTO: 0.84 K/UL — SIGNIFICANT CHANGE UP (ref 0–0.9)
MONOCYTES NFR BLD AUTO: 9.8 % — SIGNIFICANT CHANGE UP (ref 2–14)
NEUTROPHILS # BLD AUTO: 6.41 K/UL — SIGNIFICANT CHANGE UP (ref 1.8–7.4)
NEUTROPHILS NFR BLD AUTO: 74.9 % — SIGNIFICANT CHANGE UP (ref 43–77)
NITRITE UR-MCNC: NEGATIVE — SIGNIFICANT CHANGE UP
NRBC # BLD: 0 /100 WBCS — SIGNIFICANT CHANGE UP (ref 0–0)
PCO2 BLDV: 50 MMHG — SIGNIFICANT CHANGE UP (ref 42–55)
PH BLDV: 7.36 — SIGNIFICANT CHANGE UP (ref 7.32–7.43)
PH UR: 6 — SIGNIFICANT CHANGE UP (ref 5–8)
PHOSPHATE SERPL-MCNC: 2.7 MG/DL — SIGNIFICANT CHANGE UP (ref 2.5–4.5)
PLATELET # BLD AUTO: 194 K/UL — SIGNIFICANT CHANGE UP (ref 150–400)
PO2 BLDV: 23 MMHG — LOW (ref 25–45)
POTASSIUM BLDV-SCNC: 3.7 MMOL/L — SIGNIFICANT CHANGE UP (ref 3.5–5.1)
POTASSIUM SERPL-MCNC: 3.7 MMOL/L — SIGNIFICANT CHANGE UP (ref 3.5–5.3)
POTASSIUM SERPL-SCNC: 3.7 MMOL/L — SIGNIFICANT CHANGE UP (ref 3.5–5.3)
PROT SERPL-MCNC: 6.6 G/DL — SIGNIFICANT CHANGE UP (ref 6–8.3)
PROT UR-MCNC: 30 MG/DL
RBC # BLD: 3.89 M/UL — LOW (ref 4.2–5.8)
RBC # FLD: 20.2 % — HIGH (ref 10.3–14.5)
RBC CASTS # UR COMP ASSIST: 100 /HPF — HIGH (ref 0–4)
SAO2 % BLDV: 34.5 % — LOW (ref 67–88)
SODIUM SERPL-SCNC: 138 MMOL/L — SIGNIFICANT CHANGE UP (ref 135–145)
SP GR SPEC: 1.02 — SIGNIFICANT CHANGE UP (ref 1–1.03)
SPECIMEN SOURCE: SIGNIFICANT CHANGE UP
SQUAMOUS # UR AUTO: 1 /HPF — SIGNIFICANT CHANGE UP (ref 0–5)
UROBILINOGEN FLD QL: 1 MG/DL — SIGNIFICANT CHANGE UP (ref 0.2–1)
WBC # BLD: 8.55 K/UL — SIGNIFICANT CHANGE UP (ref 3.8–10.5)
WBC # FLD AUTO: 8.55 K/UL — SIGNIFICANT CHANGE UP (ref 3.8–10.5)
WBC UR QL: 7 /HPF — HIGH (ref 0–5)

## 2024-04-03 PROCEDURE — 99221 1ST HOSP IP/OBS SF/LOW 40: CPT

## 2024-04-03 PROCEDURE — 70450 CT HEAD/BRAIN W/O DYE: CPT | Mod: 26

## 2024-04-03 PROCEDURE — 95720 EEG PHY/QHP EA INCR W/VEEG: CPT

## 2024-04-03 RX ORDER — HALOPERIDOL DECANOATE 100 MG/ML
1 INJECTION INTRAMUSCULAR ONCE
Refills: 0 | Status: COMPLETED | OUTPATIENT
Start: 2024-04-03 | End: 2024-04-03

## 2024-04-03 RX ORDER — LIDOCAINE 4 G/100G
1 CREAM TOPICAL DAILY
Refills: 0 | Status: DISCONTINUED | OUTPATIENT
Start: 2024-04-03 | End: 2024-04-05

## 2024-04-03 RX ORDER — LEVETIRACETAM 250 MG/1
1000 TABLET, FILM COATED ORAL ONCE
Refills: 0 | Status: DISCONTINUED | OUTPATIENT
Start: 2024-04-03 | End: 2024-04-03

## 2024-04-03 RX ORDER — PROTHROMBIN COMPLEX CONCENTRATE (HUMAN) 25.5; 16.5; 24; 22; 22; 26 [IU]/ML; [IU]/ML; [IU]/ML; [IU]/ML; [IU]/ML; [IU]/ML
3000 POWDER, FOR SOLUTION INTRAVENOUS ONCE
Refills: 0 | Status: COMPLETED | OUTPATIENT
Start: 2024-04-03 | End: 2024-04-03

## 2024-04-03 RX ORDER — SODIUM CHLORIDE 9 MG/ML
500 INJECTION INTRAMUSCULAR; INTRAVENOUS; SUBCUTANEOUS ONCE
Refills: 0 | Status: COMPLETED | OUTPATIENT
Start: 2024-04-03 | End: 2024-04-03

## 2024-04-03 RX ORDER — APIXABAN 2.5 MG/1
1 TABLET, FILM COATED ORAL
Refills: 0 | DISCHARGE

## 2024-04-03 RX ORDER — ATORVASTATIN CALCIUM 80 MG/1
1 TABLET, FILM COATED ORAL
Qty: 30 | Refills: 0
Start: 2024-04-03 | End: 2024-05-02

## 2024-04-03 RX ORDER — APIXABAN 2.5 MG/1
1 TABLET, FILM COATED ORAL
Qty: 60 | Refills: 0
Start: 2024-04-03 | End: 2024-05-02

## 2024-04-03 RX ORDER — LIDOCAINE HCL 20 MG/ML
5 VIAL (ML) INJECTION ONCE
Refills: 0 | Status: COMPLETED | OUTPATIENT
Start: 2024-04-03 | End: 2024-04-03

## 2024-04-03 RX ORDER — ACETAMINOPHEN 500 MG
1000 TABLET ORAL ONCE
Refills: 0 | Status: COMPLETED | OUTPATIENT
Start: 2024-04-03 | End: 2024-04-03

## 2024-04-03 RX ORDER — OLANZAPINE 15 MG/1
2.5 TABLET, FILM COATED ORAL EVERY 6 HOURS
Refills: 0 | Status: DISCONTINUED | OUTPATIENT
Start: 2024-04-03 | End: 2024-04-05

## 2024-04-03 RX ORDER — SIMVASTATIN 20 MG/1
1 TABLET, FILM COATED ORAL
Refills: 0 | DISCHARGE

## 2024-04-03 RX ORDER — GABAPENTIN 400 MG/1
1 CAPSULE ORAL
Qty: 0 | Refills: 0 | DISCHARGE
Start: 2024-04-03

## 2024-04-03 RX ADMIN — Medication 3 MILLILITER(S): at 05:30

## 2024-04-03 RX ADMIN — OLANZAPINE 2.5 MILLIGRAM(S): 15 TABLET, FILM COATED ORAL at 23:04

## 2024-04-03 RX ADMIN — FINASTERIDE 5 MILLIGRAM(S): 5 TABLET, FILM COATED ORAL at 12:15

## 2024-04-03 RX ADMIN — PANTOPRAZOLE SODIUM 40 MILLIGRAM(S): 20 TABLET, DELAYED RELEASE ORAL at 05:27

## 2024-04-03 RX ADMIN — APIXABAN 5 MILLIGRAM(S): 2.5 TABLET, FILM COATED ORAL at 05:27

## 2024-04-03 RX ADMIN — HALOPERIDOL DECANOATE 1 MILLIGRAM(S): 100 INJECTION INTRAMUSCULAR at 17:41

## 2024-04-03 RX ADMIN — SODIUM CHLORIDE 1000 MILLILITER(S): 9 INJECTION INTRAMUSCULAR; INTRAVENOUS; SUBCUTANEOUS at 16:17

## 2024-04-03 RX ADMIN — Medication 650 MILLIGRAM(S): at 05:27

## 2024-04-03 RX ADMIN — MONTELUKAST 10 MILLIGRAM(S): 4 TABLET, CHEWABLE ORAL at 12:15

## 2024-04-03 RX ADMIN — Medication 1000 MILLIGRAM(S): at 18:08

## 2024-04-03 RX ADMIN — Medication 3 MILLILITER(S): at 12:14

## 2024-04-03 RX ADMIN — BUDESONIDE AND FORMOTEROL FUMARATE DIHYDRATE 2 PUFF(S): 160; 4.5 AEROSOL RESPIRATORY (INHALATION) at 05:27

## 2024-04-03 RX ADMIN — Medication 5 MILLILITER(S): at 06:45

## 2024-04-03 RX ADMIN — PROTHROMBIN COMPLEX CONCENTRATE (HUMAN) 400 INTERNATIONAL UNIT(S): 25.5; 16.5; 24; 22; 22; 26 POWDER, FOR SOLUTION INTRAVENOUS at 19:42

## 2024-04-03 RX ADMIN — Medication 20 MILLIGRAM(S): at 05:29

## 2024-04-03 RX ADMIN — Medication 400 MILLIGRAM(S): at 17:41

## 2024-04-03 RX ADMIN — Medication 650 MILLIGRAM(S): at 06:41

## 2024-04-03 NOTE — DISCHARGE NOTE NURSING/CASE MANAGEMENT/SOCIAL WORK - NSDCFUADDAPPT_GEN_ALL_CORE_FT
APPTS ARE READY TO BE MADE: [x ] YES    Best Family or Patient Contact (if needed):    Additional Information about above appointments (if needed):    1: urology appointment at clinic   2: primary care doctor  3: neurology    Other comments or requests:

## 2024-04-03 NOTE — EEG REPORT - NS EEG TEXT BOX
REPORT OF CONTINUOUS VIDEO EEG      [facility]: [address], [place] [address], Phone: [phone]  [facility]: [address] [facility], [place] [address], Phone: [phone]  [facility]: [address] [place] [address], Phone: [phone]    Patient Name: Ragini Singleton    Age: 89 year, : 10/28/1925  MRN #: -, Cohen: [id number] D  Referring Physician: -  EEG #: [id number]    Study Date: 4/3/2024   Start Time: 14:42:30 PM      End Date: 4/3/2024         End Time: 16:17:50 PM     Study Duration: 1 h 27 m    Study Information:    EEG Recording Technique:  The patient underwent continuous Video-EEG monitoring, using Telemetry System hardware on the XLTek Digital System. EEG and video data were stored on a computer hard drive with important events saved in digital archive files. The material was reviewed by a physician (electroencephalographer / epileptologist) on a daily basis. Renzo and seizure detection algorithms were utilized and reviewed. An EEG Technician attended to the patient, and was available throughout daytime work hours.  The epilepsy center neurologist was available in person or on call 24-hours per day.    EEG Placement and Labeling of Electrodes:  The EEG was performed utilizing 20 channel referential EEG connections (coronal over temporal over parasagittal montage) using all standard 10-20 electrode placements with EKG, with additional electrodes placed in the inferior temporal region using the modified 10-10 montage electrode placements for elective admissions, or if deemed necessary. Recording was at a sampling rate of 256 samples per second per channel. Time synchronized digital video recording was done simultaneously with EEG recording. A low light infrared camera was used for low light recording.     History: -      Medication  No Data.    Interpretation:    [[[Abbreviation Key:  PDR=alpha rhythm/posterior dominant rhythm. A-P=anterior posterior.  Amplitude: ‘very low’:<20; ‘low’:20-49; ‘medium’:; ‘high’:>150uV.  Persistence for periodic/rhythmic patterns (% of epoch) ‘rare’:<1%; ‘occasional’:1-10%; ‘frequent’:10-50%; ‘abundant’:50-90%; ‘continuous’:>90%.  Persistence for sporadic discharges: ‘rare’:<1/hr; ‘occasional’:1/min-1/hr; ‘frequent’:>1/min; ‘abundant’:>1/10 sec.  RPP=rhythmic and periodic patterns; GRDA=generalized rhythmic delta activity; FIRDA=frontal intermittent GRDA; LRDA=lateralized rhythmic delta activity; TIRDA=temporal intermittent rhythmic delta activity;  LPD=PLED=lateralized periodic discharges; GPD=generalized periodic discharges; BIPDs =bilateral independent periodic discharges; Mf=multifocal; SIRPDs=stimulus induced rhythmic, periodic, or ictal appearing discharges; BIRDs=brief potentially ictal rhythmic discharges >4 Hz, lasting .5-10s; PFA (paroxysmal bursts >13 Hz or =8 Hz <10s).  Modifiers: +F=with fast component; +S=with spike component; +R=with rhythmic component.  S-B=burst suppression pattern.  Max=maximal. N1-drowsy; N2-stage II sleep; N3-slow wave sleep. SSS/BETS=small sharp spikes/benign epileptiform transients of sleep. HV=hyperventilation; PS=photic stimulation]]]      Daily EEG Visual Analysis    FINDINGS:      Background:  Symmetry: symmetric  Continuous: continuous  PDR: symmetric, poorly-modulated 8 Hz activity, with amplitude to 40 uV, that attenuated to eye opening.  Low amplitude frontal beta noted in wakefulness.  Reactivity: present  Voltage: normal, [defined typically between 20-150uV]  Anterior Posterior Gradient: absent  Breach: absent    Background Slowing:  Generalized slowing: present. intermittent diffuse delta theta slowing up to 7 Hz  Focal slowing: none was present.    State Changes:   -Drowsiness was characterized by fragmentation, attenuation, and slowing of the background activity.      -Stage II sleep transients were not recorded.    Sporadic Epileptiform Discharges:   None    Rhythmic and Periodic Patterns (RPPs):  None     Electrographic and Electroclinical seizures:  None    Other Clinical Events:  One clinical event without EEG correlate @ 14:57 lasting about 1 min. Clinically: series of random bizarre movements of UE and torso and side to side head movements along with oral movements. EEG: no EEG correlate suggestive of seizures    Activation Procedures:   -Hyperventilation was not performed.    -Photic stimulation was not performed.      Artifacts:  Intermittent myogenic and movement artifacts were noted.    ECG:  Off    EEG Summary / Classification:  Abnormal EEG in the awake / drowsy states.  •	Background slowing including PDR < 8 Hz      EEG Impression / Clinical Correlate:  Abnormal prolonged EEG study due to Mild diffuse slowing which indicates mild diffuse cerebral dysfunction that is not specific in etiology    No epileptic discharges recorded.  No seizures recorded.    •	One clinical event without EEG correlate recorded suggestive of psychogenic non-epileptic seizure (PNES)    ________________________________________    TACO Canales  Attending Physician, [facility]    ------------------------------------  EEG Reading Room: [phone]  On Call Service After Hours: [phone]        REPORT OF CONTINUOUS VIDEO EEG      [facility]: [address], [place] [address], Phone: [phone]  [facility]: [address], [place] [address], Phone: [phone]  [facility]: [address] [place] [address], Phone: [phone]    Patient Name: Ragini Singleton    Age: 89 year, : 10/28/1925  MRN #: -, Cohen: [id number] D  Referring Physician: -  EEG #: [id number]    Study Date: 4/3/2024   Start Time: 14:42:30 PM      End Date: 4/3/2024         End Time: 16:17:50 PM     Study Duration: 1 h 27 m    Study Information:    EEG Recording Technique:  The patient underwent continuous Video-EEG monitoring, using Telemetry System hardware on the XLTek Digital System. EEG and video data were stored on a computer hard drive with important events saved in digital archive files. The material was reviewed by a physician (electroencephalographer / epileptologist) on a daily basis. Renzo and seizure detection algorithms were utilized and reviewed. An EEG Technician attended to the patient, and was available throughout daytime work hours.  The epilepsy center neurologist was available in person or on call 24-hours per day.    EEG Placement and Labeling of Electrodes:  The EEG was performed utilizing 20 channel referential EEG connections (coronal over temporal over parasagittal montage) using all standard 10-20 electrode placements with EKG, with additional electrodes placed in the inferior temporal region using the modified 10-10 montage electrode placements for elective admissions, or if deemed necessary. Recording was at a sampling rate of 256 samples per second per channel. Time synchronized digital video recording was done simultaneously with EEG recording. A low light infrared camera was used for low light recording.     History: -  89 year old male is here to rule out seizures    Medication  No Data.    Interpretation:    [[[Abbreviation Key:  PDR=alpha rhythm/posterior dominant rhythm. A-P=anterior posterior.  Amplitude: ‘very low’:<20; ‘low’:20-49; ‘medium’:; ‘high’:>150uV.  Persistence for periodic/rhythmic patterns (% of epoch) ‘rare’:<1%; ‘occasional’:1-10%; ‘frequent’:10-50%; ‘abundant’:50-90%; ‘continuous’:>90%.  Persistence for sporadic discharges: ‘rare’:<1/hr; ‘occasional’:1/min-1/hr; ‘frequent’:>1/min; ‘abundant’:>1/10 sec.  RPP=rhythmic and periodic patterns; GRDA=generalized rhythmic delta activity; FIRDA=frontal intermittent GRDA; LRDA=lateralized rhythmic delta activity; TIRDA=temporal intermittent rhythmic delta activity;  LPD=PLED=lateralized periodic discharges; GPD=generalized periodic discharges; BIPDs =bilateral independent periodic discharges; Mf=multifocal; SIRPDs=stimulus induced rhythmic, periodic, or ictal appearing discharges; BIRDs=brief potentially ictal rhythmic discharges >4 Hz, lasting .5-10s; PFA (paroxysmal bursts >13 Hz or =8 Hz <10s).  Modifiers: +F=with fast component; +S=with spike component; +R=with rhythmic component.  S-B=burst suppression pattern.  Max=maximal. N1-drowsy; N2-stage II sleep; N3-slow wave sleep. SSS/BETS=small sharp spikes/benign epileptiform transients of sleep. HV=hyperventilation; PS=photic stimulation]]]      Daily EEG Visual Analysis    FINDINGS:      Background:  Symmetry: Asymmetric  Continuous: continuous  PDR: symmetric, poorly-modulated 8 Hz activity (slower over left hemisphere), with amplitude to 40 uV, that attenuated to eye opening.  Low amplitude frontal beta was not noted in wakefulness.  Reactivity: present  Voltage: normal, [defined typically between 20-150uV]  Anterior Posterior Gradient: absent  Breach: absent    Background Slowing:  Generalized slowing: present. intermittent diffuse delta theta slowing up to 7 Hz  Focal slowing: present. Intermittent focal polymorphic delta theta slowing over left temporooccipital region      State Changes:   -Drowsiness was characterized by fragmentation, attenuation, and slowing of the background activity.      -Stage II sleep transients were not recorded.    Sporadic Epileptiform Discharges:   None    Rhythmic and Periodic Patterns (RPPs):  None     Electrographic and Electroclinical seizures:  None    Other Clinical Events:  One clinical event without EEG correlate @ 14:57 lasting about 1 min. Clinically: series of random bizarre movements of UE and torso and side to side head movements along with oral movements. EEG: no EEG correlate suggestive of seizures    Activation Procedures:   -Hyperventilation was not performed.    -Photic stimulation was not performed.      Artifacts:  Intermittent myogenic and movement artifacts were noted.    ECG:  Off    EEG Summary / Classification:  Abnormal EEG in the awake / drowsy states.  •	Background slowing including PDR < 8 Hz  -          Intermittent focal polymorphic delta theta slowing over left temporooccipital region      EEG Impression / Clinical Correlate:  Abnormal prolonged EEG study due to   1- Mild diffuse/multifocal cerebral dysfunction that is not specific in etiology  2- Focal slowing in left temporooccipital region which indicates focal cerebral dysfunction or structural abnormality in that region.    No epileptic discharges recorded.  No seizures recorded.    •	One clinical event without EEG correlate recorded suggestive of psychogenic non-epileptic seizure (PNES)    ________________________________________    TACO Canales  Attending Physician, [facility] [place]    ------------------------------------  EEG Reading Room: [phone]  On Call Service After Hours: [phone]

## 2024-04-03 NOTE — CONSULT NOTE ADULT - REASON FOR ADMISSION
Generalized weakness since Sunday night but with increased confusion and difficulty with using fork on non-dominant (RIGHT) hand since Sunday night.  Intermittent cough.
Generalized weakness since Sunday night but with increased confusion and difficulty with using fork on non-dominant (RIGHT) hand since Sunday night.  Intermittent cough.

## 2024-04-03 NOTE — DISCHARGE NOTE NURSING/CASE MANAGEMENT/SOCIAL WORK - PATIENT PORTAL LINK FT
You can access the FollowMyHealth Patient Portal offered by [facility] by registering at the following website: [email or web address]. By joining [facility] FollowMyHealth portal, you will also be able to view your health information using other applications (apps) compatible with our system.

## 2024-04-03 NOTE — CONSULT NOTE ADULT - ASSESSMENT
89M dnr/dni, hx HTN/GERD/BPH/CHF/CAD/asthma/stroke/Afib on eliquis last this AM (previously had been held given hx of mult falls) adm med for generalized weakness/confusion. Rapid called today for seizure. CTH w/acute on chronic SDH possible epidural component. Plt 194, last coags 3/27 wnl  Exam: Wide awake, hard of hearing, Ox2 (didn’t know year), no drift, DYSON 5/5, SILT.   -Kcentra  -4h interval CTH for prognostication  -No acute neurosurgical intervention offered   -keppra 1g bid, defer further AED recs/seizure management to neurology

## 2024-04-03 NOTE — CHART NOTE - NSCHARTNOTEFT_GEN_A_CORE
NEUROLOGY FOLLOW-UP:    Called back about pt having another similar episode of "shaking." Pt had been on EEG during this episode. EEG reviewed with epilepsy service. No seizures or epileptiform discharges were recorded. Informed primary team to not treat similar events and press red button on the side of EEG machine for any abnormal movements. Please continue to monitor on EEG overnight. For additional recommendations, please refer to progress note for today, 4/3/24.     Harrison Cordoba   PGY-3  Department of Neurology  [facility] [employer] [facility]

## 2024-04-03 NOTE — RAPID RESPONSE TEAM SUMMARY - NSADDTLFINDINGSRRT_GEN_ALL_CORE
RRT Called for concern of seizure. As per RN and primary team at bedside patient had an acute tonic-clonic "seizure" with limb shaking, eyes rolling back and dentures coming off. Seizure broke on its own, did not receive any medications. This is the second "Seizure" today, with the first one happening earlier today as well - also for approximately 10-15 seconds. Patient has known CVA on this admission, did not receive TPA or mechanical thrombectomy. Not currently on anti-epileptic medications. Labs collected during RRT. Patient is diophoretic on exam and does not have recollection of Seizure. Hemodynamically stable throughout RRT, patient protecting his airway. Neuro house called, neuro resident at bedside who recommended a vEEG and to not Keppra load, however if it happens a third time agreed to keppra load. Primary team to reassess patient in 30-60mins and follow up labs collected. Patient mentating and protecting airway, at conclusion of RRT. RRT Called for concern of seizure. As per RN and primary team at bedside patient had an acute tonic-clonic "seizure" with limb shaking, eyes rolling back and dentures coming off. Seizure broke on its own, did not receive any medications. POCT glucose 170s. This is the second "Seizure" today, with the first one happening earlier today as well - also for approximately 10-15 seconds. Patient has known CVA on this admission, did not receive TPA or mechanical thrombectomy. Not currently on anti-epileptic medications. Labs collected during RRT. Patient is diophoretic on exam and does not have recollection of Seizure. Hemodynamically stable throughout RRT, patient protecting his airway. Neuro house called, neuro resident at bedside who recommended a vEEG and to not Keppra load, however if it happens a third time agreed to keppra load. Primary team to reassess patient in 30-60mins and follow up labs collected. Patient mentating and protecting airway, at conclusion of RRT.

## 2024-04-03 NOTE — PROGRESS NOTE ADULT - ATTENDING COMMENTS
RRt for seiuzre like activity  had several events   had an event on EEG with no correlate  Juventino Bashir MD  Vascular Neurology  Office: [phone]

## 2024-04-03 NOTE — CONSULT NOTE ADULT - ATTENDING COMMENTS
DOS 3/28 seen in ED  Briefly   89 yo LEFT handed man with chronic Afib (taken off apixaban 1-2 weeks ago d/t recurrent falls), HTN, HLD, presumed GERD, BPH, asthma, anemia, and prior chronic strokes who presented to [facility] ED on 3/27/2024, with c/o weakness/difficulty ambulating/confusion since Sunday, 3/24/2024.  NO tenecteplase d/t outside therapeutic window.  NO thrombectomy d/t no LVO.  NIHSS on admission: 3 (+1 questions, +1 R facial, +1 subjective dysarthria)  LKN: 3/24/2024, 18:00  pre-MRS: 3  CTH: bilateral inferior frontsal and L occipitotemporal encephalomalacia c/w old infarcts   CTA H/N neg     CRP < 3    Impression: 1) Acute encephalopathy with difficulty ambulating and subjective dysarthria. R NLF flattening appears chronic. Could be new ischemic infarct vs. toxic/metabolic/infectious process. Recently discontinued apixaban for Afib d/t "falls and age."  2) Falls and weakness  3) chronic strokes, embolic     Recommendations:   -  aspirin 81mg daily NOW - will need to have risk/benefit discussion with patient/family about restarting apixaban for stroke prevention; spoke with daughter states he does not fall on a dialy basis.  would stop asa and place on DOAC for AF as asa and elqiuis have similar bleeding risk. stroke literature suggest that unless he is falling > 300x/year that the benefits of DOAC outweigh risks of falling   - Continue atorvastatin 80mg (goal LDL<70)  - A1c, lipid panel  - MRI brain can be considered - per daughter, patient unlikely to tolerate, and really would not  (patient ideally should be on therapeutic AC for Afib, if did not have new infarct d/t Afib now, could always have one in the future); CTH at ~72 hours from symptom onset does not suggest new infarction (although could be missed)  - TTE has been ordered by primary team  - Can obtain the following labs to evaluate for reversible causes of encephalopathy: TSH, ESR, CRP, vitamin B1/B6/B12/D, folate, ammonia  - Can consider EEG if no obvious cause of encephalopathy is identified  - Neurochecks, vitals q4h  - Fall and aspiration precautions  - PT/OT/SLP/CM  - Diet: target is DASH/TLC  - DVT prophylaxis: enoxaparin 40mg q24h (unless medically contraindicated) AND SCDs  - Stroke education provided  - Smoking cessation education not needed - non-smoker  - Please document MRS on discharge  - Patient remains FULL CODE per admitting hospitalist's discussion with family. I did discuss with daughter at bedside that ongoing GOC discussions among family and patient are encouraged given patient unlikely to do well with CPR/intubation.   spoke with daughter at [facility] in ED  Juventino Bashir MD  Vascular Neurology  Office: [phone]
89M dnr/dni, hx HTN/GERD/BPH/CHF/CAD/asthma/stroke/Afib on eliquis last this AM (previously had been held given hx of mult falls) adm med for generalized weakness/confusion. Rapid called today for seizure. CTH w/acute on chronic SDH possible epidural component. Plt 194, last coags 3/27 wnl  Exam: Wide awake, hard of hearing, Ox2 (didn’t know year), no drift, DYSON 5/5, SILT.   -Kcentra  -4h interval CTH for prognostication  -No acute neurosurgical intervention offered   -keppra 1g bid, defer further AED recs/seizure management to neurology

## 2024-04-03 NOTE — RAPID RESPONSE TEAM SUMMARY - NSSITUATIONBACKGROUNDRRT_GEN_ALL_CORE
89 yo LEFT handed man with chronic Afib (taken off apixaban 1-2 weeks ago d/t recurrent falls), HTN, HLD, presumed GERD, BPH, asthma, anemia, and prior chronic strokes who presented to [facility] ED on 3/27/2024, with c/o weakness/difficulty ambulating/confusion since Sunday Found to have acute R MCA territory.
89 yo LEFT handed man with chronic Afib (taken off apixaban 1-2 weeks ago d/t recurrent falls), HTN, HLD, presumed GERD, BPH, asthma, anemia, and prior chronic strokes who presented to [facility] ED on 3/27/2024, with c/o weakness/difficulty ambulating/confusion since Sunday Found to have acute R MCA territory.

## 2024-04-03 NOTE — CONSULT NOTE ADULT - TIME BILLING
Imaging review, plan formulation, coordination of care
-review of the history and records  -general and neurological examination  -generation of assessment and treatment plan  -communication with the patient/family members  -coordination of care.

## 2024-04-03 NOTE — CONSULT NOTE ADULT - SUBJECTIVE AND OBJECTIVE BOX
89M dnr/dni, hx HTN/GERD/BPH/CHF/CAD/asthma/stroke/Afib on eliquis last this AM (previously had been held given hx of mult falls) adm med for generalized weakness/confusion. Rapid called today for seizure. CTH w/acute on chronic SDH possible epidural component. Plt 194, last coags 3/27 wnl  Exam: Wide awake, hard of hearing, Ox2 (didn’t know year), no drift, DYSON 5/5, SILT.     --Anticoagulation:    =====================  PAST MEDICAL HISTORY   Afib    Asthmatic bronchitis , chronic    GERD (gastroesophageal reflux disease)    HLD (hyperlipidemia)    HTN (hypertension)      PAST SURGICAL HISTORY   No significant past surgical history      No Known Allergies      MEDICATIONS:  Antibiotics:    Neuro:  acetaminophen     Tablet .. 650 milliGRAM(s) Oral every 6 hours PRN  gabapentin 100 milliGRAM(s) Oral at bedtime  melatonin 3 milliGRAM(s) Oral at bedtime  OLANZapine Injectable 2.5 milliGRAM(s) IntraMuscular every 6 hours PRN    Other:  albuterol/ipratropium for Nebulization 3 milliLiter(s) Nebulizer every 6 hours  atorvastatin 80 milliGRAM(s) Oral at bedtime  budesonide 160 MICROgram(s)/formoterol 4.5 MICROgram(s) Inhaler 2 Puff(s) Inhalation two times a day  diltiazem    milliGRAM(s) Oral at bedtime  finasteride 5 milliGRAM(s) Oral daily  furosemide    Tablet 20 milliGRAM(s) Oral daily  glucagon  Injectable 1 milliGRAM(s) IntraMuscular once  guaiFENesin Oral Liquid (Sugar-Free) 200 milliGRAM(s) Oral every 6 hours PRN  montelukast 10 milliGRAM(s) Oral daily  pantoprazole    Tablet 40 milliGRAM(s) Oral before breakfast  polyethylene glycol 3350 17 Gram(s) Oral daily  senna 2 Tablet(s) Oral at bedtime  tamsulosin 0.8 milliGRAM(s) Oral at bedtime      SOCIAL HISTORY:   Occupation:   Marital Status:     FAMILY HISTORY:  No pertinent family history in first degree relatives    FH: stroke (Mother, Sibling)        ROS: Negative except per HPI    LABS:                          8.5    8.55  )-----------( 194      ( 03 Apr 2024 13:07 )             29.0     04-03    138  |  100  |  24<H>  ----------------------------<  157<H>  3.7   |  24  |  1.08    Ca    8.9      03 Apr 2024 13:07  Phos  2.7     04-03  Mg     2.1     04-03    TPro  6.6  /  Alb  3.5  /  TBili  0.6  /  DBili  x   /  AST  21  /  ALT  18  /  AlkPhos  70  04-03

## 2024-04-03 NOTE — CHART NOTE - NSCHARTNOTEFT_GEN_A_CORE
89 y/o M admitted in setting of confusion and weakness found to have acute R MCA infarct. Patient was medically ready for discharge today. However patient experienced convulsions at approximately 11:40 am while laying in bed. Nurse was at bedside and witnessed patient's eyes roll back and convulsions for a few seconds. Patient was evaluated at bedside. No focal deficits. patient was a&Ox3. Stated he felt a bit strange before the event. Several minutes prior to the event patient was on the toilet having a bowel movement. Orthostatic vitals evaluated and are slightly positive. Event discussed with Dr. Vaca and neurologist Dr. Bashir. 24 hour video EEG ordered.     Second event of convulsions occurred again about 2 hours later. Witnessed by patient's daughter, states same thing happened where patient's eyes rolled back and entire body convulsed. She states prior to this event he stated he "felt funny." RRT was called. Neuro called. Decision made to monitor patient, given the short duration of presentation uncertain if seizures.     Plan:  - If another event occurs, will administer IV Keppra  - 24 h video EEG  - Neuro follow up 89 y/o M admitted in setting of confusion and weakness found to have acute R MCA infarct. Patient was medically ready for discharge today. However patient experienced convulsions at approximately 11:40 am while laying in bed. Nurse was at bedside and witnessed patient's eyes roll back and convulsions for a few seconds. Patient was evaluated at bedside. No focal deficits. patient was a&Ox3. Stated he felt a bit strange before the event. Several minutes prior to the event patient was on the toilet having a bowel movement. Orthostatic vitals evaluated and are slightly positive. Event discussed with Dr. Vaca and neurologist Dr. Bashir. 24 hour video EEG ordered.     Second event of convulsions occurred again about 2 hours later. Witnessed by patient's daughter, states same thing happened where patient's eyes rolled back and entire body convulsed. She states prior to this event he stated he "felt funny." RRT was called. Neuro called. Decision made to monitor patient, given the short duration of presentation uncertain if seizures. Discussed ordering a head CT with neurology, stated since there are no focal neurological deficits there is no need.    Plan:  - If another event occurs, will administer IV Keppra  - 24 h video EEG  - Neuro follow up

## 2024-04-03 NOTE — RAPID RESPONSE TEAM SUMMARY - NSADDTLFINDINGSRRT_GEN_ALL_CORE
Second RRT Called today for concern of seizure like activity, witnessed by RN at bedside. Similar to second Seizure-like event (please refer to earlier RRT note). Patient had this seizure-like activity for ~ 10 seconds with limb shaking. SBP 130s and rest of vitals stable. Patient is able to follow commands and no new focal deficits. He is rambling, and difficult to understnd. Spoke to neuro resident, they were able to see the video EEG and confirmed there is no concern of seizure. Unclear etiology of seizure-like event, possible psychogenic vs convulsive syncope? POCT glucose 140s. No intervention during this RRT. Would continue the vEEG and if repeat episode occurs, primary team should contact Neurology to read vEEG for monitoring if patient had real seizure or not. Primary team can consider Head CT for AMS. Labs reviewed during RRT. Hemodynamically stable, mentating, and protecting airway during RRT.

## 2024-04-04 LAB
GLUCOSE BLDC GLUCOMTR-MCNC: 136 MG/DL — HIGH (ref 70–99)
GLUCOSE BLDC GLUCOMTR-MCNC: 150 MG/DL — HIGH (ref 70–99)
PYRIDOXAL PHOS SERPL-MCNC: 4.4 UG/L — SIGNIFICANT CHANGE UP (ref 3.4–65.2)

## 2024-04-04 PROCEDURE — 70450 CT HEAD/BRAIN W/O DYE: CPT | Mod: 26

## 2024-04-04 RX ORDER — ACETAMINOPHEN 500 MG
1000 TABLET ORAL ONCE
Refills: 0 | Status: COMPLETED | OUTPATIENT
Start: 2024-04-04 | End: 2024-04-04

## 2024-04-04 RX ORDER — LEVETIRACETAM 250 MG/1
1000 TABLET, FILM COATED ORAL EVERY 12 HOURS
Refills: 0 | Status: DISCONTINUED | OUTPATIENT
Start: 2024-04-04 | End: 2024-04-05

## 2024-04-04 RX ADMIN — ATORVASTATIN CALCIUM 80 MILLIGRAM(S): 80 TABLET, FILM COATED ORAL at 21:26

## 2024-04-04 RX ADMIN — POLYETHYLENE GLYCOL 3350 17 GRAM(S): 17 POWDER, FOR SOLUTION ORAL at 11:36

## 2024-04-04 RX ADMIN — BUDESONIDE AND FORMOTEROL FUMARATE DIHYDRATE 2 PUFF(S): 160; 4.5 AEROSOL RESPIRATORY (INHALATION) at 17:20

## 2024-04-04 RX ADMIN — SENNA PLUS 2 TABLET(S): 8.6 TABLET ORAL at 21:26

## 2024-04-04 RX ADMIN — Medication 3 MILLIGRAM(S): at 21:26

## 2024-04-04 RX ADMIN — LEVETIRACETAM 400 MILLIGRAM(S): 250 TABLET, FILM COATED ORAL at 05:20

## 2024-04-04 RX ADMIN — Medication 3 MILLILITER(S): at 21:30

## 2024-04-04 RX ADMIN — FINASTERIDE 5 MILLIGRAM(S): 5 TABLET, FILM COATED ORAL at 11:36

## 2024-04-04 RX ADMIN — Medication 1000 MILLIGRAM(S): at 02:00

## 2024-04-04 RX ADMIN — Medication 3 MILLILITER(S): at 17:20

## 2024-04-04 RX ADMIN — Medication 3 MILLILITER(S): at 11:36

## 2024-04-04 RX ADMIN — GABAPENTIN 100 MILLIGRAM(S): 400 CAPSULE ORAL at 21:26

## 2024-04-04 RX ADMIN — Medication 400 MILLIGRAM(S): at 00:57

## 2024-04-04 RX ADMIN — Medication 120 MILLIGRAM(S): at 21:26

## 2024-04-04 RX ADMIN — LEVETIRACETAM 400 MILLIGRAM(S): 250 TABLET, FILM COATED ORAL at 17:20

## 2024-04-04 RX ADMIN — TAMSULOSIN HYDROCHLORIDE 0.8 MILLIGRAM(S): 0.4 CAPSULE ORAL at 21:26

## 2024-04-04 RX ADMIN — LIDOCAINE 1 APPLICATION(S): 4 CREAM TOPICAL at 11:37

## 2024-04-04 RX ADMIN — MONTELUKAST 10 MILLIGRAM(S): 4 TABLET, CHEWABLE ORAL at 11:37

## 2024-04-04 NOTE — PROVIDER CONTACT NOTE (OTHER) - REASON
Patient complaining of pain in bladder
Pt abdomen distended
Tele event
AMS
HR
Questioning betty
Abdominal distention
EEG came off patient head
Seizure activity
Seizure like activity
Patient states penis hurts. He was given tylenol with no effect
Pt has wet productive cough

## 2024-04-04 NOTE — PROGRESS NOTE ADULT - PROBLEM SELECTOR PLAN 1
Chronic atrial fibrillation previously on apixaban until a week ago, recommended discontinuation the direct anticoagulant due to risk of falling and patient's age.   chronic, off a/c c/w dilt
Chronic atrial fibrillation previously on apixaban until a week ago, recommended discontinuation the direct anticoagulant due to risk of falling and patient's age.   chronic, restarted ac as per neuro sec to multiple infarcts after neuro had discussion with pts daughter risk and benefits of ac ,  pts daughter agreed and ac was started  but pt today found to have confusion with involuntary movements , NP discussed with neuro and initially Neuro doesn't recommended head ct , ordered eeg but pt became more confused , agitated and daughter requested writer to give sedation for pt   writer told NP to order head ct stat and pt found to have < from: CT Head No Cont (04.03.24 @ 18:21) >    Acute left convexity subdural hematoma with large convex hemorrhagic clot   overlying the left parietal convexity with epidural component not   excluded. No overlying skull fracture. Regional mass effect produces 3 mm   rightward shift.    as per neuro surgery , no surgical intervention
Chronic atrial fibrillation previously on apixaban until a week ago, recommended discontinuation the direct anticoagulant due to risk of falling and patient's age.   chronic, off a/c c/w dilt
Chronic atrial fibrillation previously on apixaban until a week ago, recommended discontinuation the direct anticoagulant due to risk of falling and patient's age.   chronic, off a/c c/w dilt
Chronic atrial fibrillation previously on apixaban until a week ago, recommended discontinuation the direct anticoagulant due to risk of falling and patient's age.   chronic, restarted ac as per neuro
Chronic atrial fibrillation previously on apixaban until a week ago, recommended discontinuation the direct anticoagulant due to risk of falling and patient's age.   chronic, off a/c c/w dilt
Chronic atrial fibrillation previously on apixaban until a week ago, recommended discontinuation the direct anticoagulant due to risk of falling and patient's age.   chronic, restarted ac as per neuro sec to multiple infarcts after neuro had discussion with pts daughter risk and benefits of ac ,  pts daughter agreed and ac was started  but pt today found to have confusion with involuntary movements , NP discussed with neuro and initially Neuro doesn't recommended head ct , ordered eeg but pt became more confused , agitated and daughter requested writer to give sedation for pt   writer told NP to order head ct stat and pt found to have < from: CT Head No Cont (04.03.24 @ 18:21) >    Acute left convexity subdural hematoma with large convex hemorrhagic clot   overlying the left parietal convexity with epidural component not   excluded. No overlying skull fracture. Regional mass effect produces 3 mm   rightward shift.    < end of copied text >    pts daughter was informed, neuro sx was consulted
Chronic atrial fibrillation previously on apixaban until a week ago, recommended discontinuation the direct anticoagulant due to risk of falling and patient's age.   chronic, restarted ac as per neuro

## 2024-04-04 NOTE — PROVIDER CONTACT NOTE (OTHER) - NAME OF MD/NP/PA/DO NOTIFIED:
MINI Zabala
Bev Hogue
MINI Kolb
MINI Zabala
MINI Zabala
Maria Antonia Gay, ACP
MINI Zabala
MINI Zabala
NGOC Burr
Tiffany Sauveur
Justin Ruby
KELBY Guadalupe
Tiffany Sauveur

## 2024-04-04 NOTE — PROGRESS NOTE ADULT - PROBLEM SELECTOR PLAN 6
Patient/family agree with pharmacologic DVT prophylaxis.
Patient/family agree with pharmacologic DVT prophylaxis.
ac on hold
ac on hold
Patient/family agree with pharmacologic DVT prophylaxis.
\on ac
Patient/family agree with pharmacologic DVT prophylaxis.
\on ac

## 2024-04-04 NOTE — PROGRESS NOTE ADULT - PROBLEM/PLAN-5
DISPLAY PLAN FREE TEXT
Pt. presents to ED c/o vomiting. Pt. reports having CTAP with contrast this morning at appx 1130. Pt. then reports she has been vomiting since 2pm. Pt. is unsure if she is having a reaction to the contrast. Pt. reports having CT scans in the past with no reaction. Pt. denies respiratory symptoms, breathing even and unlabored. Pt. endorses chills and 1 "soft, dark bowel movement". Denies fevers.
DISPLAY PLAN FREE TEXT

## 2024-04-04 NOTE — PROVIDER CONTACT NOTE (OTHER) - ACTION/TREATMENT ORDERED:
EKG ordered, done
RRT called.
Verbal order to hold.
Stool softner ordered, administered
Provider aware
Blood work ordered and to be collected.
NGOC Burr made aware. Pt advised to sit on toilet to have possible BM.
Gabapentin and melatonin ordered.
NGOC Barnes at bedside
Provider aware of situation and staff coming to help. Provider ordered IV tylenol for the possibility of pain making the patient agitated.
Provider stated he would order topical lidocaine jelly.
MINI Kolb at bedside. Rausch placed by Urology. Pain meds ordered. Bedside Xray ordered.
Make provider aware, provider order guaifenesin

## 2024-04-04 NOTE — PROVIDER CONTACT NOTE (OTHER) - DATE AND TIME:
02-Apr-2024 22:30
28-Mar-2024 23:30
03-Apr-2024 11:48
03-Apr-2024 17:50
04-Apr-2024 00:30
03-Apr-2024 12:31
03-Apr-2024 13:26
03-Apr-2024 16:08
30-Mar-2024 11:28
01-Apr-2024 18:47
28-Mar-2024 06:00
30-Mar-2024 08:40
30-Mar-2024 04:50

## 2024-04-04 NOTE — PROVIDER CONTACT NOTE (OTHER) - ASSESSMENT
Asymptomatic
Provider notified that EEG came off patients head, gauze and leads. Provider stated to try to call [facility] and gather some info on the protocol. [facility] called and they are sending someone down to try and see if they can help. EEG tech was also paged but unsure if she will come to assist tonight. Patient is agitated and restless. Zyprexa was given earlier for agitation with no effect.
Pt woke up having a wet productive cough , pt denies having any chest pain, dizziness, or discomfort. Pt VSS
Pt laying in bed. Abdomen distended. Soft to touch. Patient denies pain. Patient states they are passing gas. Pt and daughter state pt had a BM on 3/31.
No complaints of cp, palpitation, sob, no s/s of distress noted  Patient eating comfortably
No complaints of pain  Patient expressed no bm since coming to hospital  Patient has a pantoja for ua rentention
Patient had pantoja reinserted today. patient is complaining of pain at his penis. The insertion site looks good, not swollen and no bloody residue. Tylenol was given to patient and was not helping the pain.
Patient AOx3, confused at times. Patient complaining of pain in bladder and abdominal distention

## 2024-04-04 NOTE — PROGRESS NOTE ADULT - PROBLEM SELECTOR PROBLEM 2
Cerebrovascular disease

## 2024-04-04 NOTE — PROGRESS NOTE ADULT - PROBLEM SELECTOR PLAN 4
UA negative.
UA negative.  void trial
UA negative.
UA negative.
UA negative.  pt failed void trial  pantoja placed
UA negative.  pt failed void trial  pantoja placed  mild abd distension + on exam with bs+, pt has no n/ vomiting, passing flatus- ct abd
UA negative.
UA negative.  pt failed void trial  pantoja placed

## 2024-04-04 NOTE — EEG REPORT - NS EEG TEXT BOX
REPORT OF CONTINUOUS VIDEO EEG      [facility]: [address], [place] [address], Phone: [phone]  [facility]: [address], [place] [address], Phone: [phone]  [facility]: [address] [place] [address], Phone: [phone]    Patient Name: Ragini Singleton    Age: 89 year, : 10/28/1925  MRN #: -, Cohne: -2 [facility] [address]  Referring Physician: -    Study Date: 4/3/2024   Start Time: 1333  End Date: 2024         End Time: 0600  Study Duration: 16H    Study Information:    EEG Recording Technique:  The patient underwent continuous Video-EEG monitoring, using Telemetry System hardware on the XLTek Digital System. EEG and video data were stored on a computer hard drive with important events saved in digital archive files. The material was reviewed by a physician (electroencephalographer / epileptologist) on a daily basis. Renzo and seizure detection algorithms were utilized and reviewed. An EEG Technician attended to the patient, and was available throughout daytime work hours.  The epilepsy center neurologist was available in person or on call 24-hours per day.    EEG Placement and Labeling of Electrodes:  The EEG was performed utilizing 20 channel referential EEG connections (coronal over temporal over parasagittal montage) using all standard 10-20 electrode placements with EKG, with additional electrodes placed in the inferior temporal region using the modified 10-10 montage electrode placements for elective admissions, or if deemed necessary. Recording was at a sampling rate of 256 samples per second per channel. Time synchronized digital video recording was done simultaneously with EEG recording. A low light infrared camera was used for low light recording.     History: -  89 year old male is here to rule out seizures    Medication  No Data.    Interpretation:    [[[Abbreviation Key:  PDR=alpha rhythm/posterior dominant rhythm. A-P=anterior posterior.  Amplitude: ‘very low’:<20; ‘low’:20-49; ‘medium’:; ‘high’:>150uV.  Persistence for periodic/rhythmic patterns (% of epoch) ‘rare’:<1%; ‘occasional’:1-10%; ‘frequent’:10-50%; ‘abundant’:50-90%; ‘continuous’:>90%.  Persistence for sporadic discharges: ‘rare’:<1/hr; ‘occasional’:1/min-1/hr; ‘frequent’:>1/min; ‘abundant’:>1/10 sec.  RPP=rhythmic and periodic patterns; GRDA=generalized rhythmic delta activity; FIRDA=frontal intermittent GRDA; LRDA=lateralized rhythmic delta activity; TIRDA=temporal intermittent rhythmic delta activity;  LPD=PLED=lateralized periodic discharges; GPD=generalized periodic discharges; BIPDs =bilateral independent periodic discharges; Mf=multifocal; SIRPDs=stimulus induced rhythmic, periodic, or ictal appearing discharges; BIRDs=brief potentially ictal rhythmic discharges >4 Hz, lasting .5-10s; PFA (paroxysmal bursts >13 Hz or =8 Hz <10s).  Modifiers: +F=with fast component; +S=with spike component; +R=with rhythmic component.  S-B=burst suppression pattern.  Max=maximal. N1-drowsy; N2-stage II sleep; N3-slow wave sleep. SSS/BETS=small sharp spikes/benign epileptiform transients of sleep. HV=hyperventilation; PS=photic stimulation]]]      Daily EEG Visual Analysis    FINDINGS:      Background:  Symmetry: Asymmetric  Continuous: continuous  PDR: symmetric, poorly-modulated 8 Hz activity (slower over left hemisphere), with amplitude to 40 uV, that attenuated to eye opening.  Low amplitude frontal beta was not noted in wakefulness.  Reactivity: present  Voltage: normal, [defined typically between 20-150uV]  Anterior Posterior Gradient: absent  Breach: absent    Background Slowing:  Generalized slowing: present. intermittent diffuse delta theta slowing up to 7 Hz  Focal slowing: present. Intermittent focal polymorphic delta theta slowing over left temporooccipital region      State Changes:   -Drowsiness was characterized by fragmentation, attenuation, and slowing of the background activity.      -Stage II sleep transients were not recorded.    Sporadic Epileptiform Discharges:   None    Rhythmic and Periodic Patterns (RPPs):  None     Electrographic and Electroclinical seizures:  None    Other Clinical Events:  None    Activation Procedures:   -Hyperventilation was not performed.    -Photic stimulation was not performed.      Artifacts:  Intermittent myogenic and movement artifacts were noted.    ECG:  Off    EEG Summary / Classification:  Abnormal EEG in the awake / drowsy states.  •	Background slowing including PDR < 8 Hz  -          Intermittent focal polymorphic delta theta slowing over left temporooccipital region      EEG Impression / Clinical Correlate:  Abnormal prolonged EEG study due to   1- Mild diffuse/multifocal cerebral dysfunction that is not specific in etiology  2- Focal slowing in left temporooccipital region which indicates focal cerebral dysfunction or structural abnormality in that region.    No epileptic discharges recorded.  No seizures recorded.  ________________________________________    Guanakito Best MD  EEG/Epilepsy Attending     REPORT OF CONTINUOUS VIDEO EEG      [facility]: [address], [place] [address], Phone: [phone]  [facility]: [address], [place] [address], Phone: [phone]  [facility]: [address] [place] [address], Phone: [phone]    Patient Name: Ragini Singleton    Age: 89 year, : 10/28/1925  MRN #: -, Cohen: -2 [facility] [address]  Referring Physician: -    Study Date: 4/3/2024   Start Time: 1333  End Date: 2024         End Time: 0600  Study Duration: 16H    Study Information:    EEG Recording Technique:  The patient underwent continuous Video-EEG monitoring, using Telemetry System hardware on the XLTek Digital System. EEG and video data were stored on a computer hard drive with important events saved in digital archive files. The material was reviewed by a physician (electroencephalographer / epileptologist) on a daily basis. Renzo and seizure detection algorithms were utilized and reviewed. An EEG Technician attended to the patient, and was available throughout daytime work hours.  The epilepsy center neurologist was available in person or on call 24-hours per day.    EEG Placement and Labeling of Electrodes:  The EEG was performed utilizing 20 channel referential EEG connections (coronal over temporal over parasagittal montage) using all standard 10-20 electrode placements with EKG, with additional electrodes placed in the inferior temporal region using the modified 10-10 montage electrode placements for elective admissions, or if deemed necessary. Recording was at a sampling rate of 256 samples per second per channel. Time synchronized digital video recording was done simultaneously with EEG recording. A low light infrared camera was used for low light recording.     History: -  89 year old male is here to rule out seizures    Medication  No Data.    Interpretation:    [[[Abbreviation Key:  PDR=alpha rhythm/posterior dominant rhythm. A-P=anterior posterior.  Amplitude: ‘very low’:<20; ‘low’:20-49; ‘medium’:; ‘high’:>150uV.  Persistence for periodic/rhythmic patterns (% of epoch) ‘rare’:<1%; ‘occasional’:1-10%; ‘frequent’:10-50%; ‘abundant’:50-90%; ‘continuous’:>90%.  Persistence for sporadic discharges: ‘rare’:<1/hr; ‘occasional’:1/min-1/hr; ‘frequent’:>1/min; ‘abundant’:>1/10 sec.  RPP=rhythmic and periodic patterns; GRDA=generalized rhythmic delta activity; FIRDA=frontal intermittent GRDA; LRDA=lateralized rhythmic delta activity; TIRDA=temporal intermittent rhythmic delta activity;  LPD=PLED=lateralized periodic discharges; GPD=generalized periodic discharges; BIPDs =bilateral independent periodic discharges; Mf=multifocal; SIRPDs=stimulus induced rhythmic, periodic, or ictal appearing discharges; BIRDs=brief potentially ictal rhythmic discharges >4 Hz, lasting .5-10s; PFA (paroxysmal bursts >13 Hz or =8 Hz <10s).  Modifiers: +F=with fast component; +S=with spike component; +R=with rhythmic component.  S-B=burst suppression pattern.  Max=maximal. N1-drowsy; N2-stage II sleep; N3-slow wave sleep. SSS/BETS=small sharp spikes/benign epileptiform transients of sleep. HV=hyperventilation; PS=photic stimulation]]]      Daily EEG Visual Analysis    FINDINGS:      Background:  Symmetry: Asymmetric  Continuous: continuous  PDR: symmetric, poorly-modulated 8 Hz activity (slower over left hemisphere), with amplitude to 40 uV, that attenuated to eye opening.  Low amplitude frontal beta was not noted in wakefulness.  Reactivity: present  Voltage: normal, [defined typically between 20-150uV]  Anterior Posterior Gradient: absent  Breach: absent    Background Slowing:  Generalized slowing: present. intermittent diffuse delta theta slowing up to 7 Hz  Focal slowing: present. Intermittent focal polymorphic delta theta slowing over left temporooccipital region      State Changes:   -Drowsiness was characterized by fragmentation, attenuation, and slowing of the background activity.      -Stage II sleep transients were not recorded.    Sporadic Epileptiform Discharges:   None    Rhythmic and Periodic Patterns (RPPs):  None     Electrographic and Electroclinical seizures:  None    Other Clinical Events:  None    Activation Procedures:   -Hyperventilation was not performed.    -Photic stimulation was not performed.      Artifacts:  Intermittent myogenic and movement artifacts were noted.  Many electrodes removed by 01:00 on     ECG:  Off    EEG Summary / Classification:  Abnormal EEG in the awake / drowsy states.  •	Background slowing including PDR < 8 Hz  -          Intermittent focal polymorphic delta theta slowing over left temporooccipital region      EEG Impression / Clinical Correlate:  Abnormal prolonged EEG study due to   1- Mild diffuse/multifocal cerebral dysfunction that is not specific in etiology  2- Focal slowing in left temporooccipital region which indicates focal cerebral dysfunction or structural abnormality in that region.    No epileptic discharges recorded.  No seizures recorded.  ________________________________________    Guanakito Best MD  EEG/Epilepsy Attending

## 2024-04-04 NOTE — PROGRESS NOTE ADULT - PROBLEM SELECTOR PLAN 7
Transitions of Care Status:  1.  Name of PCP:     Sherry Chavez MD (PCP) [phone]  2.  PCP Contacted on Admission: [ ] Y    [x ] N    3.  PCP contacted at Discharge: [ ] Y    [ ] N    [ ] N/A  4.  Post-Discharge Appointment Date and Location:  5.  Summary of Handoff given to PCP:
Transitions of Care Status:  1.  Name of PCP:     Sherry Chavez MD (PCP) [phone]  2.  PCP Contacted on Admission: [ ] Y    [x ] N    3.  PCP contacted at Discharge: [ ] Y    [ ] N    [ ] N/A  4.  Post-Discharge Appointment Date and Location:  5.  Summary of Handoff given to PCP:
Transitions of Care Status:  1.  Name of PCP:     Sherry Chavez MD (PCP) [phone]  2.  PCP Contacted on Admission: [ ] Y    [x ] N    3.  PCP contacted at Discharge: [ ] Y    [ ] N    [ ] N/A  4.  Post-Discharge Appointment Date and Location:  5.  Summary of Handoff given to PCP:
Transitions of Care Status:  1.  Name of PCP:     Sherry Chavez MD (PCP) [phone]  2.  PCP Contacted on Admission: [ ] Y    [x ] N    3.  PCP contacted at Discharge: [ ] Y    [ ] N    [ ] N/A  4.  Post-Discharge Appointment Date and Location:  5.  Summary of Handoff given to PCP:
Transitions of Care Status:  1.  Name of PCP:     Sherry Chavez MD (PCP) [phone]  2.  PCP Contacted on Admission: [ ] Y    [x ] N    3.  PCP contacted at Discharge: [ ] Y    [ ] N    [ ] N/A  4.  Post-Discharge Appointment Date and Location:  5.  Summary of Handoff given to PCP:
Transitions of Care Status:  1.  Name of PCP:     Sherry Chavez MD (PCP) [phone]  2.  PCP Contacted on Admission: [ ] Y    [x ] N    3.  PCP contacted at Discharge: [ ] Y    [ ] N    [ ] N/A  4.  Post-Discharge Appointment Date and Location:  5.  Summary of Handoff given to PCP:
Transitions of Care Status:  1.  Name of PCP:     Sherry Chavez MD (PCP) [phone]  2.  PCP Contacted on Admission: [ ] Y    [x ] N    3.  PCP contacted at Discharge: [ ] Y    [ ] N    [ ] N/A  4.  Post-Discharge Appointment Date and Location:  5.  Summary of Handoff given to PCP:
Transitions of Care Status:  1.  Name of PCP:     Sherry Chavez MD (PCP) [phone]  2.  PCP Contacted on Admission: [ ] Y    [x ] N    3.  PCP contacted at Discharge: [ ] Y    [ ] N    [ ] N/A  4.  Post-Discharge Appointment Date and Location:  5.  Summary of Handoff given to PCP:

## 2024-04-04 NOTE — EEG REPORT - HISTORY
language regression (e.g. Landau-Kleffner)

## 2024-04-04 NOTE — PROGRESS NOTE ADULT - PROBLEM SELECTOR PLAN 3
Patient with suspected asthma exacerbation but no present requirement for systemic steroids,    RVP screen and Duoneb therapy and Symbicort equivalent.

## 2024-04-04 NOTE — PROVIDER CONTACT NOTE (OTHER) - RECOMMENDATIONS
Made provider aware
stool softner
Provider notified. nurse recommended topical gel to help numb the area.
MINI Kolb made aware.
Provider notified

## 2024-04-04 NOTE — PROGRESS NOTE ADULT - PROBLEM SELECTOR PROBLEM 4
History of urinary incontinence

## 2024-04-04 NOTE — PROGRESS NOTE ADULT - PROBLEM SELECTOR PLAN 2
CTT head and angiogram nondiagnostic  Structural or functional abnormality in the left hemisphere more prominent posteriorly  Mild nonspecific diffuse or multifocal cerebral dysfunction.     No epileptiform pattern or seizure recorded.  < from: MR Head w/wo IV Cont (03.30.24 @ 13:57) >    IMPRESSION: Acute distal right MCA territory infarctions.    < end of copied text >
CTT head and angiogram nondiagnostic  eeg as per neuro
CTT head and angiogram nondiagnostic  Structural or functional abnormality in the left hemisphere more prominent posteriorly  Mild nonspecific diffuse or multifocal cerebral dysfunction.     No epileptiform pattern or seizure recorded.  < from: MR Head w/wo IV Cont (03.30.24 @ 13:57) >    IMPRESSION: Acute distal right MCA territory infarctions.    < end of copied text >
CTT head and angiogram nondiagnostic
CTT head and angiogram nondiagnostic  Structural or functional abnormality in the left hemisphere more prominent posteriorly  Mild nonspecific diffuse or multifocal cerebral dysfunction.     No epileptiform pattern or seizure recorded.  < from: MR Head w/wo IV Cont (03.30.24 @ 13:57) >    IMPRESSION: Acute distal right MCA territory infarctions.    < end of copied text >

## 2024-04-04 NOTE — PROVIDER CONTACT NOTE (OTHER) - SITUATION
EEG came off patient head
Pt. s/p second RRT for seizure like activity by covering RN. Pt. now with increasing AMS- garbling words without logic along with screaming and attempting to roll in bed.
Seizure-like activity witnessed. RRT called.
As per tele, patient's HR 130s
Patient states penis hurts. He was given tylenol with no effect
Pt admitted for weakness, Pt requesting for gabapentin and melatonin to help sleep.
Pt. walked to the bathroom with one staff assist and walker. Assisted back to bed- noted to have seizure like activity- shaking, eyes rolled back, dentures fell out due to excessive shaking.
Questioning administration of PM eliquis as patient is with new AMS symptoms and seizure like activity x3 today.
Pt has wet productive cough
Received notification from tele tech technician that pt. noted to have 12 WCB on tele at 1143
Patient complaining of pain in bladder
Patient noted with distended abdomen
Pt abdomen distended

## 2024-04-05 VITALS
RESPIRATION RATE: 18 BRPM | SYSTOLIC BLOOD PRESSURE: 115 MMHG | OXYGEN SATURATION: 98 % | HEART RATE: 79 BPM | TEMPERATURE: 97 F | DIASTOLIC BLOOD PRESSURE: 67 MMHG

## 2024-04-05 LAB
ANION GAP SERPL CALC-SCNC: 9 MMOL/L — SIGNIFICANT CHANGE UP (ref 5–17)
BUN SERPL-MCNC: 16 MG/DL — SIGNIFICANT CHANGE UP (ref 7–23)
CALCIUM SERPL-MCNC: 8.3 MG/DL — LOW (ref 8.4–10.5)
CHLORIDE SERPL-SCNC: 107 MMOL/L — SIGNIFICANT CHANGE UP (ref 96–108)
CO2 SERPL-SCNC: 25 MMOL/L — SIGNIFICANT CHANGE UP (ref 22–31)
CREAT SERPL-MCNC: 1.02 MG/DL — SIGNIFICANT CHANGE UP (ref 0.5–1.3)
EGFR: 66 ML/MIN/1.73M2 — SIGNIFICANT CHANGE UP
GLUCOSE SERPL-MCNC: 100 MG/DL — HIGH (ref 70–99)
HCT VFR BLD CALC: 26.8 % — LOW (ref 39–50)
HGB BLD-MCNC: 8.2 G/DL — LOW (ref 13–17)
MCHC RBC-ENTMCNC: 22.8 PG — LOW (ref 27–34)
MCHC RBC-ENTMCNC: 30.6 GM/DL — LOW (ref 32–36)
MCV RBC AUTO: 74.7 FL — LOW (ref 80–100)
NRBC # BLD: 0 /100 WBCS — SIGNIFICANT CHANGE UP (ref 0–0)
PLATELET # BLD AUTO: 183 K/UL — SIGNIFICANT CHANGE UP (ref 150–400)
POTASSIUM SERPL-MCNC: 3.7 MMOL/L — SIGNIFICANT CHANGE UP (ref 3.5–5.3)
POTASSIUM SERPL-SCNC: 3.7 MMOL/L — SIGNIFICANT CHANGE UP (ref 3.5–5.3)
RBC # BLD: 3.59 M/UL — LOW (ref 4.2–5.8)
RBC # FLD: 20.1 % — HIGH (ref 10.3–14.5)
SODIUM SERPL-SCNC: 141 MMOL/L — SIGNIFICANT CHANGE UP (ref 135–145)
WBC # BLD: 7.43 K/UL — SIGNIFICANT CHANGE UP (ref 3.8–10.5)
WBC # FLD AUTO: 7.43 K/UL — SIGNIFICANT CHANGE UP (ref 3.8–10.5)

## 2024-04-05 PROCEDURE — C8929: CPT

## 2024-04-05 PROCEDURE — 84100 ASSAY OF PHOSPHORUS: CPT

## 2024-04-05 PROCEDURE — 97161 PT EVAL LOW COMPLEX 20 MIN: CPT

## 2024-04-05 PROCEDURE — 83540 ASSAY OF IRON: CPT

## 2024-04-05 PROCEDURE — 87637 SARSCOV2&INF A&B&RSV AMP PRB: CPT

## 2024-04-05 PROCEDURE — 97110 THERAPEUTIC EXERCISES: CPT

## 2024-04-05 PROCEDURE — 80053 COMPREHEN METABOLIC PANEL: CPT

## 2024-04-05 PROCEDURE — 82947 ASSAY GLUCOSE BLOOD QUANT: CPT

## 2024-04-05 PROCEDURE — 70553 MRI BRAIN STEM W/O & W/DYE: CPT | Mod: MC

## 2024-04-05 PROCEDURE — 80048 BASIC METABOLIC PNL TOTAL CA: CPT

## 2024-04-05 PROCEDURE — 74018 RADEX ABDOMEN 1 VIEW: CPT

## 2024-04-05 PROCEDURE — 97116 GAIT TRAINING THERAPY: CPT

## 2024-04-05 PROCEDURE — 83550 IRON BINDING TEST: CPT

## 2024-04-05 PROCEDURE — 81001 URINALYSIS AUTO W/SCOPE: CPT

## 2024-04-05 PROCEDURE — 85025 COMPLETE CBC W/AUTO DIFF WBC: CPT

## 2024-04-05 PROCEDURE — 82330 ASSAY OF CALCIUM: CPT

## 2024-04-05 PROCEDURE — 36415 COLL VENOUS BLD VENIPUNCTURE: CPT

## 2024-04-05 PROCEDURE — 83036 HEMOGLOBIN GLYCOSYLATED A1C: CPT

## 2024-04-05 PROCEDURE — 95700 EEG CONT REC W/VID EEG TECH: CPT

## 2024-04-05 PROCEDURE — 96374 THER/PROPH/DIAG INJ IV PUSH: CPT

## 2024-04-05 PROCEDURE — A9585: CPT

## 2024-04-05 PROCEDURE — 82652 VIT D 1 25-DIHYDROXY: CPT

## 2024-04-05 PROCEDURE — 97530 THERAPEUTIC ACTIVITIES: CPT

## 2024-04-05 PROCEDURE — 93970 EXTREMITY STUDY: CPT

## 2024-04-05 PROCEDURE — 95714 VEEG EA 12-26 HR UNMNTR: CPT

## 2024-04-05 PROCEDURE — 70496 CT ANGIOGRAPHY HEAD: CPT | Mod: MC

## 2024-04-05 PROCEDURE — 87086 URINE CULTURE/COLONY COUNT: CPT

## 2024-04-05 PROCEDURE — 84425 ASSAY OF VITAMIN B-1: CPT

## 2024-04-05 PROCEDURE — 82140 ASSAY OF AMMONIA: CPT

## 2024-04-05 PROCEDURE — 82435 ASSAY OF BLOOD CHLORIDE: CPT

## 2024-04-05 PROCEDURE — 83735 ASSAY OF MAGNESIUM: CPT

## 2024-04-05 PROCEDURE — 85027 COMPLETE CBC AUTOMATED: CPT

## 2024-04-05 PROCEDURE — 84207 ASSAY OF VITAMIN B-6: CPT

## 2024-04-05 PROCEDURE — 82728 ASSAY OF FERRITIN: CPT

## 2024-04-05 PROCEDURE — 85652 RBC SED RATE AUTOMATED: CPT

## 2024-04-05 PROCEDURE — 70450 CT HEAD/BRAIN W/O DYE: CPT | Mod: MC

## 2024-04-05 PROCEDURE — 85610 PROTHROMBIN TIME: CPT

## 2024-04-05 PROCEDURE — 85018 HEMOGLOBIN: CPT

## 2024-04-05 PROCEDURE — 85014 HEMATOCRIT: CPT

## 2024-04-05 PROCEDURE — 82746 ASSAY OF FOLIC ACID SERUM: CPT

## 2024-04-05 PROCEDURE — 82962 GLUCOSE BLOOD TEST: CPT

## 2024-04-05 PROCEDURE — 84443 ASSAY THYROID STIM HORMONE: CPT

## 2024-04-05 PROCEDURE — 99285 EMERGENCY DEPT VISIT HI MDM: CPT | Mod: 25

## 2024-04-05 PROCEDURE — 95816 EEG AWAKE AND DROWSY: CPT

## 2024-04-05 PROCEDURE — 84132 ASSAY OF SERUM POTASSIUM: CPT

## 2024-04-05 PROCEDURE — 76770 US EXAM ABDO BACK WALL COMP: CPT

## 2024-04-05 PROCEDURE — 80061 LIPID PANEL: CPT

## 2024-04-05 PROCEDURE — 93005 ELECTROCARDIOGRAM TRACING: CPT

## 2024-04-05 PROCEDURE — 84295 ASSAY OF SERUM SODIUM: CPT

## 2024-04-05 PROCEDURE — 83605 ASSAY OF LACTIC ACID: CPT

## 2024-04-05 PROCEDURE — 92610 EVALUATE SWALLOWING FUNCTION: CPT

## 2024-04-05 PROCEDURE — 82550 ASSAY OF CK (CPK): CPT

## 2024-04-05 PROCEDURE — 71045 X-RAY EXAM CHEST 1 VIEW: CPT

## 2024-04-05 PROCEDURE — 94640 AIRWAY INHALATION TREATMENT: CPT

## 2024-04-05 PROCEDURE — 87040 BLOOD CULTURE FOR BACTERIA: CPT

## 2024-04-05 PROCEDURE — 85730 THROMBOPLASTIN TIME PARTIAL: CPT

## 2024-04-05 PROCEDURE — 82607 VITAMIN B-12: CPT

## 2024-04-05 PROCEDURE — 86140 C-REACTIVE PROTEIN: CPT

## 2024-04-05 PROCEDURE — 70498 CT ANGIOGRAPHY NECK: CPT | Mod: MC

## 2024-04-05 PROCEDURE — 82803 BLOOD GASES ANY COMBINATION: CPT

## 2024-04-05 PROCEDURE — 74176 CT ABD & PELVIS W/O CONTRAST: CPT | Mod: MC

## 2024-04-05 RX ORDER — DILTIAZEM HCL 120 MG
1 CAPSULE, EXT RELEASE 24 HR ORAL
Refills: 0 | DISCHARGE

## 2024-04-05 RX ORDER — LEVETIRACETAM 250 MG/1
1 TABLET, FILM COATED ORAL
Qty: 60 | Refills: 0
Start: 2024-04-05 | End: 2024-05-04

## 2024-04-05 RX ORDER — OLANZAPINE 15 MG/1
1 TABLET, FILM COATED ORAL
Qty: 3 | Refills: 0
Start: 2024-04-05 | End: 2024-04-07

## 2024-04-05 RX ORDER — OLANZAPINE 15 MG/1
1 TABLET, FILM COATED ORAL
Qty: 3 | Refills: 0
Start: 2024-04-05 | End: 2024-04-09

## 2024-04-05 RX ORDER — DILTIAZEM HCL 120 MG
1 CAPSULE, EXT RELEASE 24 HR ORAL
Qty: 0 | Refills: 0 | DISCHARGE
Start: 2024-04-05

## 2024-04-05 RX ADMIN — PANTOPRAZOLE SODIUM 40 MILLIGRAM(S): 20 TABLET, DELAYED RELEASE ORAL at 05:51

## 2024-04-05 RX ADMIN — FINASTERIDE 5 MILLIGRAM(S): 5 TABLET, FILM COATED ORAL at 11:43

## 2024-04-05 RX ADMIN — POLYETHYLENE GLYCOL 3350 17 GRAM(S): 17 POWDER, FOR SOLUTION ORAL at 11:43

## 2024-04-05 RX ADMIN — MONTELUKAST 10 MILLIGRAM(S): 4 TABLET, CHEWABLE ORAL at 11:43

## 2024-04-05 RX ADMIN — LEVETIRACETAM 400 MILLIGRAM(S): 250 TABLET, FILM COATED ORAL at 05:51

## 2024-04-05 RX ADMIN — LIDOCAINE 1 APPLICATION(S): 4 CREAM TOPICAL at 11:51

## 2024-04-05 RX ADMIN — Medication 650 MILLIGRAM(S): at 13:17

## 2024-04-05 RX ADMIN — Medication 3 MILLILITER(S): at 11:42

## 2024-04-05 RX ADMIN — Medication 3 MILLILITER(S): at 05:51

## 2024-04-05 RX ADMIN — Medication 20 MILLIGRAM(S): at 05:51

## 2024-04-05 RX ADMIN — BUDESONIDE AND FORMOTEROL FUMARATE DIHYDRATE 2 PUFF(S): 160; 4.5 AEROSOL RESPIRATORY (INHALATION) at 05:51

## 2024-04-05 NOTE — PROGRESS NOTE ADULT - SUBJECTIVE AND OBJECTIVE BOX
Neurology Progress Note    SUBJECTIVE/OBJECTIVE/INTERVAL EVENTS: Patient seen and examined at bedside w/ neuro attending and team.     INTERVAL HISTORY: Notified by primary team for seizure-like activity x2. Per daughter at bedside, daughter was on the phone this morning when pt tapped on her for attention and when she turned around noted pt "looking up, eyes open, and head-shaking side to side and maybe both hands." No stiffness, tongue biting or urinary incontinence. Lasted <10 seconds. Pt returned to baseline pretty quickly. 2 hours later, another episode was noted after pt had been moved from bathroom to bed after having a bowel movement. Same description of events and lasting "a couple of seconds." RRT was called for these episodes. Vitals stable and no major changes. Pt currently stable and at his baseline.     REVIEW OF SYSTEMS: Otherwise denies fever, chills, headaches, vision changes, nausea, vomiting, focal weakness, focal numbness, bowel/ bladder incontinence. Few questions of a 10-system ROS was performed and is negative except for those items noted above and/or in the HPI.    VITALS & EXAMINATION:  Vital Signs Last 24 Hrs  T(C): 36.7 (2024 11:20), Max: 36.8 (2024 20:06)  T(F): 98 (2024 11:20), Max: 98.2 (2024 20:06)  HR: 87 (2024 11:20) (76 - 104)  BP: 100/69 (2024 11:20) (94/54 - 130/71)  BP(mean): --  RR: 16 (2024 11:20) (16 - 18)  SpO2: 97% (2024 11:20) (93% - 98%)    Parameters below as of 2024 11:20  Patient On (Oxygen Delivery Method): room air    General:  Constitutional: Male, appears stated age, nontoxic, not in distress  Head: Normocephalic;   Eyes: clear sclera;   Extremities: No cyanosis;   Resp: breathing comfortably     Neurological (>12):  MS: Awake, alert. Oriented to person, place (hospital) but not to time. Follows simple commands. Attends to examiner.   Language: Speech is hypophonic, mildly dysarthric, and fluent.   Cranial nerves - pupils are pinpoint (2mm b/l)/minimally reactive, BTT b/l, EOMI, face sensation (V1-V3) intact b/l, R NLF flattening - unclear chronicity, very [place], palate with symmetric elevation, shoulder shrug intact b/l, tongue midline.  Motor - normal bulk and tone throughout. 4/5 strength in BUE and BLE.   Sensation: Intact to LT b/l x4 extremities  Coordination: No dysmetria to FTN b/l UE    LABORATORY:  CBC                       8.5    8.55  )-----------( 194      ( 2024 13:07 )             29.0     Chem 04-    142  |  104  |  27<H>  ----------------------------<  121<H>  4.1   |  28  |  1.34<H>    Ca    9.3      2024 08:15      LFTs   Coagulopathy   Lipid Panel  Chol 130 LDL -- HDL 53 Trig 51  A1c   Cardiac enzymes     U/A Urinalysis Basic - ( 2024 14:43 )    Color: Yellow / Appearance: Clear / S.012 / pH: x  Gluc: x / Ketone: Negative mg/dL  / Bili: Negative / Urobili: 0.2 mg/dL   Blood: x / Protein: Negative mg/dL / Nitrite: Negative   Leuk Esterase: Negative / RBC: 8 /HPF / WBC 0 /HPF   Sq Epi: x / Non Sq Epi: 0 /HPF / Bacteria: Negative /HPF      CSF  Immunological  Other    STUDIES & IMAGING: (EEG, CT, MR, U/S, TTE/TUSHAR):  < from: MR Head w/wo IV Cont (24 @ 13:57) >  IMPRESSION: Acute distal right MCA territory infarctions.    < end of copied text >  < from: CT Angio Neck w/ IV Cont (24 @ 15:48) >  IMPRESSION:    CT brain:  No hydrocephalus, acute intracranial hemorrhage, mass effect, or brain   edema.    Redemonstration of encephalomalacia and gliosis involving the bilateral   inferior frontal and left occipitotemporal regions, likely chronic   infarcts.    CTA brain:  No flow-limiting stenosis or vascular aneurysm. No AVM.    Venous system is well opacified, no evidence for venous sinus or cortical   vein thrombosis.    CTA neck:  No flow-limiting stenosis, evidence for arterial dissection, or vascular   aneurysm.    < end of copied text >  
Slick Up MD  Interventional Cardiology / Advance Heart Failure and Cardiac Transplant Specialist  [place] Office : [phone] [place] [id number]  Tel:   [place] Office : [address] [facility] [address]  Tel: [phone]      Subjective/Overnight events: Pt is lying in bed comfortable not in distress, no chest pains no SOB no palpitations  	  MEDICATIONS:  apixaban 5 milliGRAM(s) Oral two times a day  diltiazem    milliGRAM(s) Oral at bedtime  furosemide    Tablet 20 milliGRAM(s) Oral daily      albuterol/ipratropium for Nebulization 3 milliLiter(s) Nebulizer every 6 hours  budesonide 160 MICROgram(s)/formoterol 4.5 MICROgram(s) Inhaler 2 Puff(s) Inhalation two times a day  guaiFENesin Oral Liquid (Sugar-Free) 200 milliGRAM(s) Oral every 6 hours PRN  montelukast 10 milliGRAM(s) Oral daily    acetaminophen     Tablet .. 650 milliGRAM(s) Oral every 6 hours PRN  gabapentin 100 milliGRAM(s) Oral at bedtime  melatonin 3 milliGRAM(s) Oral at bedtime    pantoprazole    Tablet 40 milliGRAM(s) Oral before breakfast  polyethylene glycol 3350 17 Gram(s) Oral daily  senna 2 Tablet(s) Oral at bedtime    atorvastatin 80 milliGRAM(s) Oral at bedtime  dextrose 50% Injectable 12.5 Gram(s) IV Push once  dextrose 50% Injectable 25 Gram(s) IV Push once  dextrose 50% Injectable 25 Gram(s) IV Push once  dextrose Oral Gel 15 Gram(s) Oral once PRN  finasteride 5 milliGRAM(s) Oral daily  glucagon  Injectable 1 milliGRAM(s) IntraMuscular once  insulin lispro (ADMELOG) corrective regimen sliding scale   SubCutaneous every 6 hours    dextrose 5%. 1000 milliLiter(s) IV Continuous <Continuous>  dextrose 5%. 1000 milliLiter(s) IV Continuous <Continuous>  tamsulosin 0.8 milliGRAM(s) Oral at bedtime      PAST MEDICAL/SURGICAL HISTORY  PAST MEDICAL & SURGICAL HISTORY:  Afib      Asthmatic bronchitis , chronic      GERD (gastroesophageal reflux disease)      HLD (hyperlipidemia)      HTN (hypertension)      No significant past surgical history          SOCIAL HISTORY: Substance Use (street drugs): ( x ) never used  (  ) other:    FAMILY HISTORY:  FH: stroke (Mother, Sibling)          PHYSICAL EXAM:  T(C): 36.7 (04-03-24 @ 04:29), Max: 36.8 (04-02-24 @ 20:06)  HR: 76 (04-03-24 @ 04:29) (60 - 104)  BP: 106/56 (04-03-24 @ 05:24) (94/54 - 137/87)  RR: 16 (04-03-24 @ 04:29) (16 - 18)  SpO2: 93% (04-03-24 @ 04:29) (93% - 98%)  Wt(kg): --  I&O's Summary    02 Apr 2024 07:01  -  03 Apr 2024 07:00  --------------------------------------------------------  IN: 480 mL / OUT: 500 mL / NET: -20 mL    03 Apr 2024 07:01  -  03 Apr 2024 10:40  --------------------------------------------------------  IN: 120 mL / OUT: 0 mL / NET: 120 mL          GENERAL: NAD  EYES:  conjunctiva and sclera clear  ENMT: No tonsillar erythema, exudates, or enlargement  Cardiovascular: Normal S1 S2, No JVD, No murmurs, No edema  Respiratory: Lungs clear to auscultation	  Gastrointestinal:  Soft  Extremities: No edema                                     8.8    8.13  )-----------( 203      ( 02 Apr 2024 08:15 )             30.4     04-02    142  |  104  |  27<H>  ----------------------------<  121<H>  4.1   |  28  |  1.34<H>    Ca    9.3      02 Apr 2024 08:15      proBNP:   Lipid Profile:   HgA1c:   TSH:     Consultant(s) Notes Reviewed:  [x ] YES  [ ] NO    Care Discussed with Consultants/Other Providers [ x] YES  [ ] NO    Imaging Personally Reviewed independently:  [x] YES  [ ] NO    All labs, radiologic studies, vitals, orders and medications list reviewed. Patient is seen and examined at bedside. Case discussed with medical team.        
Neurology Progress Note    S: Patient seen and examined.  RRT yesterday; CTH with [facility]      Medications: MEDICATIONS  (STANDING):  albuterol/ipratropium for Nebulization 3 milliLiter(s) Nebulizer every 6 hours  atorvastatin 80 milliGRAM(s) Oral at bedtime  budesonide 160 MICROgram(s)/formoterol 4.5 MICROgram(s) Inhaler 2 Puff(s) Inhalation two times a day  diltiazem    milliGRAM(s) Oral at bedtime  finasteride 5 milliGRAM(s) Oral daily  furosemide    Tablet 20 milliGRAM(s) Oral daily  gabapentin 100 milliGRAM(s) Oral at bedtime  glucagon  Injectable 1 milliGRAM(s) IntraMuscular once  levETIRAcetam  IVPB 1000 milliGRAM(s) IV Intermittent every 12 hours  lidocaine 2% Gel 1 Application(s) Topical daily  melatonin 3 milliGRAM(s) Oral at bedtime  montelukast 10 milliGRAM(s) Oral daily  pantoprazole    Tablet 40 milliGRAM(s) Oral before breakfast  polyethylene glycol 3350 17 Gram(s) Oral daily  senna 2 Tablet(s) Oral at bedtime  tamsulosin 0.8 milliGRAM(s) Oral at bedtime    MEDICATIONS  (PRN):  acetaminophen     Tablet .. 650 milliGRAM(s) Oral every 6 hours PRN Temp greater or equal to 38C (100.4F), Mild Pain (1 - 3)  guaiFENesin Oral Liquid (Sugar-Free) 200 milliGRAM(s) Oral every 6 hours PRN Cough  OLANZapine Injectable 2.5 milliGRAM(s) IntraMuscular every 6 hours PRN agitation       Vitals:  Vital Signs Last 24 Hrs  T(C): 37 (2024 21:20), Max: 37 (2024 21:20)  T(F): 98.6 (2024 21:20), Max: 98.6 (2024 21:20)  HR: 120 (2024 21:20) (115 - 120)  BP: 109/62 (2024 21:20) (109/62 - 137/83)  BP(mean): 78 (2024 21:20) (78 - 78)  RR: 18 (2024 21:20) (18 - 18)  SpO2: 98% (2024 21:20) (95% - 98%)    Parameters below as of 2024 21:20  Patient On (Oxygen Delivery Method): room air              General Exam:   General Appearance: Appropriately dressed and in no acute distress       Head: Normocephalic, atraumatic and no dysmorphic features  Ear, Nose, and Throat: Moist mucous membranes  CVS: S1S2+  Resp: No SOB, no wheeze or rhonchi  Abd: soft NTND  Extremities: No edema, no cyanosis  Skin: No bruises, no rashes       Neurologic Exam:  Mental status:  - Awake, Alert  - Oriented to: person. Knows he is in the hospital. Does not know the month/date/year.  - Speech: fluent  - Repetition and naming: intact   - Follows simple commands    Cranial nerves - pupils are pinpoint/minimally reactive, BTT b/l, EOMI - no nystagmus, face sensation (V1-V3) intact b/l, R NLF flattening - unclear chronicity, very [place], palate with symmetric elevation, shoulder shrug intact b/l, tongue midline on protrusion with full lateral movement  Dysarthria: per daughter, speech is slightly slurred    Motor - DYSON at least 4/5   Sensation - Light touch intact throughout        Coordination - Finger to Nose intact b/l. No tremors appreciated.    Gait and station - Unable to assess d/t fall risk/safety concerns.    I personally reviewed the below data/images/labs:        LABS:                          8.5    8.55  )-----------( 194      ( 2024 13:07 )             29.0     04-03    138  |  100  |  24<H>  ----------------------------<  157<H>  3.7   |  24  |  1.08    Ca    8.9      2024 13:07  Phos  2.7     04-03  Mg     2.1     04-03    TPro  6.6  /  Alb  3.5  /  TBili  0.6  /  DBili  x   /  AST  21  /  ALT  18  /  AlkPhos  70  04-03    LIVER FUNCTIONS - ( 2024 13:07 )  Alb: 3.5 g/dL / Pro: 6.6 g/dL / ALK PHOS: 70 U/L / ALT: 18 U/L / AST: 21 U/L / GGT: x             Urinalysis Basic - ( 2024 16:58 )    Color: Yellow / Appearance: Clear / S.025 / pH: x  Gluc: x / Ketone: Negative mg/dL  / Bili: Negative / Urobili: 1.0 mg/dL   Blood: x / Protein: 30 mg/dL / Nitrite: Negative   Leuk Esterase: Trace / RBC: 100 /HPF / WBC 7 /HPF   Sq Epi: x / Non Sq Epi: 1 /HPF / Bacteria: Negative /HPF             CT ANGIO BRAIN (W)AW IC  CT ANGIO NECK (W)AW IC  CT BRAIN     PROCEDURE DATE:  2024      INTERPRETATION:  CT angiography of the brain and neck    CLINICAL INDICATION: Right-sided weakness, stopped Elequis 2 weeks ago    TECHNIQUE: Direct axial CT scanning of the brain and neck was obtained   from the vertex to the level of the clavicular heads after the dynamic   intravenous injection of 70 cc of Omnipaque 300. 30 cc discarded.   Sagittal and coronal maximum intensity projection reformats were   provided.  Three-dimensional reconstructions were performed by the   radiologist using the [employer] workstation.    COMPARISON: CT brain 2023    FINDINGS:    CT BRAIN:    No hydrocephalus, midline shift, or effacement of the basal cisterns.    No acute parenchymal hemorrhage or brain edema.    Redemonstration of encephalomalacia and gliosis involving the bilateral   inferior frontal and left occipitotemporal regions, likely chronic   infarcts.    Mild white matter microvascular ischemic disease.    Bilateral ethmoid and maxillary sinus polypoid mucosal thickening.    Mastoid air cells clear.    CTA BRAIN:    Moderate short segment stenosis of the left mid supraclinoid ICA due to   calcified plaque. Normal flow-related enhancement of the more proximal   and distal skull base/intracranial internal carotid artery.    Normal flow-related enhancement of the right skull base/intracranial   internal carotid artery.    Normal flow-related enhancement of the bilateral anterior, middle, and   posterior cerebral arteries.    Normal flow-related enhancement of the bilateral intradural vertebral   arteries and the basilar artery.    No flow-limiting stenosis or vascular aneurysm. No AVM.    Venous system is well opacified, no evidence for venous sinus or cortical   vein thrombosis.    CTA NECK:    Normal flow-related enhancement of the bilateral common and internal   carotid arteries.    Normal flow-related enhancement of the bilateral vertebral arteries.    No flow-limiting stenosis, evidence for arterial dissection, or vascular   aneurysm.    IMPRESSION:    CT brain:  No hydrocephalus, acute intracranial hemorrhage, mass effect, or brain   edema.    Redemonstration of encephalomalacia and gliosis involving the bilateral   inferior frontal and left occipitotemporal regions, likely chronic   infarcts.    CTA brain:  No flow-limiting stenosis or vascular aneurysm. No AVM.    Venous system is well opacified, no evidence for venous sinus or cortical   vein thrombosis.    CTA neck:  No flow-limiting stenosis, evidence for arterial dissection, or vascular   aneurysm.      < from: MR Head w/wo IV Cont (24 @ 13:57) >    ACC: [id number] EXAM:  MR BRAIN WAW IC   ORDERED BY:  DANYELLE CHAN     PROCEDURE DATE:  2024          INTERPRETATION:  CLINICAL INDICATIONS: confusion and weakness    COMPARISON: Head CT dated 3/27/2024    TECHNIQUE: MRI brain: Multiplanar, multisequence MR imaging of the brain   are obtained with and without the administration of 7 cc intravenous   Gadavist contrast. 0.5 cc of contrast was discarded    FINDINGS:    No abnormal leptomeningeal or parenchymal enhancement. Bilateral anterior  inferior frontal lobe cystic encephalomalacia and gliosis, right worse   than left. Cystic encephalomalacia and gliosis involving the bilateral   anterior temporal lobes. Chronic left posterior temporal occipital lobe   infarction.    here are multiple small foci of abnormal restricted diffusion involving   the right corona radiata, posterior insular ribbon, posterior frontal   lobe, right parietal lobe, right temporal occipital lobes compatible with   acute infarctions.    Scattered periventricular and subcortical white matter T2 /FLAIR   hyperintensities are seen without mass effect, nonspecific, likely   representing moderate chronic microvascular changes. Normal T2 flow-voids   are seen within  the intracranial vasculature. The lateral ventricles and   cortical sulci are age-appropriate in size and configuration. There is no   mass, mass effect, or extra-axial fluid collection.    3 mm susceptibility artifact in the posterior left corona radiata image   51 series 9, may suggest a small cavernoma versus amyloid angiopathy   versus chronic hypertensive microhemorrhage. Midline structures are   normal.    The patient is status post bilateral ocular lens replacement surgery.   Moderate to severe inflammatory mucosal changes are seenthroughout the   various portions of the paranasal sinuses. The orbits and mastoid air   cells are unremarkable.    IMPRESSION: Acute distal right MCA territory infarctions.    --- End of Report ---           EEG Impression / Clinical Correlate:  Abnormal prolonged EEG study due to   1- Mild diffuse/multifocal cerebral dysfunction that is not specific in etiology  2- Focal slowing in left temporooccipital region which indicates focal cerebral dysfunction or structural abnormality in that region.  
Neurology Progress Note    S: Patient seen and examined. No new events overnight.     Medication:  acetaminophen     Tablet .. 650 milliGRAM(s) Oral every 6 hours PRN  albuterol/ipratropium for Nebulization 3 milliLiter(s) Nebulizer every 6 hours  aspirin enteric coated 81 milliGRAM(s) Oral daily  atorvastatin 80 milliGRAM(s) Oral at bedtime  budesonide 160 MICROgram(s)/formoterol 4.5 MICROgram(s) Inhaler 2 Puff(s) Inhalation two times a day  dextrose 5%. 1000 milliLiter(s) IV Continuous <Continuous>  dextrose 5%. 1000 milliLiter(s) IV Continuous <Continuous>  dextrose 50% Injectable 12.5 Gram(s) IV Push once  dextrose 50% Injectable 25 Gram(s) IV Push once  dextrose 50% Injectable 25 Gram(s) IV Push once  dextrose Oral Gel 15 Gram(s) Oral once PRN  diltiazem    milliGRAM(s) Oral at bedtime  enoxaparin Injectable 40 milliGRAM(s) SubCutaneous every 24 hours  finasteride 5 milliGRAM(s) Oral daily  furosemide    Tablet 20 milliGRAM(s) Oral daily  glucagon  Injectable 1 milliGRAM(s) IntraMuscular once  insulin lispro (ADMELOG) corrective regimen sliding scale   SubCutaneous every 6 hours  melatonin 3 milliGRAM(s) Oral at bedtime  montelukast 10 milliGRAM(s) Oral daily  pantoprazole    Tablet 40 milliGRAM(s) Oral before breakfast  senna 2 Tablet(s) Oral at bedtime  tamsulosin 0.8 milliGRAM(s) Oral at bedtime      Vitals:  Vital Signs Last 24 Hrs  T(C): 36.6 (29 Mar 2024 20:09), Max: 36.6 (29 Mar 2024 06:14)  T(F): 97.9 (29 Mar 2024 20:09), Max: 97.9 (29 Mar 2024 20:09)  HR: 126 (29 Mar 2024 20:09) (79 - 126)  BP: 131/78 (29 Mar 2024 20:09) (115/63 - 131/78)  BP(mean): --  RR: 18 (29 Mar 2024 20:09) (18 - 18)  SpO2: 97% (29 Mar 2024 20:09) (95% - 97%)    Parameters below as of 29 Mar 2024 20:09  Patient On (Oxygen Delivery Method): room air        General Exam:   General Appearance: Appropriately dressed and in no acute distress       Head: Normocephalic, atraumatic and no dysmorphic features  Ear, Nose, and Throat: Moist mucous membranes  CVS: S1S2+  Resp: No SOB, no wheeze or rhonchi  Abd: soft NTND  Extremities: No edema, no cyanosis  Skin: No bruises, no rashes       Neurologic Exam:  Mental status:  - Awake, Alert  - Oriented to: person. Knows he is in the hospital. Does not know the month/date/year.  - Speech: fluent  - Repetition and naming: intact   - Follows simple commands    Cranial nerves - pupils are pinpoint/minimally reactive, BTT b/l, EOMI - no nystagmus, face sensation (V1-V3) intact b/l, R NLF flattening - unclear chronicity, very [place], palate with symmetric elevation, shoulder shrug intact b/l, tongue midline on protrusion with full lateral movement  Dysarthria: per daughter, speech is slightly slurred    Motor - +[place] throughout. Normal bulk for age throughout. No pronator drift.   Strength testing (R/L)  Deltoid:  5/5  Biceps:  x       Triceps:  x     Wrist Extension:  x      Wrist Flexion:  x       Interossei:  5/5        :  5/5    x - does not cooperate    Hip Flexion:  5/5    Knee Flexion:  5/5      Knee Extension:  5/5      Dorsiflexion:  5/5      Plantar Flexion:  5/5    Sensation - Light touch intact throughout    DTRs (R/L)  Deferred d/t focused neurologic exam    Coordination - Finger to Nose intact b/l. No tremors appreciated.    Gait and station - Unable to assess d/t fall risk/safety concerns.    I personally reviewed the below data/images/labs:      CBC Full  -  ( 29 Mar 2024 06:42 )  WBC Count : 8.61 K/uL  RBC Count : 3.77 M/uL  Hemoglobin : 8.3 g/dL  Hematocrit : 28.1 %  Platelet Count - Automated : 175 K/uL  Mean Cell Volume : 74.5 fl  Mean Cell Hemoglobin : 22.0 pg  Mean Cell Hemoglobin Concentration : 29.5 gm/dL  Auto Neutrophil # : x  Auto Lymphocyte # : x  Auto Monocyte # : x  Auto Eosinophil # : x  Auto Basophil # : x  Auto Neutrophil % : x  Auto Lymphocyte % : x  Auto Monocyte % : x  Auto Eosinophil % : x  Auto Basophil % : x    03-29    142  |  103  |  19  ----------------------------<  101<H>  3.7   |  25  |  1.42<H>    Ca    8.6      29 Mar 2024 06:42          Urinalysis Basic - ( 29 Mar 2024 06:42 )    Color: x / Appearance: x / SG: x / pH: x  Gluc: 101 mg/dL / Ketone: x  / Bili: x / Urobili: x   Blood: x / Protein: x / Nitrite: x   Leuk Esterase: x / RBC: x / WBC x   Sq Epi: x / Non Sq Epi: x / Bacteria: x      CT ANGIO BRAIN (W)AW IC  CT ANGIO NECK (W)AW IC  CT BRAIN     PROCEDURE DATE:  03/27/2024      INTERPRETATION:  CT angiography of the brain and neck    CLINICAL INDICATION: Right-sided weakness, stopped Elequis 2 weeks ago    TECHNIQUE: Direct axial CT scanning of the brain and neck was obtained   from the vertex to the level of the clavicular heads after the dynamic   intravenous injection of 70 cc of Omnipaque 300. 30 cc discarded.   Sagittal and coronal maximum intensity projection reformats were   provided.  Three-dimensional reconstructions were performed by the   radiologist using the [employer] workstation.    COMPARISON: CT brain 8/7/2023    FINDINGS:    CT BRAIN:    No hydrocephalus, midline shift, or effacement of the basal cisterns.    No acute parenchymal hemorrhage or brain edema.    Redemonstration of encephalomalacia and gliosis involving the bilateral   inferior frontal and left occipitotemporal regions, likely chronic   infarcts.    Mild white matter microvascular ischemic disease.    Bilateral ethmoid and maxillary sinus polypoid mucosal thickening.    Mastoid air cells clear.    CTA BRAIN:    Moderate short segment stenosis of the left mid supraclinoid ICA due to   calcified plaque. Normal flow-related enhancement of the more proximal   and distal skull base/intracranial internal carotid artery.    Normal flow-related enhancement of the right skull base/intracranial   internal carotid artery.    Normal flow-related enhancement of the bilateral anterior, middle, and   posterior cerebral arteries.    Normal flow-related enhancement of the bilateral intradural vertebral   arteries and the basilar artery.    No flow-limiting stenosis or vascular aneurysm. No AVM.    Venous system is well opacified, no evidence for venous sinus or cortical   vein thrombosis.    CTA NECK:    Normal flow-related enhancement of the bilateral common and internal   carotid arteries.    Normal flow-related enhancement of the bilateral vertebral arteries.    No flow-limiting stenosis, evidence for arterial dissection, or vascular   aneurysm.    IMPRESSION:    CT brain:  No hydrocephalus, acute intracranial hemorrhage, mass effect, or brain   edema.    Redemonstration of encephalomalacia and gliosis involving the bilateral   inferior frontal and left occipitotemporal regions, likely chronic   infarcts.    CTA brain:  No flow-limiting stenosis or vascular aneurysm. No AVM.    Venous system is well opacified, no evidence for venous sinus or cortical   vein thrombosis.    CTA neck:  No flow-limiting stenosis, evidence for arterial dissection, or vascular   aneurysm.        
Neurology Progress Note    S: Patient seen and examined. No new events overnight. family at bedside. MRI with R MCA infarct         Medications: MEDICATIONS  (STANDING):  albuterol/ipratropium for Nebulization 3 milliLiter(s) Nebulizer every 6 hours  apixaban 2.5 milliGRAM(s) Oral two times a day  atorvastatin 80 milliGRAM(s) Oral at bedtime  budesonide 160 MICROgram(s)/formoterol 4.5 MICROgram(s) Inhaler 2 Puff(s) Inhalation two times a day  dextrose 5%. 1000 milliLiter(s) (100 mL/Hr) IV Continuous <Continuous>  dextrose 5%. 1000 milliLiter(s) (50 mL/Hr) IV Continuous <Continuous>  dextrose 50% Injectable 12.5 Gram(s) IV Push once  dextrose 50% Injectable 25 Gram(s) IV Push once  dextrose 50% Injectable 25 Gram(s) IV Push once  diltiazem    milliGRAM(s) Oral at bedtime  finasteride 5 milliGRAM(s) Oral daily  furosemide    Tablet 20 milliGRAM(s) Oral daily  glucagon  Injectable 1 milliGRAM(s) IntraMuscular once  insulin lispro (ADMELOG) corrective regimen sliding scale   SubCutaneous every 6 hours  melatonin 3 milliGRAM(s) Oral at bedtime  montelukast 10 milliGRAM(s) Oral daily  pantoprazole    Tablet 40 milliGRAM(s) Oral before breakfast  polyethylene glycol 3350 17 Gram(s) Oral daily  senna 2 Tablet(s) Oral at bedtime  tamsulosin 0.8 milliGRAM(s) Oral at bedtime    MEDICATIONS  (PRN):  acetaminophen     Tablet .. 650 milliGRAM(s) Oral every 6 hours PRN Temp greater or equal to 38C (100.4F), Mild Pain (1 - 3)  dextrose Oral Gel 15 Gram(s) Oral once PRN Blood Glucose LESS THAN 70 milliGRAM(s)/deciliter  guaiFENesin Oral Liquid (Sugar-Free) 200 milliGRAM(s) Oral every 6 hours PRN Cough       Vitals:  Vital Signs Last 24 Hrs  T(C): 36.8 (01 Apr 2024 09:00), Max: 36.8 (31 Mar 2024 21:57)  T(F): 98.2 (01 Apr 2024 09:00), Max: 98.3 (01 Apr 2024 00:30)  HR: 82 (01 Apr 2024 09:00) (82 - 93)  BP: 114/65 (01 Apr 2024 09:00) (102/64 - 138/66)  BP(mean): 82 (01 Apr 2024 05:01) (82 - 90)  RR: 18 (01 Apr 2024 09:00) (16 - 18)  SpO2: 96% (01 Apr 2024 09:00) (92% - 97%)    Parameters below as of 01 Apr 2024 09:00  Patient On (Oxygen Delivery Method): room air             General Exam:   General Appearance: Appropriately dressed and in no acute distress       Head: Normocephalic, atraumatic and no dysmorphic features  Ear, Nose, and Throat: Moist mucous membranes  CVS: S1S2+  Resp: No SOB, no wheeze or rhonchi  Abd: soft NTND  Extremities: No edema, no cyanosis  Skin: No bruises, no rashes       Neurologic Exam:  Mental status:  - Awake, Alert  - Oriented to: person. Knows he is in the hospital. Does not know the month/date/year.  - Speech: fluent  - Repetition and naming: intact   - Follows simple commands    Cranial nerves - pupils are pinpoint/minimally reactive, BTT b/l, EOMI - no nystagmus, face sensation (V1-V3) intact b/l, R NLF flattening - unclear chronicity, very [place], palate with symmetric elevation, shoulder shrug intact b/l, tongue midline on protrusion with full lateral movement  Dysarthria: per daughter, speech is slightly slurred    Motor - DYSON at least 4/5   Sensation - Light touch intact throughout        Coordination - Finger to Nose intact b/l. No tremors appreciated.    Gait and station - Unable to assess d/t fall risk/safety concerns.    I personally reviewed the below data/images/labs:      LABS:                          8.7    6.47  )-----------( 181      ( 01 Apr 2024 10:40 )             29.2     04-01    141  |  104  |  25<H>  ----------------------------<  107<H>  3.9   |  25  |  1.31<H>    Ca    8.7      01 Apr 2024 10:40          Urinalysis Basic - ( 01 Apr 2024 10:40 )    Color: x / Appearance: x / SG: x / pH: x  Gluc: 107 mg/dL / Ketone: x  / Bili: x / Urobili: x   Blood: x / Protein: x / Nitrite: x   Leuk Esterase: x / RBC: x / WBC x   Sq Epi: x / Non Sq Epi: x / Bacteria: x        CT ANGIO BRAIN (W)AW IC  CT ANGIO NECK (W)AW IC  CT BRAIN     PROCEDURE DATE:  03/27/2024      INTERPRETATION:  CT angiography of the brain and neck    CLINICAL INDICATION: Right-sided weakness, stopped Elequis 2 weeks ago    TECHNIQUE: Direct axial CT scanning of the brain and neck was obtained   from the vertex to the level of the clavicular heads after the dynamic   intravenous injection of 70 cc of Omnipaque 300. 30 cc discarded.   Sagittal and coronal maximum intensity projection reformats were   provided.  Three-dimensional reconstructions were performed by the   radiologist using the [employer] workstation.    COMPARISON: CT brain 8/7/2023    FINDINGS:    CT BRAIN:    No hydrocephalus, midline shift, or effacement of the basal cisterns.    No acute parenchymal hemorrhage or brain edema.    Redemonstration of encephalomalacia and gliosis involving the bilateral   inferior frontal and left occipitotemporal regions, likely chronic   infarcts.    Mild white matter microvascular ischemic disease.    Bilateral ethmoid and maxillary sinus polypoid mucosal thickening.    Mastoid air cells clear.    CTA BRAIN:    Moderate short segment stenosis of the left mid supraclinoid ICA due to   calcified plaque. Normal flow-related enhancement of the more proximal   and distal skull base/intracranial internal carotid artery.    Normal flow-related enhancement of the right skull base/intracranial   internal carotid artery.    Normal flow-related enhancement of the bilateral anterior, middle, and   posterior cerebral arteries.    Normal flow-related enhancement of the bilateral intradural vertebral   arteries and the basilar artery.    No flow-limiting stenosis or vascular aneurysm. No AVM.    Venous system is well opacified, no evidence for venous sinus or cortical   vein thrombosis.    CTA NECK:    Normal flow-related enhancement of the bilateral common and internal   carotid arteries.    Normal flow-related enhancement of the bilateral vertebral arteries.    No flow-limiting stenosis, evidence for arterial dissection, or vascular   aneurysm.    IMPRESSION:    CT brain:  No hydrocephalus, acute intracranial hemorrhage, mass effect, or brain   edema.    Redemonstration of encephalomalacia and gliosis involving the bilateral   inferior frontal and left occipitotemporal regions, likely chronic   infarcts.    CTA brain:  No flow-limiting stenosis or vascular aneurysm. No AVM.    Venous system is well opacified, no evidence for venous sinus or cortical   vein thrombosis.    CTA neck:  No flow-limiting stenosis, evidence for arterial dissection, or vascular   aneurysm.      < from: MR Head w/wo IV Cont (03.30.24 @ 13:57) >    ACC: [id number] EXAM:  MR BRAIN WAW IC   ORDERED BY:  DANYELLE CHAN     PROCEDURE DATE:  03/30/2024          INTERPRETATION:  CLINICAL INDICATIONS: confusion and weakness    COMPARISON: Head CT dated 3/27/2024    TECHNIQUE: MRI brain: Multiplanar, multisequence MR imaging of the brain   are obtained with and without the administration of 7 cc intravenous   Gadavist contrast. 0.5 cc of contrast was discarded    FINDINGS:    No abnormal leptomeningeal or parenchymal enhancement. Bilateral anterior  inferior frontal lobe cystic encephalomalacia and gliosis, right worse   than left. Cystic encephalomalacia and gliosis involving the bilateral   anterior temporal lobes. Chronic left posterior temporal occipital lobe   infarction.    here are multiple small foci of abnormal restricted diffusion involving   the right corona radiata, posterior insular ribbon, posterior frontal   lobe, right parietal lobe, right temporal occipital lobes compatible with   acute infarctions.    Scattered periventricular and subcortical white matter T2 /FLAIR   hyperintensities are seen without mass effect, nonspecific, likely   representing moderate chronic microvascular changes. Normal T2 flow-voids   are seen within  the intracranial vasculature. The lateral ventricles and   cortical sulci are age-appropriate in size and configuration. There is no   mass, mass effect, or extra-axial fluid collection.    3 mm susceptibility artifact in the posterior left corona radiata image   51 series 9, may suggest a small cavernoma versus amyloid angiopathy   versus chronic hypertensive microhemorrhage. Midline structures are   normal.    The patient is status post bilateral ocular lens replacement surgery.   Moderate to severe inflammatory mucosal changes are seenthroughout the   various portions of the paranasal sinuses. The orbits and mastoid air   cells are unremarkable.    IMPRESSION: Acute distal right MCA territory infarctions.    --- End of Report ---            JENNY ALCANTARA MD; Attending Radiologist  This document has been electronically signed. Mar 30 2024  5:47PM    < end of copied text >    
Neurology Progress Note    S: Patient seen and examined. No new events overnight. family at bedside. MRI with R MCA infarct       Medications: MEDICATIONS  (STANDING):  albuterol/ipratropium for Nebulization 3 milliLiter(s) Nebulizer every 6 hours  aspirin enteric coated 81 milliGRAM(s) Oral daily  atorvastatin 80 milliGRAM(s) Oral at bedtime  budesonide 160 MICROgram(s)/formoterol 4.5 MICROgram(s) Inhaler 2 Puff(s) Inhalation two times a day  dextrose 5%. 1000 milliLiter(s) (100 mL/Hr) IV Continuous <Continuous>  dextrose 5%. 1000 milliLiter(s) (50 mL/Hr) IV Continuous <Continuous>  dextrose 50% Injectable 12.5 Gram(s) IV Push once  dextrose 50% Injectable 25 Gram(s) IV Push once  dextrose 50% Injectable 25 Gram(s) IV Push once  diltiazem    milliGRAM(s) Oral at bedtime  enoxaparin Injectable 40 milliGRAM(s) SubCutaneous every 24 hours  finasteride 5 milliGRAM(s) Oral daily  furosemide    Tablet 20 milliGRAM(s) Oral daily  glucagon  Injectable 1 milliGRAM(s) IntraMuscular once  insulin lispro (ADMELOG) corrective regimen sliding scale   SubCutaneous every 6 hours  melatonin 3 milliGRAM(s) Oral at bedtime  montelukast 10 milliGRAM(s) Oral daily  pantoprazole    Tablet 40 milliGRAM(s) Oral before breakfast  polyethylene glycol 3350 17 Gram(s) Oral daily  senna 2 Tablet(s) Oral at bedtime  tamsulosin 0.8 milliGRAM(s) Oral at bedtime    MEDICATIONS  (PRN):  acetaminophen     Tablet .. 650 milliGRAM(s) Oral every 6 hours PRN Temp greater or equal to 38C (100.4F), Mild Pain (1 - 3)  dextrose Oral Gel 15 Gram(s) Oral once PRN Blood Glucose LESS THAN 70 milliGRAM(s)/deciliter  guaiFENesin Oral Liquid (Sugar-Free) 200 milliGRAM(s) Oral every 6 hours PRN Cough       Vitals:  Vital Signs Last 24 Hrs  T(C): 36.7 (31 Mar 2024 04:22), Max: 36.9 (30 Mar 2024 20:46)  T(F): 98.1 (31 Mar 2024 04:22), Max: 98.5 (30 Mar 2024 20:46)  HR: 72 (31 Mar 2024 04:22) (72 - 90)  BP: 123/63 (31 Mar 2024 04:22) (123/63 - 126/70)  BP(mean): 88 (30 Mar 2024 20:46) (88 - 88)  RR: 18 (31 Mar 2024 04:22) (18 - 18)  SpO2: 96% (31 Mar 2024 04:22) (96% - 98%)    Parameters below as of 31 Mar 2024 04:22  Patient On (Oxygen Delivery Method): room air              General Exam:   General Appearance: Appropriately dressed and in no acute distress       Head: Normocephalic, atraumatic and no dysmorphic features  Ear, Nose, and Throat: Moist mucous membranes  CVS: S1S2+  Resp: No SOB, no wheeze or rhonchi  Abd: soft NTND  Extremities: No edema, no cyanosis  Skin: No bruises, no rashes       Neurologic Exam:  Mental status:  - Awake, Alert  - Oriented to: person. Knows he is in the hospital. Does not know the month/date/year.  - Speech: fluent  - Repetition and naming: intact   - Follows simple commands    Cranial nerves - pupils are pinpoint/minimally reactive, BTT b/l, EOMI - no nystagmus, face sensation (V1-V3) intact b/l, R NLF flattening - unclear chronicity, very [place], palate with symmetric elevation, shoulder shrug intact b/l, tongue midline on protrusion with full lateral movement  Dysarthria: per daughter, speech is slightly slurred    Motor - DYSON at least 4/5   Sensation - Light touch intact throughout        Coordination - Finger to Nose intact b/l. No tremors appreciated.    Gait and station - Unable to assess d/t fall risk/safety concerns.    I personally reviewed the below data/images/labs:        LABS:                          8.5    7.85  )-----------( 179      ( 30 Mar 2024 05:29 )             28.3     03-30    141  |  104  |  22  ----------------------------<  104<H>  3.6   |  24  |  1.37<H>    Ca    8.5      30 Mar 2024 05:29    TPro  6.4  /  Alb  3.4  /  TBili  0.6  /  DBili  x   /  AST  19  /  ALT  10  /  AlkPhos  72  03-30    LIVER FUNCTIONS - ( 30 Mar 2024 05:29 )  Alb: 3.4 g/dL / Pro: 6.4 g/dL / ALK PHOS: 72 U/L / ALT: 10 U/L / AST: 19 U/L / GGT: x             Urinalysis Basic - ( 30 Mar 2024 05:29 )    Color: x / Appearance: x / SG: x / pH: x  Gluc: 104 mg/dL / Ketone: x  / Bili: x / Urobili: x   Blood: x / Protein: x / Nitrite: x   Leuk Esterase: x / RBC: x / WBC x   Sq Epi: x / Non Sq Epi: x / Bacteria: x          CT ANGIO BRAIN (W)AW IC  CT ANGIO NECK (W)AW IC  CT BRAIN     PROCEDURE DATE:  03/27/2024      INTERPRETATION:  CT angiography of the brain and neck    CLINICAL INDICATION: Right-sided weakness, stopped Elequis 2 weeks ago    TECHNIQUE: Direct axial CT scanning of the brain and neck was obtained   from the vertex to the level of the clavicular heads after the dynamic   intravenous injection of 70 cc of Omnipaque 300. 30 cc discarded.   Sagittal and coronal maximum intensity projection reformats were   provided.  Three-dimensional reconstructions were performed by the   radiologist using the [employer] workstation.    COMPARISON: CT brain 8/7/2023    FINDINGS:    CT BRAIN:    No hydrocephalus, midline shift, or effacement of the basal cisterns.    No acute parenchymal hemorrhage or brain edema.    Redemonstration of encephalomalacia and gliosis involving the bilateral   inferior frontal and left occipitotemporal regions, likely chronic   infarcts.    Mild white matter microvascular ischemic disease.    Bilateral ethmoid and maxillary sinus polypoid mucosal thickening.    Mastoid air cells clear.    CTA BRAIN:    Moderate short segment stenosis of the left mid supraclinoid ICA due to   calcified plaque. Normal flow-related enhancement of the more proximal   and distal skull base/intracranial internal carotid artery.    Normal flow-related enhancement of the right skull base/intracranial   internal carotid artery.    Normal flow-related enhancement of the bilateral anterior, middle, and   posterior cerebral arteries.    Normal flow-related enhancement of the bilateral intradural vertebral   arteries and the basilar artery.    No flow-limiting stenosis or vascular aneurysm. No AVM.    Venous system is well opacified, no evidence for venous sinus or cortical   vein thrombosis.    CTA NECK:    Normal flow-related enhancement of the bilateral common and internal   carotid arteries.    Normal flow-related enhancement of the bilateral vertebral arteries.    No flow-limiting stenosis, evidence for arterial dissection, or vascular   aneurysm.    IMPRESSION:    CT brain:  No hydrocephalus, acute intracranial hemorrhage, mass effect, or brain   edema.    Redemonstration of encephalomalacia and gliosis involving the bilateral   inferior frontal and left occipitotemporal regions, likely chronic   infarcts.    CTA brain:  No flow-limiting stenosis or vascular aneurysm. No AVM.    Venous system is well opacified, no evidence for venous sinus or cortical   vein thrombosis.    CTA neck:  No flow-limiting stenosis, evidence for arterial dissection, or vascular   aneurysm.      < from: MR Head w/wo IV Cont (03.30.24 @ 13:57) >    ACC: [id number] EXAM:  MR BRAIN WAW IC   ORDERED BY:  DANYELLE CHAN     PROCEDURE DATE:  03/30/2024          INTERPRETATION:  CLINICAL INDICATIONS: confusion and weakness    COMPARISON: Head CT dated 3/27/2024    TECHNIQUE: MRI brain: Multiplanar, multisequence MR imaging of the brain   are obtained with and without the administration of 7 cc intravenous   Gadavist contrast. 0.5 cc of contrast was discarded    FINDINGS:    No abnormal leptomeningeal or parenchymal enhancement. Bilateral anterior  inferior frontal lobe cystic encephalomalacia and gliosis, right worse   than left. Cystic encephalomalacia and gliosis involving the bilateral   anterior temporal lobes. Chronic left posterior temporal occipital lobe   infarction.    here are multiple small foci of abnormal restricted diffusion involving   the right corona radiata, posterior insular ribbon, posterior frontal   lobe, right parietal lobe, right temporal occipital lobes compatible with   acute infarctions.    Scattered periventricular and subcortical white matter T2 /FLAIR   hyperintensities are seen without mass effect, nonspecific, likely   representing moderate chronic microvascular changes. Normal T2 flow-voids   are seen within  the intracranial vasculature. The lateral ventricles and   cortical sulci are age-appropriate in size and configuration. There is no   mass, mass effect, or extra-axial fluid collection.    3 mm susceptibility artifact in the posterior left corona radiata image   51 series 9, may suggest a small cavernoma versus amyloid angiopathy   versus chronic hypertensive microhemorrhage. Midline structures are   normal.    The patient is status post bilateral ocular lens replacement surgery.   Moderate to severe inflammatory mucosal changes are seenthroughout the   various portions of the paranasal sinuses. The orbits and mastoid air   cells are unremarkable.    IMPRESSION: Acute distal right MCA territory infarctions.    --- End of Report ---            JENNY ALCANTARA MD; Attending Radiologist  This document has been electronically signed. Mar 30 2024  5:47PM    < end of copied text >    
Neurology Progress Note    S: Patient seen and examined. doing okay family at bedside       Medications: MEDICATIONS  (STANDING):  albuterol/ipratropium for Nebulization 3 milliLiter(s) Nebulizer every 6 hours  atorvastatin 80 milliGRAM(s) Oral at bedtime  budesonide 160 MICROgram(s)/formoterol 4.5 MICROgram(s) Inhaler 2 Puff(s) Inhalation two times a day  diltiazem    milliGRAM(s) Oral at bedtime  finasteride 5 milliGRAM(s) Oral daily  furosemide    Tablet 20 milliGRAM(s) Oral daily  gabapentin 100 milliGRAM(s) Oral at bedtime  glucagon  Injectable 1 milliGRAM(s) IntraMuscular once  levETIRAcetam  IVPB 1000 milliGRAM(s) IV Intermittent every 12 hours  lidocaine 2% Gel 1 Application(s) Topical daily  melatonin 3 milliGRAM(s) Oral at bedtime  montelukast 10 milliGRAM(s) Oral daily  pantoprazole    Tablet 40 milliGRAM(s) Oral before breakfast  polyethylene glycol 3350 17 Gram(s) Oral daily  senna 2 Tablet(s) Oral at bedtime  tamsulosin 0.8 milliGRAM(s) Oral at bedtime    MEDICATIONS  (PRN):  acetaminophen     Tablet .. 650 milliGRAM(s) Oral every 6 hours PRN Temp greater or equal to 38C (100.4F), Mild Pain (1 - 3)  guaiFENesin Oral Liquid (Sugar-Free) 200 milliGRAM(s) Oral every 6 hours PRN Cough  OLANZapine Injectable 2.5 milliGRAM(s) IntraMuscular every 6 hours PRN agitation       Vitals:  Vital Signs Last 24 Hrs  T(C): 36.2 (05 Apr 2024 11:15), Max: 36.4 (05 Apr 2024 05:45)  T(F): 97.1 (05 Apr 2024 11:15), Max: 97.5 (05 Apr 2024 05:45)  HR: 79 (05 Apr 2024 11:15) (70 - 79)  BP: 115/67 (05 Apr 2024 11:15) (104/58 - 115/67)  BP(mean): --  RR: 18 (05 Apr 2024 11:15) (18 - 18)  SpO2: 98% (05 Apr 2024 11:15) (96% - 98%)    Parameters below as of 05 Apr 2024 05:45  Patient On (Oxygen Delivery Method): room air                    General Exam:   General Appearance: Appropriately dressed and in no acute distress       Head: Normocephalic, atraumatic and no dysmorphic features  Ear, Nose, and Throat: Moist mucous membranes  CVS: S1S2+  Resp: No SOB, no wheeze or rhonchi  Abd: soft NTND  Extremities: No edema, no cyanosis  Skin: No bruises, no rashes       Neurologic Exam:  Mental status:  - Awake, Alert  - Oriented to: person. Knows he is in the hospital. Does not know the month/date/year.  - Speech: fluent  - Repetition and naming: intact   - Follows simple commands    Cranial nerves - pupils are pinpoint/minimally reactive, BTT b/l, EOMI - no nystagmus, face sensation (V1-V3) intact b/l, R NLF flattening - unclear chronicity, very [place], palate with symmetric elevation, shoulder shrug intact b/l, tongue midline on protrusion with full lateral movement  Dysarthria: per daughter, speech is slightly slurred    Motor - DYSON at least 4/5   Sensation - Light touch intact throughout        Coordination - Finger to Nose intact b/l. No tremors appreciated.    Gait and station - Unable to assess d/t fall risk/safety concerns.    I personally reviewed the below data/images/labs:      LABS:                          8.2    7.43  )-----------( 183      ( 05 Apr 2024 08:22 )             26.8     04-05    141  |  107  |  16  ----------------------------<  100<H>  3.7   |  25  |  1.02    Ca    8.3<L>      05 Apr 2024 08:22          Urinalysis Basic - ( 05 Apr 2024 08:22 )    Color: x / Appearance: x / SG: x / pH: x  Gluc: 100 mg/dL / Ketone: x  / Bili: x / Urobili: x   Blood: x / Protein: x / Nitrite: x   Leuk Esterase: x / RBC: x / WBC x   Sq Epi: x / Non Sq Epi: x / Bacteria: x               CT ANGIO BRAIN (W)AW IC  CT ANGIO NECK (W)AW IC  CT BRAIN     PROCEDURE DATE:  03/27/2024      INTERPRETATION:  CT angiography of the brain and neck    CLINICAL INDICATION: Right-sided weakness, stopped Elequis 2 weeks ago    TECHNIQUE: Direct axial CT scanning of the brain and neck was obtained   from the vertex to the level of the clavicular heads after the dynamic   intravenous injection of 70 cc of Omnipaque 300. 30 cc discarded.   Sagittal and coronal maximum intensity projection reformats were   provided.  Three-dimensional reconstructions were performed by the   radiologist using the [employer] workstation.    COMPARISON: CT brain 8/7/2023    FINDINGS:    CT BRAIN:    No hydrocephalus, midline shift, or effacement of the basal cisterns.    No acute parenchymal hemorrhage or brain edema.    Redemonstration of encephalomalacia and gliosis involving the bilateral   inferior frontal and left occipitotemporal regions, likely chronic   infarcts.    Mild white matter microvascular ischemic disease.    Bilateral ethmoid and maxillary sinus polypoid mucosal thickening.    Mastoid air cells clear.    CTA BRAIN:    Moderate short segment stenosis of the left mid supraclinoid ICA due to   calcified plaque. Normal flow-related enhancement of the more proximal   and distal skull base/intracranial internal carotid artery.    Normal flow-related enhancement of the right skull base/intracranial   internal carotid artery.    Normal flow-related enhancement of the bilateral anterior, middle, and   posterior cerebral arteries.    Normal flow-related enhancement of the bilateral intradural vertebral   arteries and the basilar artery.    No flow-limiting stenosis or vascular aneurysm. No AVM.    Venous system is well opacified, no evidence for venous sinus or cortical   vein thrombosis.    CTA NECK:    Normal flow-related enhancement of the bilateral common and internal   carotid arteries.    Normal flow-related enhancement of the bilateral vertebral arteries.    No flow-limiting stenosis, evidence for arterial dissection, or vascular   aneurysm.    IMPRESSION:    CT brain:  No hydrocephalus, acute intracranial hemorrhage, mass effect, or brain   edema.    Redemonstration of encephalomalacia and gliosis involving the bilateral   inferior frontal and left occipitotemporal regions, likely chronic   infarcts.    CTA brain:  No flow-limiting stenosis or vascular aneurysm. No AVM.    Venous system is well opacified, no evidence for venous sinus or cortical   vein thrombosis.    CTA neck:  No flow-limiting stenosis, evidence for arterial dissection, or vascular   aneurysm.      < from: MR Head w/wo IV Cont (03.30.24 @ 13:57) >    ACC: [id number] EXAM:  MR BRAIN WAW IC   ORDERED BY:  DANYELLE CHAN     PROCEDURE DATE:  03/30/2024          INTERPRETATION:  CLINICAL INDICATIONS: confusion and weakness    COMPARISON: Head CT dated 3/27/2024    TECHNIQUE: MRI brain: Multiplanar, multisequence MR imaging of the brain   are obtained with and without the administration of 7 cc intravenous   Gadavist contrast. 0.5 cc of contrast was discarded    FINDINGS:    No abnormal leptomeningeal or parenchymal enhancement. Bilateral anterior  inferior frontal lobe cystic encephalomalacia and gliosis, right worse   than left. Cystic encephalomalacia and gliosis involving the bilateral   anterior temporal lobes. Chronic left posterior temporal occipital lobe   infarction.    here are multiple small foci of abnormal restricted diffusion involving   the right corona radiata, posterior insular ribbon, posterior frontal   lobe, right parietal lobe, right temporal occipital lobes compatible with   acute infarctions.    Scattered periventricular and subcortical white matter T2 /FLAIR   hyperintensities are seen without mass effect, nonspecific, likely   representing moderate chronic microvascular changes. Normal T2 flow-voids   are seen within  the intracranial vasculature. The lateral ventricles and   cortical sulci are age-appropriate in size and configuration. There is no   mass, mass effect, or extra-axial fluid collection.    3 mm susceptibility artifact in the posterior left corona radiata image   51 series 9, may suggest a small cavernoma versus amyloid angiopathy   versus chronic hypertensive microhemorrhage. Midline structures are   normal.    The patient is status post bilateral ocular lens replacement surgery.   Moderate to severe inflammatory mucosal changes are seenthroughout the   various portions of the paranasal sinuses. The orbits and mastoid air   cells are unremarkable.    IMPRESSION: Acute distal right MCA territory infarctions.    --- End of Report ---           EEG Impression / Clinical Correlate:  Abnormal prolonged EEG study due to   1- Mild diffuse/multifocal cerebral dysfunction that is not specific in etiology  2- Focal slowing in left temporooccipital region which indicates focal cerebral dysfunction or structural abnormality in that region.  
  Slick Up MD  Interventional Cardiology / Endovascular Specialist  [place] Office : [address] [place] [address] Tel:   [place] Office : [address] [facility] [address]  Tel: [phone]      Subjective/Overnight events: Patient lying in bed comfortably. No acute distress. Denies chest pain, SOB or palpitations  	  MEDICATIONS:  aspirin enteric coated 81 milliGRAM(s) Oral daily  diltiazem    milliGRAM(s) Oral at bedtime  enoxaparin Injectable 40 milliGRAM(s) SubCutaneous every 24 hours  furosemide    Tablet 20 milliGRAM(s) Oral daily      albuterol/ipratropium for Nebulization 3 milliLiter(s) Nebulizer every 6 hours  budesonide 160 MICROgram(s)/formoterol 4.5 MICROgram(s) Inhaler 2 Puff(s) Inhalation two times a day  montelukast 10 milliGRAM(s) Oral daily    acetaminophen     Tablet .. 650 milliGRAM(s) Oral every 6 hours PRN    pantoprazole    Tablet 40 milliGRAM(s) Oral before breakfast    atorvastatin 80 milliGRAM(s) Oral at bedtime  dextrose 50% Injectable 25 Gram(s) IV Push once  dextrose 50% Injectable 25 Gram(s) IV Push once  dextrose 50% Injectable 12.5 Gram(s) IV Push once  dextrose Oral Gel 15 Gram(s) Oral once PRN  finasteride 5 milliGRAM(s) Oral daily  glucagon  Injectable 1 milliGRAM(s) IntraMuscular once  insulin lispro (ADMELOG) corrective regimen sliding scale   SubCutaneous every 6 hours    dextrose 5%. 1000 milliLiter(s) IV Continuous <Continuous>  dextrose 5%. 1000 milliLiter(s) IV Continuous <Continuous>  tamsulosin 0.8 milliGRAM(s) Oral at bedtime      PAST MEDICAL/SURGICAL HISTORY  PAST MEDICAL & SURGICAL HISTORY:  Afib      Asthmatic bronchitis , chronic      GERD (gastroesophageal reflux disease)      HLD (hyperlipidemia)      HTN (hypertension)      No significant past surgical history          SOCIAL HISTORY: Substance Use (street drugs): ( x ) never used  (  ) other:    FAMILY HISTORY:  FH: stroke (Mother, Sibling)            PHYSICAL EXAM:  T(C): 36.8 (03-28-24 @ 08:00), Max: 37.3 (03-27-24 @ 12:00)  HR: 117 (03-28-24 @ 08:00) (60 - 117)  BP: 134/78 (03-28-24 @ 08:00) (115/64 - 148/89)  RR: 18 (03-28-24 @ 08:00) (16 - 19)  SpO2: 96% (03-28-24 @ 08:00) (96% - 99%)  Wt(kg): --  I&O's Summary    27 Mar 2024 07:01  -  28 Mar 2024 07:00  --------------------------------------------------------  IN: 0 mL / OUT: 450 mL / NET: -450 mL      Height (cm): 162.6 (03-27 @ 10:50)  Weight (kg): 63.5 (03-27 @ 10:50)  BMI (kg/m2): 24 (03-27 @ 10:50)  BSA (m2): 1.68 (03-27 @ 10:50)    GENERAL: NAD  EYES:  conjunctiva and sclera clear  ENMT: No tonsillar erythema, exudates, or enlargement  Cardiovascular: Normal S1 S2, No JVD, No murmurs, No edema  Respiratory: Lungs clear to auscultation	  Gastrointestinal:  Soft,   Extremities: No edema                                     9.0    9.32  )-----------( 167      ( 28 Mar 2024 07:36 )             30.5     03-28    141  |  103  |  19  ----------------------------<  98  4.0   |  27  |  1.17    Ca    9.0      28 Mar 2024 07:38    TPro  6.5  /  Alb  3.4  /  TBili  0.6  /  DBili  x   /  AST  19  /  ALT  9<L>  /  AlkPhos  71  03-27    proBNP:   Lipid Profile:   HgA1c:   TSH:     Consultant(s) Notes Reviewed:  [x ] YES  [ ] NO    Care Discussed with Consultants/Other Providers [ x] YES  [ ] NO    Imaging Personally Reviewed independently:  [x] YES  [ ] NO    All labs, radiologic studies, vitals, orders and medications list reviewed. Patient is seen and examined at bedside. Case discussed with medical team.              
Neurology Progress Note    S: Patient seen and examined. No new events overnight. family at bedside. MRI with R MCA infarct         Medications: MEDICATIONS  (STANDING):  albuterol/ipratropium for Nebulization 3 milliLiter(s) Nebulizer every 6 hours  apixaban 5 milliGRAM(s) Oral two times a day  atorvastatin 80 milliGRAM(s) Oral at bedtime  budesonide 160 MICROgram(s)/formoterol 4.5 MICROgram(s) Inhaler 2 Puff(s) Inhalation two times a day  dextrose 5%. 1000 milliLiter(s) (100 mL/Hr) IV Continuous <Continuous>  dextrose 5%. 1000 milliLiter(s) (50 mL/Hr) IV Continuous <Continuous>  dextrose 50% Injectable 25 Gram(s) IV Push once  dextrose 50% Injectable 25 Gram(s) IV Push once  dextrose 50% Injectable 12.5 Gram(s) IV Push once  diltiazem    milliGRAM(s) Oral at bedtime  finasteride 5 milliGRAM(s) Oral daily  furosemide    Tablet 20 milliGRAM(s) Oral daily  glucagon  Injectable 1 milliGRAM(s) IntraMuscular once  insulin lispro (ADMELOG) corrective regimen sliding scale   SubCutaneous every 6 hours  melatonin 3 milliGRAM(s) Oral at bedtime  montelukast 10 milliGRAM(s) Oral daily  pantoprazole    Tablet 40 milliGRAM(s) Oral before breakfast  polyethylene glycol 3350 17 Gram(s) Oral daily  senna 2 Tablet(s) Oral at bedtime  tamsulosin 0.8 milliGRAM(s) Oral at bedtime    MEDICATIONS  (PRN):  acetaminophen     Tablet .. 650 milliGRAM(s) Oral every 6 hours PRN Temp greater or equal to 38C (100.4F), Mild Pain (1 - 3)  dextrose Oral Gel 15 Gram(s) Oral once PRN Blood Glucose LESS THAN 70 milliGRAM(s)/deciliter  guaiFENesin Oral Liquid (Sugar-Free) 200 milliGRAM(s) Oral every 6 hours PRN Cough       Vitals:  Vital Signs Last 24 Hrs  T(C): 36.4 (02 Apr 2024 07:54), Max: 37 (01 Apr 2024 17:00)  T(F): 97.5 (02 Apr 2024 07:54), Max: 98.6 (01 Apr 2024 17:00)  HR: 88 (02 Apr 2024 07:54) (77 - 95)  BP: 130/73 (02 Apr 2024 07:54) (103/75 - 130/73)  BP(mean): --  RR: 18 (02 Apr 2024 07:54) (18 - 18)  SpO2: 96% (02 Apr 2024 07:54) (93% - 98%)    Parameters below as of 02 Apr 2024 07:54  Patient On (Oxygen Delivery Method): room air            General Exam:   General Appearance: Appropriately dressed and in no acute distress       Head: Normocephalic, atraumatic and no dysmorphic features  Ear, Nose, and Throat: Moist mucous membranes  CVS: S1S2+  Resp: No SOB, no wheeze or rhonchi  Abd: soft NTND  Extremities: No edema, no cyanosis  Skin: No bruises, no rashes       Neurologic Exam:  Mental status:  - Awake, Alert  - Oriented to: person. Knows he is in the hospital. Does not know the month/date/year.  - Speech: fluent  - Repetition and naming: intact   - Follows simple commands    Cranial nerves - pupils are pinpoint/minimally reactive, BTT b/l, EOMI - no nystagmus, face sensation (V1-V3) intact b/l, R NLF flattening - unclear chronicity, very [place], palate with symmetric elevation, shoulder shrug intact b/l, tongue midline on protrusion with full lateral movement  Dysarthria: per daughter, speech is slightly slurred    Motor - DYSON at least 4/5   Sensation - Light touch intact throughout        Coordination - Finger to Nose intact b/l. No tremors appreciated.    Gait and station - Unable to assess d/t fall risk/safety concerns.    I personally reviewed the below data/images/labs:      LABS:    LABS:                          8.8    8.13  )-----------( 203      ( 02 Apr 2024 08:15 )             30.4     04-02    142  |  104  |  27<H>  ----------------------------<  121<H>  4.1   |  28  |  1.34<H>    Ca    9.3      02 Apr 2024 08:15          Urinalysis Basic - ( 02 Apr 2024 08:15 )    Color: x / Appearance: x / SG: x / pH: x  Gluc: 121 mg/dL / Ketone: x  / Bili: x / Urobili: x   Blood: x / Protein: x / Nitrite: x   Leuk Esterase: x / RBC: x / WBC x   Sq Epi: x / Non Sq Epi: x / Bacteria: x          CT ANGIO BRAIN (W)AW IC  CT ANGIO NECK (W)AW IC  CT BRAIN     PROCEDURE DATE:  03/27/2024      INTERPRETATION:  CT angiography of the brain and neck    CLINICAL INDICATION: Right-sided weakness, stopped Elequis 2 weeks ago    TECHNIQUE: Direct axial CT scanning of the brain and neck was obtained   from the vertex to the level of the clavicular heads after the dynamic   intravenous injection of 70 cc of Omnipaque 300. 30 cc discarded.   Sagittal and coronal maximum intensity projection reformats were   provided.  Three-dimensional reconstructions were performed by the   radiologist using the [employer] workstation.    COMPARISON: CT brain 8/7/2023    FINDINGS:    CT BRAIN:    No hydrocephalus, midline shift, or effacement of the basal cisterns.    No acute parenchymal hemorrhage or brain edema.    Redemonstration of encephalomalacia and gliosis involving the bilateral   inferior frontal and left occipitotemporal regions, likely chronic   infarcts.    Mild white matter microvascular ischemic disease.    Bilateral ethmoid and maxillary sinus polypoid mucosal thickening.    Mastoid air cells clear.    CTA BRAIN:    Moderate short segment stenosis of the left mid supraclinoid ICA due to   calcified plaque. Normal flow-related enhancement of the more proximal   and distal skull base/intracranial internal carotid artery.    Normal flow-related enhancement of the right skull base/intracranial   internal carotid artery.    Normal flow-related enhancement of the bilateral anterior, middle, and   posterior cerebral arteries.    Normal flow-related enhancement of the bilateral intradural vertebral   arteries and the basilar artery.    No flow-limiting stenosis or vascular aneurysm. No AVM.    Venous system is well opacified, no evidence for venous sinus or cortical   vein thrombosis.    CTA NECK:    Normal flow-related enhancement of the bilateral common and internal   carotid arteries.    Normal flow-related enhancement of the bilateral vertebral arteries.    No flow-limiting stenosis, evidence for arterial dissection, or vascular   aneurysm.    IMPRESSION:    CT brain:  No hydrocephalus, acute intracranial hemorrhage, mass effect, or brain   edema.    Redemonstration of encephalomalacia and gliosis involving the bilateral   inferior frontal and left occipitotemporal regions, likely chronic   infarcts.    CTA brain:  No flow-limiting stenosis or vascular aneurysm. No AVM.    Venous system is well opacified, no evidence for venous sinus or cortical   vein thrombosis.    CTA neck:  No flow-limiting stenosis, evidence for arterial dissection, or vascular   aneurysm.      < from: MR Head w/wo IV Cont (03.30.24 @ 13:57) >    ACC: [id number] EXAM:  MR BRAIN WAW IC   ORDERED BY:  DANYELLE CHAN     PROCEDURE DATE:  03/30/2024          INTERPRETATION:  CLINICAL INDICATIONS: confusion and weakness    COMPARISON: Head CT dated 3/27/2024    TECHNIQUE: MRI brain: Multiplanar, multisequence MR imaging of the brain   are obtained with and without the administration of 7 cc intravenous   Gadavist contrast. 0.5 cc of contrast was discarded    FINDINGS:    No abnormal leptomeningeal or parenchymal enhancement. Bilateral anterior  inferior frontal lobe cystic encephalomalacia and gliosis, right worse   than left. Cystic encephalomalacia and gliosis involving the bilateral   anterior temporal lobes. Chronic left posterior temporal occipital lobe   infarction.    here are multiple small foci of abnormal restricted diffusion involving   the right corona radiata, posterior insular ribbon, posterior frontal   lobe, right parietal lobe, right temporal occipital lobes compatible with   acute infarctions.    Scattered periventricular and subcortical white matter T2 /FLAIR   hyperintensities are seen without mass effect, nonspecific, likely   representing moderate chronic microvascular changes. Normal T2 flow-voids   are seen within  the intracranial vasculature. The lateral ventricles and   cortical sulci are age-appropriate in size and configuration. There is no   mass, mass effect, or extra-axial fluid collection.    3 mm susceptibility artifact in the posterior left corona radiata image   51 series 9, may suggest a small cavernoma versus amyloid angiopathy   versus chronic hypertensive microhemorrhage. Midline structures are   normal.    The patient is status post bilateral ocular lens replacement surgery.   Moderate to severe inflammatory mucosal changes are seenthroughout the   various portions of the paranasal sinuses. The orbits and mastoid air   cells are unremarkable.    IMPRESSION: Acute distal right MCA territory infarctions.    --- End of Report ---            JENNY ALCANTARA MD; Attending Radiologist  This document has been electronically signed. Mar 30 2024  5:47PM    < end of copied text >    
    SUBJECTIVE / OVERNIGHT EVENTS:pt seen and examined, pts family next to pts bedside  pt developed urinary retention , pantoja was placed       MEDICATIONS  (STANDING):  albuterol/ipratropium for Nebulization 3 milliLiter(s) Nebulizer every 6 hours  apixaban 5 milliGRAM(s) Oral two times a day  atorvastatin 80 milliGRAM(s) Oral at bedtime  budesonide 160 MICROgram(s)/formoterol 4.5 MICROgram(s) Inhaler 2 Puff(s) Inhalation two times a day  dextrose 5%. 1000 milliLiter(s) (50 mL/Hr) IV Continuous <Continuous>  dextrose 5%. 1000 milliLiter(s) (100 mL/Hr) IV Continuous <Continuous>  dextrose 50% Injectable 12.5 Gram(s) IV Push once  dextrose 50% Injectable 25 Gram(s) IV Push once  dextrose 50% Injectable 25 Gram(s) IV Push once  diltiazem    milliGRAM(s) Oral at bedtime  finasteride 5 milliGRAM(s) Oral daily  furosemide    Tablet 20 milliGRAM(s) Oral daily  gabapentin 100 milliGRAM(s) Oral at bedtime  glucagon  Injectable 1 milliGRAM(s) IntraMuscular once  insulin lispro (ADMELOG) corrective regimen sliding scale   SubCutaneous every 6 hours  melatonin 3 milliGRAM(s) Oral at bedtime  montelukast 10 milliGRAM(s) Oral daily  pantoprazole    Tablet 40 milliGRAM(s) Oral before breakfast  polyethylene glycol 3350 17 Gram(s) Oral daily  senna 2 Tablet(s) Oral at bedtime  tamsulosin 0.8 milliGRAM(s) Oral at bedtime    MEDICATIONS  (PRN):  acetaminophen     Tablet .. 650 milliGRAM(s) Oral every 6 hours PRN Temp greater or equal to 38C (100.4F), Mild Pain (1 - 3)  dextrose Oral Gel 15 Gram(s) Oral once PRN Blood Glucose LESS THAN 70 milliGRAM(s)/deciliter  guaiFENesin Oral Liquid (Sugar-Free) 200 milliGRAM(s) Oral every 6 hours PRN Cough    Vital Signs Last 24 Hrs  T(C): 36.8 (24 @ 20:06), Max: 36.9 (24 @ 03:05)  T(F): 98.2 (24 @ 20:06), Max: 98.5 (24 @ 03:05)  HR: 104 (24 @ 20:06) (60 - 104)  BP: 130/71 (24 @ 20:06) (114/62 - 137/87)  BP(mean): --  RR: 18 (24 @ 20:06) (18 - 18)  SpO2: 98% (24 @ 20:06) (94% - 98%)      PHYSICAL EXAM:  GENERAL: NAD  EYES: EOMI, PERRLA  NECK: Supple, No JVD  CHEST/LUNG: dec breath sounds at bases  HEART:  S1 , S2 +  ABDOMEN: soft, bs+, mild distension+, no tenderness  EXTREMITIES:  no edema  NEUROLOGY:alert awake, much more communicative today  pantoja+      LABS:      142  |  104  |  27<H>  ----------------------------<  121<H>  4.1   |  28  |  1.34<H>    Ca    9.3      2024 08:15      Creatinine Trend: 1.34 <--, 1.31 <--, 1.37 <--, 1.42 <--, 1.17 <--, 1.17 <--                        8.8    8.13  )-----------( 203      ( 2024 08:15 )             30.4     Urine Studies:  Urinalysis Basic - ( 2024 14:43 )    Color: Yellow / Appearance: Clear / S.012 / pH:   Gluc:  / Ketone: Negative mg/dL  / Bili: Negative / Urobili: 0.2 mg/dL   Blood:  / Protein: Negative mg/dL / Nitrite: Negative   Leuk Esterase: Negative / RBC: 8 /HPF / WBC 0 /HPF   Sq Epi:  / Non Sq Epi: 0 /HPF / Bacteria: Negative /HPF                  RADIOLOGY & ADDITIONAL TESTS:    Imaging Personally Reviewed:yes    Consultant(s) Notes Reviewed:  yes    Care Discussed with Consultants/Other Providers:yes  
  Slick Up MD  Interventional Cardiology / Endovascular Specialist  [place] Office : [address] [place] [address] [place]. [address]  Tel:   [place] Office : [address] [facility] [place] [address]  Tel: [phone]    Subjective/Overnight events: Patient lying in bed comfortably. No acute distress. Denies chest pain, SOB or palpitations    	  MEDICATIONS:  aspirin enteric coated 81 milliGRAM(s) Oral daily  diltiazem    milliGRAM(s) Oral at bedtime  enoxaparin Injectable 40 milliGRAM(s) SubCutaneous every 24 hours  furosemide    Tablet 20 milliGRAM(s) Oral daily      albuterol/ipratropium for Nebulization 3 milliLiter(s) Nebulizer every 6 hours  budesonide 160 MICROgram(s)/formoterol 4.5 MICROgram(s) Inhaler 2 Puff(s) Inhalation two times a day  montelukast 10 milliGRAM(s) Oral daily    acetaminophen     Tablet .. 650 milliGRAM(s) Oral every 6 hours PRN  melatonin 3 milliGRAM(s) Oral at bedtime    pantoprazole    Tablet 40 milliGRAM(s) Oral before breakfast  senna 2 Tablet(s) Oral at bedtime    atorvastatin 80 milliGRAM(s) Oral at bedtime  dextrose 50% Injectable 25 Gram(s) IV Push once  dextrose 50% Injectable 25 Gram(s) IV Push once  dextrose 50% Injectable 12.5 Gram(s) IV Push once  dextrose Oral Gel 15 Gram(s) Oral once PRN  finasteride 5 milliGRAM(s) Oral daily  glucagon  Injectable 1 milliGRAM(s) IntraMuscular once  insulin lispro (ADMELOG) corrective regimen sliding scale   SubCutaneous every 6 hours    dextrose 5%. 1000 milliLiter(s) IV Continuous <Continuous>  dextrose 5%. 1000 milliLiter(s) IV Continuous <Continuous>  tamsulosin 0.8 milliGRAM(s) Oral at bedtime      PAST MEDICAL/SURGICAL HISTORY  PAST MEDICAL & SURGICAL HISTORY:  Afib      Asthmatic bronchitis , chronic      GERD (gastroesophageal reflux disease)      HLD (hyperlipidemia)      HTN (hypertension)      No significant past surgical history          SOCIAL HISTORY: Substance Use (street drugs): ( x ) never used  (  ) other:    FAMILY HISTORY:  FH: stroke (Mother, Sibling)        REVIEW OF SYSTEMS:  CONSTITUTIONAL: No fever, weight loss, or fatigue  EYES: No eye pain, visual disturbances, or discharge  ENMT:  No difficulty hearing, tinnitus, vertigo; No sinus or throat pain  BREASTS: No pain, masses, or nipple discharge  GASTROINTESTINAL: No abdominal or epigastric pain. No nausea, vomiting, or hematemesis; No diarrhea or constipation. No melena or hematochezia.  GENITOURINARY: No dysuria, frequency, hematuria, or incontinence  NEUROLOGICAL: No headaches, memory loss, loss of strength, numbness, or tremors  ENDOCRINE: No heat or cold intolerance; No hair loss  MUSCULOSKELETAL: No joint pain or swelling; No muscle, back, or extremity pain  PSYCHIATRIC: No depression, anxiety, mood swings, or difficulty sleeping  HEME/LYMPH: No easy bruising, or bleeding gums  All others negative    PHYSICAL EXAM:  T(C): 36.6 (03-29-24 @ 20:09), Max: 36.6 (03-29-24 @ 06:14)  HR: 126 (03-29-24 @ 20:09) (79 - 126)  BP: 131/78 (03-29-24 @ 20:09) (115/63 - 131/78)  RR: 18 (03-29-24 @ 20:09) (18 - 18)  SpO2: 97% (03-29-24 @ 20:09) (95% - 97%)  Wt(kg): --  I&O's Summary    29 Mar 2024 07:01  -  29 Mar 2024 23:08  --------------------------------------------------------  IN: 660 mL / OUT: 2625 mL / NET: -1965 mL      GENERAL: NAD  EYES:  conjunctiva and sclera clear  ENMT: No tonsillar erythema, exudates, or enlargement  Cardiovascular: Normal S1 S2, No JVD, No murmurs, No edema  Respiratory: Lungs clear to auscultation	  Gastrointestinal:  Soft,   Extremities: No edema                                 8.3    8.61  )-----------( 175      ( 29 Mar 2024 06:42 )             28.1     03-29    142  |  103  |  19  ----------------------------<  101<H>  3.7   |  25  |  1.42<H>    Ca    8.6      29 Mar 2024 06:42      proBNP:   Lipid Profile:   HgA1c:   TSH:     Consultant(s) Notes Reviewed:  [x ] YES  [ ] NO    Care Discussed with Consultants/Other Providers [ x] YES  [ ] NO    Imaging Personally Reviewed independently:  [x] YES  [ ] NO    All labs, radiologic studies, vitals, orders and medications list reviewed. Patient is seen and examined at bedside. Case discussed with medical team.              
Neurology Progress Note    S: Patient seen and examined. No new events overnight. family at bedside. MRI with R MCA infarct     Medication:    Medications: MEDICATIONS  (STANDING):  albuterol/ipratropium for Nebulization 3 milliLiter(s) Nebulizer every 6 hours  aspirin enteric coated 81 milliGRAM(s) Oral daily  atorvastatin 80 milliGRAM(s) Oral at bedtime  budesonide 160 MICROgram(s)/formoterol 4.5 MICROgram(s) Inhaler 2 Puff(s) Inhalation two times a day  dextrose 5%. 1000 milliLiter(s) (50 mL/Hr) IV Continuous <Continuous>  dextrose 5%. 1000 milliLiter(s) (100 mL/Hr) IV Continuous <Continuous>  dextrose 50% Injectable 25 Gram(s) IV Push once  dextrose 50% Injectable 25 Gram(s) IV Push once  dextrose 50% Injectable 12.5 Gram(s) IV Push once  diltiazem    milliGRAM(s) Oral at bedtime  enoxaparin Injectable 40 milliGRAM(s) SubCutaneous every 24 hours  finasteride 5 milliGRAM(s) Oral daily  furosemide    Tablet 20 milliGRAM(s) Oral daily  glucagon  Injectable 1 milliGRAM(s) IntraMuscular once  insulin lispro (ADMELOG) corrective regimen sliding scale   SubCutaneous every 6 hours  melatonin 3 milliGRAM(s) Oral at bedtime  montelukast 10 milliGRAM(s) Oral daily  pantoprazole    Tablet 40 milliGRAM(s) Oral before breakfast  polyethylene glycol 3350 17 Gram(s) Oral daily  senna 2 Tablet(s) Oral at bedtime  tamsulosin 0.8 milliGRAM(s) Oral at bedtime    MEDICATIONS  (PRN):  acetaminophen     Tablet .. 650 milliGRAM(s) Oral every 6 hours PRN Temp greater or equal to 38C (100.4F), Mild Pain (1 - 3)  dextrose Oral Gel 15 Gram(s) Oral once PRN Blood Glucose LESS THAN 70 milliGRAM(s)/deciliter  guaiFENesin Oral Liquid (Sugar-Free) 200 milliGRAM(s) Oral every 6 hours PRN Cough       Vitals:  Vital Signs Last 24 Hrs  T(C): 36.9 (30 Mar 2024 20:46), Max: 36.9 (30 Mar 2024 20:46)  T(F): 98.5 (30 Mar 2024 20:46), Max: 98.5 (30 Mar 2024 20:46)  HR: 84 (30 Mar 2024 20:46) (84 - 99)  BP: 126/70 (30 Mar 2024 20:46) (111/58 - 130/66)  BP(mean): 88 (30 Mar 2024 20:46) (88 - 88)  RR: 18 (30 Mar 2024 20:46) (18 - 18)  SpO2: 96% (30 Mar 2024 20:46) (96% - 100%)    Parameters below as of 30 Mar 2024 11:38  Patient On (Oxygen Delivery Method): room air              General Exam:   General Appearance: Appropriately dressed and in no acute distress       Head: Normocephalic, atraumatic and no dysmorphic features  Ear, Nose, and Throat: Moist mucous membranes  CVS: S1S2+  Resp: No SOB, no wheeze or rhonchi  Abd: soft NTND  Extremities: No edema, no cyanosis  Skin: No bruises, no rashes       Neurologic Exam:  Mental status:  - Awake, Alert  - Oriented to: person. Knows he is in the hospital. Does not know the month/date/year.  - Speech: fluent  - Repetition and naming: intact   - Follows simple commands    Cranial nerves - pupils are pinpoint/minimally reactive, BTT b/l, EOMI - no nystagmus, face sensation (V1-V3) intact b/l, R NLF flattening - unclear chronicity, very [place], palate with symmetric elevation, shoulder shrug intact b/l, tongue midline on protrusion with full lateral movement  Dysarthria: per daughter, speech is slightly slurred    Motor - DYSON at least 4/5   Sensation - Light touch intact throughout        Coordination - Finger to Nose intact b/l. No tremors appreciated.    Gait and station - Unable to assess d/t fall risk/safety concerns.    I personally reviewed the below data/images/labs:      CBC Full  -  ( 29 Mar 2024 06:42 )  WBC Count : 8.61 K/uL  RBC Count : 3.77 M/uL  Hemoglobin : 8.3 g/dL  Hematocrit : 28.1 %  Platelet Count - Automated : 175 K/uL  Mean Cell Volume : 74.5 fl  Mean Cell Hemoglobin : 22.0 pg  Mean Cell Hemoglobin Concentration : 29.5 gm/dL  Auto Neutrophil # : x  Auto Lymphocyte # : x  Auto Monocyte # : x  Auto Eosinophil # : x  Auto Basophil # : x  Auto Neutrophil % : x  Auto Lymphocyte % : x  Auto Monocyte % : x  Auto Eosinophil % : x  Auto Basophil % : x    03-29    142  |  103  |  19  ----------------------------<  101<H>  3.7   |  25  |  1.42<H>    Ca    8.6      29 Mar 2024 06:42          Urinalysis Basic - ( 29 Mar 2024 06:42 )    Color: x / Appearance: x / SG: x / pH: x  Gluc: 101 mg/dL / Ketone: x  / Bili: x / Urobili: x   Blood: x / Protein: x / Nitrite: x   Leuk Esterase: x / RBC: x / WBC x   Sq Epi: x / Non Sq Epi: x / Bacteria: x      CT ANGIO BRAIN (W)AW IC  CT ANGIO NECK (W)AW IC  CT BRAIN     PROCEDURE DATE:  03/27/2024      INTERPRETATION:  CT angiography of the brain and neck    CLINICAL INDICATION: Right-sided weakness, stopped Elequis 2 weeks ago    TECHNIQUE: Direct axial CT scanning of the brain and neck was obtained   from the vertex to the level of the clavicular heads after the dynamic   intravenous injection of 70 cc of Omnipaque 300. 30 cc discarded.   Sagittal and coronal maximum intensity projection reformats were   provided.  Three-dimensional reconstructions were performed by the   radiologist using the [employer] workstation.    COMPARISON: CT brain 8/7/2023    FINDINGS:    CT BRAIN:    No hydrocephalus, midline shift, or effacement of the basal cisterns.    No acute parenchymal hemorrhage or brain edema.    Redemonstration of encephalomalacia and gliosis involving the bilateral   inferior frontal and left occipitotemporal regions, likely chronic   infarcts.    Mild white matter microvascular ischemic disease.    Bilateral ethmoid and maxillary sinus polypoid mucosal thickening.    Mastoid air cells clear.    CTA BRAIN:    Moderate short segment stenosis of the left mid supraclinoid ICA due to   calcified plaque. Normal flow-related enhancement of the more proximal   and distal skull base/intracranial internal carotid artery.    Normal flow-related enhancement of the right skull base/intracranial   internal carotid artery.    Normal flow-related enhancement of the bilateral anterior, middle, and   posterior cerebral arteries.    Normal flow-related enhancement of the bilateral intradural vertebral   arteries and the basilar artery.    No flow-limiting stenosis or vascular aneurysm. No AVM.    Venous system is well opacified, no evidence for venous sinus or cortical   vein thrombosis.    CTA NECK:    Normal flow-related enhancement of the bilateral common and internal   carotid arteries.    Normal flow-related enhancement of the bilateral vertebral arteries.    No flow-limiting stenosis, evidence for arterial dissection, or vascular   aneurysm.    IMPRESSION:    CT brain:  No hydrocephalus, acute intracranial hemorrhage, mass effect, or brain   edema.    Redemonstration of encephalomalacia and gliosis involving the bilateral   inferior frontal and left occipitotemporal regions, likely chronic   infarcts.    CTA brain:  No flow-limiting stenosis or vascular aneurysm. No AVM.    Venous system is well opacified, no evidence for venous sinus or cortical   vein thrombosis.    CTA neck:  No flow-limiting stenosis, evidence for arterial dissection, or vascular   aneurysm.      < from: MR Head w/wo IV Cont (03.30.24 @ 13:57) >    ACC: [id number] EXAM:  MR BRAIN WAW IC   ORDERED BY:  DANYELLE CHAN     PROCEDURE DATE:  03/30/2024          INTERPRETATION:  CLINICAL INDICATIONS: confusion and weakness    COMPARISON: Head CT dated 3/27/2024    TECHNIQUE: MRI brain: Multiplanar, multisequence MR imaging of the brain   are obtained with and without the administration of 7 cc intravenous   Gadavist contrast. 0.5 cc of contrast was discarded    FINDINGS:    No abnormal leptomeningeal or parenchymal enhancement. Bilateral anterior  inferior frontal lobe cystic encephalomalacia and gliosis, right worse   than left. Cystic encephalomalacia and gliosis involving the bilateral   anterior temporal lobes. Chronic left posterior temporal occipital lobe   infarction.    here are multiple small foci of abnormal restricted diffusion involving   the right corona radiata, posterior insular ribbon, posterior frontal   lobe, right parietal lobe, right temporal occipital lobes compatible with   acute infarctions.    Scattered periventricular and subcortical white matter T2 /FLAIR   hyperintensities are seen without mass effect, nonspecific, likely   representing moderate chronic microvascular changes. Normal T2 flow-voids   are seen within  the intracranial vasculature. The lateral ventricles and   cortical sulci are age-appropriate in size and configuration. There is no   mass, mass effect, or extra-axial fluid collection.    3 mm susceptibility artifact in the posterior left corona radiata image   51 series 9, may suggest a small cavernoma versus amyloid angiopathy   versus chronic hypertensive microhemorrhage. Midline structures are   normal.    The patient is status post bilateral ocular lens replacement surgery.   Moderate to severe inflammatory mucosal changes are seenthroughout the   various portions of the paranasal sinuses. The orbits and mastoid air   cells are unremarkable.    IMPRESSION: Acute distal right MCA territory infarctions.    --- End of Report ---            JENNY ALCANTARA MD; Attending Radiologist  This document has been electronically signed. Mar 30 2024  5:47PM    < end of copied text >    
Slick Up MD  Interventional Cardiology / Advance Heart Failure and Cardiac Transplant Specialist  [employer] Office : [address] [place] [address] [place] [address]  Tel:   [place] Office : [address] [facility] [place] [address]  Tel: [phone]       Pt is lying in bed comfortable lethargic  	  MEDICATIONS:  diltiazem    milliGRAM(s) Oral at bedtime  furosemide    Tablet 20 milliGRAM(s) Oral daily      albuterol/ipratropium for Nebulization 3 milliLiter(s) Nebulizer every 6 hours  budesonide 160 MICROgram(s)/formoterol 4.5 MICROgram(s) Inhaler 2 Puff(s) Inhalation two times a day  guaiFENesin Oral Liquid (Sugar-Free) 200 milliGRAM(s) Oral every 6 hours PRN  montelukast 10 milliGRAM(s) Oral daily    acetaminophen     Tablet .. 650 milliGRAM(s) Oral every 6 hours PRN  gabapentin 100 milliGRAM(s) Oral at bedtime  levETIRAcetam  IVPB 1000 milliGRAM(s) IV Intermittent every 12 hours  melatonin 3 milliGRAM(s) Oral at bedtime  OLANZapine Injectable 2.5 milliGRAM(s) IntraMuscular every 6 hours PRN    pantoprazole    Tablet 40 milliGRAM(s) Oral before breakfast  polyethylene glycol 3350 17 Gram(s) Oral daily  senna 2 Tablet(s) Oral at bedtime    atorvastatin 80 milliGRAM(s) Oral at bedtime  finasteride 5 milliGRAM(s) Oral daily  glucagon  Injectable 1 milliGRAM(s) IntraMuscular once    lidocaine 2% Gel 1 Application(s) Topical daily  tamsulosin 0.8 milliGRAM(s) Oral at bedtime      PAST MEDICAL/SURGICAL HISTORY  PAST MEDICAL & SURGICAL HISTORY:  Afib      Asthmatic bronchitis , chronic      GERD (gastroesophageal reflux disease)      HLD (hyperlipidemia)      HTN (hypertension)      No significant past surgical history          SOCIAL HISTORY: Substance Use (street drugs): ( x ) never used  (  ) other:    FAMILY HISTORY:  FH: stroke (Mother, Sibling)        REVIEW OF SYSTEMS:  CONSTITUTIONAL: No fever, weight loss, or fatigue  EYES: No eye pain, visual disturbances, or discharge  ENMT:  No difficulty hearing, tinnitus, vertigo; No sinus or throat pain  BREASTS: No pain, masses, or nipple discharge  GASTROINTESTINAL: No abdominal or epigastric pain. No nausea, vomiting, or hematemesis; No diarrhea or constipation. No melena or hematochezia.  GENITOURINARY: No dysuria, frequency, hematuria, or incontinence  NEUROLOGICAL: No headaches, memory loss, loss of strength, numbness, or tremors  ENDOCRINE: No heat or cold intolerance; No hair loss  MUSCULOSKELETAL: No joint pain or swelling; No muscle, back, or extremity pain  PSYCHIATRIC: No depression, anxiety, mood swings, or difficulty sleeping  HEME/LYMPH: No easy bruising, or bleeding gums       PHYSICAL EXAM:  T(C): 37 (04-04-24 @ 21:20), Max: 37 (04-04-24 @ 21:20)  HR: 120 (04-04-24 @ 21:20) (115 - 120)  BP: 109/62 (04-04-24 @ 21:20) (109/62 - 137/83)  RR: 18 (04-04-24 @ 21:20) (18 - 18)  SpO2: 98% (04-04-24 @ 21:20) (95% - 98%)  Wt(kg): --  I&O's Summary    03 Apr 2024 07:01  -  04 Apr 2024 07:00  --------------------------------------------------------  IN: 960 mL / OUT: 1100 mL / NET: -140 mL    04 Apr 2024 07:01  -  04 Apr 2024 23:27  --------------------------------------------------------  IN: 240 mL / OUT: 500 mL / NET: -260 mL          GENERAL: NAD  EYES:   PERRLA   ENMT:   Moist mucous membranes, Good dentition, No lesions  Cardiovascular: Normal S1 S2, No JVD, No murmurs, No edema  Respiratory: Lungs clear to auscultation	  Gastrointestinal:  Soft, Non-tender, + BS	  Extremities: no edema                                    8.5    8.55  )-----------( 194      ( 03 Apr 2024 13:07 )             29.0     04-03    138  |  100  |  24<H>  ----------------------------<  157<H>  3.7   |  24  |  1.08    Ca    8.9      03 Apr 2024 13:07  Phos  2.7     04-03  Mg     2.1     04-03    TPro  6.6  /  Alb  3.5  /  TBili  0.6  /  DBili  x   /  AST  21  /  ALT  18  /  AlkPhos  70  04-03    proBNP:   Lipid Profile:   HgA1c:   TSH:     Consultant(s) Notes Reviewed:  [x ] YES  [ ] NO    Care Discussed with Consultants/Other Providers [ x] YES  [ ] NO    Imaging Personally Reviewed independently:  [x] YES  [ ] NO    All labs, radiologic studies, vitals, orders and medications list reviewed. Patient is seen and examined at bedside. Case discussed with medical team.        
Slick Up MD  Interventional Cardiology / Advance Heart Failure and Cardiac Transplant Specialist  [place] Office : [address] [place] [address]  Tel:   [place] Office : [address] [facility] [address]  Tel: [phone]      Subjective/Overnight events: Pt is lying in bed not in distress  	  MEDICATIONS:  apixaban 5 milliGRAM(s) Oral two times a day  diltiazem    milliGRAM(s) Oral at bedtime  furosemide    Tablet 20 milliGRAM(s) Oral daily      albuterol/ipratropium for Nebulization 3 milliLiter(s) Nebulizer every 6 hours  budesonide 160 MICROgram(s)/formoterol 4.5 MICROgram(s) Inhaler 2 Puff(s) Inhalation two times a day  guaiFENesin Oral Liquid (Sugar-Free) 200 milliGRAM(s) Oral every 6 hours PRN  montelukast 10 milliGRAM(s) Oral daily    acetaminophen     Tablet .. 650 milliGRAM(s) Oral every 6 hours PRN  melatonin 3 milliGRAM(s) Oral at bedtime    pantoprazole    Tablet 40 milliGRAM(s) Oral before breakfast  polyethylene glycol 3350 17 Gram(s) Oral daily  senna 2 Tablet(s) Oral at bedtime    atorvastatin 80 milliGRAM(s) Oral at bedtime  dextrose 50% Injectable 12.5 Gram(s) IV Push once  dextrose 50% Injectable 25 Gram(s) IV Push once  dextrose 50% Injectable 25 Gram(s) IV Push once  dextrose Oral Gel 15 Gram(s) Oral once PRN  finasteride 5 milliGRAM(s) Oral daily  glucagon  Injectable 1 milliGRAM(s) IntraMuscular once  insulin lispro (ADMELOG) corrective regimen sliding scale   SubCutaneous every 6 hours    dextrose 5%. 1000 milliLiter(s) IV Continuous <Continuous>  dextrose 5%. 1000 milliLiter(s) IV Continuous <Continuous>  tamsulosin 0.8 milliGRAM(s) Oral at bedtime      PAST MEDICAL/SURGICAL HISTORY  PAST MEDICAL & SURGICAL HISTORY:  Afib      Asthmatic bronchitis , chronic      GERD (gastroesophageal reflux disease)      HLD (hyperlipidemia)      HTN (hypertension)      No significant past surgical history          SOCIAL HISTORY: Substance Use (street drugs): ( x ) never used  (  ) other:    FAMILY HISTORY:  FH: stroke (Mother, Sibling)          PHYSICAL EXAM:  T(C): 36.4 (04-02-24 @ 07:54), Max: 37 (04-01-24 @ 17:00)  HR: 88 (04-02-24 @ 07:54) (77 - 95)  BP: 130/73 (04-02-24 @ 07:54) (103/75 - 130/73)  RR: 18 (04-02-24 @ 07:54) (18 - 18)  SpO2: 96% (04-02-24 @ 07:54) (93% - 98%)  Wt(kg): --  I&O's Summary    01 Apr 2024 07:01  -  02 Apr 2024 07:00  --------------------------------------------------------  IN: 780 mL / OUT: 985 mL / NET: -205 mL        Weight (kg): 61.9 (04-02 @ 07:54)    GENERAL: NAD  EYES:  conjunctiva and sclera clear  ENMT: No tonsillar erythema, exudates, or enlargement  Cardiovascular: Normal S1 S2, No JVD, No murmurs, No edema  Respiratory: Lungs clear to auscultation	  Gastrointestinal:  Soft  Extremities: No edema                                     8.8    8.13  )-----------( 203      ( 02 Apr 2024 08:15 )             30.4     04-02    142  |  104  |  27<H>  ----------------------------<  121<H>  4.1   |  28  |  1.34<H>    Ca    9.3      02 Apr 2024 08:15      proBNP:   Lipid Profile:   HgA1c:   TSH:     Consultant(s) Notes Reviewed:  [x ] YES  [ ] NO    Care Discussed with Consultants/Other Providers [ x] YES  [ ] NO    Imaging Personally Reviewed independently:  [x] YES  [ ] NO    All labs, radiologic studies, vitals, orders and medications list reviewed. Patient is seen and examined at bedside. Case discussed with medical team.        
Slick Up MD  Interventional Cardiology / Advance Heart Failure and Cardiac Transplant Specialist  [place] Office : [address] [place] [address]  Tel:   [place] Office : [address] [facility] [address]  Tel: [phone]      Subjective/Overnight events: Pt is sitting up in recliner not in distress, no chest pains no SOB no palpitations  	  MEDICATIONS:  apixaban 2.5 milliGRAM(s) Oral two times a day  diltiazem    milliGRAM(s) Oral at bedtime  furosemide    Tablet 20 milliGRAM(s) Oral daily      albuterol/ipratropium for Nebulization 3 milliLiter(s) Nebulizer every 6 hours  budesonide 160 MICROgram(s)/formoterol 4.5 MICROgram(s) Inhaler 2 Puff(s) Inhalation two times a day  guaiFENesin Oral Liquid (Sugar-Free) 200 milliGRAM(s) Oral every 6 hours PRN  montelukast 10 milliGRAM(s) Oral daily    acetaminophen     Tablet .. 650 milliGRAM(s) Oral every 6 hours PRN  melatonin 3 milliGRAM(s) Oral at bedtime    pantoprazole    Tablet 40 milliGRAM(s) Oral before breakfast  polyethylene glycol 3350 17 Gram(s) Oral daily  senna 2 Tablet(s) Oral at bedtime    atorvastatin 80 milliGRAM(s) Oral at bedtime  dextrose 50% Injectable 12.5 Gram(s) IV Push once  dextrose 50% Injectable 25 Gram(s) IV Push once  dextrose 50% Injectable 25 Gram(s) IV Push once  dextrose Oral Gel 15 Gram(s) Oral once PRN  finasteride 5 milliGRAM(s) Oral daily  glucagon  Injectable 1 milliGRAM(s) IntraMuscular once  insulin lispro (ADMELOG) corrective regimen sliding scale   SubCutaneous every 6 hours    dextrose 5%. 1000 milliLiter(s) IV Continuous <Continuous>  dextrose 5%. 1000 milliLiter(s) IV Continuous <Continuous>  tamsulosin 0.8 milliGRAM(s) Oral at bedtime      PAST MEDICAL/SURGICAL HISTORY  PAST MEDICAL & SURGICAL HISTORY:  Afib      Asthmatic bronchitis , chronic      GERD (gastroesophageal reflux disease)      HLD (hyperlipidemia)      HTN (hypertension)      No significant past surgical history          SOCIAL HISTORY: Substance Use (street drugs): ( x ) never used  (  ) other:    FAMILY HISTORY:  FH: stroke (Mother, Sibling)        PHYSICAL EXAM:  T(C): 36.4 (04-01-24 @ 11:23), Max: 36.8 (03-31-24 @ 21:57)  HR: 77 (04-01-24 @ 11:23) (77 - 93)  BP: 129/60 (04-01-24 @ 11:23) (102/64 - 138/66)  RR: 18 (04-01-24 @ 11:23) (16 - 18)  SpO2: 98% (04-01-24 @ 11:23) (92% - 98%)  Wt(kg): --  I&O's Summary    31 Mar 2024 07:01  -  01 Apr 2024 07:00  --------------------------------------------------------  IN: 720 mL / OUT: 1650 mL / NET: -930 mL    01 Apr 2024 07:01  -  01 Apr 2024 11:26  --------------------------------------------------------  IN: 120 mL / OUT: 100 mL / NET: 20 mL          GENERAL: NAD  EYES:  conjunctiva and sclera clear  ENMT: No tonsillar erythema, exudates, or enlargement  Cardiovascular: Normal S1 S2, No JVD, No murmurs, No edema  Respiratory: Lungs clear to auscultation	  Gastrointestinal:  Soft  Extremities: No edema                                     8.7    6.47  )-----------( 181      ( 01 Apr 2024 10:40 )             29.2     04-01    141  |  104  |  25<H>  ----------------------------<  107<H>  3.9   |  25  |  1.31<H>    Ca    8.7      01 Apr 2024 10:40      proBNP:   Lipid Profile:   HgA1c:   TSH:     Consultant(s) Notes Reviewed:  [x ] YES  [ ] NO    Care Discussed with Consultants/Other Providers [ x] YES  [ ] NO    Imaging Personally Reviewed independently:  [x] YES  [ ] NO    All labs, radiologic studies, vitals, orders and medications list reviewed. Patient is seen and examined at bedside. Case discussed with medical team.        
    SUBJECTIVE / OVERNIGHT EVENTS:pt seen and examined, pts family next to pts bedisde  03-28-24     MEDICATIONS  (STANDING):  albuterol/ipratropium for Nebulization 3 milliLiter(s) Nebulizer every 6 hours  aspirin enteric coated 81 milliGRAM(s) Oral daily  atorvastatin 80 milliGRAM(s) Oral at bedtime  budesonide 160 MICROgram(s)/formoterol 4.5 MICROgram(s) Inhaler 2 Puff(s) Inhalation two times a day  dextrose 5%. 1000 milliLiter(s) (100 mL/Hr) IV Continuous <Continuous>  dextrose 5%. 1000 milliLiter(s) (50 mL/Hr) IV Continuous <Continuous>  dextrose 50% Injectable 12.5 Gram(s) IV Push once  dextrose 50% Injectable 25 Gram(s) IV Push once  dextrose 50% Injectable 25 Gram(s) IV Push once  diltiazem    milliGRAM(s) Oral at bedtime  enoxaparin Injectable 40 milliGRAM(s) SubCutaneous every 24 hours  finasteride 5 milliGRAM(s) Oral daily  furosemide    Tablet 20 milliGRAM(s) Oral daily  glucagon  Injectable 1 milliGRAM(s) IntraMuscular once  insulin lispro (ADMELOG) corrective regimen sliding scale   SubCutaneous every 6 hours  montelukast 10 milliGRAM(s) Oral daily  pantoprazole    Tablet 40 milliGRAM(s) Oral before breakfast  tamsulosin 0.8 milliGRAM(s) Oral at bedtime    MEDICATIONS  (PRN):  acetaminophen     Tablet .. 650 milliGRAM(s) Oral every 6 hours PRN Temp greater or equal to 38C (100.4F), Mild Pain (1 - 3)  dextrose Oral Gel 15 Gram(s) Oral once PRN Blood Glucose LESS THAN 70 milliGRAM(s)/deciliter    T(C): 37.5 (03-28-24 @ 20:47), Max: 37.5 (03-28-24 @ 20:47)  HR: 92 (03-28-24 @ 20:47) (75 - 118)  BP: 124/71 (03-28-24 @ 20:47) (115/64 - 157/80)  RR: 18 (03-28-24 @ 20:47) (16 - 18)  SpO2: 91% (03-28-24 @ 20:47) (91% - 100%)    CAPILLARY BLOOD GLUCOSE      POCT Blood Glucose.: 104 mg/dL (28 Mar 2024 17:00)  POCT Blood Glucose.: 128 mg/dL (28 Mar 2024 11:22)  POCT Blood Glucose.: 108 mg/dL (28 Mar 2024 06:33)  POCT Blood Glucose.: 124 mg/dL (28 Mar 2024 00:44)    I&O's Summary    27 Mar 2024 07:01  -  28 Mar 2024 07:00  --------------------------------------------------------  IN: 0 mL / OUT: 450 mL / NET: -450 mL    PHYSICAL EXAM:  GENERAL: NAD  EYES: EOMI, PERRLA  NECK: Supple, No JVD  CHEST/LUNG: dec breath sounds at bases  HEART:  S1 , S2 +  ABDOMEN: soft, bs+  EXTREMITIES:  no edema  NEUROLOGY:alert awake      LABS:                        9.0    9.32  )-----------( 167      ( 28 Mar 2024 07:36 )             30.5     03-28    141  |  103  |  19  ----------------------------<  98  4.0   |  27  |  1.17    Ca    9.0      28 Mar 2024 07:38    TPro  6.5  /  Alb  3.4  /  TBili  0.6  /  DBili  x   /  AST  19  /  ALT  9<L>  /  AlkPhos  71  03-27    PT/INR - ( 27 Mar 2024 12:21 )   PT: 11.5 sec;   INR: 1.05 ratio         PTT - ( 27 Mar 2024 12:21 )  PTT:30.5 sec      Urinalysis Basic - ( 28 Mar 2024 07:38 )    Color: x / Appearance: x / SG: x / pH: x  Gluc: 98 mg/dL / Ketone: x  / Bili: x / Urobili: x   Blood: x / Protein: x / Nitrite: x   Leuk Esterase: x / RBC: x / WBC x   Sq Epi: x / Non Sq Epi: x / Bacteria: x        RADIOLOGY & ADDITIONAL TESTS:    Imaging Personally Reviewed:yes    Consultant(s) Notes Reviewed:  yes    Care Discussed with Consultants/Other Providers:yes  
    SUBJECTIVE / OVERNIGHT EVENTS:pt seen and examined, pts family next to pts bedisde  03-31-24     MEDICATIONS  (STANDING):  albuterol/ipratropium for Nebulization 3 milliLiter(s) Nebulizer every 6 hours  apixaban 2.5 milliGRAM(s) Oral two times a day  atorvastatin 80 milliGRAM(s) Oral at bedtime  budesonide 160 MICROgram(s)/formoterol 4.5 MICROgram(s) Inhaler 2 Puff(s) Inhalation two times a day  dextrose 5%. 1000 milliLiter(s) (100 mL/Hr) IV Continuous <Continuous>  dextrose 5%. 1000 milliLiter(s) (50 mL/Hr) IV Continuous <Continuous>  dextrose 50% Injectable 25 Gram(s) IV Push once  dextrose 50% Injectable 25 Gram(s) IV Push once  dextrose 50% Injectable 12.5 Gram(s) IV Push once  diltiazem    milliGRAM(s) Oral at bedtime  finasteride 5 milliGRAM(s) Oral daily  furosemide    Tablet 20 milliGRAM(s) Oral daily  glucagon  Injectable 1 milliGRAM(s) IntraMuscular once  insulin lispro (ADMELOG) corrective regimen sliding scale   SubCutaneous every 6 hours  melatonin 3 milliGRAM(s) Oral at bedtime  montelukast 10 milliGRAM(s) Oral daily  pantoprazole    Tablet 40 milliGRAM(s) Oral before breakfast  polyethylene glycol 3350 17 Gram(s) Oral daily  senna 2 Tablet(s) Oral at bedtime  tamsulosin 0.8 milliGRAM(s) Oral at bedtime    MEDICATIONS  (PRN):  acetaminophen     Tablet .. 650 milliGRAM(s) Oral every 6 hours PRN Temp greater or equal to 38C (100.4F), Mild Pain (1 - 3)  dextrose Oral Gel 15 Gram(s) Oral once PRN Blood Glucose LESS THAN 70 milliGRAM(s)/deciliter  guaiFENesin Oral Liquid (Sugar-Free) 200 milliGRAM(s) Oral every 6 hours PRN Cough    Vital Signs Last 24 Hrs  T(C): 36.8 (03-31-24 @ 21:57), Max: 36.8 (03-31-24 @ 00:13)  T(F): 98.2 (03-31-24 @ 21:57), Max: 98.2 (03-31-24 @ 00:13)  HR: 87 (03-31-24 @ 21:57) (72 - 93)  BP: 126/70 (03-31-24 @ 21:57) (102/64 - 126/70)  BP(mean): 89 (03-31-24 @ 21:57) (89 - 89)  RR: 16 (03-31-24 @ 21:57) (16 - 18)  SpO2: 97% (03-31-24 @ 21:57) (96% - 97%)          PHYSICAL EXAM:  GENERAL: NAD  EYES: EOMI, PERRLA  NECK: Supple, No JVD  CHEST/LUNG: dec breath sounds at bases  HEART:  S1 , S2 +  ABDOMEN: soft, bs+  EXTREMITIES:  no edema  NEUROLOGY:alert awake, much more communicative today    LABS:  LABS:  03-30    141  |  104  |  22  ----------------------------<  104<H>  3.6   |  24  |  1.37<H>    Ca    8.5      30 Mar 2024 05:29    TPro  6.4  /  Alb  3.4  /  TBili  0.6  /  DBili      /  AST  19  /  ALT  10  /  AlkPhos  72  03-30    Creatinine Trend: 1.37 <--, 1.42 <--, 1.17 <--, 1.17 <--                        8.5    7.85  )-----------( 179      ( 30 Mar 2024 05:29 )             28.3     Urine Studies:  Urinalysis Basic - ( 30 Mar 2024 05:29 )    Color:  / Appearance:  / SG:  / pH:   Gluc: 104 mg/dL / Ketone:   / Bili:  / Urobili:    Blood:  / Protein:  / Nitrite:    Leuk Esterase:  / RBC:  / WBC    Sq Epi:  / Non Sq Epi:  / Bacteria:               LIVER FUNCTIONS - ( 30 Mar 2024 05:29 )  Alb: 3.4 g/dL / Pro: 6.4 g/dL / ALK PHOS: 72 U/L / ALT: 10 U/L / AST: 19 U/L / GGT: x             Sq Epi: x / Non Sq Epi: x / Bacteria: x        RADIOLOGY & ADDITIONAL TESTS:    Imaging Personally Reviewed:yes    Consultant(s) Notes Reviewed:  yes    Care Discussed with Consultants/Other Providers:yes  
    SUBJECTIVE / OVERNIGHT EVENTS:pt seen and examined, pts family next to pts bedside  pt found to have intermittently , confused   pts daughter next to pts bedside    MEDICATIONS  (STANDING):  albuterol/ipratropium for Nebulization 3 milliLiter(s) Nebulizer every 6 hours  atorvastatin 80 milliGRAM(s) Oral at bedtime  budesonide 160 MICROgram(s)/formoterol 4.5 MICROgram(s) Inhaler 2 Puff(s) Inhalation two times a day  diltiazem    milliGRAM(s) Oral at bedtime  finasteride 5 milliGRAM(s) Oral daily  furosemide    Tablet 20 milliGRAM(s) Oral daily  gabapentin 100 milliGRAM(s) Oral at bedtime  glucagon  Injectable 1 milliGRAM(s) IntraMuscular once  levETIRAcetam  IVPB 1000 milliGRAM(s) IV Intermittent every 12 hours  lidocaine 2% Gel 1 Application(s) Topical daily  melatonin 3 milliGRAM(s) Oral at bedtime  montelukast 10 milliGRAM(s) Oral daily  pantoprazole    Tablet 40 milliGRAM(s) Oral before breakfast  polyethylene glycol 3350 17 Gram(s) Oral daily  senna 2 Tablet(s) Oral at bedtime  tamsulosin 0.8 milliGRAM(s) Oral at bedtime    MEDICATIONS  (PRN):  acetaminophen     Tablet .. 650 milliGRAM(s) Oral every 6 hours PRN Temp greater or equal to 38C (100.4F), Mild Pain (1 - 3)  guaiFENesin Oral Liquid (Sugar-Free) 200 milliGRAM(s) Oral every 6 hours PRN Cough  OLANZapine Injectable 2.5 milliGRAM(s) IntraMuscular every 6 hours PRN agitation    Vital Signs Last 24 Hrs  T(C): 36.7 (24 @ 11:35), Max: 36.7 (24 @ 04:21)  T(F): 98.1 (24 @ 11:35), Max: 98.1 (24 @ 11:35)  HR: 115 (24 @ 11:35) (110 - 118)  BP: 122/79 (24 @ 11:35) (122/79 - 139/75)  BP(mean): 97 (24 @ 21:05) (97 - 97)  RR: 18 (24 @ 11:35) (18 - 18)  SpO2: 95% (24 @ 11:35) (95% - 97%)    Orthostatic VS  24 @ 11:20  Lying BP: 122/75 HR: 80  Sitting BP: 104/67 HR: 71  Standing BP: 98/64 HR: 87  Site: --  Mode: --      PHYSICAL EXAM:  GENERAL: NAD  EYES: EOMI, PERRLA  NECK: Supple, No JVD  CHEST/LUNG: dec breath sounds at bases  HEART:  S1 , S2 +  ABDOMEN: soft, bs+, mild distension+, no tenderness  EXTREMITIES:  no edema  NEUROLOGY: confused , not following commands  pantoja+    LABS:      138  |  100  |  24<H>  ----------------------------<  157<H>  3.7   |  24  |  1.08    Ca    8.9      2024 13:07  Phos  2.7     -  Mg     2.1     -03    TPro  6.6  /  Alb  3.5  /  TBili  0.6  /  DBili      /  AST  21  /  ALT  18  /  AlkPhos  70  04-03    Creatinine Trend: 1.08 <--, 1.34 <--, 1.31 <--, 1.37 <--, 1.42 <--                        8.5    8.55  )-----------( 194      ( 2024 13:07 )             29.0     Urine Studies:  Urinalysis Basic - ( 2024 16:58 )    Color: Yellow / Appearance: Clear / S.025 / pH:   Gluc:  / Ketone: Negative mg/dL  / Bili: Negative / Urobili: 1.0 mg/dL   Blood:  / Protein: 30 mg/dL / Nitrite: Negative   Leuk Esterase: Trace / RBC: 100 /HPF / WBC 7 /HPF   Sq Epi:  / Non Sq Epi: 1 /HPF / Bacteria: Negative /HPF        CARDIAC MARKERS ( 2024 13:07 )  x     / x     / 119 U/L / x     / x            LIVER FUNCTIONS - ( 2024 13:07 )  Alb: 3.5 g/dL / Pro: 6.6 g/dL / ALK PHOS: 70 U/L / ALT: 18 U/L / AST: 21 U/L / GGT: x                         RADIOLOGY & ADDITIONAL TESTS:    Imaging Personally Reviewed:yes    Consultant(s) Notes Reviewed:  yes    Care Discussed with Consultants/Other Providers:yes  
    SUBJECTIVE / OVERNIGHT EVENTS:pt seen and examined, pts family next to pts bedisde  03-29-24     MEDICATIONS  (STANDING):  albuterol/ipratropium for Nebulization 3 milliLiter(s) Nebulizer every 6 hours  aspirin enteric coated 81 milliGRAM(s) Oral daily  atorvastatin 80 milliGRAM(s) Oral at bedtime  budesonide 160 MICROgram(s)/formoterol 4.5 MICROgram(s) Inhaler 2 Puff(s) Inhalation two times a day  dextrose 5%. 1000 milliLiter(s) (100 mL/Hr) IV Continuous <Continuous>  dextrose 5%. 1000 milliLiter(s) (50 mL/Hr) IV Continuous <Continuous>  dextrose 50% Injectable 12.5 Gram(s) IV Push once  dextrose 50% Injectable 25 Gram(s) IV Push once  dextrose 50% Injectable 25 Gram(s) IV Push once  diltiazem    milliGRAM(s) Oral at bedtime  enoxaparin Injectable 40 milliGRAM(s) SubCutaneous every 24 hours  finasteride 5 milliGRAM(s) Oral daily  furosemide    Tablet 20 milliGRAM(s) Oral daily  glucagon  Injectable 1 milliGRAM(s) IntraMuscular once  insulin lispro (ADMELOG) corrective regimen sliding scale   SubCutaneous every 6 hours  montelukast 10 milliGRAM(s) Oral daily  pantoprazole    Tablet 40 milliGRAM(s) Oral before breakfast  tamsulosin 0.8 milliGRAM(s) Oral at bedtime    MEDICATIONS  (PRN):  acetaminophen     Tablet .. 650 milliGRAM(s) Oral every 6 hours PRN Temp greater or equal to 38C (100.4F), Mild Pain (1 - 3)  dextrose Oral Gel 15 Gram(s) Oral once PRN Blood Glucose LESS THAN 70 milliGRAM(s)/deciliter    T(C): 37.5 (03-28-24 @ 20:47), Max: 37.5 (03-28-24 @ 20:47)  HR: 92 (03-28-24 @ 20:47) (75 - 118)  BP: 124/71 (03-28-24 @ 20:47) (115/64 - 157/80)  RR: 18 (03-28-24 @ 20:47) (16 - 18)  SpO2: 91% (03-28-24 @ 20:47) (91% - 100%)    CAPILLARY BLOOD GLUCOSE      POCT Blood Glucose.: 104 mg/dL (28 Mar 2024 17:00)  POCT Blood Glucose.: 128 mg/dL (28 Mar 2024 11:22)  POCT Blood Glucose.: 108 mg/dL (28 Mar 2024 06:33)  POCT Blood Glucose.: 124 mg/dL (28 Mar 2024 00:44)    I&O's Summary    27 Mar 2024 07:01  -  28 Mar 2024 07:00  --------------------------------------------------------  IN: 0 mL / OUT: 450 mL / NET: -450 mL    PHYSICAL EXAM:  GENERAL: NAD  EYES: EOMI, PERRLA  NECK: Supple, No JVD  CHEST/LUNG: dec breath sounds at bases  HEART:  S1 , S2 +  ABDOMEN: soft, bs+  EXTREMITIES:  no edema  NEUROLOGY:alert awake, much more communicative today        LABS:                        9.0    9.32  )-----------( 167      ( 28 Mar 2024 07:36 )             30.5     03-28    141  |  103  |  19  ----------------------------<  98  4.0   |  27  |  1.17    Ca    9.0      28 Mar 2024 07:38    TPro  6.5  /  Alb  3.4  /  TBili  0.6  /  DBili  x   /  AST  19  /  ALT  9<L>  /  AlkPhos  71  03-27    PT/INR - ( 27 Mar 2024 12:21 )   PT: 11.5 sec;   INR: 1.05 ratio         PTT - ( 27 Mar 2024 12:21 )  PTT:30.5 sec      Urinalysis Basic - ( 28 Mar 2024 07:38 )    Color: x / Appearance: x / SG: x / pH: x  Gluc: 98 mg/dL / Ketone: x  / Bili: x / Urobili: x   Blood: x / Protein: x / Nitrite: x   Leuk Esterase: x / RBC: x / WBC x   Sq Epi: x / Non Sq Epi: x / Bacteria: x        RADIOLOGY & ADDITIONAL TESTS:    Imaging Personally Reviewed:yes    Consultant(s) Notes Reviewed:  yes    Care Discussed with Consultants/Other Providers:yes  
    SUBJECTIVE / OVERNIGHT EVENTS:pt seen and examined, pts family next to pts bedside  pt found to have involuntray movemnets intermittently , confused   pts daughter next to pts bedside    MEDICATIONS  (STANDING):  albuterol/ipratropium for Nebulization 3 milliLiter(s) Nebulizer every 6 hours  atorvastatin 80 milliGRAM(s) Oral at bedtime  budesonide 160 MICROgram(s)/formoterol 4.5 MICROgram(s) Inhaler 2 Puff(s) Inhalation two times a day  diltiazem    milliGRAM(s) Oral at bedtime  finasteride 5 milliGRAM(s) Oral daily  furosemide    Tablet 20 milliGRAM(s) Oral daily  gabapentin 100 milliGRAM(s) Oral at bedtime  glucagon  Injectable 1 milliGRAM(s) IntraMuscular once  lidocaine 2% Gel 1 Application(s) Topical daily  melatonin 3 milliGRAM(s) Oral at bedtime  montelukast 10 milliGRAM(s) Oral daily  pantoprazole    Tablet 40 milliGRAM(s) Oral before breakfast  polyethylene glycol 3350 17 Gram(s) Oral daily  senna 2 Tablet(s) Oral at bedtime  tamsulosin 0.8 milliGRAM(s) Oral at bedtime    MEDICATIONS  (PRN):  acetaminophen     Tablet .. 650 milliGRAM(s) Oral every 6 hours PRN Temp greater or equal to 38C (100.4F), Mild Pain (1 - 3)  guaiFENesin Oral Liquid (Sugar-Free) 200 milliGRAM(s) Oral every 6 hours PRN Cough  OLANZapine Injectable 2.5 milliGRAM(s) IntraMuscular every 6 hours PRN agitation    Vital Signs Last 24 Hrs  T(C): 36.4 (24 @ 21:05), Max: 36.7 (24 @ 04:29)  T(F): 97.6 (24 @ 21:05), Max: 98 (24 @ 04:29)  HR: 110 (24 @ 21:05) (76 - 110)  BP: 139/75 (24 @ 21:05) (94/54 - 139/75)  BP(mean): 97 (24 @ 21:05) (97 - 97)  RR: 18 (24 @ 21:05) (16 - 18)  SpO2: 97% (24 @ 21:05) (93% - 97%)    Orthostatic VS  24 @ 11:20  Lying BP: 122/75 HR: 80  Sitting BP: 104/67 HR: 71  Standing BP: 98/64 HR: 87  Site: --  Mode: --      PHYSICAL EXAM:  GENERAL: NAD  EYES: EOMI, PERRLA  NECK: Supple, No JVD  CHEST/LUNG: dec breath sounds at bases  HEART:  S1 , S2 +  ABDOMEN: soft, bs+, mild distension+, no tenderness  EXTREMITIES:  no edema  NEUROLOGY: confused , not following commands  pantoja+    LABS:      138  |  100  |  24<H>  ----------------------------<  157<H>  3.7   |  24  |  1.08    Ca    8.9      2024 13:07  Phos  2.7     04-03  Mg     2.1     -    TPro  6.6  /  Alb  3.5  /  TBili  0.6  /  DBili      /  AST  21  /  ALT  18  /  AlkPhos  70  04-03    Creatinine Trend: 1.08 <--, 1.34 <--, 1.31 <--, 1.37 <--, 1.42 <--, 1.17 <--                        8.5    8.55  )-----------( 194      ( 2024 13:07 )             29.0     Urine Studies:  Urinalysis Basic - ( 2024 16:58 )    Color: Yellow / Appearance: Clear / S.025 / pH:   Gluc:  / Ketone: Negative mg/dL  / Bili: Negative / Urobili: 1.0 mg/dL   Blood:  / Protein: 30 mg/dL / Nitrite: Negative   Leuk Esterase: Trace / RBC: 100 /HPF / WBC 7 /HPF   Sq Epi:  / Non Sq Epi: 1 /HPF / Bacteria: Negative /HPF        CARDIAC MARKERS ( 2024 13:07 )  x     / x     / 119 U/L / x     / x            LIVER FUNCTIONS - ( 2024 13:07 )  Alb: 3.5 g/dL / Pro: 6.6 g/dL / ALK PHOS: 70 U/L / ALT: 18 U/L / AST: 21 U/L / GGT: x                           RADIOLOGY & ADDITIONAL TESTS:    Imaging Personally Reviewed:yes    Consultant(s) Notes Reviewed:  yes    Care Discussed with Consultants/Other Providers:yes  
    SUBJECTIVE / OVERNIGHT EVENTS:pt seen and examined, pts family next to pts bedisde  04-1--24     MEDICATIONS  (STANDING):  albuterol/ipratropium for Nebulization 3 milliLiter(s) Nebulizer every 6 hours  apixaban 5 milliGRAM(s) Oral two times a day  atorvastatin 80 milliGRAM(s) Oral at bedtime  budesonide 160 MICROgram(s)/formoterol 4.5 MICROgram(s) Inhaler 2 Puff(s) Inhalation two times a day  dextrose 5%. 1000 milliLiter(s) (100 mL/Hr) IV Continuous <Continuous>  dextrose 5%. 1000 milliLiter(s) (50 mL/Hr) IV Continuous <Continuous>  dextrose 50% Injectable 12.5 Gram(s) IV Push once  dextrose 50% Injectable 25 Gram(s) IV Push once  dextrose 50% Injectable 25 Gram(s) IV Push once  diltiazem    milliGRAM(s) Oral at bedtime  finasteride 5 milliGRAM(s) Oral daily  furosemide    Tablet 20 milliGRAM(s) Oral daily  glucagon  Injectable 1 milliGRAM(s) IntraMuscular once  insulin lispro (ADMELOG) corrective regimen sliding scale   SubCutaneous every 6 hours  melatonin 3 milliGRAM(s) Oral at bedtime  montelukast 10 milliGRAM(s) Oral daily  pantoprazole    Tablet 40 milliGRAM(s) Oral before breakfast  polyethylene glycol 3350 17 Gram(s) Oral daily  senna 2 Tablet(s) Oral at bedtime  tamsulosin 0.8 milliGRAM(s) Oral at bedtime    MEDICATIONS  (PRN):  acetaminophen     Tablet .. 650 milliGRAM(s) Oral every 6 hours PRN Temp greater or equal to 38C (100.4F), Mild Pain (1 - 3)  dextrose Oral Gel 15 Gram(s) Oral once PRN Blood Glucose LESS THAN 70 milliGRAM(s)/deciliter  guaiFENesin Oral Liquid (Sugar-Free) 200 milliGRAM(s) Oral every 6 hours PRN Cough    Vital Signs Last 24 Hrs  T(C): 37 (04-01-24 @ 17:00), Max: 37 (04-01-24 @ 17:00)  T(F): 98.6 (04-01-24 @ 17:00), Max: 98.6 (04-01-24 @ 17:00)  HR: 95 (04-01-24 @ 17:00) (77 - 95)  BP: 103/75 (04-01-24 @ 17:00) (103/75 - 138/66)  BP(mean): 82 (04-01-24 @ 05:01) (82 - 90)  RR: 18 (04-01-24 @ 17:00) (16 - 18)  SpO2: 98% (04-01-24 @ 17:00) (92% - 98%)      PHYSICAL EXAM:  GENERAL: NAD  EYES: EOMI, PERRLA  NECK: Supple, No JVD  CHEST/LUNG: dec breath sounds at bases  HEART:  S1 , S2 +  ABDOMEN: soft, bs+  EXTREMITIES:  no edema  NEUROLOGY:alert awake, much more communicative today  pantoja+  LABS:  04-01    141  |  104  |  25<H>  ----------------------------<  107<H>  3.9   |  25  |  1.31<H>    Ca    8.7      01 Apr 2024 10:40      Creatinine Trend: 1.31 <--, 1.37 <--, 1.42 <--, 1.17 <--, 1.17 <--                        8.7    6.47  )-----------( 181      ( 01 Apr 2024 10:40 )             29.2     Urine Studies:  Urinalysis Basic - ( 01 Apr 2024 10:40 )    Color:  / Appearance:  / SG:  / pH:   Gluc: 107 mg/dL / Ketone:   / Bili:  / Urobili:    Blood:  / Protein:  / Nitrite:    Leuk Esterase:  / RBC:  / WBC    Sq Epi:  / Non Sq Epi:  / Bacteria:                           LIVER FUNCTIONS - ( 30 Mar 2024 05:29 )  Alb: 3.4 g/dL / Pro: 6.4 g/dL / ALK PHOS: 72 U/L / ALT: 10 U/L / AST: 19 U/L / GGT: x             Sq Epi: x / Non Sq Epi: x / Bacteria: x        RADIOLOGY & ADDITIONAL TESTS:    Imaging Personally Reviewed:yes    Consultant(s) Notes Reviewed:  yes    Care Discussed with Consultants/Other Providers:yes  
    SUBJECTIVE / OVERNIGHT EVENTS:pt seen and examined, pts family next to pts bedisde  03-30-24     MEDICATIONS  (STANDING):  albuterol/ipratropium for Nebulization 3 milliLiter(s) Nebulizer every 6 hours  aspirin enteric coated 81 milliGRAM(s) Oral daily  atorvastatin 80 milliGRAM(s) Oral at bedtime  budesonide 160 MICROgram(s)/formoterol 4.5 MICROgram(s) Inhaler 2 Puff(s) Inhalation two times a day  dextrose 5%. 1000 milliLiter(s) (100 mL/Hr) IV Continuous <Continuous>  dextrose 5%. 1000 milliLiter(s) (50 mL/Hr) IV Continuous <Continuous>  dextrose 50% Injectable 25 Gram(s) IV Push once  dextrose 50% Injectable 25 Gram(s) IV Push once  dextrose 50% Injectable 12.5 Gram(s) IV Push once  diltiazem    milliGRAM(s) Oral at bedtime  enoxaparin Injectable 40 milliGRAM(s) SubCutaneous every 24 hours  finasteride 5 milliGRAM(s) Oral daily  furosemide    Tablet 20 milliGRAM(s) Oral daily  gabapentin 100 milliGRAM(s) Oral once  glucagon  Injectable 1 milliGRAM(s) IntraMuscular once  insulin lispro (ADMELOG) corrective regimen sliding scale   SubCutaneous every 6 hours  melatonin 3 milliGRAM(s) Oral at bedtime  montelukast 10 milliGRAM(s) Oral daily  pantoprazole    Tablet 40 milliGRAM(s) Oral before breakfast  polyethylene glycol 3350 17 Gram(s) Oral daily  senna 2 Tablet(s) Oral at bedtime  tamsulosin 0.8 milliGRAM(s) Oral at bedtime    MEDICATIONS  (PRN):  acetaminophen     Tablet .. 650 milliGRAM(s) Oral every 6 hours PRN Temp greater or equal to 38C (100.4F), Mild Pain (1 - 3)  dextrose Oral Gel 15 Gram(s) Oral once PRN Blood Glucose LESS THAN 70 milliGRAM(s)/deciliter  guaiFENesin Oral Liquid (Sugar-Free) 200 milliGRAM(s) Oral every 6 hours PRN Cough    Vital Signs Last 24 Hrs  T(C): 36.9 (03-30-24 @ 20:46), Max: 36.9 (03-30-24 @ 20:46)  T(F): 98.5 (03-30-24 @ 20:46), Max: 98.5 (03-30-24 @ 20:46)  HR: 84 (03-30-24 @ 20:46) (84 - 99)  BP: 126/70 (03-30-24 @ 20:46) (111/58 - 130/66)  BP(mean): 88 (03-30-24 @ 20:46) (88 - 88)  RR: 18 (03-30-24 @ 20:46) (18 - 18)  SpO2: 96% (03-30-24 @ 20:46) (96% - 100%)        PHYSICAL EXAM:  GENERAL: NAD  EYES: EOMI, PERRLA  NECK: Supple, No JVD  CHEST/LUNG: dec breath sounds at bases  HEART:  S1 , S2 +  ABDOMEN: soft, bs+  EXTREMITIES:  no edema  NEUROLOGY:alert awake, much more communicative today      LABS:  03-30    141  |  104  |  22  ----------------------------<  104<H>  3.6   |  24  |  1.37<H>    Ca    8.5      30 Mar 2024 05:29    TPro  6.4  /  Alb  3.4  /  TBili  0.6  /  DBili      /  AST  19  /  ALT  10  /  AlkPhos  72  03-30    Creatinine Trend: 1.37 <--, 1.42 <--, 1.17 <--, 1.17 <--                        8.5    7.85  )-----------( 179      ( 30 Mar 2024 05:29 )             28.3     Urine Studies:  Urinalysis Basic - ( 30 Mar 2024 05:29 )    Color:  / Appearance:  / SG:  / pH:   Gluc: 104 mg/dL / Ketone:   / Bili:  / Urobili:    Blood:  / Protein:  / Nitrite:    Leuk Esterase:  / RBC:  / WBC    Sq Epi:  / Non Sq Epi:  / Bacteria:               LIVER FUNCTIONS - ( 30 Mar 2024 05:29 )  Alb: 3.4 g/dL / Pro: 6.4 g/dL / ALK PHOS: 72 U/L / ALT: 10 U/L / AST: 19 U/L / GGT: x             Leuk Esterase: x / RBC: x / WBC x   Sq Epi: x / Non Sq Epi: x / Bacteria: x        RADIOLOGY & ADDITIONAL TESTS:    Imaging Personally Reviewed:yes    Consultant(s) Notes Reviewed:  yes    Care Discussed with Consultants/Other Providers:yes

## 2024-04-05 NOTE — PROGRESS NOTE ADULT - ASSESSMENT
89 yo LEFT handed man with chronic Afib (taken off apixaban 1-2 weeks ago d/t recurrent falls), HTN, HLD, presumed GERD, BPH, asthma, anemia, and prior chronic strokes who presented to [facility] ED on 3/27/2024, with c/o weakness/difficulty ambulating/confusion since Sunday, 3/24/2024.  NO tenecteplase d/t outside therapeutic window.  NO thrombectomy d/t no LVO.  NIHSS on admission: 3 (+1 questions, +1 R facial, +1 subjective dysarthria)  LKN: 3/24/2024, 18:00  pre-MRS: 3  CTH: bilateral inferior frontsal and L occipitotemporal encephalomalacia c/w old infarcts   CTA H/N neg     CRP < 3  LDL 65  A1c 6.2  EEG no seizures   MRI with acute strokes in R MCA terriotry     Impression: 1) Acute encephalopathy with difficulty ambulating and subjective dysarthria. R NLF flattening appears chronic. Could be new ischemic infarct vs. toxic/metabolic/infectious process. Recently discontinued apixaban for Afib d/t "falls and age."  2) Falls and weakness  3) chronic strokes, embolic   4) new R MCA embolic infarct     Recommendations:   -  aspirin 81mg daily NOW - suggest restarting apixaban for stroke prevention; spoke with daughter states he does not fall on a dialy basis.  would stop asa and place on DOAC for AF as asa and elqiuis have similar bleeding risk. stroke literature suggest that unless he is falling > 300x/year that the benefits of DOAC outweigh risks of falling   - Continue atorvastatin 80mg (goal LDL<70)   - Neurochecks, vitals q4h  - Fall and aspiration precautions  - PT/OT/SLP/CM  - Diet: target is DASH/TLC  - DVT prophylaxis: enoxaparin 40mg q24h (unless medically contraindicated) AND SCDs  - Stroke education provided   spoke with family at bedside 3/30  Juventino Bashir MD  Vascular Neurology  Office: [phone] .     
89 yo LEFT handed man with chronic Afib (taken off apixaban 1-2 weeks ago d/t recurrent falls), HTN, HLD, presumed GERD, BPH, asthma, anemia, and prior chronic strokes who presented to [facility] ED on 3/27/2024, with c/o weakness/difficulty ambulating/confusion since Sunday, 3/24/2024.  NO tenecteplase d/t outside therapeutic window.  NO thrombectomy d/t no LVO.  NIHSS on admission: 3 (+1 questions, +1 R facial, +1 subjective dysarthria)  LKN: 3/24/2024, 18:00  pre-MRS: 3  CTH: bilateral inferior frontsal and L occipitotemporal encephalomalacia c/w old infarcts   CTA H/N neg     CRP < 3  LDL 65  A1c 6.2  EEG (3/27/24) - no seizures   MRI with acute strokes in R MCA territory     Impression:   1) Acute encephalopathy with difficulty ambulating and subjective dysarthria. R NLF flattening appears chronic. Could be new ischemic infarct vs. toxic/metabolic/infectious process. Recently discontinued apixaban for Afib d/t "falls and age."  2) Falls and weakness  3) chronic strokes, embolic   4) new R MCA embolic infarct   5) Brief episodes of ?LOC vs "unresponsive" associated w/ "head shaking and b/l hands shaking" possibly 2/2 toxic-metabolic etiology vs. convulsive syncope vs. seizure-like activity although low suspicion at this time. Vitals stable during episode.     Recommendations:  - vEEG (called EEG tech to prioritize); pt previously had routine EEG on 3/29/24 but will monitor for at least 24 hrs.   - Monitor off anti-seizure medications for now  - Check B1, B6, B12, folate, TSH w/ reflex T4, ammonia  - Check orthostatics  - IVF as needed (pt hypotensive to  systolic currently compared to yesterday, 4/2, which was systolic 130s)  - Continue apixaban for stroke prevention; spoke with daughter states he does not fall on a dialy basis.  would stop asa and place on DOAC for AF as asa and elqiuis have similar bleeding risk. stroke literature suggest that unless he is falling > 300x/year that the benefits of DOAC outweigh risks of falling : eliquis 2.5mg BID started however given Cr < 1.5 and weight > 60kg should be on 5mg BID ; now correct dose   - Continue atorvastatin 80mg (goal LDL<70)   - Neurochecks, vitals q4h  - Fall and aspiration precautions  - PT/OT/SLP/CM  - Diet: target is DASH/TLC  - DVT prophylaxis: Apixaban   - Stroke education provided    Case d/w Neurology attending.   
EKG - Afib       1) s/p fall - fell about 3 weeks ago , pt had mechanical fall no LOC, out pt echo shows normal LV mild AS    2) Afib - chronic, c/w dilt. was taken off AC 2/2 falls recently OP however MRI this admission shows acute CVA now restarted on eliquis  
EKG - Afib   Echo - < from: Transthoracic Echocardiogram (11.07.17 @ 13:41) >  1. Normal left ventricular systolic function. No segmental  wall motion abnormalities.  2. The right ventricle is not well visualized; grossly  normal right ventricular systolic function.  3. No pericardial effusion seen.      < end of copied text >      a/p     1) s/p fall - fell about 3 weeks ago , pt had mechanical fall no LOC, out pt echo shows normal LV mild AS, now off a/c     2) Afib - chronic, off a/c c/w dilt     3) DVT ppx- sub q  heparin   
  89 yo LEFT handed man with chronic Afib (taken off apixaban 1-2 weeks ago d/t recurrent falls), HTN, HLD, presumed GERD, BPH, asthma, anemia, and prior chronic strokes who presented to [facility] ED on 3/27/2024, with c/o weakness/difficulty ambulating/confusion since Sunday, 3/24/2024.  NO tenecteplase d/t outside therapeutic window.  NO thrombectomy d/t no LVO.  NIHSS on admission: 3 (+1 questions, +1 R facial, +1 subjective dysarthria)  LKN: 3/24/2024, 18:00  pre-MRS: 3  CTH: bilateral inferior frontsal and L occipitotemporal encephalomalacia c/w old infarcts   CTA H/N neg     CRP < 3  LDL 65  A1c 6.2    Impression: 1) Acute encephalopathy with difficulty ambulating and subjective dysarthria. R NLF flattening appears chronic. Could be new ischemic infarct vs. toxic/metabolic/infectious process. Recently discontinued apixaban for Afib d/t "falls and age."  2) Falls and weakness  3) chronic strokes, embolic     Recommendations:   -  aspirin 81mg daily NOW - will need to have risk/benefit discussion with patient/family about restarting apixaban for stroke prevention; spoke with daughter states he does not fall on a dialy basis.  would stop asa and place on DOAC for AF as asa and elqiuis have similar bleeding risk. stroke literature suggest that unless he is falling > 300x/year that the benefits of DOAC outweigh risks of falling   - Continue atorvastatin 80mg (goal LDL<70)  - MRI brain can be considered - per daughter, patient unlikely to tolerate, and really would not  (patient ideally should be on therapeutic AC for Afib, if did not have new infarct d/t Afib now, could always have one in the future); CTH at ~72 hours from symptom onset does not suggest new infarction (although could be missed)  - TTE has been ordered by primary team  - Can obtain the following labs to evaluate for reversible causes of encephalopathy: TSH, ESR, CRP, vitamin B1/B6/B12/D, folate, ammonia  - Can consider EEG if no obvious cause of encephalopathy is identified  - Neurochecks, vitals q4h  - Fall and aspiration precautions  - PT/OT/SLP/CM  - Diet: target is DASH/TLC  - DVT prophylaxis: enoxaparin 40mg q24h (unless medically contraindicated) AND SCDs  - Stroke education provided     Juvention Bashir MD  Vascular Neurology  Office: [phone] .     
89 yo LEFT handed man with chronic Afib (taken off apixaban 1-2 weeks ago d/t recurrent falls), HTN, HLD, presumed GERD, BPH, asthma, anemia, and prior chronic strokes who presented to [facility] ED on 3/27/2024, with c/o weakness/difficulty ambulating/confusion since Sunday, 3/24/2024.  NO tenecteplase d/t outside therapeutic window.  NO thrombectomy d/t no LVO.  NIHSS on admission: 3 (+1 questions, +1 R facial, +1 subjective dysarthria)  LKN: 3/24/2024, 18:00  pre-MRS: 3  CTH: bilateral inferior frontsal and L occipitotemporal encephalomalacia c/w old infarcts   CTA H/N neg     CRP < 3  LDL 65  A1c 6.2  EEG no seizures   MRI with acute strokes in R MCA terriotry   4/3 CTH with actue L SDH 3mm  CTH 4/4 no change   4/4 RRT yesterday for concern for seziure activiyt; SDH on CTH ; EEG no seizures   EEG no seizures, L temporoccipital slowing     Impression: 1) Acute encephalopathy with difficulty ambulating and subjective dysarthria. R NLF flattening appears chronic. Could be new ischemic infarct vs. toxic/metabolic/infectious process. Recently discontinued apixaban for Afib d/t "falls and age."  2) Falls and weakness  3) chronic strokes, embolic   4) new R MCA embolic infarct     Recommendations:   - keppra 1g BID started   - EEG shows no seiuzres but slowing.    - neurosx recs appreciated   - holding AC   - Continue atorvastatin 80mg (goal LDL<70)   - Neurochecks, vitals q4h  - Fall and aspiration precautions  - PT/OT/SLP/CM  - gabapentin should be QHS  - Diet: target is DASH/TLC  - DVT prophylaxis: enoxaparin 40mg q24h (unless medically contraindicated) AND SCDs  - Stroke education provided   spoke with family at bedside 4/4  Juventino Bashir MD  Vascular Neurology  Office: [phone] .     
89 yo LEFT handed man with chronic Afib (taken off apixaban 1-2 weeks ago d/t recurrent falls), HTN, HLD, presumed GERD, BPH, asthma, anemia, and prior chronic strokes who presented to [facility] ED on 3/27/2024, with c/o weakness/difficulty ambulating/confusion since Sunday, 3/24/2024.  NO tenecteplase d/t outside therapeutic window.  NO thrombectomy d/t no LVO.  NIHSS on admission: 3 (+1 questions, +1 R facial, +1 subjective dysarthria)  LKN: 3/24/2024, 18:00  pre-MRS: 3  CTH: bilateral inferior frontsal and L occipitotemporal encephalomalacia c/w old infarcts   CTA H/N neg     CRP < 3  LDL 65  A1c 6.2  EEG no seizures   MRI with acute strokes in R MCA terriotry     Impression: 1) Acute encephalopathy with difficulty ambulating and subjective dysarthria. R NLF flattening appears chronic. Could be new ischemic infarct vs. toxic/metabolic/infectious process. Recently discontinued apixaban for Afib d/t "falls and age."  2) Falls and weakness  3) chronic strokes, embolic   4) new R MCA embolic infarct     Recommendations:   -  aspirin 81mg daily NOW - suggest restarting apixaban for stroke prevention; spoke with daughter states he does not fall on a dialy basis.  would stop asa and place on DOAC for AF as asa and elqiuis have similar bleeding risk. stroke literature suggest that unless he is falling > 300x/year that the benefits of DOAC outweigh risks of falling : eliquis 2.5mg BID started however given Cr < 1.5 and weight > 60kg should be on 5mg BID   - Continue atorvastatin 80mg (goal LDL<70)   - Neurochecks, vitals q4h  - Fall and aspiration precautions  - PT/OT/SLP/CM  - Diet: target is DASH/TLC  - DVT prophylaxis: enoxaparin 40mg q24h (unless medically contraindicated) AND SCDs  - Stroke education provided   spoke with family at bedside 3/30  Juventino Bashir MD  Vascular Neurology  Office: [phone] .     
89 yo LEFT handed man with chronic Afib (taken off apixaban 1-2 weeks ago d/t recurrent falls), HTN, HLD, presumed GERD, BPH, asthma, anemia, and prior chronic strokes who presented to [facility] ED on 3/27/2024, with c/o weakness/difficulty ambulating/confusion since Sunday, 3/24/2024.  NO tenecteplase d/t outside therapeutic window.  NO thrombectomy d/t no LVO.  NIHSS on admission: 3 (+1 questions, +1 R facial, +1 subjective dysarthria)  LKN: 3/24/2024, 18:00  pre-MRS: 3  CTH: bilateral inferior frontsal and L occipitotemporal encephalomalacia c/w old infarcts   CTA H/N neg     CRP < 3  LDL 65  A1c 6.2  EEG no seizures   MRI with acute strokes in R MCA terriotry   4/3 CTH with actue L SDH 3mm  CTH 4/4 no change   4/4 RRT yesterday for concern for seziure activiyt; SDH on CTH ; EEG no seizures   EEG no seizures, L temporoccipital slowing     Impression: 1) Acute encephalopathy with difficulty ambulating and subjective dysarthria. R NLF flattening appears chronic. Could be new ischemic infarct vs. toxic/metabolic/infectious process. Recently discontinued apixaban for Afib d/t "falls and age."  2) Falls and weakness  3) chronic strokes, embolic   4) new R MCA embolic infarct     Recommendations:   - keppra 1g BID started   - EEG shows no seiuzres but slowing.    - neurosx recs appreciated   - holding AC   - Continue atorvastatin 80mg (goal LDL<70)   - Neurochecks, vitals q4h  - Fall and aspiration precautions  - PT/OT/SLP/CM  - gabapentin should be QHS  - Diet: target is DASH/TLC  - DVT prophylaxis: enoxaparin 40mg q24h (unless medically contraindicated) AND SCDs  - Stroke education provided   spoke with family at bedside 4/5  no objection to dc planning with outpatient f/u  Juventino Bashir MD  Vascular Neurology  Office: [phone] .     
89 yo LEFT handed man with chronic Afib (taken off apixaban 1-2 weeks ago d/t recurrent falls), HTN, HLD, presumed GERD, BPH, asthma, anemia, and prior chronic strokes who presented to [facility] ED on 3/27/2024, with c/o weakness/difficulty ambulating/confusion since Sunday, 3/24/2024.  NO tenecteplase d/t outside therapeutic window.  NO thrombectomy d/t no LVO.  NIHSS on admission: 3 (+1 questions, +1 R facial, +1 subjective dysarthria)  LKN: 3/24/2024, 18:00  pre-MRS: 3  CTH: bilateral inferior frontsal and L occipitotemporal encephalomalacia c/w old infarcts   CTA H/N neg     CRP < 3  LDL 65  A1c 6.2  EEG no seizures   MRI with acute strokes in R MCA terriotry     Impression: 1) Acute encephalopathy with difficulty ambulating and subjective dysarthria. R NLF flattening appears chronic. Could be new ischemic infarct vs. toxic/metabolic/infectious process. Recently discontinued apixaban for Afib d/t "falls and age."  2) Falls and weakness  3) chronic strokes, embolic   4) new R MCA embolic infarct     Recommendations:   -  aspirin 81mg daily NOW - suggest restarting apixaban for stroke prevention; spoke with daughter states he does not fall on a dialy basis.  would stop asa and place on DOAC for AF as asa and elqiuis have similar bleeding risk. stroke literature suggest that unless he is falling > 300x/year that the benefits of DOAC outweigh risks of falling   - Continue atorvastatin 80mg (goal LDL<70)   - Neurochecks, vitals q4h  - Fall and aspiration precautions  - PT/OT/SLP/CM  - Diet: target is DASH/TLC  - DVT prophylaxis: enoxaparin 40mg q24h (unless medically contraindicated) AND SCDs  - Stroke education provided   spoke with family at bedside 3/30  Juventino Bashir MD  Vascular Neurology  Office: [phone] .     
EKG - Afib       1) s/p fall - fell about 3 weeks ago , pt had mechanical fall no LOC, out pt echo shows normal LV mild AS    2) Afib - chronic, c/w dilt. was taken off AC 2/2 falls recently OP however MRI this admission shows acute CVA now restarted on eliquis    
EKG - Afib   Echo - < from: Transthoracic Echocardiogram (11.07.17 @ 13:41) >  1. Normal left ventricular systolic function. No segmental  wall motion abnormalities.  2. The right ventricle is not well visualized; grossly  normal right ventricular systolic function.  3. No pericardial effusion seen.      < end of copied text >      a/p     1) s/p fall - fell about 3 weeks ago , pt had mechanical fall no LOC, out pt echo shows normal LV mild AS    2) Afib - chronic, c/w dilt. was taken off AC 2/2 falls recently OP however MRI this admission shows acute CVA now restarted on eliquis    3) DVT ppx- sub q  heparin       
EKG - Afib   Echo - < from: Transthoracic Echocardiogram (11.07.17 @ 13:41) >  1. Normal left ventricular systolic function. No segmental  wall motion abnormalities.  2. The right ventricle is not well visualized; grossly  normal right ventricular systolic function.  3. No pericardial effusion seen.      < end of copied text >      a/p     1) s/p fall - fell about 3 weeks ago , pt had mechanical fall no LOC, out pt echo shows normal LV mild AS, now off a/c     2) Afib - chronic, off a/c c/w dilt     3) DVT ppx- sub q  heparin   
89 yo LEFT handed man with chronic Afib (taken off apixaban 1-2 weeks ago d/t recurrent falls), HTN, HLD, presumed GERD, BPH, asthma, anemia, and prior chronic strokes who presented to [facility] ED on 3/27/2024, with c/o weakness/difficulty ambulating/confusion since Sunday, 3/24/2024.  NO tenecteplase d/t outside therapeutic window.  NO thrombectomy d/t no LVO.  NIHSS on admission: 3 (+1 questions, +1 R facial, +1 subjective dysarthria)  LKN: 3/24/2024, 18:00  pre-MRS: 3  CTH: bilateral inferior frontsal and L occipitotemporal encephalomalacia c/w old infarcts   CTA H/N neg     CRP < 3  LDL 65  A1c 6.2  EEG no seizures   MRI with acute strokes in R MCA terriotry     Impression: 1) Acute encephalopathy with difficulty ambulating and subjective dysarthria. R NLF flattening appears chronic. Could be new ischemic infarct vs. toxic/metabolic/infectious process. Recently discontinued apixaban for Afib d/t "falls and age."  2) Falls and weakness  3) chronic strokes, embolic   4) new R MCA embolic infarct     Recommendations:   -  apixaban for stroke prevention; spoke with daughter states he does not fall on a dialy basis.  would stop asa and place on DOAC for AF as asa and elqiuis have similar bleeding risk. stroke literature suggest that unless he is falling > 300x/year that the benefits of DOAC outweigh risks of falling : eliquis 2.5mg BID started however given Cr < 1.5 and weight > 60kg should be on 5mg BID ; now correct dose   - Continue atorvastatin 80mg (goal LDL<70)   - Neurochecks, vitals q4h  - Fall and aspiration precautions  - PT/OT/SLP/CM  - gabapentin should be QHS  - Diet: target is DASH/TLC  - DVT prophylaxis: enoxaparin 40mg q24h (unless medically contraindicated) AND SCDs  - Stroke education provided   spoke with family at bedside 3/30  Juventino Bashir MD  Vascular Neurology  Office: [phone] .     
EKG - Afib       1) s/p fall - fell about 3 weeks ago , pt had mechanical fall no LOC, out pt echo shows normal LV mild AS    2) Afib - chronic, c/w dilt. was taken off AC 2/2 falls recently OP however MRI this admission shows acute CVA ,restarted on eliquis s/p subdural hematoma seen Neurosurgery no intervention required currently will keep the patient off anticoagulation

## 2024-04-05 NOTE — PROGRESS NOTE ADULT - PROVIDER SPECIALTY LIST ADULT
Cardiology
Neurology
Cardiology
Cardiology
Neurology
Neurology
Cardiology
Neurology
Cardiology
Cardiology
Neurology
Internal Medicine

## 2024-04-07 ENCOUNTER — TRANSCRIPTION ENCOUNTER (OUTPATIENT)
Age: 89
End: 2024-04-07

## 2024-04-11 ENCOUNTER — INPATIENT (INPATIENT)
Facility: HOSPITAL | Age: 89
LOS: 6 days | Discharge: HOME | DRG: 157 | End: 2024-04-18
Attending: INTERNAL MEDICINE | Admitting: INTERNAL MEDICINE
Payer: MEDICARE

## 2024-04-11 VITALS
RESPIRATION RATE: 30 BRPM | HEART RATE: 72 BPM | WEIGHT: 110.01 LBS | OXYGEN SATURATION: 100 % | SYSTOLIC BLOOD PRESSURE: 131 MMHG | DIASTOLIC BLOOD PRESSURE: 85 MMHG | HEIGHT: 64 IN

## 2024-04-11 DIAGNOSIS — N39.0 URINARY TRACT INFECTION, SITE NOT SPECIFIED: ICD-10-CM

## 2024-04-11 DIAGNOSIS — D63.8 ANEMIA IN OTHER CHRONIC DISEASES CLASSIFIED ELSEWHERE: ICD-10-CM

## 2024-04-11 DIAGNOSIS — I10 ESSENTIAL (PRIMARY) HYPERTENSION: ICD-10-CM

## 2024-04-11 DIAGNOSIS — R62.7 ADULT FAILURE TO THRIVE: ICD-10-CM

## 2024-04-11 DIAGNOSIS — E78.5 HYPERLIPIDEMIA, UNSPECIFIED: ICD-10-CM

## 2024-04-11 DIAGNOSIS — R41.82 ALTERED MENTAL STATUS, UNSPECIFIED: ICD-10-CM

## 2024-04-11 DIAGNOSIS — S06.5XAA TRAUMATIC SUBDURAL HEMORRHAGE WITH LOSS OF CONSCIOUSNESS STATUS UNKNOWN, INITIAL ENCOUNTER: ICD-10-CM

## 2024-04-11 DIAGNOSIS — J45.909 UNSPECIFIED ASTHMA, UNCOMPLICATED: ICD-10-CM

## 2024-04-11 DIAGNOSIS — Z29.9 ENCOUNTER FOR PROPHYLACTIC MEASURES, UNSPECIFIED: ICD-10-CM

## 2024-04-11 DIAGNOSIS — I48.20 CHRONIC ATRIAL FIBRILLATION, UNSPECIFIED: ICD-10-CM

## 2024-04-11 DIAGNOSIS — N40.0 BENIGN PROSTATIC HYPERPLASIA WITHOUT LOWER URINARY TRACT SYMPTOMS: ICD-10-CM

## 2024-04-11 LAB
ALBUMIN SERPL ELPH-MCNC: 3.5 G/DL — SIGNIFICANT CHANGE UP (ref 3.3–5)
ALP SERPL-CCNC: 91 U/L — SIGNIFICANT CHANGE UP (ref 40–120)
ALT FLD-CCNC: 20 U/L — SIGNIFICANT CHANGE UP (ref 10–45)
ANION GAP SERPL CALC-SCNC: 13 MMOL/L — SIGNIFICANT CHANGE UP (ref 5–17)
ANISOCYTOSIS BLD QL: SLIGHT — SIGNIFICANT CHANGE UP
APPEARANCE UR: ABNORMAL
AST SERPL-CCNC: 32 U/L — SIGNIFICANT CHANGE UP (ref 10–40)
BACTERIA # UR AUTO: ABNORMAL /HPF
BASE EXCESS BLDV CALC-SCNC: 2.5 MMOL/L — SIGNIFICANT CHANGE UP (ref -2–3)
BASOPHILS # BLD AUTO: 0.07 K/UL — SIGNIFICANT CHANGE UP (ref 0–0.2)
BASOPHILS NFR BLD AUTO: 0.9 % — SIGNIFICANT CHANGE UP (ref 0–2)
BILIRUB SERPL-MCNC: 0.8 MG/DL — SIGNIFICANT CHANGE UP (ref 0.2–1.2)
BILIRUB UR-MCNC: NEGATIVE — SIGNIFICANT CHANGE UP
BUN SERPL-MCNC: 27 MG/DL — HIGH (ref 7–23)
CA-I SERPL-SCNC: 1.19 MMOL/L — SIGNIFICANT CHANGE UP (ref 1.15–1.33)
CALCIUM SERPL-MCNC: 9.4 MG/DL — SIGNIFICANT CHANGE UP (ref 8.4–10.5)
CAST: 25 /LPF — HIGH (ref 0–4)
CHLORIDE BLDV-SCNC: 107 MMOL/L — SIGNIFICANT CHANGE UP (ref 96–108)
CHLORIDE SERPL-SCNC: 107 MMOL/L — SIGNIFICANT CHANGE UP (ref 96–108)
CO2 BLDV-SCNC: 30 MMOL/L — HIGH (ref 22–26)
CO2 SERPL-SCNC: 25 MMOL/L — SIGNIFICANT CHANGE UP (ref 22–31)
COLOR SPEC: SIGNIFICANT CHANGE UP
CREAT SERPL-MCNC: 1.27 MG/DL — SIGNIFICANT CHANGE UP (ref 0.5–1.3)
DIFF PNL FLD: ABNORMAL
EGFR: 51 ML/MIN/1.73M2 — LOW
ELLIPTOCYTES BLD QL SMEAR: SLIGHT — SIGNIFICANT CHANGE UP
EOSINOPHIL # BLD AUTO: 0 K/UL — SIGNIFICANT CHANGE UP (ref 0–0.5)
EOSINOPHIL NFR BLD AUTO: 0 % — SIGNIFICANT CHANGE UP (ref 0–6)
FINE GRAN CASTS #/AREA URNS AUTO: PRESENT
GAS PNL BLDV: 143 MMOL/L — SIGNIFICANT CHANGE UP (ref 136–145)
GAS PNL BLDV: SIGNIFICANT CHANGE UP
GLUCOSE BLDV-MCNC: 107 MG/DL — HIGH (ref 70–99)
GLUCOSE SERPL-MCNC: 109 MG/DL — HIGH (ref 70–99)
GLUCOSE UR QL: NEGATIVE MG/DL — SIGNIFICANT CHANGE UP
HCO3 BLDV-SCNC: 28 MMOL/L — SIGNIFICANT CHANGE UP (ref 22–29)
HCT VFR BLD CALC: 30.3 % — LOW (ref 39–50)
HCT VFR BLDA CALC: 29 % — LOW (ref 39–51)
HGB BLD CALC-MCNC: 9.6 G/DL — LOW (ref 12.6–17.4)
HGB BLD-MCNC: 9.1 G/DL — LOW (ref 13–17)
KETONES UR-MCNC: 15 MG/DL
LACTATE BLDV-MCNC: 1.7 MMOL/L — SIGNIFICANT CHANGE UP (ref 0.5–2)
LEUKOCYTE ESTERASE UR-ACNC: ABNORMAL
LIDOCAIN IGE QN: 17 U/L — SIGNIFICANT CHANGE UP (ref 7–60)
LYMPHOCYTES # BLD AUTO: 1.02 K/UL — SIGNIFICANT CHANGE UP (ref 1–3.3)
LYMPHOCYTES # BLD AUTO: 12.3 % — LOW (ref 13–44)
MAGNESIUM SERPL-MCNC: 2.2 MG/DL — SIGNIFICANT CHANGE UP (ref 1.6–2.6)
MANUAL SMEAR VERIFICATION: SIGNIFICANT CHANGE UP
MCHC RBC-ENTMCNC: 22.3 PG — LOW (ref 27–34)
MCHC RBC-ENTMCNC: 30 GM/DL — LOW (ref 32–36)
MCV RBC AUTO: 74.3 FL — LOW (ref 80–100)
MICROCYTES BLD QL: SLIGHT — SIGNIFICANT CHANGE UP
MONOCYTES # BLD AUTO: 0.51 K/UL — SIGNIFICANT CHANGE UP (ref 0–0.9)
MONOCYTES NFR BLD AUTO: 6.1 % — SIGNIFICANT CHANGE UP (ref 2–14)
NEUTROPHILS # BLD AUTO: 6.7 K/UL — SIGNIFICANT CHANGE UP (ref 1.8–7.4)
NEUTROPHILS NFR BLD AUTO: 80.7 % — HIGH (ref 43–77)
NITRITE UR-MCNC: POSITIVE
OVALOCYTES BLD QL SMEAR: SLIGHT — SIGNIFICANT CHANGE UP
PCO2 BLDV: 49 MMHG — SIGNIFICANT CHANGE UP (ref 42–55)
PH BLDV: 7.37 — SIGNIFICANT CHANGE UP (ref 7.32–7.43)
PH UR: 5.5 — SIGNIFICANT CHANGE UP (ref 5–8)
PHOSPHATE SERPL-MCNC: 3.5 MG/DL — SIGNIFICANT CHANGE UP (ref 2.5–4.5)
PLAT MORPH BLD: NORMAL — SIGNIFICANT CHANGE UP
PLATELET # BLD AUTO: 371 K/UL — SIGNIFICANT CHANGE UP (ref 150–400)
PO2 BLDV: 28 MMHG — SIGNIFICANT CHANGE UP (ref 25–45)
POIKILOCYTOSIS BLD QL AUTO: SLIGHT — SIGNIFICANT CHANGE UP
POLYCHROMASIA BLD QL SMEAR: SLIGHT — SIGNIFICANT CHANGE UP
POTASSIUM BLDV-SCNC: 4.4 MMOL/L — SIGNIFICANT CHANGE UP (ref 3.5–5.1)
POTASSIUM SERPL-MCNC: 4.4 MMOL/L — SIGNIFICANT CHANGE UP (ref 3.5–5.3)
POTASSIUM SERPL-SCNC: 4.4 MMOL/L — SIGNIFICANT CHANGE UP (ref 3.5–5.3)
PROT SERPL-MCNC: 7.2 G/DL — SIGNIFICANT CHANGE UP (ref 6–8.3)
PROT UR-MCNC: 30 MG/DL
RBC # BLD: 4.08 M/UL — LOW (ref 4.2–5.8)
RBC # FLD: 20.5 % — HIGH (ref 10.3–14.5)
RBC BLD AUTO: ABNORMAL
RBC CASTS # UR COMP ASSIST: 9 /HPF — HIGH (ref 0–4)
REVIEW: SIGNIFICANT CHANGE UP
SAO2 % BLDV: 36.3 % — LOW (ref 67–88)
SCHISTOCYTES BLD QL AUTO: SLIGHT — SIGNIFICANT CHANGE UP
SODIUM SERPL-SCNC: 145 MMOL/L — SIGNIFICANT CHANGE UP (ref 135–145)
SP GR SPEC: 1.02 — SIGNIFICANT CHANGE UP (ref 1–1.03)
SQUAMOUS # UR AUTO: 26 /HPF — HIGH (ref 0–5)
UROBILINOGEN FLD QL: 0.2 MG/DL — SIGNIFICANT CHANGE UP (ref 0.2–1)
WBC # BLD: 8.3 K/UL — SIGNIFICANT CHANGE UP (ref 3.8–10.5)
WBC # FLD AUTO: 8.3 K/UL — SIGNIFICANT CHANGE UP (ref 3.8–10.5)
WBC UR QL: 469 /HPF — HIGH (ref 0–5)

## 2024-04-11 PROCEDURE — 99285 EMERGENCY DEPT VISIT HI MDM: CPT

## 2024-04-11 PROCEDURE — 71045 X-RAY EXAM CHEST 1 VIEW: CPT | Mod: 26

## 2024-04-11 PROCEDURE — 99223 1ST HOSP IP/OBS HIGH 75: CPT

## 2024-04-11 PROCEDURE — 70450 CT HEAD/BRAIN W/O DYE: CPT | Mod: 26,MC

## 2024-04-11 RX ORDER — FLUTICASONE FUROATE, UMECLIDINIUM BROMIDE AND VILANTEROL TRIFENATATE 200; 62.5; 25 UG/1; UG/1; UG/1
1 POWDER RESPIRATORY (INHALATION)
Refills: 0 | DISCHARGE

## 2024-04-11 RX ORDER — FUROSEMIDE 40 MG
1 TABLET ORAL
Refills: 0 | DISCHARGE

## 2024-04-11 RX ORDER — GABAPENTIN 400 MG/1
100 CAPSULE ORAL AT BEDTIME
Refills: 0 | Status: DISCONTINUED | OUTPATIENT
Start: 2024-04-11 | End: 2024-04-18

## 2024-04-11 RX ORDER — PANTOPRAZOLE SODIUM 20 MG/1
40 TABLET, DELAYED RELEASE ORAL
Refills: 0 | Status: DISCONTINUED | OUTPATIENT
Start: 2024-04-11 | End: 2024-04-12

## 2024-04-11 RX ORDER — PANTOPRAZOLE SODIUM 20 MG/1
1 TABLET, DELAYED RELEASE ORAL
Refills: 0 | DISCHARGE

## 2024-04-11 RX ORDER — OLANZAPINE 15 MG/1
2.5 TABLET, FILM COATED ORAL EVERY 6 HOURS
Refills: 0 | Status: DISCONTINUED | OUTPATIENT
Start: 2024-04-11 | End: 2024-04-18

## 2024-04-11 RX ORDER — MULTIVIT-MIN/FERROUS GLUCONATE 9 MG/15 ML
1 LIQUID (ML) ORAL
Refills: 0 | DISCHARGE

## 2024-04-11 RX ORDER — TAMSULOSIN HYDROCHLORIDE 0.4 MG/1
0.4 CAPSULE ORAL AT BEDTIME
Refills: 0 | Status: DISCONTINUED | OUTPATIENT
Start: 2024-04-11 | End: 2024-04-18

## 2024-04-11 RX ORDER — TAMSULOSIN HYDROCHLORIDE 0.4 MG/1
2 CAPSULE ORAL
Refills: 0 | DISCHARGE

## 2024-04-11 RX ORDER — CEFTRIAXONE 500 MG/1
1000 INJECTION, POWDER, FOR SOLUTION INTRAMUSCULAR; INTRAVENOUS EVERY 24 HOURS
Refills: 0 | Status: DISCONTINUED | OUTPATIENT
Start: 2024-04-11 | End: 2024-04-12

## 2024-04-11 RX ORDER — SODIUM CHLORIDE 9 MG/ML
1000 INJECTION INTRAMUSCULAR; INTRAVENOUS; SUBCUTANEOUS
Refills: 0 | Status: DISCONTINUED | OUTPATIENT
Start: 2024-04-11 | End: 2024-04-13

## 2024-04-11 RX ORDER — SODIUM CHLORIDE 9 MG/ML
1000 INJECTION INTRAMUSCULAR; INTRAVENOUS; SUBCUTANEOUS ONCE
Refills: 0 | Status: COMPLETED | OUTPATIENT
Start: 2024-04-11 | End: 2024-04-11

## 2024-04-11 RX ORDER — LEVETIRACETAM 250 MG/1
1000 TABLET, FILM COATED ORAL
Refills: 0 | Status: DISCONTINUED | OUTPATIENT
Start: 2024-04-11 | End: 2024-04-12

## 2024-04-11 RX ORDER — MONTELUKAST 4 MG/1
1 TABLET, CHEWABLE ORAL
Refills: 0 | DISCHARGE

## 2024-04-11 RX ORDER — FUROSEMIDE 40 MG
20 TABLET ORAL DAILY
Refills: 0 | Status: DISCONTINUED | OUTPATIENT
Start: 2024-04-11 | End: 2024-04-18

## 2024-04-11 RX ORDER — LANOLIN ALCOHOL/MO/W.PET/CERES
3 CREAM (GRAM) TOPICAL AT BEDTIME
Refills: 0 | Status: DISCONTINUED | OUTPATIENT
Start: 2024-04-11 | End: 2024-04-18

## 2024-04-11 RX ORDER — ACETAMINOPHEN 500 MG
650 TABLET ORAL EVERY 6 HOURS
Refills: 0 | Status: DISCONTINUED | OUTPATIENT
Start: 2024-04-11 | End: 2024-04-18

## 2024-04-11 RX ORDER — TIOTROPIUM BROMIDE 18 UG/1
2 CAPSULE ORAL; RESPIRATORY (INHALATION) DAILY
Refills: 0 | Status: DISCONTINUED | OUTPATIENT
Start: 2024-04-11 | End: 2024-04-18

## 2024-04-11 RX ORDER — CEFTRIAXONE 500 MG/1
1000 INJECTION, POWDER, FOR SOLUTION INTRAMUSCULAR; INTRAVENOUS EVERY 24 HOURS
Refills: 0 | Status: DISCONTINUED | OUTPATIENT
Start: 2024-04-11 | End: 2024-04-11

## 2024-04-11 RX ORDER — FINASTERIDE 5 MG/1
5 TABLET, FILM COATED ORAL DAILY
Refills: 0 | Status: DISCONTINUED | OUTPATIENT
Start: 2024-04-11 | End: 2024-04-18

## 2024-04-11 RX ORDER — MONTELUKAST 4 MG/1
10 TABLET, CHEWABLE ORAL DAILY
Refills: 0 | Status: DISCONTINUED | OUTPATIENT
Start: 2024-04-11 | End: 2024-04-18

## 2024-04-11 RX ORDER — DILTIAZEM HCL 120 MG
120 CAPSULE, EXT RELEASE 24 HR ORAL DAILY
Refills: 0 | Status: DISCONTINUED | OUTPATIENT
Start: 2024-04-11 | End: 2024-04-18

## 2024-04-11 RX ORDER — ONDANSETRON 8 MG/1
4 TABLET, FILM COATED ORAL EVERY 8 HOURS
Refills: 0 | Status: DISCONTINUED | OUTPATIENT
Start: 2024-04-11 | End: 2024-04-18

## 2024-04-11 RX ORDER — ATORVASTATIN CALCIUM 80 MG/1
80 TABLET, FILM COATED ORAL AT BEDTIME
Refills: 0 | Status: DISCONTINUED | OUTPATIENT
Start: 2024-04-11 | End: 2024-04-18

## 2024-04-11 RX ORDER — CEFTRIAXONE 500 MG/1
1000 INJECTION, POWDER, FOR SOLUTION INTRAMUSCULAR; INTRAVENOUS ONCE
Refills: 0 | Status: COMPLETED | OUTPATIENT
Start: 2024-04-11 | End: 2024-04-11

## 2024-04-11 RX ORDER — BUDESONIDE AND FORMOTEROL FUMARATE DIHYDRATE 160; 4.5 UG/1; UG/1
2 AEROSOL RESPIRATORY (INHALATION)
Refills: 0 | Status: DISCONTINUED | OUTPATIENT
Start: 2024-04-11 | End: 2024-04-18

## 2024-04-11 RX ADMIN — SODIUM CHLORIDE 90 MILLILITER(S): 9 INJECTION INTRAMUSCULAR; INTRAVENOUS; SUBCUTANEOUS at 19:01

## 2024-04-11 RX ADMIN — SODIUM CHLORIDE 1000 MILLILITER(S): 9 INJECTION INTRAMUSCULAR; INTRAVENOUS; SUBCUTANEOUS at 15:37

## 2024-04-11 RX ADMIN — CEFTRIAXONE 100 MILLIGRAM(S): 500 INJECTION, POWDER, FOR SOLUTION INTRAMUSCULAR; INTRAVENOUS at 18:44

## 2024-04-11 NOTE — PATIENT PROFILE ADULT - TRANSPORTATION
Discussed in rounds this am. Had questionable seizure activity this weekend. Neuro involved. Tolerating weaning of Protek and it may be removed. Continues on Milrinone gtt. To repeat  Echo. Updated Comprehensive HC via [facility]. Will follow to assist with transitions of care.    no

## 2024-04-11 NOTE — PATIENT PROFILE ADULT - FALL HARM RISK - HARM RISK INTERVENTIONS

## 2024-04-11 NOTE — ED PROVIDER NOTE - HIV OFFER
Detail Level: Detailed
Quality 130: Documentation Of Current Medications In The Medical Record: Current Medications Documented
Previously Declined (within the last year)

## 2024-04-11 NOTE — H&P ADULT - NSHPPHYSICALEXAM_GEN_ALL_CORE
T(C): 36.4 (04-12-24 @ 05:13), Max: 37 (04-11-24 @ 14:42)  HR: 69 (04-12-24 @ 05:13) (69 - 84)  BP: 120/60 (04-12-24 @ 05:13) (117/58 - 133/69)  RR: 18 (04-12-24 @ 05:13) (18 - 30)  SpO2: 96% (04-12-24 @ 05:13) (96% - 100%)    CONSTITUTIONAL: Well groomed, no apparent distress  EYES: PERRLA , EOMI  ENMT: MMM  RESP: No respiratory distress, no use of accessory muscles; CTA b/l  CV: +S1S2, no MRG; no peripheral edema  GI: Soft, NTND, no RGR  MSK: ROM x4 extremities   SKIN: No rashes or ulcers noted  NEURO: CN II-XII grossly intact , no focal deficit   PSYCH: Alert, confused

## 2024-04-11 NOTE — PATIENT PROFILE ADULT - FUNCTIONAL ASSESSMENT - BASIC MOBILITY 6.
1-calculated by average/Not able to assess (calculate score using [facility] averaging method)

## 2024-04-11 NOTE — ED PROVIDER NOTE - PHYSICAL EXAMINATION
Gen: NAD, non-toxic appearing  Head: normal appearing  HEENT: normal conjunctiva, oral mucosa dry, perrla   Lung: no respiratory distress, speaking in full sentences, CTA b/l     CV: regular rate and rhythm, no murmurs  Abd: soft, non distended, non tender   MSK: no visible deformities  Neuro: No focal deficits  Skin: Warm  Psych: normal affect

## 2024-04-11 NOTE — ED PROVIDER NOTE - OBJECTIVE STATEMENT
89-year-old male past medical history of chronic A-fib not on AC 2/2 frequent falls HTN, CKD 3, BPH, chronic LE edema on Lasix 20 mg daily presents to ED for decreased PO intake x 2 days.  Of note recently discharged 5 days ago for increased confusion and agitation, found to have a SDH with midline shift.  Patient poor historian at bedside, appears lethargic.  Daughter at bedside reports patient was responsive moments before arriving to ED with no complaints of abdominal pain, fever, chills, nausea, vomiting, diarrhea, constipation, urinary symptoms, cough.

## 2024-04-11 NOTE — ED PROVIDER NOTE - PROGRESS NOTE DETAILS
Kimberly Wolfe (Rodriguez) PGY3 found to have a UTI. CTX ordered. Dr. Vaca aware and accepts pt to her service.

## 2024-04-11 NOTE — ED ADULT NURSE REASSESSMENT NOTE - NS ED NURSE REASSESS COMMENT FT1
pt straight cath aseptically with 180ml get urine drained specimen to lab as ordered pt tolerated procedure well pt pending disposition

## 2024-04-11 NOTE — H&P ADULT - NSHPLABSRESULTS_GEN_ALL_CORE
9.1    8.30  )-----------( 371      ( 11 Apr 2024 15:45 )             30.3     04-11    145  |  107  |  27<H>  ----------------------------<  109<H>  4.4   |  25  |  1.27    Ca    9.4      11 Apr 2024 15:45  Phos  3.5     04-11  Mg     2.2     04-11    TPro  7.2  /  Alb  3.5  /  TBili  0.8  /  DBili  x   /  AST  32  /  ALT  20  /  AlkPhos  91  04-11      Urinalysis + Microscopic Examination (04.11.24 @ 15:31)    pH Urine: 5.5    Urine Appearance: Turbid    Color: Dark Yellow    Specific Gravity: 1.024    Protein, Urine: 30 mg/dL    Glucose Qualitative, Urine: Negative mg/dL    Ketone - Urine: 15 mg/dL    Blood, Urine: Large    Bilirubin: Negative    Urobilinogen: 0.2 mg/dL    Leukocyte Esterase Concentration: Moderate    Nitrite: Positive    Review: Reviewed    White Blood Cell - Urine: 469 /HPF    Red Blood Cell - Urine: 9 /HPF    Bacteria: Many /HPF    Cast: 25 /LPF    Fine Granular Casts: Present    Epithelial Cells: 26 /HPF      - - - - - - - - - - - - - - - - - - - - - - - - - - - - - - - - - - - - - - - - - - - - - - - - - - - -       EKG PERSONALLY REVIEWED:  Afib 70 bpm      IMAGES PERSONALLY REVIEWED:     < from: Xray Chest 1 View- PORTABLE-Urgent (Xray Chest 1 View- PORTABLE-Urgent .) (04.11.24 @ 16:35) >  IMPRESSION:  No focal consolidations.      < from: CT Head No Cont (04.11.24 @ 16:16) >  IMPRESSION:  No significant interval change in the size of the acute and subacute left   holohemispheric subdural hematoma measuring up to 2.3 cm in greatest   width. There is mass effect on the underlying parenchyma that appears   mildly worsened as compared to the prior exam. Of note, portions of the   subdural hematoma are lower in attenuation, compatible with evolution   over time. There is mild left to right midline shift, essentially unchanged.  Rest of the chronic findings are unchanged.

## 2024-04-11 NOTE — ED PROVIDER NOTE - CLINICAL SUMMARY MEDICAL DECISION MAKING FREE TEXT BOX
Afebrile hemodynamically stable male saturating well on room air at 98% presenting to ED for decreased p.o. intake x 2 days with no apparent abdominal pain urinary symptoms or infectious cause will order basic labs, lipase, UA with culture, mag/Phos, CT head to rule out SDH progression versus infectious etiology versus UTI.  Given fluids, will reassess. 89 y old male DNR /DNI , Recently discharge from [facility]  For subdural hematoma   presented to ED for dehydration and AMS   Afebrile hemodynamically stable male saturating well on room air at 98% presenting to ED for decreased p.o. intake x 2 days with no apparent abdominal pain urinary symptoms or infectious cause will order basic labs, lipase, UA with culture, mag/Phos, CT head to rule out SDH progression versus infectious etiology versus UTI.  Given fluids, will reassess. ZR

## 2024-04-11 NOTE — H&P ADULT - PROBLEM SELECTOR PLAN 4
- Hx/o chronic Afib taken off apixaban 1-2 weeks ago d/t recurrent falls  - Now c/b recent SDH   - C/w Diltiazem, statin

## 2024-04-11 NOTE — H&P ADULT - PROBLEM SELECTOR PLAN 1
DDx: infection vs electrolyte abnormalities vs cardiac dz vs neuro d/o vs debility   - Noted to have poor intake   - Afebrile, VSS, nontoxic appearing   - Labs: chronic anemia, chem stable, UA: infectious but could be contaminate    - CXR: neg    CThead:  largely stable SDH  w/ midline shift  - Infection: possible given UA grossly (+), S/p ceftriaxone 1g in ED. Will c/w in this high risk pt    F/u Ucx, monitor fever curve    - Electrolytes overall unremarkable, less likely source of FTT    - Cardiac: Pt w/significant cardiac hx of Afib, htn. appears stable cardiac wise. Unlikely etiology   - Neuro: hx/o CVA, recent SDH.  On exam pt appears at baseline, no new focal neuro deficit,   CTH w/stable SDH.  Neuro etiology low suspicion though not r/o    - Pt w/ multiple comorbidities, recent hospitalization, Component of debility highly probably in this frail medically complex pt. Obtain PT consult DDx: infection vs electrolyte abnormalities vs cardiac dz vs neuro d/o vs debility   - Noted to have poor intake   - Afebrile, VSS, nontoxic appearing   - Labs: chronic anemia, chem stable, UA: infectious ?contaminate   - CXR: neg    CThead:  largely stable SDH  w/ midline shift  - Infection: possible given UA grossly (+), S/p ceftriaxone 1g in ED. Will c/w in this high risk pt    F/u Ucx, monitor fever curve    - Electrolytes overall unremarkable, less likely source of FTT    - Cardiac: Pt w/significant cardiac hx of Afib, htn. appears stable cardiac wise. Unlikely etiology   - Neuro: hx/o CVA, recent SDH.  On exam pt appears at baseline, no new focal neuro deficit,   CTH w/stable SDH.  Neuro etiology low suspicion though not r/o    - Pt w/ multiple comorbidities, recent hospitalization, Component of debility highly probably in this frail medically complex pt. Obtain PT consult.   - Also with recent refusal to eat, c/f dysphagia. SLP consult (ordered) . NPO

## 2024-04-11 NOTE — ED PROVIDER NOTE - CADM POA CENTRAL LINE
Medication name: Dexmethylphenidate HCl ER 35 MG Oral Capsule  Last Refill Details: Date 12/12/2022, # of tablets: 30, # of refills approved:0   Ordering provider name: Griselda Dense  Last office visit with ordering provider: 10/4/2022    Last office visit note-Return in about 1 year (around 10/4/2023) for Well Visit     Patient has no scheduled follow appointment yet    unable to refill medication per established guidelines request forwarded to provider to review, please call patient to discuss refills No

## 2024-04-11 NOTE — H&P ADULT - ASSESSMENT
89y  LEFT handed man pmh chronic Afib (taken off apixaban 1-2 weeks ago d/t recurrent falls), HTN, HLD, presumed GERD, BPH, asthma, anemia, prior chronic strokes, SDH w/ midline shift during recent admission discharged few days ago now coming for decreased intake a/f further w/u

## 2024-04-11 NOTE — ED ADULT NURSE NOTE - OBJECTIVE STATEMENT
pt 89 yo male accompanied by daughter from [facility] for not eating and worsening weakness AMS per ems pt was hypoxic in ambulance on arrival pt taken off oxygen on room air sitting on stretcher pt sating 100% pt with bilateral clear breath sounds pt was discharged 5 days ago was here for urinary retention on arrival bladder scan with 69ml visualized pt poor oral intake tongue and mucous membranes dry cracked pt moving all extremities vitals stable pending md dang pt 89 yo male accompanied by daughter from [facility] for not eating and worsening weakness AMS per ems pt was hypoxic in ambulance on arrival pt taken off oxygen on room air sitting on stretcher pt sating 100% pt with bilateral clear breath sounds pt was discharged 5 days ago was here for urinary retention on arrival bladder scan with 69ml visualized pt poor oral intake tongue and mucous membranes dry cracked pt moving all extremities vitals stable pending md dang pt had recent subdural on ct findings with last admission

## 2024-04-11 NOTE — H&P ADULT - PROBLEM SELECTOR PLAN 2
- UA:  infectious but could be contaminate  - S/p ceftriaxone 1g in ED. Will c/w in this high risk pt    - F/u Ucx, monitor fever curve

## 2024-04-11 NOTE — H&P ADULT - PROBLEM SELECTOR PLAN 3
- SDH w/ midline shift during recent admission  - CThead:  largely stable SDH  w/ midline shift  - Seizure precx   - C/w Keppra

## 2024-04-11 NOTE — ED PROVIDER NOTE - CARE PLAN
Principal Discharge DX:	Syncope   1 Principal Discharge DX:	AMS (altered mental status)   Principal Discharge DX:	AMS (altered mental status)  Secondary Diagnosis:	Acute UTI

## 2024-04-11 NOTE — PATIENT PROFILE ADULT - FALL HARM RISK - RISK INTERVENTIONS

## 2024-04-11 NOTE — H&P ADULT - HISTORY OF PRESENT ILLNESS
89 yo LEFT handed man pmh  chronic Afib (taken off apixaban 1-2 weeks ago d/t recurrent falls), HTN, HLD, presumed GERD, BPH, asthma, anemia, prior chronic strokes, SDH w/ midline shift during recent admission discharged few days ago now coming for decreased PO intake.      ROS limited given AMS

## 2024-04-12 LAB
ANION GAP SERPL CALC-SCNC: 12 MMOL/L — SIGNIFICANT CHANGE UP (ref 5–17)
ANION GAP SERPL CALC-SCNC: 13 MMOL/L — SIGNIFICANT CHANGE UP (ref 5–17)
BUN SERPL-MCNC: 26 MG/DL — HIGH (ref 7–23)
BUN SERPL-MCNC: 27 MG/DL — HIGH (ref 7–23)
CALCIUM SERPL-MCNC: 7.7 MG/DL — LOW (ref 8.4–10.5)
CALCIUM SERPL-MCNC: 8 MG/DL — LOW (ref 8.4–10.5)
CHLORIDE SERPL-SCNC: 112 MMOL/L — HIGH (ref 96–108)
CHLORIDE SERPL-SCNC: 113 MMOL/L — HIGH (ref 96–108)
CO2 SERPL-SCNC: 21 MMOL/L — LOW (ref 22–31)
CO2 SERPL-SCNC: 22 MMOL/L — SIGNIFICANT CHANGE UP (ref 22–31)
CREAT SERPL-MCNC: 1.07 MG/DL — SIGNIFICANT CHANGE UP (ref 0.5–1.3)
CREAT SERPL-MCNC: 1.11 MG/DL — SIGNIFICANT CHANGE UP (ref 0.5–1.3)
EGFR: 60 ML/MIN/1.73M2 — SIGNIFICANT CHANGE UP
EGFR: 63 ML/MIN/1.73M2 — SIGNIFICANT CHANGE UP
GLUCOSE SERPL-MCNC: 84 MG/DL — SIGNIFICANT CHANGE UP (ref 70–99)
GLUCOSE SERPL-MCNC: 87 MG/DL — SIGNIFICANT CHANGE UP (ref 70–99)
HCT VFR BLD CALC: 24.4 % — LOW (ref 39–50)
HCT VFR BLD CALC: 29.4 % — LOW (ref 39–50)
HGB BLD-MCNC: 7.2 G/DL — LOW (ref 13–17)
HGB BLD-MCNC: 8.4 G/DL — LOW (ref 13–17)
INR BLD: 1.14 RATIO — SIGNIFICANT CHANGE UP (ref 0.85–1.18)
MAGNESIUM SERPL-MCNC: 2 MG/DL — SIGNIFICANT CHANGE UP (ref 1.6–2.6)
MCHC RBC-ENTMCNC: 21.7 PG — LOW (ref 27–34)
MCHC RBC-ENTMCNC: 22.2 PG — LOW (ref 27–34)
MCHC RBC-ENTMCNC: 28.6 GM/DL — LOW (ref 32–36)
MCHC RBC-ENTMCNC: 29.5 GM/DL — LOW (ref 32–36)
MCV RBC AUTO: 75.1 FL — LOW (ref 80–100)
MCV RBC AUTO: 76 FL — LOW (ref 80–100)
NRBC # BLD: 0 /100 WBCS — SIGNIFICANT CHANGE UP (ref 0–0)
NRBC # BLD: 0 /100 WBCS — SIGNIFICANT CHANGE UP (ref 0–0)
PLATELET # BLD AUTO: 253 K/UL — SIGNIFICANT CHANGE UP (ref 150–400)
PLATELET # BLD AUTO: 281 K/UL — SIGNIFICANT CHANGE UP (ref 150–400)
POTASSIUM SERPL-MCNC: 3.7 MMOL/L — SIGNIFICANT CHANGE UP (ref 3.5–5.3)
POTASSIUM SERPL-MCNC: 3.8 MMOL/L — SIGNIFICANT CHANGE UP (ref 3.5–5.3)
POTASSIUM SERPL-SCNC: 3.7 MMOL/L — SIGNIFICANT CHANGE UP (ref 3.5–5.3)
POTASSIUM SERPL-SCNC: 3.8 MMOL/L — SIGNIFICANT CHANGE UP (ref 3.5–5.3)
PROTHROM AB SERPL-ACNC: 12.5 SEC — SIGNIFICANT CHANGE UP (ref 9.5–13)
RBC # BLD: 3.25 M/UL — LOW (ref 4.2–5.8)
RBC # BLD: 3.87 M/UL — LOW (ref 4.2–5.8)
RBC # FLD: 20.3 % — HIGH (ref 10.3–14.5)
RBC # FLD: 20.4 % — HIGH (ref 10.3–14.5)
SODIUM SERPL-SCNC: 146 MMOL/L — HIGH (ref 135–145)
SODIUM SERPL-SCNC: 147 MMOL/L — HIGH (ref 135–145)
WBC # BLD: 6.95 K/UL — SIGNIFICANT CHANGE UP (ref 3.8–10.5)
WBC # BLD: 8.7 K/UL — SIGNIFICANT CHANGE UP (ref 3.8–10.5)
WBC # FLD AUTO: 6.95 K/UL — SIGNIFICANT CHANGE UP (ref 3.8–10.5)
WBC # FLD AUTO: 8.7 K/UL — SIGNIFICANT CHANGE UP (ref 3.8–10.5)

## 2024-04-12 PROCEDURE — 99222 1ST HOSP IP/OBS MODERATE 55: CPT

## 2024-04-12 RX ORDER — LEVETIRACETAM 250 MG/1
750 TABLET, FILM COATED ORAL
Refills: 0 | Status: DISCONTINUED | OUTPATIENT
Start: 2024-04-12 | End: 2024-04-12

## 2024-04-12 RX ORDER — LEVETIRACETAM 250 MG/1
750 TABLET, FILM COATED ORAL
Refills: 0 | Status: DISCONTINUED | OUTPATIENT
Start: 2024-04-12 | End: 2024-04-16

## 2024-04-12 RX ORDER — ACETAMINOPHEN 500 MG
1000 TABLET ORAL ONCE
Refills: 0 | Status: COMPLETED | OUTPATIENT
Start: 2024-04-12 | End: 2024-04-12

## 2024-04-12 RX ORDER — LEVETIRACETAM 250 MG/1
1000 TABLET, FILM COATED ORAL AT BEDTIME
Refills: 0 | Status: DISCONTINUED | OUTPATIENT
Start: 2024-04-12 | End: 2024-04-12

## 2024-04-12 RX ORDER — LEVETIRACETAM 250 MG/1
1000 TABLET, FILM COATED ORAL AT BEDTIME
Refills: 0 | Status: DISCONTINUED | OUTPATIENT
Start: 2024-04-12 | End: 2024-04-16

## 2024-04-12 RX ORDER — PANTOPRAZOLE SODIUM 20 MG/1
40 TABLET, DELAYED RELEASE ORAL DAILY
Refills: 0 | Status: DISCONTINUED | OUTPATIENT
Start: 2024-04-12 | End: 2024-04-16

## 2024-04-12 RX ADMIN — ATORVASTATIN CALCIUM 80 MILLIGRAM(S): 80 TABLET, FILM COATED ORAL at 21:57

## 2024-04-12 RX ADMIN — Medication 400 MILLIGRAM(S): at 03:02

## 2024-04-12 RX ADMIN — Medication 1000 MILLIGRAM(S): at 09:31

## 2024-04-12 RX ADMIN — BUDESONIDE AND FORMOTEROL FUMARATE DIHYDRATE 2 PUFF(S): 160; 4.5 AEROSOL RESPIRATORY (INHALATION) at 06:11

## 2024-04-12 RX ADMIN — TAMSULOSIN HYDROCHLORIDE 0.4 MILLIGRAM(S): 0.4 CAPSULE ORAL at 21:57

## 2024-04-12 RX ADMIN — Medication 120 MILLIGRAM(S): at 06:10

## 2024-04-12 RX ADMIN — GABAPENTIN 100 MILLIGRAM(S): 400 CAPSULE ORAL at 22:07

## 2024-04-12 RX ADMIN — LEVETIRACETAM 400 MILLIGRAM(S): 250 TABLET, FILM COATED ORAL at 21:57

## 2024-04-12 RX ADMIN — Medication 20 MILLIGRAM(S): at 06:11

## 2024-04-12 RX ADMIN — LEVETIRACETAM 1000 MILLIGRAM(S): 250 TABLET, FILM COATED ORAL at 06:11

## 2024-04-12 RX ADMIN — PANTOPRAZOLE SODIUM 40 MILLIGRAM(S): 20 TABLET, DELAYED RELEASE ORAL at 06:11

## 2024-04-12 RX ADMIN — Medication 400 MILLIGRAM(S): at 09:01

## 2024-04-12 NOTE — DIETITIAN INITIAL EVALUATION ADULT - OTHER INFO
-- NPO, on IVF NaCl 0.9% @ 90ml/hr  -- on antibiotic for UTI  -- Heart Failure on Lasix  -- GI: on Protonix, Zofran, Simethicone

## 2024-04-12 NOTE — DIETITIAN INITIAL EVALUATION ADULT - PERTINENT LABORATORY DATA
04-12    146<H>  |  112<H>  |  27<H>  ----------------------------<  87  3.7   |  21<L>  |  1.11    Ca    8.0<L>      12 Apr 2024 06:57  Phos  3.5     04-11  Mg     2.0     04-12    TPro  7.2  /  Alb  3.5  /  TBili  0.8  /  DBili  x   /  AST  32  /  ALT  20  /  AlkPhos  91  04-11    A1C with Estimated Average Glucose Result: 6.2 % (03-28-24 @ 07:36)  A1C with Estimated Average Glucose Result: 6.4 % (06-04-23 @ 05:57)

## 2024-04-12 NOTE — DIETITIAN INITIAL EVALUATION ADULT - ENTERAL
If EN, recommend to initiate Jevity 1.5 @ 10ml/hr, advance as tolerated, to a goal rate of 55ml/hr x 24 hrs to provide 1320ml formula, 1980kcal (33kcal/kg), 85g protein (1.4g/kg), 1003ml free water; based on current wt of 60.7kg. RD remains available to adjust EN as needed.

## 2024-04-12 NOTE — CONSULT NOTE ADULT - ASSESSMENT
EKG - not in the chart       1) failure to thrive t/t per primary team       2) Afib - chronic, c/w dilt. was taken off AC 2/2 falls recently OP however MRI last admission shows acute CVA      
89 yo LEFT handed man pmh  chronic Afib (taken off apixaban 1-2 weeks ago d/t recurrent falls), HTN, HLD, presumed GERD, BPH, asthma, anemia, prior chronic strokes, SDH w/ midline shift during recent admission discharged few days ago now coming for decreased PO intake.      Overall abnormal U/A, AMS with decreased po intake.  Per daughter at bedside pt has no new symptoms, these symptoms are ongoing since last admission.  No fever or leucocytosis   Component of dehydration due to poor po intake.       PLAN:   Abnormal U/A but pt with no systemic signs of infection.   recommend to monitor off abx   urine cx in lab, even if positive not planning to treat unless any change in clinical status.   blood cx in lab.   trend cbc, no leucocytosis.       Plan discussed with Medicine ACP.     Clint Subramanian  Please contact through MS Teams   If no response or past 5 pm/weekend call [phone]. 
89 yo LEFT handed man with chronic Afib (taken off apixaban 1-2 weeks ago d/t recurrent falls and Hemorrhage), HTN, HLD, presumed GERD, BPH, asthma, anemia, prior chronic strokes, RMCA infarct and  SDH w/ midline shift during recent admission discharged few days ago now coming for decreased PO intake.  Prior Workup:   CTH: bilateral inferior frontsal and L occipitotemporal encephalomalacia c/w old infarcts   CTA H/N neg     CRP < 3  LDL 65  A1c 6.2  EEG no seizures   MRI with acute strokes in R MCA terriotry   4/3 CTH with actue L SDH 3mm  CTH 4/4 no change   4/3 RRT for concern for seziure activiyt; SDH on CTH ; EEG no seizures   EEG no seizures, L temporoccipital slowing   This admission:   CTH no change in L SDH 2.3cm in depht.  left to righ shift unchanged   UA +    Impression   1) chronic strokes as well as more recent R MCA infarct, likely embolic from Afib   2) recent L SDH with shift unchanged   3) AMS, decrease PO intake 2/2 toxic metabolic encephalopathy from UTI     - treatemnt of infection per team on CTX  - seizure ppx with keppra 1g BID ; consider lowering to 750AM and 1g PM   - routine EEG   - hold AC; DVT ppx okay   - Continue atorvastatin 80mg (goal LDL<70)   - f/u ID   - CTH if change in neuro exam   - PT/OT/SS/SLP, OOBC  - check FS, glucose control <180  - GI/DVT ppx  - Counseling on diet, exercise, and medication adherence was done  - Counseling on smoking cessation and alcohol consumption offered when appropriate.  - Pain assessed and judicious use of narcotics when appropriate was discussed.    - Stroke education given when appropriate.  - Importance of fall prevention discussed.   - Differential diagnosis and plan of care discussed with patient and/or family and primary team  - Thank you for allowing me to participate in the care of this patient. Call with questions.     Juventino Bashir MD  Vascular Neurology  Office: [phone]

## 2024-04-12 NOTE — CONSULT NOTE ADULT - SUBJECTIVE AND OBJECTIVE BOX
Neurology Consult    Reason for Consult: Patient is a 89y old  Male who presents with a chief complaint of AMS (11 Apr 2024 20:51)      HPI:  89 yo LEFT handed man pmh  chronic Afib (taken off apixaban 1-2 weeks ago d/t recurrent falls), HTN, HLD, presumed GERD, BPH, asthma, anemia, prior chronic strokes, SDH w/ midline shift during recent admission discharged few days ago now coming for decreased PO intake.      ROS limited given AMS  (11 Apr 2024 20:51)       PAST MEDICAL & SURGICAL HISTORY:  Afib      Asthmatic bronchitis , chronic      GERD (gastroesophageal reflux disease)      HLD (hyperlipidemia)      HTN (hypertension)      No significant past surgical history          Allergies: Allergies    No Known Allergies    Intolerances        Social History: Denies toxic habits including tobacco, ETOH or illicit drugs.    Family History: FAMILY HISTORY:  FH: stroke (Mother, Sibling)    . No family history of strokes    Medications: MEDICATIONS  (STANDING):  atorvastatin 80 milliGRAM(s) Oral at bedtime  budesonide  80 MICROgram(s)/formoterol 4.5 MICROgram(s) Inhaler 2 Puff(s) Inhalation two times a day  cefTRIAXone   IVPB 1000 milliGRAM(s) IV Intermittent every 24 hours  diltiazem    milliGRAM(s) Oral daily  finasteride 5 milliGRAM(s) Oral daily  furosemide    Tablet 20 milliGRAM(s) Oral daily  gabapentin 100 milliGRAM(s) Oral at bedtime  levETIRAcetam 1000 milliGRAM(s) Oral two times a day  montelukast 10 milliGRAM(s) Oral daily  pantoprazole    Tablet 40 milliGRAM(s) Oral before breakfast  sodium chloride 0.9%. 1000 milliLiter(s) (90 mL/Hr) IV Continuous <Continuous>  tamsulosin 0.4 milliGRAM(s) Oral at bedtime  tiotropium 2.5 MICROgram(s) Inhaler 2 Puff(s) Inhalation daily    MEDICATIONS  (PRN):  acetaminophen     Tablet .. 650 milliGRAM(s) Oral every 6 hours PRN Temp greater or equal to 38C (100.4F), Mild Pain (1 - 3)  aluminum hydroxide/magnesium hydroxide/simethicone Suspension 30 milliLiter(s) Oral every 4 hours PRN Dyspepsia  melatonin 3 milliGRAM(s) Oral at bedtime PRN Insomnia  OLANZapine 2.5 milliGRAM(s) Oral every 6 hours PRN for agitation  ondansetron Injectable 4 milliGRAM(s) IV Push every 8 hours PRN Nausea and/or Vomiting      Review of Systems:  unable given mental status     Vitals:  Vital Signs Last 24 Hrs  T(C): 36.4 (12 Apr 2024 05:13), Max: 37 (11 Apr 2024 14:42)  T(F): 97.6 (12 Apr 2024 05:13), Max: 98.6 (11 Apr 2024 14:42)  HR: 69 (12 Apr 2024 05:13) (69 - 84)  BP: 120/60 (12 Apr 2024 05:13) (117/58 - 133/69)  BP(mean): --  RR: 18 (12 Apr 2024 05:13) (18 - 30)  SpO2: 96% (12 Apr 2024 05:13) (96% - 100%)    Parameters below as of 12 Apr 2024 05:13  Patient On (Oxygen Delivery Method): room air        General Exam:   General Appearance: Appropriately dressed and in no acute distress       Head: Normocephalic, atraumatic and no dysmorphic features  Ear, Nose, and Throat: Moist mucous membranes  CVS: S1S2+  Resp: No SOB, no wheeze or rhonchi  GI: soft NT/ND  Extremities: No edema or cyanosis  Skin: No bruises or rashes     Neurological Exam:  Mental Status: Awake, alert and oriented x 1-2.  Able to follow simple verbal commands. Able to name and repeat. fluent speech. No obvious aphasia + dysarthria noted.   Cranial Nerves: PERRL, EOMI, VFFC, sensation V1-V3 intact,  no obvious facial asymmetry, equal elevation of palate, scm/trap 5/5, tongue is midline on protrusion. no obvious papilledema on fundoscopic exam. hearing is grossly intact.   Motor: DYSON at least 4/5   Sensation: Intact to light touch and pinprick throughout. no right/left confusion. no extinction to tactile on DSS.    Reflexes: 1+ throughout at biceps, brachioradialis, triceps, patellars and ankles bilaterally and equal. No clonus. R toe and L toe were both downgoing.  Coordination: No dysmetria on FNF    Gait: deferred     Data/Labs/Imaging which I personally reviewed.     Labs:     CBC Full  -  ( 12 Apr 2024 06:57 )  WBC Count : 6.95 K/uL  RBC Count : 3.25 M/uL  Hemoglobin : 7.2 g/dL  Hematocrit : 24.4 %  Platelet Count - Automated : 253 K/uL  Mean Cell Volume : 75.1 fl  Mean Cell Hemoglobin : 22.2 pg  Mean Cell Hemoglobin Concentration : 29.5 gm/dL  Auto Neutrophil # : x  Auto Lymphocyte # : x  Auto Monocyte # : x  Auto Eosinophil # : x  Auto Basophil # : x  Auto Neutrophil % : x  Auto Lymphocyte % : x  Auto Monocyte % : x  Auto Eosinophil % : x  Auto Basophil % : x    04-12    146<H>  |  112<H>  |  27<H>  ----------------------------<  87  3.7   |  21<L>  |  1.11    Ca    8.0<L>      12 Apr 2024 06:57  Phos  3.5     04-11  Mg     2.0     04-12    TPro  7.2  /  Alb  3.5  /  TBili  0.8  /  DBili  x   /  AST  32  /  ALT  20  /  AlkPhos  91  04-11    LIVER FUNCTIONS - ( 11 Apr 2024 15:45 )  Alb: 3.5 g/dL / Pro: 7.2 g/dL / ALK PHOS: 91 U/L / ALT: 20 U/L / AST: 32 U/L / GGT: x           PT/INR - ( 12 Apr 2024 06:57 )   PT: 12.5 sec;   INR: 1.14 ratio           Urinalysis Basic - ( 12 Apr 2024 06:57 )    Color: x / Appearance: x / SG: x / pH: x  Gluc: 87 mg/dL / Ketone: x  / Bili: x / Urobili: x   Blood: x / Protein: x / Nitrite: x   Leuk Esterase: x / RBC: x / WBC x   Sq Epi: x / Non Sq Epi: x / Bacteria: x      < from: CT Head No Cont (04.11.24 @ 16:16) >    ACC: [id number] EXAM:  CT BRAIN   ORDERED BY:  BRITTNEE MURCIA     PROCEDURE DATE:  04/11/2024          INTERPRETATION:  Exam Date: 4/11/2024 4:16 PM    CT head without IV contrast    CLINICAL INFORMATION: assess progression of SDH    TECHNIQUE: Contiguous axial sections were obtained through the head. Exam   was repeated secondary to motion. Coronal and sagittal reformats were   obtained.    COMPARISON: CT head 4/4/2024    FINDINGS:  No significant interval change in the size of the acute and subacute left   holohemispheric subdural hematoma measuring up to 2.3 cm in greatest   width. There is mass effect on the underlying parenchyma that appears   mildly worsened as compared to the prior exam. Of note, portions of the   subdural hematoma are lower in attenuation, compatible with evolution   over time. There is mild left to right midline shift, essentially   unchanged.    There is an area of chronic encephalomalacia within the right anterior   frontal lobe. There is an additional small areaof encephalomalacia   within the left posterior temporal lobe, unchanged since prior exam. Rest   of the findings are unchanged.    There are scattered mucosal inflammatory changes in the paranasal sinuses.    IMPRESSION:    No significant interval change in the size of the acute and subacute left   holohemispheric subdural hematoma measuring up to 2.3 cm in greatest   width. There is mass effect on the underlying parenchyma that appears   mildly worsened as compared to the prior exam. Of note, portions of the   subdural hematoma are lower in attenuation, compatible with evolution   over time. There is mild left to right midline shift, essentially   unchanged.  Rest of the chronic findings are unchanged.      --- End of Report ---            JUSTINE FUCHS MD; Attending Radiologist  This document has been electronically signed. Apr 11 2024  4:22PM    < end of copied text >      
  Slick Up MD  Interventional Cardiology / Endovascular Specialist  [place] Office : [phone] [place] [address] [place] [address]  Tel:   [place] Office : [phone] [facility] [place] [address]  Tel: [phone]    89 yo LEFT handed man pmh  chronic Afib (taken off apixaban 1-2 weeks ago d/t recurrent falls), HTN, HLD, presumed GERD, BPH, asthma, anemia, prior chronic strokes, SDH w/ midline shift during recent admission discharged few days ago now coming for decreased PO intake.      ROS limited given AMS   	  MEDICATIONS:  diltiazem    milliGRAM(s) Oral daily  furosemide    Tablet 20 milliGRAM(s) Oral daily      budesonide  80 MICROgram(s)/formoterol 4.5 MICROgram(s) Inhaler 2 Puff(s) Inhalation two times a day  montelukast 10 milliGRAM(s) Oral daily  tiotropium 2.5 MICROgram(s) Inhaler 2 Puff(s) Inhalation daily    acetaminophen     Tablet .. 650 milliGRAM(s) Oral every 6 hours PRN  gabapentin 100 milliGRAM(s) Oral at bedtime  levETIRAcetam 750 milliGRAM(s) Oral with breakfast  levETIRAcetam 1000 milliGRAM(s) Oral at bedtime  melatonin 3 milliGRAM(s) Oral at bedtime PRN  OLANZapine 2.5 milliGRAM(s) Oral every 6 hours PRN  ondansetron Injectable 4 milliGRAM(s) IV Push every 8 hours PRN    aluminum hydroxide/magnesium hydroxide/simethicone Suspension 30 milliLiter(s) Oral every 4 hours PRN  pantoprazole    Tablet 40 milliGRAM(s) Oral before breakfast    atorvastatin 80 milliGRAM(s) Oral at bedtime  finasteride 5 milliGRAM(s) Oral daily    sodium chloride 0.9%. 1000 milliLiter(s) IV Continuous <Continuous>  tamsulosin 0.4 milliGRAM(s) Oral at bedtime      PAST MEDICAL/SURGICAL HISTORY  PAST MEDICAL & SURGICAL HISTORY:  Afib      Asthmatic bronchitis , chronic      GERD (gastroesophageal reflux disease)      HLD (hyperlipidemia)      HTN (hypertension)      No significant past surgical history          SOCIAL HISTORY: Substance Use (street drugs): ( x ) never used  (  ) other:    FAMILY HISTORY:  FH: stroke (Mother, Sibling)        PHYSICAL EXAM:  T(C): 37.1 (04-12-24 @ 13:23), Max: 37.1 (04-12-24 @ 13:23)  HR: 70 (04-12-24 @ 13:23) (69 - 84)  BP: 145/62 (04-12-24 @ 13:23) (117/58 - 145/62)  RR: 18 (04-12-24 @ 13:23) (18 - 20)  SpO2: 96% (04-12-24 @ 13:23) (96% - 100%)  Wt(kg): --  I&O's Summary    Height (cm): 167.6 (04-11 @ 22:49)  Weight (kg): 65.6 (04-11 @ 22:49)  BMI (kg/m2): 23.4 (04-11 @ 22:49)  BSA (m2): 1.74 (04-11 @ 22:49)    GENERAL: NAD  EYES:   PERRLA   ENMT:   Moist mucous membranes, Good dentition, No lesions  Cardiovascular: Normal S1 S2, No JVD, No murmurs, No edema  Respiratory: Lungs clear to auscultation	  Gastrointestinal:  Soft, Non-tender, + BS	  Extremities: no edema                              8.4    8.70  )-----------( 281      ( 12 Apr 2024 11:03 )             29.4     04-12    146<H>  |  112<H>  |  27<H>  ----------------------------<  87  3.7   |  21<L>  |  1.11    Ca    8.0<L>      12 Apr 2024 06:57  Phos  3.5     04-11  Mg     2.0     04-12    TPro  7.2  /  Alb  3.5  /  TBili  0.8  /  DBili  x   /  AST  32  /  ALT  20  /  AlkPhos  91  04-11    proBNP:   Lipid Profile:   HgA1c:   TSH:     Consultant(s) Notes Reviewed:  [x ] YES  [ ] NO    Care Discussed with Consultants/Other Providers [ x] YES  [ ] NO    Imaging Personally Reviewed independently:  [x] YES  [ ] NO    All labs, radiologic studies, vitals, orders and medications list reviewed. Patient is seen and examined at bedside. Case discussed with medical team.              
Patient is a 89y old  Male who presents with a chief complaint of Altered mental status     (12 Apr 2024 14:45)      HPI:  89 yo LEFT handed man pmh  chronic Afib (taken off apixaban 1-2 weeks ago d/t recurrent falls), HTN, HLD, presumed GERD, BPH, asthma, anemia, prior chronic strokes, SDH w/ midline shift during recent admission discharged few days ago now coming for decreased PO intake.  ROS limited given AMS  (11 Apr 2024 20:51)  Above reviewed:   Pt unable to provide any hx, daughter at bedside. No fever or chills, not eating since discharge, lethargic. Per RN no diarrhea, no cough. Did have some issue with urinary retention but now voiding spontaneously.       PAST MEDICAL & SURGICAL HISTORY:  Afib      Asthmatic bronchitis , chronic      GERD (gastroesophageal reflux disease)      HLD (hyperlipidemia)      HTN (hypertension)      No significant past surgical history          REVIEW OF SYSTEMS  Unable to obtain due to pt's underlying mental status.         Social history:  From home.         FAMILY HISTORY:  FH: stroke (Mother, Sibling)        Allergies  No Known Allergies          Antimicrobials:        Vital Signs Last 24 Hrs  T(C): 37.1 (12 Apr 2024 13:23), Max: 37.1 (12 Apr 2024 13:23)  T(F): 98.7 (12 Apr 2024 13:23), Max: 98.7 (12 Apr 2024 13:23)  HR: 65 (12 Apr 2024 13:25) (65 - 84)  BP: 149/68 (12 Apr 2024 13:25) (117/58 - 149/68)  BP(mean): --  RR: 18 (12 Apr 2024 13:23) (18 - 18)  SpO2: 96% (12 Apr 2024 13:25) (96% - 97%)    Parameters below as of 12 Apr 2024 13:25  Patient On (Oxygen Delivery Method): room air        PHYSICAL EXAM: Patient in no acute distress.    Constitutional: Comfortable. Awake, sitting in chair.     Eyes: No discharge or conjunctival injection.    ENMT: No thrush. No pharyngeal erythema.    Neck: Supple,     Respiratory:  + air entry bilaterally.    Cardiovascular: S1 S2 wnl,     Gastrointestinal: Soft, no tenderness, non distended.    Genitourinary: No pantoja      Extremities: No edema.    Vascular: peripheral pulses felt    Skin: No rash     Musculoskeletal: No joint swelling.    Psychiatric: unable to assess.                               8.4    8.70  )-----------( 281      ( 12 Apr 2024 11:03 )             29.4       04-12    146<H>  |  112<H>  |  27<H>  ----------------------------<  87  3.7   |  21<L>  |  1.11    Ca    8.0<L>      12 Apr 2024 06:57  Phos  3.5     04-11  Mg     2.0     04-12    TPro  7.2  /  Alb  3.5  /  TBili  0.8  /  DBili  x   /  AST  32  /  ALT  20  /  AlkPhos  91  04-11      Urinalysis + Microscopic Examination (04.11.24 @ 15:31)   pH Urine: 5.5  Urine Appearance: Turbid  Color: Dark Yellow  Specific Gravity: 1.024  Protein, Urine: 30 mg/dL  Glucose Qualitative, Urine: Negative mg/dL  Ketone - Urine: 15 mg/dL  Blood, Urine: Large  Bilirubin: Negative  Urobilinogen: 0.2 mg/dL  Leukocyte Esterase Concentration: Moderate  Nitrite: Positive  Review: Reviewed  White Blood Cell - Urine: 469 /HPF  Red Blood Cell - Urine: 9 /HPF  Bacteria: Many /HPF  Cast: 25 /LPF  Fine Granular Casts: Present  Epithelial Cells: 26 /HPF      Radiology: Imaging reviewed and visualized personally [ x]      < from: CT Head No Cont (04.11.24 @ 16:16) >  IMPRESSION:    No significant interval change in the size of the acute and subacute left   holohemispheric subdural hematoma measuring up to 2.3 cm in greatest   width. There is mass effect on the underlying parenchyma that appears   mildly worsened as compared to the prior exam. Of note, portions of the   subdural hematoma are lower in attenuation, compatible with evolution   over time. There is mild left to right midline shift, essentially   unchanged.  Rest of the chronic findings are unchanged.      < from: Xray Chest 1 View- PORTABLE-Urgent (Xray Chest 1 View- PORTABLE-Urgent .) (04.11.24 @ 16:35) >  IMPRESSION:  No focal consolidations.

## 2024-04-12 NOTE — DIETITIAN INITIAL EVALUATION ADULT - ORAL INTAKE PTA/DIET HISTORY
Pt known by RD from recent admission (2 weeks ago). Per recent RD note, pt with a good appetite most of the time, not following therapeutic diet, eats well-balanced meals. Confirms no known food allergies. Denies history of chewing or swallowing issue, denies GI distress at baseline. Hx of using Glucerna once in a while. Daughter notices pt with decreased PO intake <50% for >5 days this time with AMS, states pt refuse to open his mouth most of the time.

## 2024-04-12 NOTE — DIETITIAN INITIAL EVALUATION ADULT - PHYSCIAL ASSESSMENT
Drug Dosing Weight  Height (cm): 167.6 (11 Apr 2024 22:49)  Weight (kg): 65.6 (11 Apr 2024 22:49)  BMI (kg/m2): 23.4 (11 Apr 2024 22:49)    Daily wt (bed scale Weight in kG): 60.7 (04-12 @ 05:13)    UBW: ~132-140lb from daughter   Wt history from previous RD notes: 63.5kg (3/27/24), 65.9kg (3/29/24)      ** Noted with wt loss of 2.8kg/6lb/4.4% in 2 weeks, wt loss might be multifactorial: fluid shift and inadequate PO intake. RD will continue to monitor wt trends as available/able.     IBW: 142lb, 94% IBW

## 2024-04-12 NOTE — DIETITIAN INITIAL EVALUATION ADULT - ADD RECOMMEND
-- Pt at high risk for refeeding syndrome; monitor potassium, magnesium, phosphorus, glucose and fluid balance closely.   -- Recommend MVI, pending no medical contraindications, for micronutrient support.   -- Monitor for PO/EN initiation, GI tolerance, skin integrity, labs, weight, and bowel movement regularity.   -- Malnutrition alert placed in chart.

## 2024-04-12 NOTE — PHYSICAL THERAPY INITIAL EVALUATION ADULT - TRANSFER SAFETY CONCERNS NOTED: SIT/STAND, REHAB EVAL
Returned patient's call.   He had a question regarding stopping macrobid.   Instructed for patient to stop macrobid and start bactrim. He will notify coumadin clinic as well.        ----- Message from Jessenia Del Valle LPN sent at 7/1/2019  1:40 PM CDT -----  Contact: pt   He has questions about the antibiotic you talked to him about earlier.     ----- Message -----  From: Yoselin Singletary  Sent: 7/1/2019  10:25 AM  To: Neville BARKER Staff    Needs Advice    Reason for call: Leigh Ann DAVIS --- pt calledd back he have questions about the antibiotic        Communication Preference: [phone]    Additional Information:        
decreased safety awareness/losing balance/decreased weight-shifting ability

## 2024-04-12 NOTE — SWALLOW BEDSIDE ASSESSMENT ADULT - SLP GENERAL OBSERVATIONS
Patient received OOB in chair, on room air, no verbal attempts made with refusal to participate in PO trials. Daughter at bedside provided max encouragement, however, patient continued to refuse.

## 2024-04-12 NOTE — SWALLOW BEDSIDE ASSESSMENT ADULT - SLP PERTINENT HISTORY OF CURRENT PROBLEM
89 yo LEFT handed man pmh  chronic Afib (taken off apixaban 1-2 weeks ago d/t recurrent falls), HTN, HLD, presumed GERD, BPH, asthma, anemia, prior chronic strokes, SDH w/ midline shift during recent admission discharged few days ago now coming for decreased PO intake.

## 2024-04-12 NOTE — DIETITIAN NUTRITION RISK NOTIFICATION - PHYSICAL ASSESSMENT ORBITAL
Physical Therapy 4/27/2020    Orders acknowledged and chart reviewed up to date. Per PCT and chart, pt planned for d/c to home today. Will follow-up as appropriate. Thank you.   Kristen Lucas, PT, DPT moderate

## 2024-04-12 NOTE — DIETITIAN INITIAL EVALUATION ADULT - PROBLEM SELECTOR PLAN 1
DDx: infection vs electrolyte abnormalities vs cardiac dz vs neuro d/o vs debility   - Noted to have poor intake   - Afebrile, VSS, nontoxic appearing   - Labs: chronic anemia, chem stable, UA: infectious ?contaminate   - CXR: neg    CThead:  largely stable SDH  w/ midline shift  - Infection: possible given UA grossly (+), S/p ceftriaxone 1g in ED. Will c/w in this high risk pt    F/u Ucx, monitor fever curve    - Electrolytes overall unremarkable, less likely source of FTT    - Cardiac: Pt w/significant cardiac hx of Afib, htn. appears stable cardiac wise. Unlikely etiology   - Neuro: hx/o CVA, recent SDH.  On exam pt appears at baseline, no new focal neuro deficit,   CTH w/stable SDH.  Neuro etiology low suspicion though not r/o    - Pt w/ multiple comorbidities, recent hospitalization, Component of debility highly probably in this frail medically complex pt. Obtain PT consult.   - Also with recent refusal to eat, c/f dysphagia. SLP consult (ordered) . NPO

## 2024-04-12 NOTE — SWALLOW BEDSIDE ASSESSMENT ADULT - SWALLOW EVAL: CRITERIA FOR SKILLED INTERVENTION MET
Patient refusing to participate in PO trials./patient/family refuse skilled intervention at this time

## 2024-04-12 NOTE — DIETITIAN INITIAL EVALUATION ADULT - OTHER CALCULATIONS
Fluid needs deferred to team. Energy and protein needs based on current wt of 60.7kg in consideration of malnutrition.

## 2024-04-12 NOTE — SWALLOW BEDSIDE ASSESSMENT ADULT - SWALLOW EVAL: RECOMMENDED DIET
Unable to determine as patient refusing all attempts at PO trials. Dietary decision deferred to primary team and patient/family wishes.

## 2024-04-12 NOTE — PHYSICAL THERAPY INITIAL EVALUATION ADULT - ADDITIONAL COMMENTS
Pt unable to provide social history due to decreased cognition. Per daughter at bedside, pt lives with his daughter in a private house with 4 steps to enter, 1 flight of stairs inside to bedroom. PTA pt was ambulating independently with a rolling walker and cane however has recently required more assistance for functional mobility and ADLs. Pt also owns 3:1 commode and a wheelchair.

## 2024-04-12 NOTE — PHYSICAL THERAPY INITIAL EVALUATION ADULT - NSPTDISCHREC_GEN_A_CORE
If pt d/c home would require home PT, assist with all mobility/ADLs, & DME: Pt will require transportation into the home/Sub-acute Rehab

## 2024-04-12 NOTE — DIETITIAN INITIAL EVALUATION ADULT - PERTINENT MEDS FT
MEDICATIONS  (STANDING):  atorvastatin 80 milliGRAM(s) Oral at bedtime  budesonide  80 MICROgram(s)/formoterol 4.5 MICROgram(s) Inhaler 2 Puff(s) Inhalation two times a day  diltiazem    milliGRAM(s) Oral daily  finasteride 5 milliGRAM(s) Oral daily  furosemide    Tablet 20 milliGRAM(s) Oral daily  gabapentin 100 milliGRAM(s) Oral at bedtime  levETIRAcetam 750 milliGRAM(s) Oral with breakfast  levETIRAcetam 1000 milliGRAM(s) Oral at bedtime  montelukast 10 milliGRAM(s) Oral daily  pantoprazole    Tablet 40 milliGRAM(s) Oral before breakfast  sodium chloride 0.9%. 1000 milliLiter(s) (90 mL/Hr) IV Continuous <Continuous>  tamsulosin 0.4 milliGRAM(s) Oral at bedtime  tiotropium 2.5 MICROgram(s) Inhaler 2 Puff(s) Inhalation daily    MEDICATIONS  (PRN):  acetaminophen     Tablet .. 650 milliGRAM(s) Oral every 6 hours PRN Temp greater or equal to 38C (100.4F), Mild Pain (1 - 3)  aluminum hydroxide/magnesium hydroxide/simethicone Suspension 30 milliLiter(s) Oral every 4 hours PRN Dyspepsia  melatonin 3 milliGRAM(s) Oral at bedtime PRN Insomnia  OLANZapine 2.5 milliGRAM(s) Oral every 6 hours PRN for agitation  ondansetron Injectable 4 milliGRAM(s) IV Push every 8 hours PRN Nausea and/or Vomiting

## 2024-04-12 NOTE — SWALLOW BEDSIDE ASSESSMENT ADULT - COMMENTS
Neuro Impressions: Chronic strokes as well as more recent R MCA infarct, likely embolic from Afib; recent L SDH with shift unchanged; AMS, decrease PO intake 2/2 toxic metabolic encephalopathy from UTI   Infection: possible given UA grossly (+), S/p ceftriaxone 1g in ED. Will c/w in this high risk pt. F/u Ucx, monitor fever curve.   CXR: Neg  CT head: Largely stable SDH  w/ midline shift    Swallow hx: Patient seen for bedside swallow evaluation on 3/28/24 revealing overtly functional oropharyngeal swallowing skills. Formation, control and transfer of the bolus were judged to be adequate.  No signs or symptoms of laryngeal penetration/aspiration evident with any consistency presented.  Recommended for regular solids and thin liquids.

## 2024-04-12 NOTE — DIETITIAN INITIAL EVALUATION ADULT - DIET TYPE
Recommend nutrition GOC with family if possible. If PO, Recommend diet free of therapeutic restriction, Diet texture per medical team/SLP. RD remains available to adjust diet as needed. Recommend nutrition GOC with family if possible. If PO, Recommend diet free of therapeutic restriction + Ensure plus high protein 2x daily (700kcal, 40g proteins). diet texture per medical team/SLP. RD remains available to adjust diet as needed.

## 2024-04-12 NOTE — DIETITIAN INITIAL EVALUATION ADULT - REASON INDICATOR FOR ASSESSMENT
Pt seen for consult for Congestive Heart Failure and MST score >/2. Pt with AMS. Information obtained from RN, electronic medical record and daughter (Ho) at bedside. Chart reviewed, events noted.

## 2024-04-12 NOTE — DIETITIAN INITIAL EVALUATION ADULT - ENERGY INTAKE
Pt is currently NPO due to AMS. SLP consulted however unable to assess as pt refusing to participate in bedside swallow evaluation 4/12.

## 2024-04-12 NOTE — PHYSICAL THERAPY INITIAL EVALUATION ADULT - PERTINENT HX OF CURRENT PROBLEM, REHAB EVAL
89 yo LEFT handed man pmh  chronic Afib (taken off apixaban 1-2 weeks ago d/t recurrent falls), HTN, HLD, presumed GERD, BPH, asthma, anemia, prior chronic strokes, SDH w/ midline shift during recent admission discharged few days ago now coming for decreased PO intake.  4/11 CT Head: No significant interval change in the size of the acute and subacute left holohemispheric subdural hematoma measuring up to 2.3 cm in greatest width. There is mass effect on the underlying parenchyma that appears mildly worsened as compared to the prior exam. Of note, portions of the   subdural hematoma are lower in attenuation, compatible with evolution over time. There is mild left to right midline shift, essentially unchanged. CXR: (-)

## 2024-04-12 NOTE — SWALLOW BEDSIDE ASSESSMENT ADULT - SWALLOW EVAL: DIAGNOSIS
89 yo LEFT handed man with chronic Afib (taken off apixaban 1-2 weeks ago d/t recurrent falls and Hemorrhage), HTN, HLD, presumed GERD, BPH, asthma, anemia, prior chronic strokes, RMCA infarct and  SDH w/ midline shift during recent admission discharged few days ago now coming for decreased PO intake. 89Y LEFT handed man with chronic Afib (taken off apixaban 1-2 weeks ago d/t recurrent falls and Hemorrhage), HTN, HLD, presumed GERD, BPH, asthma, anemia, prior chronic strokes, RMCA infarct and  SDH w/ midline shift during recent admission discharged few days ago now coming for decreased PO intake. Patient refusing to participate in bedside swallow evaluation and did not engage with clinician despite max verbal/tactile cues and encouragement provided by SLP and daughter at bedside. Daughter endorsing similar behaviors at home s/p discharge. This service unable to make dietary recommendations at this time. Dietary decision deferred to primary team and patient/family wishes.

## 2024-04-13 LAB
ANION GAP SERPL CALC-SCNC: 15 MMOL/L — SIGNIFICANT CHANGE UP (ref 5–17)
BLD GP AB SCN SERPL QL: NEGATIVE — SIGNIFICANT CHANGE UP
BUN SERPL-MCNC: 20 MG/DL — SIGNIFICANT CHANGE UP (ref 7–23)
CALCIUM SERPL-MCNC: 8.6 MG/DL — SIGNIFICANT CHANGE UP (ref 8.4–10.5)
CHLORIDE SERPL-SCNC: 111 MMOL/L — HIGH (ref 96–108)
CO2 SERPL-SCNC: 21 MMOL/L — LOW (ref 22–31)
CREAT SERPL-MCNC: 0.89 MG/DL — SIGNIFICANT CHANGE UP (ref 0.5–1.3)
EGFR: 77 ML/MIN/1.73M2 — SIGNIFICANT CHANGE UP
GLUCOSE BLDC GLUCOMTR-MCNC: 116 MG/DL — HIGH (ref 70–99)
GLUCOSE BLDC GLUCOMTR-MCNC: 79 MG/DL — SIGNIFICANT CHANGE UP (ref 70–99)
GLUCOSE SERPL-MCNC: 62 MG/DL — LOW (ref 70–99)
HCT VFR BLD CALC: 29.6 % — LOW (ref 39–50)
HGB BLD-MCNC: 8.6 G/DL — LOW (ref 13–17)
MCHC RBC-ENTMCNC: 22.5 PG — LOW (ref 27–34)
MCHC RBC-ENTMCNC: 29.1 GM/DL — LOW (ref 32–36)
MCV RBC AUTO: 77.3 FL — LOW (ref 80–100)
NRBC # BLD: 0 /100 WBCS — SIGNIFICANT CHANGE UP (ref 0–0)
PLATELET # BLD AUTO: 290 K/UL — SIGNIFICANT CHANGE UP (ref 150–400)
POTASSIUM SERPL-MCNC: 3.5 MMOL/L — SIGNIFICANT CHANGE UP (ref 3.5–5.3)
POTASSIUM SERPL-SCNC: 3.5 MMOL/L — SIGNIFICANT CHANGE UP (ref 3.5–5.3)
RBC # BLD: 3.83 M/UL — LOW (ref 4.2–5.8)
RBC # FLD: 20.4 % — HIGH (ref 10.3–14.5)
RH IG SCN BLD-IMP: NEGATIVE — SIGNIFICANT CHANGE UP
SODIUM SERPL-SCNC: 147 MMOL/L — HIGH (ref 135–145)
WBC # BLD: 7.35 K/UL — SIGNIFICANT CHANGE UP (ref 3.8–10.5)
WBC # FLD AUTO: 7.35 K/UL — SIGNIFICANT CHANGE UP (ref 3.8–10.5)

## 2024-04-13 RX ORDER — SODIUM CHLORIDE 9 MG/ML
1000 INJECTION, SOLUTION INTRAVENOUS
Refills: 0 | Status: DISCONTINUED | OUTPATIENT
Start: 2024-04-13 | End: 2024-04-18

## 2024-04-13 RX ADMIN — BUDESONIDE AND FORMOTEROL FUMARATE DIHYDRATE 2 PUFF(S): 160; 4.5 AEROSOL RESPIRATORY (INHALATION) at 17:34

## 2024-04-13 RX ADMIN — LEVETIRACETAM 400 MILLIGRAM(S): 250 TABLET, FILM COATED ORAL at 22:21

## 2024-04-13 RX ADMIN — Medication 120 MILLIGRAM(S): at 05:18

## 2024-04-13 RX ADMIN — TAMSULOSIN HYDROCHLORIDE 0.4 MILLIGRAM(S): 0.4 CAPSULE ORAL at 22:22

## 2024-04-13 RX ADMIN — LEVETIRACETAM 400 MILLIGRAM(S): 250 TABLET, FILM COATED ORAL at 11:32

## 2024-04-13 RX ADMIN — PANTOPRAZOLE SODIUM 40 MILLIGRAM(S): 20 TABLET, DELAYED RELEASE ORAL at 11:36

## 2024-04-13 RX ADMIN — TIOTROPIUM BROMIDE 2 PUFF(S): 18 CAPSULE ORAL; RESPIRATORY (INHALATION) at 12:00

## 2024-04-13 RX ADMIN — MONTELUKAST 10 MILLIGRAM(S): 4 TABLET, CHEWABLE ORAL at 11:36

## 2024-04-13 RX ADMIN — GABAPENTIN 100 MILLIGRAM(S): 400 CAPSULE ORAL at 22:21

## 2024-04-13 RX ADMIN — Medication 20 MILLIGRAM(S): at 05:19

## 2024-04-13 RX ADMIN — SODIUM CHLORIDE 75 MILLILITER(S): 9 INJECTION, SOLUTION INTRAVENOUS at 22:49

## 2024-04-13 RX ADMIN — FINASTERIDE 5 MILLIGRAM(S): 5 TABLET, FILM COATED ORAL at 11:35

## 2024-04-13 RX ADMIN — ATORVASTATIN CALCIUM 80 MILLIGRAM(S): 80 TABLET, FILM COATED ORAL at 22:22

## 2024-04-13 RX ADMIN — SODIUM CHLORIDE 75 MILLILITER(S): 9 INJECTION, SOLUTION INTRAVENOUS at 08:43

## 2024-04-13 NOTE — PROGRESS NOTE ADULT - ASSESSMENT
EKG - not in the chart       1) failure to thrive t/t per primary team       2) Afib - chronic, c/w dilt. was taken off AC 2/2 falls recently OP however MRI last admission shows acute CVA

## 2024-04-14 LAB
-  AMOXICILLIN/CLAVULANIC ACID: SIGNIFICANT CHANGE UP
-  AMPICILLIN/SULBACTAM: SIGNIFICANT CHANGE UP
-  AMPICILLIN: SIGNIFICANT CHANGE UP
-  AZTREONAM: SIGNIFICANT CHANGE UP
-  CEFAZOLIN: SIGNIFICANT CHANGE UP
-  CEFEPIME: SIGNIFICANT CHANGE UP
-  CEFOXITIN: SIGNIFICANT CHANGE UP
-  CEFTRIAXONE: SIGNIFICANT CHANGE UP
-  CEFUROXIME: SIGNIFICANT CHANGE UP
-  CIPROFLOXACIN: SIGNIFICANT CHANGE UP
-  ERTAPENEM: SIGNIFICANT CHANGE UP
-  GENTAMICIN: SIGNIFICANT CHANGE UP
-  IMIPENEM: SIGNIFICANT CHANGE UP
-  LEVOFLOXACIN: SIGNIFICANT CHANGE UP
-  MEROPENEM: SIGNIFICANT CHANGE UP
-  NITROFURANTOIN: SIGNIFICANT CHANGE UP
-  PIPERACILLIN/TAZOBACTAM: SIGNIFICANT CHANGE UP
-  TOBRAMYCIN: SIGNIFICANT CHANGE UP
-  TRIMETHOPRIM/SULFAMETHOXAZOLE: SIGNIFICANT CHANGE UP
ANION GAP SERPL CALC-SCNC: 11 MMOL/L — SIGNIFICANT CHANGE UP (ref 5–17)
ANION GAP SERPL CALC-SCNC: 9 MMOL/L — SIGNIFICANT CHANGE UP (ref 5–17)
BUN SERPL-MCNC: 13 MG/DL — SIGNIFICANT CHANGE UP (ref 7–23)
BUN SERPL-MCNC: 13 MG/DL — SIGNIFICANT CHANGE UP (ref 7–23)
CALCIUM SERPL-MCNC: 8.6 MG/DL — SIGNIFICANT CHANGE UP (ref 8.4–10.5)
CALCIUM SERPL-MCNC: 8.7 MG/DL — SIGNIFICANT CHANGE UP (ref 8.4–10.5)
CHLORIDE SERPL-SCNC: 108 MMOL/L — SIGNIFICANT CHANGE UP (ref 96–108)
CHLORIDE SERPL-SCNC: 108 MMOL/L — SIGNIFICANT CHANGE UP (ref 96–108)
CO2 SERPL-SCNC: 22 MMOL/L — SIGNIFICANT CHANGE UP (ref 22–31)
CO2 SERPL-SCNC: 24 MMOL/L — SIGNIFICANT CHANGE UP (ref 22–31)
CREAT SERPL-MCNC: 0.88 MG/DL — SIGNIFICANT CHANGE UP (ref 0.5–1.3)
CREAT SERPL-MCNC: 0.91 MG/DL — SIGNIFICANT CHANGE UP (ref 0.5–1.3)
CULTURE RESULTS: ABNORMAL
EGFR: 76 ML/MIN/1.73M2 — SIGNIFICANT CHANGE UP
EGFR: 78 ML/MIN/1.73M2 — SIGNIFICANT CHANGE UP
GLUCOSE BLDC GLUCOMTR-MCNC: 121 MG/DL — HIGH (ref 70–99)
GLUCOSE BLDC GLUCOMTR-MCNC: 126 MG/DL — HIGH (ref 70–99)
GLUCOSE BLDC GLUCOMTR-MCNC: 136 MG/DL — HIGH (ref 70–99)
GLUCOSE BLDC GLUCOMTR-MCNC: 142 MG/DL — HIGH (ref 70–99)
GLUCOSE BLDC GLUCOMTR-MCNC: 144 MG/DL — HIGH (ref 70–99)
GLUCOSE SERPL-MCNC: 132 MG/DL — HIGH (ref 70–99)
GLUCOSE SERPL-MCNC: 133 MG/DL — HIGH (ref 70–99)
MAGNESIUM SERPL-MCNC: 1.9 MG/DL — SIGNIFICANT CHANGE UP (ref 1.6–2.6)
MAGNESIUM SERPL-MCNC: 2 MG/DL — SIGNIFICANT CHANGE UP (ref 1.6–2.6)
METHOD TYPE: SIGNIFICANT CHANGE UP
ORGANISM # SPEC MICROSCOPIC CNT: ABNORMAL
ORGANISM # SPEC MICROSCOPIC CNT: ABNORMAL
PHOSPHATE SERPL-MCNC: 2.3 MG/DL — LOW (ref 2.5–4.5)
POTASSIUM SERPL-MCNC: 3.2 MMOL/L — LOW (ref 3.5–5.3)
POTASSIUM SERPL-MCNC: 3.3 MMOL/L — LOW (ref 3.5–5.3)
POTASSIUM SERPL-SCNC: 3.2 MMOL/L — LOW (ref 3.5–5.3)
POTASSIUM SERPL-SCNC: 3.3 MMOL/L — LOW (ref 3.5–5.3)
SODIUM SERPL-SCNC: 141 MMOL/L — SIGNIFICANT CHANGE UP (ref 135–145)
SODIUM SERPL-SCNC: 141 MMOL/L — SIGNIFICANT CHANGE UP (ref 135–145)
SPECIMEN SOURCE: SIGNIFICANT CHANGE UP

## 2024-04-14 RX ORDER — FLUCONAZOLE 150 MG/1
100 TABLET ORAL ONCE
Refills: 0 | Status: COMPLETED | OUTPATIENT
Start: 2024-04-14 | End: 2024-04-14

## 2024-04-14 RX ORDER — POTASSIUM CHLORIDE 20 MEQ
40 PACKET (EA) ORAL ONCE
Refills: 0 | Status: COMPLETED | OUTPATIENT
Start: 2024-04-14 | End: 2024-04-14

## 2024-04-14 RX ADMIN — LEVETIRACETAM 400 MILLIGRAM(S): 250 TABLET, FILM COATED ORAL at 21:32

## 2024-04-14 RX ADMIN — MONTELUKAST 10 MILLIGRAM(S): 4 TABLET, CHEWABLE ORAL at 12:05

## 2024-04-14 RX ADMIN — TIOTROPIUM BROMIDE 2 PUFF(S): 18 CAPSULE ORAL; RESPIRATORY (INHALATION) at 12:05

## 2024-04-14 RX ADMIN — Medication 120 MILLIGRAM(S): at 05:23

## 2024-04-14 RX ADMIN — Medication 20 MILLIGRAM(S): at 05:23

## 2024-04-14 RX ADMIN — FLUCONAZOLE 100 MILLIGRAM(S): 150 TABLET ORAL at 15:01

## 2024-04-14 RX ADMIN — LEVETIRACETAM 400 MILLIGRAM(S): 250 TABLET, FILM COATED ORAL at 08:53

## 2024-04-14 RX ADMIN — SODIUM CHLORIDE 75 MILLILITER(S): 9 INJECTION, SOLUTION INTRAVENOUS at 16:57

## 2024-04-14 RX ADMIN — BUDESONIDE AND FORMOTEROL FUMARATE DIHYDRATE 2 PUFF(S): 160; 4.5 AEROSOL RESPIRATORY (INHALATION) at 17:19

## 2024-04-14 RX ADMIN — BUDESONIDE AND FORMOTEROL FUMARATE DIHYDRATE 2 PUFF(S): 160; 4.5 AEROSOL RESPIRATORY (INHALATION) at 05:29

## 2024-04-14 RX ADMIN — GABAPENTIN 100 MILLIGRAM(S): 400 CAPSULE ORAL at 21:32

## 2024-04-14 RX ADMIN — FINASTERIDE 5 MILLIGRAM(S): 5 TABLET, FILM COATED ORAL at 12:04

## 2024-04-14 RX ADMIN — Medication 40 MILLIEQUIVALENT(S): at 18:15

## 2024-04-14 RX ADMIN — PANTOPRAZOLE SODIUM 40 MILLIGRAM(S): 20 TABLET, DELAYED RELEASE ORAL at 12:05

## 2024-04-14 RX ADMIN — ATORVASTATIN CALCIUM 80 MILLIGRAM(S): 80 TABLET, FILM COATED ORAL at 21:33

## 2024-04-14 RX ADMIN — TAMSULOSIN HYDROCHLORIDE 0.4 MILLIGRAM(S): 0.4 CAPSULE ORAL at 21:32

## 2024-04-14 NOTE — PROGRESS NOTE ADULT - ASSESSMENT
89 yo LEFT handed man with chronic Afib (taken off apixaban 1-2 weeks ago d/t recurrent falls and Hemorrhage), HTN, HLD, presumed GERD, BPH, asthma, anemia, prior chronic strokes, RMCA infarct and  SDH w/ midline shift during recent admission discharged few days ago now coming for decreased PO intake.  Prior Workup:   CTH: bilateral inferior frontsal and L occipitotemporal encephalomalacia c/w old infarcts   CTA H/N neg     CRP < 3  LDL 65  A1c 6.2  EEG no seizures   MRI with acute strokes in R MCA terriotry   4/3 CTH with actue L SDH 3mm  CTH 4/4 no change   4/3 RRT for concern for seziure activiyt; SDH on CTH ; EEG no seizures   EEG no seizures, L temporoccipital slowing   This admission:   CTH no change in L SDH 2.3cm in depht.  left to righ shift unchanged   UA +    Impression   1) chronic strokes as well as more recent R MCA infarct, likely embolic from Afib   2) recent L SDH with shift unchanged   3) AMS, decrease PO intake 2/2 toxic metabolic encephalopathy from UTI     - PEG eval?   - treatemnt of infection per team was on CTX; now off   - seizure ppx with keppra 1g BID ; consider lowering to 750AM and 1g PM   - routine EEG   - hold AC; DVT ppx okay   - Continue atorvastatin 80mg (goal LDL<70)   - f/u ID   - CTH if change in neuro exam   - PT/OT/SS/SLP, OOBC  - check FS, glucose control <180  - GI/DVT ppx   - Thank you for allowing me to participate in the care of this patient. Call with questions.     Juventino Bashir MD  Vascular Neurology  Office: [phone]

## 2024-04-14 NOTE — CHART NOTE - NSCHARTNOTEFT_GEN_A_CORE
Nutrition Follow Up Note  Patient seen for: Consult for Calorie Count / malnutrition follow up    Chart reviewed, events noted.    Source: [] Patient       [x] medical record         [x] RN        [x] Family at bedside       [] Other:    -If unable to interview patient: [] Trach/Vent/BiPAP  [x] Disoriented/confused/inappropriate to interview    Diet Order:   Diet, NPO:   Except Medications  With Ice Chips/Sips of Water (04-12-24)    - Is current order appropriate/adequate? [x] Yes: defer advancement to medical team    - PO intake :   [x] N/A - NPO since admit    - Nutrition-related concerns:  -Pt NPO since admit. speech language pathologist saw patient on 4/12 and recommended NPO.  -Acute UTI per chart.  -Ordered for lasix.  -Ordered fir Dextrose 5% + NaCl 0.45% @ 75ml/hr.  -Low K and phosphorus noted today.    GI: Per family no nausea, no BM since admit; Last BM 4/10 per flowsheets;   Bowel Regimen? [] Yes   [x] No      Weights:   Dosing weight: 144.6lb/65.6kg (04-11)  Daily Weight: 133.8lb/60.7kg (04-12)  Weight fluctuations likely in setting of fluid shifts and poor PO intake. RD to continue to monitor weight trends as available/able.     Nutritionally Pertinent MEDICATIONS  (STANDING):  atorvastatin 80 milliGRAM(s) Oral at bedtime  budesonide  80 MICROgram(s)/formoterol 4.5 MICROgram(s) Inhaler 2 Puff(s) Inhalation two times a day  dextrose 5% + sodium chloride 0.45%. 1000 milliLiter(s) (75 mL/Hr) IV Continuous <Continuous>  diltiazem    milliGRAM(s) Oral daily  finasteride 5 milliGRAM(s) Oral daily  furosemide    Tablet 20 milliGRAM(s) Oral daily  gabapentin 100 milliGRAM(s) Oral at bedtime  levETIRAcetam  IVPB 750 milliGRAM(s) IV Intermittent with breakfast  levETIRAcetam  IVPB 1000 milliGRAM(s) IV Intermittent at bedtime  montelukast 10 milliGRAM(s) Oral daily  pantoprazole  Injectable 40 milliGRAM(s) IV Push daily  tamsulosin 0.4 milliGRAM(s) Oral at bedtime  tiotropium 2.5 MICROgram(s) Inhaler 2 Puff(s) Inhalation daily    MEDICATIONS  (PRN):  acetaminophen     Tablet .. 650 milliGRAM(s) Oral every 6 hours PRN Temp greater or equal to 38C (100.4F), Mild Pain (1 - 3)  aluminum hydroxide/magnesium hydroxide/simethicone Suspension 30 milliLiter(s) Oral every 4 hours PRN Dyspepsia  melatonin 3 milliGRAM(s) Oral at bedtime PRN Insomnia  OLANZapine 2.5 milliGRAM(s) Oral every 6 hours PRN for agitation  ondansetron Injectable 4 milliGRAM(s) IV Push every 8 hours PRN Nausea and/or Vomiting      Pertinent Labs: 04-14 @ 07:14: Na 141, BUN 13, Cr 0.88, <H>, K+ 3.2<L>, Phos --, Mg 1.9, Alk Phos --, ALT/SGPT --, AST/SGOT --, HbA1c --  04-14 @ 07:07: Na 141, BUN 13, Cr 0.91, <H>, K+ 3.3<L>, Phos 2.3<L>, Mg 2.0, Alk Phos --, ALT/SGPT --, AST/SGOT --, HbA1c --    A1C with Estimated Average Glucose Result: 6.2 % (03-28-24 @ 07:36)    Finger Sticks:  POCT Blood Glucose.: 142 mg/dL (04-14 @ 06:23)  POCT Blood Glucose.: 121 mg/dL (04-14 @ 00:21)  POCT Blood Glucose.: 116 mg/dL (04-13 @ 19:35)      Skin per nursing documentation: no pressure injury or deep tissue injury noted  Edema: 1+ edema left foot, right foot per flowsheets     Estimated Needs: based on current wt of 60.7kg in consideration of malnutrition  [x] no change since previous assessment  Energy: 2806-0673 kcal/day (30-35 kcal/kg)  Protein: 73-85 g/day (1.2-1.4 g/kg)  Fluids: defer to medical team     Previous Nutrition Diagnosis: Severe acute malnutrition   Nutrition Diagnosis is: [x] ongoing  [] resolved [] not applicable     New Nutrition Diagnosis: [x] Not applicable    Nutrition Care Plan:  [x] In Progress - -Being addressed with PO diet recommendations and EN recommendations.   [] Achieved  [] Not applicable    Nutrition Interventions:     Education Provided:       [] Yes:  [x] No: Family without questions for RD at this time.       Recommendations:      -Nutrition plan of care to be in line with medical GOC and pt/family wishes.  -Calorie count inappropriate at this time as patient NPO. Can re-place calorie count consult if tube feeding is within GOC/medically feasible, otherwise can consider pleasure feeds (Defer to speech language pathologist/medical team).  -If PO diet warranted, recommend no therapeutic diet restrictions, diet texture per SLP/team; + Ensure Plus High Protein x 2/day (provides 700 kcal, 40 g protein).  -If EN warranted, recommend Jevity 1.5 @ 10ml/hr, advance as tolerated, to a goal rate of 55ml/hr x 24 hrs to provide 1320ml formula, 1980kcal (33kcal/kg), 85g protein (1.4g/kg), 1003ml free water; based on most recent daily wt of 60.7kg. RD remains available to adjust EN as needed.  -Patient at risk for refeeding syndrome: Recommend adding multivitamin and thiamine. Monitor daily potassium, magnesium, and phosphorous. Monitor electrolytes, replete as needed.     Monitoring and Evaluation:   Continue to monitor nutritional intake, tolerance to diet prescription, weights, labs, skin integrity      RD remains available upon request and will follow up per protocol  Corinne Lima RDN, CDN - available via [facility] TEAMS Nutrition Follow Up Note  Patient seen for: Consult for Calorie Count / malnutrition follow up    Chart reviewed, events noted.    Source: [] Patient       [x] medical record         [x] RN        [x] Family at bedside       [] Other:    -If unable to interview patient: [] Trach/Vent/BiPAP  [x] Disoriented/confused/inappropriate to interview    Diet Order:   Diet, NPO:   Except Medications  With Ice Chips/Sips of Water (04-12-24)    - Is current order appropriate/adequate? [x] Yes: defer advancement to medical team    - PO intake :   [x] N/A - NPO since admit    - Nutrition-related concerns:  -Pt NPO since admit. speech language pathologist saw patient on 4/12 and recommended NPO.  -Acute UTI per chart.  -Ordered for lasix.  -Ordered fir Dextrose 5% + NaCl 0.45% @ 75ml/hr.  -Low K and phosphorus noted today.    GI: Per family no nausea, no BM since admit; Last BM 4/10 per flowsheets;   Bowel Regimen? [] Yes   [x] No      Weights:   Dosing weight: 144.6lb/65.6kg (04-11)  Daily Weight: 133.8lb/60.7kg (04-12)  Weight fluctuations likely in setting of fluid shifts and poor PO intake. RD to continue to monitor weight trends as available/able.     Nutritionally Pertinent MEDICATIONS  (STANDING):  atorvastatin 80 milliGRAM(s) Oral at bedtime  budesonide  80 MICROgram(s)/formoterol 4.5 MICROgram(s) Inhaler 2 Puff(s) Inhalation two times a day  dextrose 5% + sodium chloride 0.45%. 1000 milliLiter(s) (75 mL/Hr) IV Continuous <Continuous>  diltiazem    milliGRAM(s) Oral daily  finasteride 5 milliGRAM(s) Oral daily  furosemide    Tablet 20 milliGRAM(s) Oral daily  gabapentin 100 milliGRAM(s) Oral at bedtime  levETIRAcetam  IVPB 750 milliGRAM(s) IV Intermittent with breakfast  levETIRAcetam  IVPB 1000 milliGRAM(s) IV Intermittent at bedtime  montelukast 10 milliGRAM(s) Oral daily  pantoprazole  Injectable 40 milliGRAM(s) IV Push daily  tamsulosin 0.4 milliGRAM(s) Oral at bedtime  tiotropium 2.5 MICROgram(s) Inhaler 2 Puff(s) Inhalation daily    MEDICATIONS  (PRN):  acetaminophen     Tablet .. 650 milliGRAM(s) Oral every 6 hours PRN Temp greater or equal to 38C (100.4F), Mild Pain (1 - 3)  aluminum hydroxide/magnesium hydroxide/simethicone Suspension 30 milliLiter(s) Oral every 4 hours PRN Dyspepsia  melatonin 3 milliGRAM(s) Oral at bedtime PRN Insomnia  OLANZapine 2.5 milliGRAM(s) Oral every 6 hours PRN for agitation  ondansetron Injectable 4 milliGRAM(s) IV Push every 8 hours PRN Nausea and/or Vomiting      Pertinent Labs: 04-14 @ 07:14: Na 141, BUN 13, Cr 0.88, <H>, K+ 3.2<L>, Phos --, Mg 1.9, Alk Phos --, ALT/SGPT --, AST/SGOT --, HbA1c --  04-14 @ 07:07: Na 141, BUN 13, Cr 0.91, <H>, K+ 3.3<L>, Phos 2.3<L>, Mg 2.0, Alk Phos --, ALT/SGPT --, AST/SGOT --, HbA1c --    A1C with Estimated Average Glucose Result: 6.2 % (03-28-24 @ 07:36)    Finger Sticks:  POCT Blood Glucose.: 142 mg/dL (04-14 @ 06:23)  POCT Blood Glucose.: 121 mg/dL (04-14 @ 00:21)  POCT Blood Glucose.: 116 mg/dL (04-13 @ 19:35)      Skin per nursing documentation: no pressure injury or deep tissue injury noted  Edema: 1+ edema left foot, right foot per flowsheets     Estimated Needs: based on current wt of 60.7kg in consideration of malnutrition and age  [x] recalculated  Energy: 9654-3922 kcal/day (25-30 kcal/kg)  Protein: 67-79 g/day (1.1-1.3 g/kg)  Fluids: defer to medical team     Previous Nutrition Diagnosis: Severe acute malnutrition   Nutrition Diagnosis is: [x] ongoing  [] resolved [] not applicable     New Nutrition Diagnosis: [x] Not applicable    Nutrition Care Plan:  [x] In Progress - -Being addressed with PO diet recommendations and EN recommendations.   [] Achieved  [] Not applicable    Nutrition Interventions:     Education Provided:       [] Yes:  [x] No: Family without questions for RD at this time.       Recommendations:      -Nutrition plan of care to be in line with medical GOC and pt/family wishes.  -Calorie count inappropriate at this time as patient NPO. Can re-place calorie count consult if tube feeding is within GOC/medically feasible, otherwise can consider pleasure feeds (Defer to speech language pathologist/medical team).  -If PO diet warranted, recommend no therapeutic diet restrictions, diet texture per SLP/team; + Ensure Plus High Protein x 2/day (provides 700 kcal, 40 g protein).  -If EN warranted, recommend Jevity 1.5 @ 10ml/hr, advance as tolerated, to a goal rate of 45ml/hr x 24 hrs to provide 1080ml formula, 1620kcal, 69g protein, 704ml free water; meets 26.7kcal/kg and 1.14g/kg protein based on most recent daily wt of 60.7kg. Defer additional free water flushes to medical team. RD remains available to adjust EN as needed.  -Patient at risk for refeeding syndrome: Recommend adding multivitamin and thiamine. Monitor daily potassium, magnesium, and phosphorous. Monitor electrolytes, replete as needed.   -Monitor Finger Sticks for need of Consistent Carbohydrate diet / glucose support tube feeding formula.    Monitoring and Evaluation:   Continue to monitor nutritional intake, tolerance to diet prescription, weights, labs, skin integrity      RD remains available upon request and will follow up per protocol  Corinne Lima RDN, CDN - available via [facility] TEAMS

## 2024-04-15 LAB
ANION GAP SERPL CALC-SCNC: 14 MMOL/L — SIGNIFICANT CHANGE UP (ref 5–17)
ANION GAP SERPL CALC-SCNC: 14 MMOL/L — SIGNIFICANT CHANGE UP (ref 5–17)
BUN SERPL-MCNC: 10 MG/DL — SIGNIFICANT CHANGE UP (ref 7–23)
BUN SERPL-MCNC: 10 MG/DL — SIGNIFICANT CHANGE UP (ref 7–23)
CALCIUM SERPL-MCNC: 8.1 MG/DL — LOW (ref 8.4–10.5)
CALCIUM SERPL-MCNC: 8.1 MG/DL — LOW (ref 8.4–10.5)
CHLORIDE SERPL-SCNC: 107 MMOL/L — SIGNIFICANT CHANGE UP (ref 96–108)
CHLORIDE SERPL-SCNC: 108 MMOL/L — SIGNIFICANT CHANGE UP (ref 96–108)
CO2 SERPL-SCNC: 20 MMOL/L — LOW (ref 22–31)
CO2 SERPL-SCNC: 21 MMOL/L — LOW (ref 22–31)
CREAT SERPL-MCNC: 0.81 MG/DL — SIGNIFICANT CHANGE UP (ref 0.5–1.3)
CREAT SERPL-MCNC: 0.81 MG/DL — SIGNIFICANT CHANGE UP (ref 0.5–1.3)
EGFR: 80 ML/MIN/1.73M2 — SIGNIFICANT CHANGE UP
EGFR: 80 ML/MIN/1.73M2 — SIGNIFICANT CHANGE UP
GLUCOSE BLDC GLUCOMTR-MCNC: 112 MG/DL — HIGH (ref 70–99)
GLUCOSE BLDC GLUCOMTR-MCNC: 124 MG/DL — HIGH (ref 70–99)
GLUCOSE BLDC GLUCOMTR-MCNC: 125 MG/DL — HIGH (ref 70–99)
GLUCOSE BLDC GLUCOMTR-MCNC: 131 MG/DL — HIGH (ref 70–99)
GLUCOSE BLDC GLUCOMTR-MCNC: 148 MG/DL — HIGH (ref 70–99)
GLUCOSE BLDC GLUCOMTR-MCNC: 156 MG/DL — HIGH (ref 70–99)
GLUCOSE BLDC GLUCOMTR-MCNC: 157 MG/DL — HIGH (ref 70–99)
GLUCOSE SERPL-MCNC: 121 MG/DL — HIGH (ref 70–99)
GLUCOSE SERPL-MCNC: 121 MG/DL — HIGH (ref 70–99)
HCT VFR BLD CALC: 29.3 % — LOW (ref 39–50)
HGB BLD-MCNC: 8.5 G/DL — LOW (ref 13–17)
MAGNESIUM SERPL-MCNC: 1.8 MG/DL — SIGNIFICANT CHANGE UP (ref 1.6–2.6)
MAGNESIUM SERPL-MCNC: 1.8 MG/DL — SIGNIFICANT CHANGE UP (ref 1.6–2.6)
MCHC RBC-ENTMCNC: 22.2 PG — LOW (ref 27–34)
MCHC RBC-ENTMCNC: 29 GM/DL — LOW (ref 32–36)
MCV RBC AUTO: 76.5 FL — LOW (ref 80–100)
NRBC # BLD: 0 /100 WBCS — SIGNIFICANT CHANGE UP (ref 0–0)
PHOSPHATE SERPL-MCNC: 2.3 MG/DL — LOW (ref 2.5–4.5)
PLATELET # BLD AUTO: 260 K/UL — SIGNIFICANT CHANGE UP (ref 150–400)
POTASSIUM SERPL-MCNC: 3.5 MMOL/L — SIGNIFICANT CHANGE UP (ref 3.5–5.3)
POTASSIUM SERPL-MCNC: 3.6 MMOL/L — SIGNIFICANT CHANGE UP (ref 3.5–5.3)
POTASSIUM SERPL-SCNC: 3.5 MMOL/L — SIGNIFICANT CHANGE UP (ref 3.5–5.3)
POTASSIUM SERPL-SCNC: 3.6 MMOL/L — SIGNIFICANT CHANGE UP (ref 3.5–5.3)
RBC # BLD: 3.83 M/UL — LOW (ref 4.2–5.8)
RBC # FLD: 20.5 % — HIGH (ref 10.3–14.5)
SODIUM SERPL-SCNC: 142 MMOL/L — SIGNIFICANT CHANGE UP (ref 135–145)
SODIUM SERPL-SCNC: 142 MMOL/L — SIGNIFICANT CHANGE UP (ref 135–145)
WBC # BLD: 6.3 K/UL — SIGNIFICANT CHANGE UP (ref 3.8–10.5)
WBC # FLD AUTO: 6.3 K/UL — SIGNIFICANT CHANGE UP (ref 3.8–10.5)

## 2024-04-15 PROCEDURE — 95720 EEG PHY/QHP EA INCR W/VEEG: CPT

## 2024-04-15 RX ORDER — MAGNESIUM SULFATE 500 MG/ML
2 VIAL (ML) INJECTION ONCE
Refills: 0 | Status: DISCONTINUED | OUTPATIENT
Start: 2024-04-15 | End: 2024-04-18

## 2024-04-15 RX ADMIN — LEVETIRACETAM 400 MILLIGRAM(S): 250 TABLET, FILM COATED ORAL at 13:45

## 2024-04-15 RX ADMIN — Medication 20 MILLIGRAM(S): at 05:10

## 2024-04-15 RX ADMIN — Medication 120 MILLIGRAM(S): at 05:05

## 2024-04-15 RX ADMIN — Medication 63.75 MILLIMOLE(S): at 17:50

## 2024-04-15 RX ADMIN — ATORVASTATIN CALCIUM 80 MILLIGRAM(S): 80 TABLET, FILM COATED ORAL at 22:27

## 2024-04-15 RX ADMIN — LEVETIRACETAM 400 MILLIGRAM(S): 250 TABLET, FILM COATED ORAL at 22:24

## 2024-04-15 RX ADMIN — BUDESONIDE AND FORMOTEROL FUMARATE DIHYDRATE 2 PUFF(S): 160; 4.5 AEROSOL RESPIRATORY (INHALATION) at 05:06

## 2024-04-15 RX ADMIN — TAMSULOSIN HYDROCHLORIDE 0.4 MILLIGRAM(S): 0.4 CAPSULE ORAL at 22:27

## 2024-04-15 RX ADMIN — GABAPENTIN 100 MILLIGRAM(S): 400 CAPSULE ORAL at 22:30

## 2024-04-15 NOTE — PROGRESS NOTE ADULT - ASSESSMENT
EKG - not in the chart       1) failure to thrive t/t per primary team       2) Afib - chronic, c/w dilt. was taken off AC 2/2 falls recently OP however MRI last admission shows acute CVA     EKG - not in the chart       1) failure to thrive t/t per primary team       2) Afib - chronic, c/w dilt. was taken off AC 2/2 falls recently OP however MRI last admission shows acute CVA      3) Diastolic  CHF continue Lasix

## 2024-04-15 NOTE — CHART NOTE - NSCHARTNOTEFT_GEN_A_CORE
89 yo LEFT handed man pmh  chronic Afib (taken off apixaban 1-2 weeks ago d/t recurrent falls), HTN, HLD, presumed GERD, BPH, asthma, anemia, prior chronic strokes, SDH w/ midline shift during recent admission discharged few days ago now coming for decreased PO intake. Found to have FTT, UTI and ?thrush. Per neurology note, family not interested in PEG.    Patient seen for initial evaluation on 4/13, but did not participate in assessment. Seen today for re-evaluation to determine candidacy for safe oral intake.    Patient encountered awake and alert, upright in bed, on RA, grossly A&Ox2. Vocal quality weak. Daughter at bedside reported pt was consuming a regular diet at home. Stated he does have dentures, but she did not bring them to the hospital. Pt accepted limited trials of puree and thin liquids via straw prior to refusing intake. Although exam limited, swallow function appeared grossly WFL. No overt s/s of laryngeal penetration/aspiration. Harder solids not given as pt does not have dentures here. Daughter stated she will try to have them brought in. Reviewed aspiration precautions and safe feeding techniques. Daughter verbalized understanding.    Recommendations:  1. puree and thin liquids  2. Maintain good oral hygiene  3. Maintain aspiration precautions; if noted with s/s of aspiration, please contact dept at [phone]  4. This service can f/u for potential diet advancement if family brings in dentures    Discussed with ABHISHEK Song and NP Lynn Hill MA, CCC-SLP  Speech Language Pathologist  available on TEAMS or [phone]

## 2024-04-15 NOTE — CHART NOTE - NSCHARTNOTEFT_GEN_A_CORE
EEG preliminary read (not final) on the initial recording hour(s) = x 1     No seizures recorded.  Left continuos focal slowing.   Mild slowing noted, nonspecific.  Final report to follow tomorrow morning after completion of study.    [facility] EEG Reading Room Ph#: [phone]  Epilepsy Answering Service after 5PM and before 8:30AM: Ph#: [phone]

## 2024-04-15 NOTE — PROGRESS NOTE ADULT - ASSESSMENT
89 yo LEFT handed man with chronic Afib (taken off apixaban 1-2 weeks ago d/t recurrent falls and Hemorrhage), HTN, HLD, presumed GERD, BPH, asthma, anemia, prior chronic strokes, RMCA infarct and  SDH w/ midline shift during recent admission discharged few days ago now coming for decreased PO intake.  Prior Workup:   CTH: bilateral inferior frontsal and L occipitotemporal encephalomalacia c/w old infarcts   CTA H/N neg     CRP < 3  LDL 65  A1c 6.2  EEG no seizures   MRI with acute strokes in R MCA terriotry   4/3 CTH with actue L SDH 3mm  CTH 4/4 no change   4/3 RRT for concern for seziure activiyt; SDH on CTH ; EEG no seizures   EEG no seizures, L temporoccipital slowing   This admission:   CTH no change in L SDH 2.3cm in depht.  left to righ shift unchanged   UA +    Impression   1) chronic strokes as well as more recent R MCA infarct, likely embolic from Afib   2) recent L SDH with shift unchanged   3) AMS, decrease PO intake 2/2 toxic metabolic encephalopathy from UTI     - PEG eval? ; family not interested   - treatemnt of infection per team was on CTX; now off   - seizure ppx with keppra 1g BID ; consider lowering to 750AM and 1g PM   - routine EEG pending   - hold AC; DVT ppx okay   - Continue atorvastatin 80mg (goal LDL<70)   - f/u ID   - CTH if change in neuro exam   - PT/OT/SS/SLP, OOBC  - check FS, glucose control <180  - GI/DVT ppx   - Thank you for allowing me to participate in the care of this patient. Call with questions.   spoke with family 4/15   Juventino Bashir MD  Vascular Neurology  Office: [phone]

## 2024-04-16 ENCOUNTER — TRANSCRIPTION ENCOUNTER (OUTPATIENT)
Age: 89
End: 2024-04-16

## 2024-04-16 LAB
ALBUMIN SERPL ELPH-MCNC: 2.8 G/DL — LOW (ref 3.3–5)
ALP SERPL-CCNC: 76 U/L — SIGNIFICANT CHANGE UP (ref 40–120)
ALT FLD-CCNC: 12 U/L — SIGNIFICANT CHANGE UP (ref 10–45)
ANION GAP SERPL CALC-SCNC: 11 MMOL/L — SIGNIFICANT CHANGE UP (ref 5–17)
ANION GAP SERPL CALC-SCNC: 9 MMOL/L — SIGNIFICANT CHANGE UP (ref 5–17)
AST SERPL-CCNC: 13 U/L — SIGNIFICANT CHANGE UP (ref 10–40)
BILIRUB SERPL-MCNC: 0.6 MG/DL — SIGNIFICANT CHANGE UP (ref 0.2–1.2)
BUN SERPL-MCNC: 6 MG/DL — LOW (ref 7–23)
BUN SERPL-MCNC: 7 MG/DL — SIGNIFICANT CHANGE UP (ref 7–23)
CALCIUM SERPL-MCNC: 8.2 MG/DL — LOW (ref 8.4–10.5)
CALCIUM SERPL-MCNC: 8.5 MG/DL — SIGNIFICANT CHANGE UP (ref 8.4–10.5)
CHLORIDE SERPL-SCNC: 105 MMOL/L — SIGNIFICANT CHANGE UP (ref 96–108)
CHLORIDE SERPL-SCNC: 106 MMOL/L — SIGNIFICANT CHANGE UP (ref 96–108)
CO2 SERPL-SCNC: 24 MMOL/L — SIGNIFICANT CHANGE UP (ref 22–31)
CO2 SERPL-SCNC: 25 MMOL/L — SIGNIFICANT CHANGE UP (ref 22–31)
CREAT SERPL-MCNC: 0.86 MG/DL — SIGNIFICANT CHANGE UP (ref 0.5–1.3)
CREAT SERPL-MCNC: 0.88 MG/DL — SIGNIFICANT CHANGE UP (ref 0.5–1.3)
EGFR: 78 ML/MIN/1.73M2 — SIGNIFICANT CHANGE UP
EGFR: 78 ML/MIN/1.73M2 — SIGNIFICANT CHANGE UP
GLUCOSE BLDC GLUCOMTR-MCNC: 130 MG/DL — HIGH (ref 70–99)
GLUCOSE SERPL-MCNC: 125 MG/DL — HIGH (ref 70–99)
GLUCOSE SERPL-MCNC: 128 MG/DL — HIGH (ref 70–99)
HCT VFR BLD CALC: 30.7 % — LOW (ref 39–50)
HGB BLD-MCNC: 8.9 G/DL — LOW (ref 13–17)
MAGNESIUM SERPL-MCNC: 1.7 MG/DL — SIGNIFICANT CHANGE UP (ref 1.6–2.6)
MAGNESIUM SERPL-MCNC: 1.8 MG/DL — SIGNIFICANT CHANGE UP (ref 1.6–2.6)
MCHC RBC-ENTMCNC: 21.5 PG — LOW (ref 27–34)
MCHC RBC-ENTMCNC: 29 GM/DL — LOW (ref 32–36)
MCV RBC AUTO: 74.3 FL — LOW (ref 80–100)
NRBC # BLD: 0 /100 WBCS — SIGNIFICANT CHANGE UP (ref 0–0)
PHOSPHATE SERPL-MCNC: 2.9 MG/DL — SIGNIFICANT CHANGE UP (ref 2.5–4.5)
PLATELET # BLD AUTO: 232 K/UL — SIGNIFICANT CHANGE UP (ref 150–400)
POTASSIUM SERPL-MCNC: 3.1 MMOL/L — LOW (ref 3.5–5.3)
POTASSIUM SERPL-MCNC: 3.2 MMOL/L — LOW (ref 3.5–5.3)
POTASSIUM SERPL-SCNC: 3.1 MMOL/L — LOW (ref 3.5–5.3)
POTASSIUM SERPL-SCNC: 3.2 MMOL/L — LOW (ref 3.5–5.3)
PROT SERPL-MCNC: 5.9 G/DL — LOW (ref 6–8.3)
RBC # BLD: 4.13 M/UL — LOW (ref 4.2–5.8)
RBC # FLD: 20.2 % — HIGH (ref 10.3–14.5)
SODIUM SERPL-SCNC: 140 MMOL/L — SIGNIFICANT CHANGE UP (ref 135–145)
SODIUM SERPL-SCNC: 140 MMOL/L — SIGNIFICANT CHANGE UP (ref 135–145)
WBC # BLD: 6.55 K/UL — SIGNIFICANT CHANGE UP (ref 3.8–10.5)
WBC # FLD AUTO: 6.55 K/UL — SIGNIFICANT CHANGE UP (ref 3.8–10.5)

## 2024-04-16 PROCEDURE — 95720 EEG PHY/QHP EA INCR W/VEEG: CPT

## 2024-04-16 RX ORDER — POTASSIUM CHLORIDE 20 MEQ
40 PACKET (EA) ORAL EVERY 4 HOURS
Refills: 0 | Status: DISCONTINUED | OUTPATIENT
Start: 2024-04-16 | End: 2024-04-16

## 2024-04-16 RX ORDER — PANTOPRAZOLE SODIUM 20 MG/1
40 TABLET, DELAYED RELEASE ORAL
Refills: 0 | Status: DISCONTINUED | OUTPATIENT
Start: 2024-04-16 | End: 2024-04-18

## 2024-04-16 RX ORDER — LEVETIRACETAM 250 MG/1
1000 TABLET, FILM COATED ORAL AT BEDTIME
Refills: 0 | Status: DISCONTINUED | OUTPATIENT
Start: 2024-04-16 | End: 2024-04-18

## 2024-04-16 RX ORDER — LEVETIRACETAM 250 MG/1
750 TABLET, FILM COATED ORAL
Refills: 0 | Status: DISCONTINUED | OUTPATIENT
Start: 2024-04-16 | End: 2024-04-18

## 2024-04-16 RX ORDER — POTASSIUM CHLORIDE 20 MEQ
10 PACKET (EA) ORAL
Refills: 0 | Status: COMPLETED | OUTPATIENT
Start: 2024-04-16 | End: 2024-04-16

## 2024-04-16 RX ADMIN — MONTELUKAST 10 MILLIGRAM(S): 4 TABLET, CHEWABLE ORAL at 11:06

## 2024-04-16 RX ADMIN — SODIUM CHLORIDE 75 MILLILITER(S): 9 INJECTION, SOLUTION INTRAVENOUS at 21:29

## 2024-04-16 RX ADMIN — LEVETIRACETAM 1000 MILLIGRAM(S): 250 TABLET, FILM COATED ORAL at 21:24

## 2024-04-16 RX ADMIN — Medication 20 MILLIGRAM(S): at 05:32

## 2024-04-16 RX ADMIN — Medication 100 MILLIEQUIVALENT(S): at 17:32

## 2024-04-16 RX ADMIN — TIOTROPIUM BROMIDE 2 PUFF(S): 18 CAPSULE ORAL; RESPIRATORY (INHALATION) at 11:06

## 2024-04-16 RX ADMIN — TAMSULOSIN HYDROCHLORIDE 0.4 MILLIGRAM(S): 0.4 CAPSULE ORAL at 21:24

## 2024-04-16 RX ADMIN — ATORVASTATIN CALCIUM 80 MILLIGRAM(S): 80 TABLET, FILM COATED ORAL at 21:24

## 2024-04-16 RX ADMIN — Medication 100 MILLIEQUIVALENT(S): at 13:51

## 2024-04-16 RX ADMIN — LEVETIRACETAM 400 MILLIGRAM(S): 250 TABLET, FILM COATED ORAL at 07:28

## 2024-04-16 RX ADMIN — FINASTERIDE 5 MILLIGRAM(S): 5 TABLET, FILM COATED ORAL at 11:06

## 2024-04-16 RX ADMIN — PANTOPRAZOLE SODIUM 40 MILLIGRAM(S): 20 TABLET, DELAYED RELEASE ORAL at 11:06

## 2024-04-16 RX ADMIN — Medication 100 MILLIEQUIVALENT(S): at 15:30

## 2024-04-16 RX ADMIN — GABAPENTIN 100 MILLIGRAM(S): 400 CAPSULE ORAL at 21:24

## 2024-04-16 RX ADMIN — BUDESONIDE AND FORMOTEROL FUMARATE DIHYDRATE 2 PUFF(S): 160; 4.5 AEROSOL RESPIRATORY (INHALATION) at 05:33

## 2024-04-16 RX ADMIN — Medication 120 MILLIGRAM(S): at 05:32

## 2024-04-16 NOTE — DISCHARGE NOTE PROVIDER - NSDCMRMEDTOKEN_GEN_ALL_CORE_FT
atorvastatin 80 mg oral tablet: 1 tab(s) orally once a day (at bedtime)  dilTIAZem 120 mg/24 hours oral capsule, extended release: 1 cap(s) orally once a day (at bedtime)  finasteride 5 mg oral tablet: 1 tab(s) orally once a day  Flomax 0.4 mg oral capsule: 2 cap(s) orally once a day  gabapentin 100 mg oral capsule: 1 cap(s) orally once a day (at bedtime)  Keppra 1000 mg oral tablet: 1 tab(s) orally 2 times a day  Lasix 20 mg oral tablet: 1 tab(s) orally once a day  montelukast 10 mg oral tablet: 1 tab(s) orally once a day  pantoprazole 40 mg oral delayed release tablet: 1 tab(s) orally once a day  PreserVision AREDS 2 oral capsule: 1 cap(s) orally once a day  Trelegy Ellipta 100 mcg-62.5 mcg-25 mcg/inh inhalation powder: 1 puff(s) inhaled once a day  ZyPREXA 2.5 mg oral tablet: 1 tab(s) orally as needed for  agitation   atorvastatin 80 mg oral tablet: 1 tab(s) orally once a day (at bedtime)  dilTIAZem 120 mg/24 hours oral capsule, extended release: 1 cap(s) orally once a day (at bedtime)  finasteride 5 mg oral tablet: 1 tab(s) orally once a day  Flomax 0.4 mg oral capsule: 2 cap(s) orally once a day  gabapentin 100 mg oral capsule: 1 cap(s) orally once a day (at bedtime)  Hospital Bed: Use As Directed. Height 167.6 cm; Weight 65.6 kg. ICD R62.7  Keppra 1000 mg oral tablet: 1 tab(s) orally 2 times a day  Lasix 20 mg oral tablet: 1 tab(s) orally once a day  montelukast 10 mg oral tablet: 1 tab(s) orally once a day  pantoprazole 40 mg oral delayed release tablet: 1 tab(s) orally once a day  PreserVision AREDS 2 oral capsule: 1 cap(s) orally once a day  Trelegy Ellipta 100 mcg-62.5 mcg-25 mcg/inh inhalation powder: 1 puff(s) inhaled once a day  ZyPREXA 2.5 mg oral tablet: 1 tab(s) orally as needed for  agitation   atorvastatin 80 mg oral tablet: 1 tab(s) orally once a day (at bedtime)  dilTIAZem 120 mg/24 hours oral capsule, extended release: 1 cap(s) orally once a day (at bedtime)  finasteride 5 mg oral tablet: 1 tab(s) orally once a day  Flomax 0.4 mg oral capsule: 2 cap(s) orally once a day  gabapentin 100 mg oral capsule: 1 cap(s) orally once a day (at bedtime)  Keppra 1000 mg oral tablet: 1 tab(s) orally 2 times a day  Lasix 20 mg oral tablet: 1 tab(s) orally once a day  montelukast 10 mg oral tablet: 1 tab(s) orally once a day  pantoprazole 40 mg oral delayed release tablet: 1 tab(s) orally once a day  PreserVision AREDS 2 oral capsule: 1 cap(s) orally once a day  [facility] Hospital Bed: Use As Directed. Height 167.6 cm; Weight 65.6 kg. ICD I48.91, J44.9; DELILAH 99  Trelegy Ellipta 100 mcg-62.5 mcg-25 mcg/inh inhalation powder: 1 puff(s) inhaled once a day  ZyPREXA 2.5 mg oral tablet: 1 tab(s) orally as needed for  agitation   atorvastatin 80 mg oral tablet: 1 tab(s) orally once a day (at bedtime)  dilTIAZem 120 mg/24 hours oral capsule, extended release: 1 cap(s) orally once a day (at bedtime)  finasteride 5 mg oral tablet: 1 tab(s) orally once a day  Flomax 0.4 mg oral capsule: 2 cap(s) orally once a day  gabapentin 100 mg oral capsule: 1 cap(s) orally once a day (at bedtime)  Keppra 1000 mg oral tablet: 1 tab(s) orally 2 times a day  Lasix 20 mg oral tablet: 1 tab(s) orally once a day  melatonin 3 mg oral tablet: 1 tab(s) orally once a day (at bedtime) As needed Insomnia  montelukast 10 mg oral tablet: 1 tab(s) orally once a day  pantoprazole 40 mg oral delayed release tablet: 1 tab(s) orally once a day  PreserVision AREDS 2 oral capsule: 1 cap(s) orally once a day  [facility] Hospital Bed: Use As Directed. Height 167.6 cm; Weight 65.6 kg. ICD I48.91, J44.9; DELILAH 99  Trelegy Ellipta 100 mcg-62.5 mcg-25 mcg/inh inhalation powder: 1 puff(s) inhaled once a day  ZyPREXA 2.5 mg oral tablet: 1 tab(s) orally as needed for  agitation   atorvastatin 80 mg oral tablet: 1 tab(s) orally once a day (at bedtime)  dilTIAZem 120 mg/24 hours oral capsule, extended release: 1 cap(s) orally once a day (at bedtime)  finasteride 5 mg oral tablet: 1 tab(s) orally once a day  Flomax 0.4 mg oral capsule: 2 cap(s) orally once a day  gabapentin 100 mg oral capsule: 1 cap(s) orally once a day (at bedtime)  Lasix 20 mg oral tablet: 1 tab(s) orally once a day  levETIRAcetam 1000 mg oral tablet: 1 tab(s) orally once a day (at bedtime)  levETIRAcetam 750 mg oral tablet: 1 tab(s) orally once a day (before a meal)  melatonin 3 mg oral tablet: 1 tab(s) orally once a day (at bedtime) As needed Insomnia  montelukast 10 mg oral tablet: 1 tab(s) orally once a day  pantoprazole 40 mg oral delayed release tablet: 1 tab(s) orally once a day  PreserVision AREDS 2 oral capsule: 1 cap(s) orally once a day  Trelegy Ellipta 100 mcg-62.5 mcg-25 mcg/inh inhalation powder: 1 puff(s) inhaled once a day  ZyPREXA 2.5 mg oral tablet: 1 tab(s) orally as needed for  agitation

## 2024-04-16 NOTE — DISCHARGE NOTE PROVIDER - NSDCFUSCHEDAPPT_GEN_ALL_CORE_FT
Tomi Patel  [facility] Physician [facility]  UROLOGY [address] [place] [address]  Scheduled Appointment: 04/25/2024

## 2024-04-16 NOTE — DISCHARGE NOTE PROVIDER - NSDCCPCAREPLAN_GEN_ALL_CORE_FT
PRINCIPAL DISCHARGE DIAGNOSIS  Diagnosis: FTT (failure to thrive) in adult  Assessment and Plan of Treatment: Noted to have poor intake   -You are  Afebrile, VSS, nontoxic appearing   - Labs: chronic anemia, chem stable, UA: infectious ?contaminate , though asympotmatic will monitor off antibitoics.   -repeat speech and swallow eval reviewed ->pt on puree diet with strict aspiration precautions.      SECONDARY DISCHARGE DIAGNOSES  Diagnosis: SDH (subdural hematoma)  Assessment and Plan of Treatment: repeat CTH stable  continue taking keppra    Diagnosis: Acute UTI  Assessment and Plan of Treatment: Abnormal U/A but pt with no systemic signs of infection->recommend to monitor off abx , urine cx in lab, even if positive not planning to treat unless any change in clinical status

## 2024-04-16 NOTE — PROGRESS NOTE ADULT - ASSESSMENT
89 yo LEFT handed man with chronic Afib (taken off apixaban 1-2 weeks ago d/t recurrent falls and Hemorrhage), HTN, HLD, presumed GERD, BPH, asthma, anemia, prior chronic strokes, RMCA infarct and  SDH w/ midline shift during recent admission discharged few days ago now coming for decreased PO intake.  Prior Workup:   CTH: bilateral inferior frontsal and L occipitotemporal encephalomalacia c/w old infarcts   CTA H/N neg     CRP < 3  LDL 65  A1c 6.2  EEG no seizures   MRI with acute strokes in R MCA terriotry   4/3 CTH with actue L SDH 3mm  CTH 4/4 no change   4/3 RRT for concern for seziure activiyt; SDH on CTH ; EEG no seizures   EEG no seizures, L temporoccipital slowing   This admission:   CTH no change in L SDH 2.3cm in depht.  left to righ shift unchanged   UA +  EEG 4/16 no seizures found. slwoing noted   passed SS     Impression   1) chronic strokes as well as more recent R MCA infarct, likely embolic from Afib   2) recent L SDH with shift unchanged   3) AMS, decrease PO intake 2/2 toxic metabolic encephalopathy from UTI     - PEG eval? ; family not interested ; passed SS   - treatemnt of infection per team was on CTX; now off   - seizure ppx with keppra 1g BID ; consider lowering to 750AM and 1g PM   - hold AC; DVT ppx okay   - Continue atorvastatin 80mg (goal LDL<70)   - f/u ID   - CTH if change in neuro exam   - PT/OT/SS/SLP, OOBC  - check FS, glucose control <180  - GI/DVT ppx   - Thank you for allowing me to participate in the care of this patient. Call with questions.   spoke with family 4/16  dc planning now LISANDRA Bashir MD  Vascular Neurology  Office: [phone]

## 2024-04-16 NOTE — DISCHARGE NOTE PROVIDER - PROVIDER TOKENS
Date    Diabetes mellitus ([facility] [address]     Headache     Hypertension        SURGICAL HISTORY           Procedure Laterality Date    ROTATOR CUFF REPAIR      ROTATOR CUFF REPAIR Right          FAMILY HISTORY     History reviewed. No pertinent family history. No family status information on file. His mother had heart attack and needed bypass in her 45s. SOCIAL HISTORY      reports that he has never smoked. He has never used smokeless tobacco. He reports that he does not drink alcohol or use drugs. REVIEW OF SYSTEMS    (2-9 systems for level 4, 10 or more for level 5)     Review of Systems   Constitutional: Negative for chills, fatigue and fever. HENT: Negative for congestion, ear discharge and facial swelling. Eyes: Negative for discharge and redness. Respiratory: Positive for cough. Negative for shortness of breath. Cardiovascular: Positive for chest pain. Gastrointestinal: Negative for abdominal pain, constipation, diarrhea and vomiting. Genitourinary: Negative for dysuria and hematuria. Musculoskeletal: Negative for arthralgias. Skin: Negative for color change and rash. Neurological: Negative for syncope, numbness and headaches. Hematological: Negative for adenopathy. Psychiatric/Behavioral: Negative for confusion. The patient is not nervous/anxious. Except as noted above the remainder of the review of systems was reviewed and negative. PHYSICAL EXAM    (up to 7 for level 4, 8 or more for level 5)     Vitals:    03/01/20 2037 03/01/20 2059 03/01/20 2111   BP: (!) 143/78 (!) 154/90 133/87   Pulse: 82 73 83   Resp: 16 18 18   Temp: 98.7 °F (37.1 °C)     TempSrc: Oral     SpO2: 100% 98% 96%   Weight: 234 lb (106.1 kg)     Height: 5' 9\" (1.753 m)         Physical Exam  Vitals signs reviewed. Constitutional:       General: He is not in acute distress. Appearance: He is well-developed. He is not diaphoretic. HENT:      Head: Normocephalic and atraumatic.    Eyes:
PROVIDER:[TOKEN:[[id number]]]

## 2024-04-16 NOTE — CHART NOTE - NSCHARTNOTEFT_GEN_A_CORE
Called by Dr. Vaca to switch meds to PO given speech and swallow recommendations-   Keppra and PPI changed.   Patient refusing potassium supplementation via oral route- IV form ordered.     [id number]

## 2024-04-16 NOTE — DISCHARGE NOTE PROVIDER - HOSPITAL COURSE
HPI:  89 yo LEFT handed man pmh  chronic Afib (taken off apixaban 1-2 weeks ago d/t recurrent falls), HTN, HLD, presumed GERD, BPH, asthma, anemia, prior chronic strokes, SDH w/ midline shift during recent admission discharged few days ago now coming for decreased PO intake.      ROS limited given AMS  (11 Apr 2024 20:51)    Hospital Course: 89y LEFT handed man pmh chronic Afib (taken off apixaban 1-2 weeks ago d/t recurrent falls), HTN, HLD, presumed GERD, BPH, asthma, anemia, prior chronic strokes, SDH w/ midline shift during recent admission discharged few days ago now coming for decreased intake a/f further w/u. pt noted with FTT. Noted to have poor intake   PT  afebrile, VSS, nontoxic appearing. Labs: chronic anemia, chem stable, UA: infectious ?contaminate. Abnormal U/A but pt with no systemic signs of infection->recommend to monitor off abx , urine cx in lab, even if positive not planning to treat unless any change in clinical status. SDH w/ midline shift during recent admission, CT head:  largely stable SDH  w/ midline shift. Will c/w Keppra. EEG performed -> negative. chronic, c/w diltiazem. Pt was taken off AC 2/2 falls recently OP however MRI last admission shows acute CVA. Pt now medically stable for discharge.         Important Medication Changes and Reason:    Active or Pending Issues Requiring Follow-up:    Advanced Directives:   [ ] Full code  [ x] DNR  [ ] Hospice    Discharge Diagnoses:  FTT  SDH  UTI         HPI:  89 yo LEFT handed man pmh  chronic Afib (taken off apixaban 1-2 weeks ago d/t recurrent falls), HTN, HLD, presumed GERD, BPH, asthma, anemia, prior chronic strokes, SDH w/ midline shift during recent admission discharged few days ago now coming for decreased PO intake.      ROS limited given AMS  (11 Apr 2024 20:51)    Hospital Course: 89y LEFT handed man pmh chronic Afib (taken off apixaban 1-2 weeks ago d/t recurrent falls), HTN, HLD, presumed GERD, BPH, asthma, anemia, prior chronic strokes, SDH w/ midline shift during recent admission discharged few days ago now coming for decreased intake a/f further w/u. pt noted with FTT. Noted to have poor intake   PT  afebrile, VSS, nontoxic appearing. Labs: chronic anemia, chem stable, UA: infectious ?contaminate. Abnormal U/A but pt with no systemic signs of infection->recommend to monitor off abx , urine cx in lab, even if positive not planning to treat unless any change in clinical status. SDH w/ midline shift during recent admission, CT head:  largely stable SDH  w/ midline shift. Will c/w Keppra. EEG performed -> negative. chronic, c/w diltiazem. Pt was taken off AC 2/2 falls recently OP however MRI last admission shows acute CVA. Pt now medically stable for discharge.         Important Medication Changes and Reason:  Keppra     Active or Pending Issues Requiring Follow-up:  Neurology   PCP     Advanced Directives:   [ ] Full code  [ x] DNR  [ ] Hospice    Discharge Diagnoses:  FTT  SDH  UTI

## 2024-04-16 NOTE — PROGRESS NOTE ADULT - ASSESSMENT
EKG - not in the chart       1) failure to thrive t/t per primary team       2) Afib - chronic, c/w dilt. was taken off AC 2/2 falls recently OP however MRI last admission shows acute CVA      3) Diastolic  CHF continue Lasix

## 2024-04-16 NOTE — DISCHARGE NOTE PROVIDER - DETAILS OF MALNUTRITION DIAGNOSIS/DIAGNOSES
This patient has been assessed with a concern for Malnutrition and was treated during this hospitalization for the following Nutrition diagnosis/diagnoses:     -  04/12/2024: Severe protein-calorie malnutrition

## 2024-04-16 NOTE — EEG REPORT - NS EEG TEXT BOX
REPORT OF CONTINUOUS VIDEO EEG      [facility]: [address], [place] [address], Phone: [phone]  [facility]: [address], [place] [address], Phone: [phone]  [facility]: [address] [place] [address], Phone: [phone]    Patient Name: Ragini Mistry    Age: 89 year, : 10/28/1925  [place]hen: [facility] [address]  EEG #: [id number]    Study Date: 4/15/2024   Start Time: 16:47:42 PM      End Date: 2024         End Time: 08:00:07 AM     Study Duration: 15 h 5 m    Study Information:    EEG Recording Technique:  The patient underwent continuous Video-EEG monitoring, using Telemetry System hardware on the XLTek Digital System. EEG and video data were stored on a computer hard drive with important events saved in digital archive files. The material was reviewed by a physician (electroencephalographer / epileptologist) on a daily basis. Renzo and seizure detection algorithms were utilized and reviewed. An EEG Technician attended to the patient, and was available throughout daytime work hours.  The epilepsy center neurologist was available in person or on call 24-hours per day.    EEG Placement and Labeling of Electrodes:  The EEG was performed utilizing 20 channel referential EEG connections (coronal over temporal over parasagittal montage) using all standard 10-20 electrode placements with EKG, with additional electrodes placed in the inferior temporal region using the modified 10-10 montage electrode placements for elective admissions, or if deemed necessary. Recording was at a sampling rate of 256 samples per second per channel. Time synchronized digital video recording was done simultaneously with EEG recording. A low light infrared camera was used for low light recording.     History: -  89 year old Male is here to rule out seizures    Medication  Neurontin    Zyprexa    Keppra (Levetiracetam)      Interpretation:    [[[Abbreviation Key:  PDR=alpha rhythm/posterior dominant rhythm. A-P=anterior posterior.  Amplitude: ‘very low’:<20; ‘low’:20-49; ‘medium’:; ‘high’:>150uV.  Persistence for periodic/rhythmic patterns (% of epoch) ‘rare’:<1%; ‘occasional’:1-10%; ‘frequent’:10-50%; ‘abundant’:50-90%; ‘continuous’:>90%.  Persistence for sporadic discharges: ‘rare’:<1/hr; ‘occasional’:1/min-1/hr; ‘frequent’:>1/min; ‘abundant’:>1/10 sec.  RPP=rhythmic and periodic patterns; GRDA=generalized rhythmic delta activity; FIRDA=frontal intermittent GRDA; LRDA=lateralized rhythmic delta activity; TIRDA=temporal intermittent rhythmic delta activity;  LPD=PLED=lateralized periodic discharges; GPD=generalized periodic discharges; BIPDs =bilateral independent periodic discharges; Mf=multifocal; SIRPDs=stimulus induced rhythmic, periodic, or ictal appearing discharges; BIRDs=brief potentially ictal rhythmic discharges >4 Hz, lasting .5-10s; PFA (paroxysmal bursts >13 Hz or =8 Hz <10s).  Modifiers: +F=with fast component; +S=with spike component; +R=with rhythmic component.  S-B=burst suppression pattern.  Max=maximal. N1-drowsy; N2-stage II sleep; N3-slow wave sleep. SSS/BETS=small sharp spikes/benign epileptiform transients of sleep. HV=hyperventilation; PS=photic stimulation]]]      Daily EEG Visual Analysis    FINDINGS:      Background:  Symmetry: symmetric  Continuous: continuous  PDR: symmetric, poorly-modulated up to 8 Hz activity, with amplitude to 40 uV, that attenuated to eye opening (slower and less well modulated over left hemisphere).  Low amplitude frontal beta not noted in wakefulness.  Reactivity: present  Voltage: normal, [defined typically between 20-150uV]  Anterior Posterior Gradient: absent  Breach: absent    Background Slowing:  Generalized slowing: present. Background is diffusely slow consisting of delta theta activity up to 8 Hz    Focal slowing: present. Continuous focal polymorphic delta theta slowing over left hemisphere region more prominent over left temporal region        State Changes:   -Drowsiness was characterized by fragmentation, attenuation, and slowing of the background activity.    -Stage II sleep transients were not recorded.    Sporadic Epileptiform Discharges:   None    Rhythmic and Periodic Patterns (RPPs):  None     Electrographic and Electroclinical seizures:  None    Other Clinical Events:  None    Activation Procedures:   -Hyperventilation was not performed.    -Photic stimulation was not performed.      Artifacts:  Intermittent myogenic and movement artifacts were noted.    ECG:  Irregular heart rhythm     EEG Summary / Classification:  Abnormal EEG in the awake / drowsy / asleep states.  •	Background slowing  -           Continuous focal polymorphic delta theta slowing over left hemisphere region more prominent over left temporal region    EEG Impression / Clinical Correlate:  Abnormal prolonged EEG study due to   1- Focal slowing in left hemisphere region more prominent over left temporal region which indicates focal cerebral dysfunction or structural abnormality in that region.  2- Mild diffuse slowing which indicates mild diffuse cerebral dysfunction that is not specific in etiology    No epileptic discharges recorded.  No seizures recorded.  •	    ________________________________________    TACO Canales  Attending Physician, [facility]    ------------------------------------  EEG Reading Room: [phone]  On Call Service After Hours: [phone]        REPORT OF CONTINUOUS VIDEO EEG      [facility]: [address], [place] [address], Phone: [phone]  [facility]: [phone], [place] [address], Phone: [phone]  [facility]: [address] [place] [address], Phone: [phone]    Patient Name: Ragini Mistry    Age: 89 year, : 10/28/1925  [place]: [facility] [address]  EEG #: [id number]    Study Date: 4/15/2024   Start Time: 16:47:42 PM      End Date: 2024         End Time: 08:00:07 AM     Study Duration: 15 h 5 m    Study Information:    EEG Recording Technique:  The patient underwent continuous Video-EEG monitoring, using Telemetry System hardware on the XLTek Digital System. EEG and video data were stored on a computer hard drive with important events saved in digital archive files. The material was reviewed by a physician (electroencephalographer / epileptologist) on a daily basis. Renzo and seizure detection algorithms were utilized and reviewed. An EEG Technician attended to the patient, and was available throughout daytime work hours.  The epilepsy center neurologist was available in person or on call 24-hours per day.    EEG Placement and Labeling of Electrodes:  The EEG was performed utilizing 20 channel referential EEG connections (coronal over temporal over parasagittal montage) using all standard 10-20 electrode placements with EKG, with additional electrodes placed in the inferior temporal region using the modified 10-10 montage electrode placements for elective admissions, or if deemed necessary. Recording was at a sampling rate of 256 samples per second per channel. Time synchronized digital video recording was done simultaneously with EEG recording. A low light infrared camera was used for low light recording.     History: -  89 year old Male is here to rule out seizures    Medication  Neurontin    Zyprexa    Keppra (Levetiracetam)      Interpretation:    [[[Abbreviation Key:  PDR=alpha rhythm/posterior dominant rhythm. A-P=anterior posterior.  Amplitude: ‘very low’:<20; ‘low’:20-49; ‘medium’:; ‘high’:>150uV.  Persistence for periodic/rhythmic patterns (% of epoch) ‘rare’:<1%; ‘occasional’:1-10%; ‘frequent’:10-50%; ‘abundant’:50-90%; ‘continuous’:>90%.  Persistence for sporadic discharges: ‘rare’:<1/hr; ‘occasional’:1/min-1/hr; ‘frequent’:>1/min; ‘abundant’:>1/10 sec.  RPP=rhythmic and periodic patterns; GRDA=generalized rhythmic delta activity; FIRDA=frontal intermittent GRDA; LRDA=lateralized rhythmic delta activity; TIRDA=temporal intermittent rhythmic delta activity;  LPD=PLED=lateralized periodic discharges; GPD=generalized periodic discharges; BIPDs =bilateral independent periodic discharges; Mf=multifocal; SIRPDs=stimulus induced rhythmic, periodic, or ictal appearing discharges; BIRDs=brief potentially ictal rhythmic discharges >4 Hz, lasting .5-10s; PFA (paroxysmal bursts >13 Hz or =8 Hz <10s).  Modifiers: +F=with fast component; +S=with spike component; +R=with rhythmic component.  S-B=burst suppression pattern.  Max=maximal. N1-drowsy; N2-stage II sleep; N3-slow wave sleep. SSS/BETS=small sharp spikes/benign epileptiform transients of sleep. HV=hyperventilation; PS=photic stimulation]]]      Daily EEG Visual Analysis    FINDINGS:      Background:  Symmetry: symmetric  Continuous: continuous  PDR: symmetric, poorly-modulated up to 8 Hz activity, with amplitude to 40 uV, that attenuated to eye opening (slower and less well modulated over left hemisphere).  Low amplitude frontal beta not noted in wakefulness.  Reactivity: present  Voltage: normal, [defined typically between 20-150uV]  Anterior Posterior Gradient: absent  Breach: absent    Background Slowing:  Generalized slowing: present. Background is diffusely slow consisting of delta theta activity up to 8 Hz    Focal slowing: present. Continuous focal polymorphic delta theta slowing over left hemisphere region more prominent over left temporal region        State Changes:   -Drowsiness was characterized by fragmentation, attenuation, and slowing of the background activity.    -Stage II sleep transients were not recorded.    Sporadic Epileptiform Discharges:   None    Rhythmic and Periodic Patterns (RPPs):  None     Electrographic and Electroclinical seizures:  None    Other Clinical Events:  None    Activation Procedures:   -Hyperventilation was not performed.    -Photic stimulation was not performed.      Artifacts:  Intermittent myogenic and movement artifacts were noted.    ECG:  Irregular heart rhythm     EEG Summary / Classification:  Abnormal EEG in the awake / drowsy / asleep states.  •	Background slowing  -           Continuous focal polymorphic delta theta slowing over left hemisphere region more prominent over left temporal region    EEG Impression / Clinical Correlate:  Abnormal prolonged EEG study due to   1- Focal slowing in left hemisphere region more prominent over left temporal region which indicates focal cerebral dysfunction or structural abnormality in that region.  2- Mild diffuse slowing which indicates mild diffuse cerebral dysfunction that is not specific in etiology    No epileptic discharges recorded.  No seizures recorded.  •	  Irregular heart rhythm noted on EKG  ________________________________________    TACO Canales  Attending Physician, [facility] [place]    ------------------------------------  EEG Reading Room: [phone]  On Call Service After Hours: [phone]

## 2024-04-16 NOTE — DISCHARGE NOTE PROVIDER - CARE PROVIDER_API CALL
Sherry Chavez  Internal Medicine  [address], [address]  [place] [address]  Phone: [phone]  Fax: [phone]  Follow Up Time:

## 2024-04-17 LAB
ANION GAP SERPL CALC-SCNC: 10 MMOL/L — SIGNIFICANT CHANGE UP (ref 5–17)
BUN SERPL-MCNC: 6 MG/DL — LOW (ref 7–23)
CALCIUM SERPL-MCNC: 8.9 MG/DL — SIGNIFICANT CHANGE UP (ref 8.4–10.5)
CHLORIDE SERPL-SCNC: 105 MMOL/L — SIGNIFICANT CHANGE UP (ref 96–108)
CO2 SERPL-SCNC: 24 MMOL/L — SIGNIFICANT CHANGE UP (ref 22–31)
CREAT SERPL-MCNC: 0.88 MG/DL — SIGNIFICANT CHANGE UP (ref 0.5–1.3)
CULTURE RESULTS: SIGNIFICANT CHANGE UP
EGFR: 78 ML/MIN/1.73M2 — SIGNIFICANT CHANGE UP
GLUCOSE SERPL-MCNC: 120 MG/DL — HIGH (ref 70–99)
MAGNESIUM SERPL-MCNC: 1.8 MG/DL — SIGNIFICANT CHANGE UP (ref 1.6–2.6)
POTASSIUM SERPL-MCNC: 3.5 MMOL/L — SIGNIFICANT CHANGE UP (ref 3.5–5.3)
POTASSIUM SERPL-SCNC: 3.5 MMOL/L — SIGNIFICANT CHANGE UP (ref 3.5–5.3)
SODIUM SERPL-SCNC: 139 MMOL/L — SIGNIFICANT CHANGE UP (ref 135–145)
SPECIMEN SOURCE: SIGNIFICANT CHANGE UP

## 2024-04-17 PROCEDURE — 95718 EEG PHYS/QHP 2-12 HR W/VEEG: CPT

## 2024-04-17 RX ADMIN — MONTELUKAST 10 MILLIGRAM(S): 4 TABLET, CHEWABLE ORAL at 12:29

## 2024-04-17 RX ADMIN — PANTOPRAZOLE SODIUM 40 MILLIGRAM(S): 20 TABLET, DELAYED RELEASE ORAL at 06:07

## 2024-04-17 RX ADMIN — LEVETIRACETAM 750 MILLIGRAM(S): 250 TABLET, FILM COATED ORAL at 09:48

## 2024-04-17 RX ADMIN — ATORVASTATIN CALCIUM 80 MILLIGRAM(S): 80 TABLET, FILM COATED ORAL at 21:29

## 2024-04-17 RX ADMIN — SODIUM CHLORIDE 75 MILLILITER(S): 9 INJECTION, SOLUTION INTRAVENOUS at 17:22

## 2024-04-17 RX ADMIN — Medication 20 MILLIGRAM(S): at 06:07

## 2024-04-17 RX ADMIN — LEVETIRACETAM 1000 MILLIGRAM(S): 250 TABLET, FILM COATED ORAL at 21:29

## 2024-04-17 RX ADMIN — TAMSULOSIN HYDROCHLORIDE 0.4 MILLIGRAM(S): 0.4 CAPSULE ORAL at 21:29

## 2024-04-17 RX ADMIN — FINASTERIDE 5 MILLIGRAM(S): 5 TABLET, FILM COATED ORAL at 12:29

## 2024-04-17 RX ADMIN — BUDESONIDE AND FORMOTEROL FUMARATE DIHYDRATE 2 PUFF(S): 160; 4.5 AEROSOL RESPIRATORY (INHALATION) at 06:07

## 2024-04-17 RX ADMIN — GABAPENTIN 100 MILLIGRAM(S): 400 CAPSULE ORAL at 21:30

## 2024-04-17 RX ADMIN — Medication 120 MILLIGRAM(S): at 06:07

## 2024-04-17 RX ADMIN — SODIUM CHLORIDE 75 MILLILITER(S): 9 INJECTION, SOLUTION INTRAVENOUS at 06:14

## 2024-04-17 NOTE — PROGRESS NOTE ADULT - PROBLEM SELECTOR PLAN 9
- H&H largely stable   - Trend labs

## 2024-04-17 NOTE — PROGRESS NOTE ADULT - PROBLEM SELECTOR PLAN 5
- Bp stable, CTM  - C/w antihypertensives

## 2024-04-17 NOTE — EEG REPORT - NS EEG TEXT BOX
EEG Report [Charted Location: [address]] [Authored: 2024 08:49]- for Visit: [id number], Complete, Revised, Signed in Full, Available to Patient    EEG REPORT:    EEG Report:  · EEG Report	     REPORT OF CONTINUOUS VIDEO EEG      [facility]: [address], [place] [address], Phone: [phone]  [facility]: [id number] [address], [place] [address], Phone: [phone]  [facility]: [address] [place] [address], Phone: [phone]    Patient Name: Ragini Mistry    Age: 89 year, : 10/28/1925  [facility]hen: [facility] [address]  EEG #: [id number]    Study Date: 2024   Start Time: 08:00    End Date: 2024         End Time: 08:00    Study Duration: 24 h    Study Information:    EEG Recording Technique:  The patient underwent continuous Video-EEG monitoring, using Telemetry System hardware on the XLTek Digital System. EEG and video data were stored on a computer hard drive with important events saved in digital archive files. The material was reviewed by a physician (electroencephalographer / epileptologist) on a daily basis. Renzo and seizure detection algorithms were utilized and reviewed. An EEG Technician attended to the patient, and was available throughout daytime work hours.  The epilepsy center neurologist was available in person or on call 24-hours per day.    EEG Placement and Labeling of Electrodes:  The EEG was performed utilizing 20 channel referential EEG connections (coronal over temporal over parasagittal montage) using all standard 10-20 electrode placements with EKG, with additional electrodes placed in the inferior temporal region using the modified 10-10 montage electrode placements for elective admissions, or if deemed necessary. Recording was at a sampling rate of 256 samples per second per channel. Time synchronized digital video recording was done simultaneously with EEG recording. A low light infrared camera was used for low light recording.     History: -  89 year old Male is here to rule out seizures    Medication  Neurontin    Zyprexa    Keppra (Levetiracetam)      Interpretation:    [[[Abbreviation Key:  PDR=alpha rhythm/posterior dominant rhythm. A-P=anterior posterior.  Amplitude: ‘very low’:<20; ‘low’:20-49; ‘medium’:; ‘high’:>150uV.  Persistence for periodic/rhythmic patterns (% of epoch) ‘rare’:<1%; ‘occasional’:1-10%; ‘frequent’:10-50%; ‘abundant’:50-90%; ‘continuous’:>90%.  Persistence for sporadic discharges: ‘rare’:<1/hr; ‘occasional’:1/min-1/hr; ‘frequent’:>1/min; ‘abundant’:>1/10 sec.  RPP=rhythmic and periodic patterns; GRDA=generalized rhythmic delta activity; FIRDA=frontal intermittent GRDA; LRDA=lateralized rhythmic delta activity; TIRDA=temporal intermittent rhythmic delta activity;  LPD=PLED=lateralized periodic discharges; GPD=generalized periodic discharges; BIPDs =bilateral independent periodic discharges; Mf=multifocal; SIRPDs=stimulus induced rhythmic, periodic, or ictal appearing discharges; BIRDs=brief potentially ictal rhythmic discharges >4 Hz, lasting .5-10s; PFA (paroxysmal bursts >13 Hz or =8 Hz <10s).  Modifiers: +F=with fast component; +S=with spike component; +R=with rhythmic component.  S-B=burst suppression pattern.  Max=maximal. N1-drowsy; N2-stage II sleep; N3-slow wave sleep. SSS/BETS=small sharp spikes/benign epileptiform transients of sleep. HV=hyperventilation; PS=photic stimulation]]]      Daily EEG Visual Analysis    FINDINGS:      Background:  Symmetry: Asymmetric  Continuous: continuous  PDR: Asymmetric, poorly-modulated up to 6-7 Hz activity, with amplitude to 40 uV, that attenuated to eye opening (slower and less well modulated over left hemisphere).  Low amplitude frontal beta not noted in wakefulness.  Reactivity: present  Voltage: normal, [defined typically between 20-150uV]  Anterior Posterior Gradient: absent  Breach: absent    Background Slowing:  Generalized slowing: present. Background is diffusely slow consisting of delta theta activity up to 7 Hz    Focal slowing: present. Continuous focal polymorphic delta theta slowing over left hemisphere region more prominent over left temporal region        State Changes:   -Drowsiness was characterized by fragmentation, attenuation, and slowing of the background activity.    -Stage II sleep transients were not recorded.    Sporadic Epileptiform Discharges:   None    Rhythmic and Periodic Patterns (RPPs):  None     Electrographic and Electroclinical seizures:  None    Other Clinical Events:  None    Activation Procedures:   -Hyperventilation was not performed.    -Photic stimulation was not performed.      Artifacts:  Intermittent myogenic and movement artifacts were noted.    ECG:  Irregular heart rhythm     EEG Summary / Classification:  Abnormal EEG in the awake / drowsy / asleep states.  •	Background slowing  -           Continuous focal polymorphic delta theta slowing over left hemisphere region more prominent over left temporal region    EEG Impression / Clinical Correlate:  Abnormal prolonged EEG study due to   1- Focal slowing in left hemisphere region more prominent over left temporal region which indicates focal cerebral dysfunction or structural abnormality in that region.  2- Mild diffuse/multifocal slowing which indicates mild to moderate diffuse cerebral dysfunction that is not specific in etiology    No epileptic discharges recorded.  No seizures recorded.  •	  Irregular heart rhythm noted on EKG    In absence of additional concerns, recommend consideration for discontinuation of current EEG study with reconnection in future if warranted.    ________________________________________    TACO Canales  Attending Physician, [facility]    ------------------------------------  EEG Reading Room: [phone]  On Call Service After Hours: [phone]                          REPORT OF CONTINUOUS VIDEO EEG      [facility]: [address], [place] [address], Phone: [phone]  [facility]: [address] [address], [place] [address], Phone: [phone]  [facility]: [address] [place] [address], Phone: [phone]    Patient Name: Ragini Mistry    Age: 89 year, : 10/28/1925  [place]: [facility] [address]  EEG #: [id number]    Study Date: 2024   Start Time: 08:00    End Date: 2024         End Time: 12:57  Study Duration: 28 h 57 m    Study Information:    EEG Recording Technique:  The patient underwent continuous Video-EEG monitoring, using Telemetry System hardware on the XLTek Digital System. EEG and video data were stored on a computer hard drive with important events saved in digital archive files. The material was reviewed by a physician (electroencephalographer / epileptologist) on a daily basis. Renzo and seizure detection algorithms were utilized and reviewed. An EEG Technician attended to the patient, and was available throughout daytime work hours.  The epilepsy center neurologist was available in person or on call 24-hours per day.    EEG Placement and Labeling of Electrodes:  The EEG was performed utilizing 20 channel referential EEG connections (coronal over temporal over parasagittal montage) using all standard 10-20 electrode placements with EKG, with additional electrodes placed in the inferior temporal region using the modified 10-10 montage electrode placements for elective admissions, or if deemed necessary. Recording was at a sampling rate of 256 samples per second per channel. Time synchronized digital video recording was done simultaneously with EEG recording. A low light infrared camera was used for low light recording.     History: -  89 year old Male is here to rule out seizures    Medication  Neurontin    Zyprexa    Keppra (Levetiracetam)      Interpretation:    [[[Abbreviation Key:  PDR=alpha rhythm/posterior dominant rhythm. A-P=anterior posterior.  Amplitude: ‘very low’:<20; ‘low’:20-49; ‘medium’:; ‘high’:>150uV.  Persistence for periodic/rhythmic patterns (% of epoch) ‘rare’:<1%; ‘occasional’:1-10%; ‘frequent’:10-50%; ‘abundant’:50-90%; ‘continuous’:>90%.  Persistence for sporadic discharges: ‘rare’:<1/hr; ‘occasional’:1/min-1/hr; ‘frequent’:>1/min; ‘abundant’:>1/10 sec.  RPP=rhythmic and periodic patterns; GRDA=generalized rhythmic delta activity; FIRDA=frontal intermittent GRDA; LRDA=lateralized rhythmic delta activity; TIRDA=temporal intermittent rhythmic delta activity;  LPD=PLED=lateralized periodic discharges; GPD=generalized periodic discharges; BIPDs =bilateral independent periodic discharges; Mf=multifocal; SIRPDs=stimulus induced rhythmic, periodic, or ictal appearing discharges; BIRDs=brief potentially ictal rhythmic discharges >4 Hz, lasting .5-10s; PFA (paroxysmal bursts >13 Hz or =8 Hz <10s).  Modifiers: +F=with fast component; +S=with spike component; +R=with rhythmic component.  S-B=burst suppression pattern.  Max=maximal. N1-drowsy; N2-stage II sleep; N3-slow wave sleep. SSS/BETS=small sharp spikes/benign epileptiform transients of sleep. HV=hyperventilation; PS=photic stimulation]]]      Daily EEG Visual Analysis    FINDINGS:      Background:  Symmetry: Asymmetric  Continuous: continuous  PDR: Asymmetric, poorly-modulated up to 6-7 Hz activity, with amplitude to 40 uV, that attenuated to eye opening (slower and less well modulated over left hemisphere).  Low amplitude frontal beta not noted in wakefulness.  Reactivity: present  Voltage: normal, [defined typically between 20-150uV]  Anterior Posterior Gradient: absent  Breach: absent    Background Slowing:  Generalized slowing: present. Background is diffusely slow consisting of delta theta activity up to 7 Hz    Focal slowing: present. Continuous focal polymorphic delta theta slowing over left hemisphere region more prominent over left temporal region        State Changes:   -Drowsiness was characterized by fragmentation, attenuation, and slowing of the background activity.    -Stage II sleep transients were not recorded.    Sporadic Epileptiform Discharges:   None    Rhythmic and Periodic Patterns (RPPs):  None     Electrographic and Electroclinical seizures:  None    Other Clinical Events:  None    Activation Procedures:   -Hyperventilation was not performed.    -Photic stimulation was not performed.      Artifacts:  Intermittent myogenic and movement artifacts were noted.    ECG:  Irregular heart rhythm     EEG Summary / Classification:  Abnormal EEG in the awake / drowsy / asleep states.  •	Background slowing  -           Continuous focal polymorphic delta theta slowing over left hemisphere region more prominent over left temporal region    EEG Impression / Clinical Correlate:  Abnormal prolonged EEG study due to   1- Focal slowing in left hemisphere region more prominent over left temporal region which indicates focal cerebral dysfunction or structural abnormality in that region.  2- Mild diffuse/multifocal slowing which indicates mild to moderate diffuse cerebral dysfunction that is not specific in etiology    No epileptic discharges recorded.  No seizures recorded.  •	  Irregular heart rhythm noted on EKG    In absence of additional concerns, recommend consideration for discontinuation of current EEG study with reconnection in future if warranted.    ________________________________________    TACO Canales  Attending Physician, [facility] [place]    ------------------------------------  EEG Reading Room: [phone]  On Call Service After Hours: [phone]

## 2024-04-17 NOTE — PROGRESS NOTE ADULT - PROBLEM SELECTOR PROBLEM 9
Anemia of chronic disease

## 2024-04-17 NOTE — PROGRESS NOTE ADULT - PROBLEM SELECTOR PLAN 1
DDx: infection vs electrolyte abnormalities vs cardiac dz vs neuro d/o vs debility   - Noted to have poor intake   - Afebrile, VSS, nontoxic appearing   - Labs: chronic anemia, chem stable, UA: infectious ?contaminate   - CXR: neg    CThead:  largely stable SDH  w/ midline shift  - Infection: possible given UA grossly (+), S/p ceftriaxone 1g in ED. Will c/w in this high risk pt    F/u Ucx, monitor fever curve    - Electrolytes overall unremarkable, less likely source of FTT    - Cardiac: Pt w/significant cardiac hx of Afib, htn. appears stable cardiac wise. Unlikely etiology   - Neuro: hx/o CVA, recent SDH.  On exam pt appears at baseline, no new focal neuro deficit,   CTH w/stable SDH.  Neuro etiology low suspicion though not r/o    - Pt w/ multiple comorbidities, recent hospitalization, Component of debility highly probably in this frail medically complex pt. Obtain PT consult.   - Also with recent refusal to eat, c/f dysphagia. SLP consult (ordered) . NPO
DDx: infection vs electrolyte abnormalities vs cardiac dz vs neuro d/o vs debility   - Noted to have poor intake   - Afebrile, VSS, nontoxic appearing   - Labs: chronic anemia, chem stable, UA: infectious ?contaminate   -speech and swallow eval
DDx: infection vs electrolyte abnormalities vs cardiac dz vs neuro d/o vs debility   - Noted to have poor intake   - Afebrile, VSS, nontoxic appearing   - Labs: chronic anemia, chem stable, UA: infectious ?contaminate   -repeat speech and swallow eval reviewed   pt on puree diet with strict aspiration precautions
DDx: infection vs electrolyte abnormalities vs cardiac dz vs neuro d/o vs debility   - Noted to have poor intake   - Afebrile, VSS, nontoxic appearing   - Labs: chronic anemia, chem stable, UA: infectious ?contaminate   -speech and swallow eval
DDx: infection vs electrolyte abnormalities vs cardiac dz vs neuro d/o vs debility   - Noted to have poor intake   - Afebrile, VSS, nontoxic appearing   - Labs: chronic anemia, chem stable, UA: infectious ?contaminate   -repeat speech and swallow eval reviewed   pt on puree diet with strict aspiration precautions
DDx: infection vs electrolyte abnormalities vs cardiac dz vs neuro d/o vs debility   - Noted to have poor intake   - Afebrile, VSS, nontoxic appearing   - Labs: chronic anemia, chem stable, UA: infectious ?contaminate   -speech and swallow eval
DDx: infection vs electrolyte abnormalities vs cardiac dz vs neuro d/o vs debility   - Noted to have poor intake   - Afebrile, VSS, nontoxic appearing   - Labs: chronic anemia, chem stable, UA: infectious ?contaminate   -repeat speech and swallow eval

## 2024-04-17 NOTE — PROGRESS NOTE ADULT - PROBLEM SELECTOR PLAN 10
- DVT ppx: AC contraindicated iso SDH   - GI ppx: PPI   - PT consult

## 2024-04-17 NOTE — PROGRESS NOTE ADULT - PROBLEM SELECTOR PROBLEM 3
SDH (subdural hematoma)

## 2024-04-17 NOTE — PROGRESS NOTE ADULT - PROBLEM SELECTOR PLAN 7
- Finasteride  - Flomax

## 2024-04-17 NOTE — PROGRESS NOTE ADULT - PROBLEM SELECTOR PLAN 8
- Montelukast   - Trelegy non-formulary >> Symbicort while admitted

## 2024-04-17 NOTE — PROGRESS NOTE ADULT - PROBLEM SELECTOR PLAN 2
Abnormal U/A but pt with no systemic signs of infection.   recommend to monitor off abx   urine cx in lab, even if positive not planning to treat unless any change in clinical status.   blood cx in lab.   trend cbc, no leucocytosis.    FAILURE TO THRIVE - LIKELY THRUSH - WILL TREAT AND MONITOR PATIENTS SYMPTOMS CLOSELY
Abnormal U/A but pt with no systemic signs of infection.   recommend to monitor off abx   urine cx in lab, even if positive not planning to treat unless any change in clinical status.   blood cx in lab.   trend cbc, no leucocytosis.
Abnormal U/A but pt with no systemic signs of infection.   recommend to monitor off abx   urine cx in lab, even if positive not planning to treat unless any change in clinical status.   blood cx in lab.   trend cbc, no leucocytosis.  thrush - s/p diflucan
Abnormal U/A but pt with no systemic signs of infection.   recommend to monitor off abx   urine cx in lab, even if positive not planning to treat unless any change in clinical status.   blood cx in lab.   trend cbc, no leucocytosis.    FAILURE TO THRIVE - LIKELY THRUSH - WILL TREAT AND MONITOR PATIENTS SYMPTOMS CLOSELY
- UA:  infectious but could be contaminate  - S/p ceftriaxone 1g in ED. Will c/w in this high risk pt    - F/u Ucx, monitor fever curve
Abnormal U/A but pt with no systemic signs of infection.   recommend to monitor off abx   urine cx in lab, even if positive not planning to treat unless any change in clinical status.   blood cx in lab.   trend cbc, no leucocytosis.    FAILURE TO THRIVE - LIKELY THRUSH - WILL TREAT AND MONITOR PATIENTS SYMPTOMS CLOSELY
Abnormal U/A but pt with no systemic signs of infection.   recommend to monitor off abx   urine cx in lab, even if positive not planning to treat unless any change in clinical status.   blood cx in lab.   trend cbc, no leucocytosis.  thrush - s/p diflucan

## 2024-04-17 NOTE — PROGRESS NOTE ADULT - PROBLEM SELECTOR PLAN 6
- c/w Atorvastatin

## 2024-04-17 NOTE — CHART NOTE - NSCHARTNOTEFT_GEN_A_CORE
Patient requires a semi electric hospital bed due to failure to thrive.   Patient requires the head of the bed to be elevated more than 30 degrees.   Pillows and wedges are not effective.   Patient requires positioning of the body in ways not feasible with an ordinary bed as well as frequent repositioning in order to alleviate pain.   Patient can independently affect the adjustment by operating the control.     [id number]

## 2024-04-17 NOTE — PROGRESS NOTE ADULT - PROBLEM SELECTOR PROBLEM 1
FTT (failure to thrive) in adult

## 2024-04-18 ENCOUNTER — TRANSCRIPTION ENCOUNTER (OUTPATIENT)
Age: 89
End: 2024-04-18

## 2024-04-18 VITALS
HEART RATE: 68 BPM | RESPIRATION RATE: 18 BRPM | DIASTOLIC BLOOD PRESSURE: 62 MMHG | TEMPERATURE: 98 F | SYSTOLIC BLOOD PRESSURE: 147 MMHG | OXYGEN SATURATION: 97 %

## 2024-04-18 LAB
ANION GAP SERPL CALC-SCNC: 11 MMOL/L — SIGNIFICANT CHANGE UP (ref 5–17)
BUN SERPL-MCNC: 10 MG/DL — SIGNIFICANT CHANGE UP (ref 7–23)
CALCIUM SERPL-MCNC: 8.5 MG/DL — SIGNIFICANT CHANGE UP (ref 8.4–10.5)
CHLORIDE SERPL-SCNC: 106 MMOL/L — SIGNIFICANT CHANGE UP (ref 96–108)
CO2 SERPL-SCNC: 22 MMOL/L — SIGNIFICANT CHANGE UP (ref 22–31)
CREAT SERPL-MCNC: 0.94 MG/DL — SIGNIFICANT CHANGE UP (ref 0.5–1.3)
EGFR: 73 ML/MIN/1.73M2 — SIGNIFICANT CHANGE UP
GLUCOSE SERPL-MCNC: 105 MG/DL — HIGH (ref 70–99)
MAGNESIUM SERPL-MCNC: 1.8 MG/DL — SIGNIFICANT CHANGE UP (ref 1.6–2.6)
POTASSIUM SERPL-MCNC: 3.5 MMOL/L — SIGNIFICANT CHANGE UP (ref 3.5–5.3)
POTASSIUM SERPL-SCNC: 3.5 MMOL/L — SIGNIFICANT CHANGE UP (ref 3.5–5.3)
SODIUM SERPL-SCNC: 139 MMOL/L — SIGNIFICANT CHANGE UP (ref 135–145)

## 2024-04-18 PROCEDURE — 85025 COMPLETE CBC W/AUTO DIFF WBC: CPT

## 2024-04-18 PROCEDURE — 87086 URINE CULTURE/COLONY COUNT: CPT

## 2024-04-18 PROCEDURE — 86900 BLOOD TYPING SEROLOGIC ABO: CPT

## 2024-04-18 PROCEDURE — 82435 ASSAY OF BLOOD CHLORIDE: CPT

## 2024-04-18 PROCEDURE — 87186 SC STD MICRODIL/AGAR DIL: CPT

## 2024-04-18 PROCEDURE — 85610 PROTHROMBIN TIME: CPT

## 2024-04-18 PROCEDURE — 94640 AIRWAY INHALATION TREATMENT: CPT

## 2024-04-18 PROCEDURE — 70450 CT HEAD/BRAIN W/O DYE: CPT | Mod: MC

## 2024-04-18 PROCEDURE — 82962 GLUCOSE BLOOD TEST: CPT

## 2024-04-18 PROCEDURE — 71045 X-RAY EXAM CHEST 1 VIEW: CPT

## 2024-04-18 PROCEDURE — 81001 URINALYSIS AUTO W/SCOPE: CPT

## 2024-04-18 PROCEDURE — 86850 RBC ANTIBODY SCREEN: CPT

## 2024-04-18 PROCEDURE — 97161 PT EVAL LOW COMPLEX 20 MIN: CPT

## 2024-04-18 PROCEDURE — 84132 ASSAY OF SERUM POTASSIUM: CPT

## 2024-04-18 PROCEDURE — 83735 ASSAY OF MAGNESIUM: CPT

## 2024-04-18 PROCEDURE — 97530 THERAPEUTIC ACTIVITIES: CPT

## 2024-04-18 PROCEDURE — 36415 COLL VENOUS BLD VENIPUNCTURE: CPT

## 2024-04-18 PROCEDURE — 85014 HEMATOCRIT: CPT

## 2024-04-18 PROCEDURE — 83605 ASSAY OF LACTIC ACID: CPT

## 2024-04-18 PROCEDURE — 84100 ASSAY OF PHOSPHORUS: CPT

## 2024-04-18 PROCEDURE — 83690 ASSAY OF LIPASE: CPT

## 2024-04-18 PROCEDURE — 95711 VEEG 2-12 HR UNMONITORED: CPT

## 2024-04-18 PROCEDURE — 80053 COMPREHEN METABOLIC PANEL: CPT

## 2024-04-18 PROCEDURE — 95714 VEEG EA 12-26 HR UNMNTR: CPT

## 2024-04-18 PROCEDURE — 85018 HEMOGLOBIN: CPT

## 2024-04-18 PROCEDURE — 95700 EEG CONT REC W/VID EEG TECH: CPT

## 2024-04-18 PROCEDURE — 84295 ASSAY OF SERUM SODIUM: CPT

## 2024-04-18 PROCEDURE — 80048 BASIC METABOLIC PNL TOTAL CA: CPT

## 2024-04-18 PROCEDURE — 82803 BLOOD GASES ANY COMBINATION: CPT

## 2024-04-18 PROCEDURE — 86901 BLOOD TYPING SEROLOGIC RH(D): CPT

## 2024-04-18 PROCEDURE — 85027 COMPLETE CBC AUTOMATED: CPT

## 2024-04-18 PROCEDURE — 97110 THERAPEUTIC EXERCISES: CPT

## 2024-04-18 PROCEDURE — 87040 BLOOD CULTURE FOR BACTERIA: CPT

## 2024-04-18 PROCEDURE — 99285 EMERGENCY DEPT VISIT HI MDM: CPT

## 2024-04-18 PROCEDURE — 82947 ASSAY GLUCOSE BLOOD QUANT: CPT

## 2024-04-18 PROCEDURE — 92526 ORAL FUNCTION THERAPY: CPT

## 2024-04-18 PROCEDURE — 82330 ASSAY OF CALCIUM: CPT

## 2024-04-18 RX ORDER — LEVETIRACETAM 250 MG/1
1 TABLET, FILM COATED ORAL
Qty: 0 | Refills: 0 | DISCHARGE
Start: 2024-04-18

## 2024-04-18 RX ORDER — LANOLIN ALCOHOL/MO/W.PET/CERES
1 CREAM (GRAM) TOPICAL
Qty: 0 | Refills: 0 | DISCHARGE
Start: 2024-04-18

## 2024-04-18 RX ADMIN — SODIUM CHLORIDE 75 MILLILITER(S): 9 INJECTION, SOLUTION INTRAVENOUS at 05:49

## 2024-04-18 RX ADMIN — Medication 20 MILLIGRAM(S): at 05:41

## 2024-04-18 RX ADMIN — BUDESONIDE AND FORMOTEROL FUMARATE DIHYDRATE 2 PUFF(S): 160; 4.5 AEROSOL RESPIRATORY (INHALATION) at 05:40

## 2024-04-18 RX ADMIN — FINASTERIDE 5 MILLIGRAM(S): 5 TABLET, FILM COATED ORAL at 08:57

## 2024-04-18 RX ADMIN — LEVETIRACETAM 750 MILLIGRAM(S): 250 TABLET, FILM COATED ORAL at 08:58

## 2024-04-18 RX ADMIN — TIOTROPIUM BROMIDE 2 PUFF(S): 18 CAPSULE ORAL; RESPIRATORY (INHALATION) at 08:58

## 2024-04-18 RX ADMIN — MONTELUKAST 10 MILLIGRAM(S): 4 TABLET, CHEWABLE ORAL at 08:58

## 2024-04-18 RX ADMIN — PANTOPRAZOLE SODIUM 40 MILLIGRAM(S): 20 TABLET, DELAYED RELEASE ORAL at 05:40

## 2024-04-18 RX ADMIN — Medication 120 MILLIGRAM(S): at 05:40

## 2024-04-18 NOTE — PROGRESS NOTE ADULT - NUTRITIONAL ASSESSMENT
This patient has been assessed with a concern for Malnutrition and has been determined to have a diagnosis/diagnoses of Severe protein-calorie malnutrition.    This patient is being managed with:   Diet Pureed-  DASH/TLC {Sodium & Cholesterol Restricted} (DASH)  Entered: Apr 15 2024  2:34PM  
This patient has been assessed with a concern for Malnutrition and has been determined to have a diagnosis/diagnoses of Severe protein-calorie malnutrition.    This patient is being managed with:   Diet NPO-  Except Medications  With Ice Chips/Sips of Water  Entered: Apr 12 2024  5:50AM  
This patient has been assessed with a concern for Malnutrition and has been determined to have a diagnosis/diagnoses of Severe protein-calorie malnutrition.    This patient is being managed with:   Diet NPO-  Except Medications  With Ice Chips/Sips of Water  Entered: Apr 12 2024  5:50AM  
This patient has been assessed with a concern for Malnutrition and has been determined to have a diagnosis/diagnoses of Severe protein-calorie malnutrition.    This patient is being managed with:   Diet Pureed-  DASH/TLC {Sodium & Cholesterol Restricted} (DASH)  Entered: Apr 15 2024  2:34PM  
This patient has been assessed with a concern for Malnutrition and has been determined to have a diagnosis/diagnoses of Severe protein-calorie malnutrition.    This patient is being managed with:   Diet NPO-  Except Medications  With Ice Chips/Sips of Water  Entered: Apr 12 2024  5:50AM  
This patient has been assessed with a concern for Malnutrition and has been determined to have a diagnosis/diagnoses of Severe protein-calorie malnutrition.    This patient is being managed with:   Diet Pureed-  DASH/TLC {Sodium & Cholesterol Restricted} (DASH)  Entered: Apr 15 2024  2:34PM

## 2024-04-18 NOTE — PROGRESS NOTE ADULT - SUBJECTIVE AND OBJECTIVE BOX
Neurology        S: patient seen. family at bedside.  EEG in progress       Medications: MEDICATIONS  (STANDING):  atorvastatin 80 milliGRAM(s) Oral at bedtime  budesonide  80 MICROgram(s)/formoterol 4.5 MICROgram(s) Inhaler 2 Puff(s) Inhalation two times a day  dextrose 5% + sodium chloride 0.45%. 1000 milliLiter(s) (75 mL/Hr) IV Continuous <Continuous>  diltiazem    milliGRAM(s) Oral daily  finasteride 5 milliGRAM(s) Oral daily  furosemide    Tablet 20 milliGRAM(s) Oral daily  gabapentin 100 milliGRAM(s) Oral at bedtime  levETIRAcetam  IVPB 750 milliGRAM(s) IV Intermittent with breakfast  levETIRAcetam  IVPB 1000 milliGRAM(s) IV Intermittent at bedtime  magnesium sulfate  IVPB 2 Gram(s) IV Intermittent once  montelukast 10 milliGRAM(s) Oral daily  pantoprazole  Injectable 40 milliGRAM(s) IV Push daily  tamsulosin 0.4 milliGRAM(s) Oral at bedtime  tiotropium 2.5 MICROgram(s) Inhaler 2 Puff(s) Inhalation daily    MEDICATIONS  (PRN):  acetaminophen     Tablet .. 650 milliGRAM(s) Oral every 6 hours PRN Temp greater or equal to 38C (100.4F), Mild Pain (1 - 3)  aluminum hydroxide/magnesium hydroxide/simethicone Suspension 30 milliLiter(s) Oral every 4 hours PRN Dyspepsia  melatonin 3 milliGRAM(s) Oral at bedtime PRN Insomnia  OLANZapine 2.5 milliGRAM(s) Oral every 6 hours PRN for agitation  ondansetron Injectable 4 milliGRAM(s) IV Push every 8 hours PRN Nausea and/or Vomiting       Vitals:  Vital Signs Last 24 Hrs  T(C): 36.7 (16 Apr 2024 04:41), Max: 36.7 (16 Apr 2024 04:41)  T(F): 98 (16 Apr 2024 04:41), Max: 98 (16 Apr 2024 04:41)  HR: 93 (16 Apr 2024 04:41) (88 - 93)  BP: 169/82 (16 Apr 2024 05:25) (138/79 - 169/82)  BP(mean): --  RR: 18 (16 Apr 2024 04:41) (18 - 18)  SpO2: 96% (16 Apr 2024 04:41) (96% - 98%)    Parameters below as of 16 Apr 2024 04:41  Patient On (Oxygen Delivery Method): room air                General Exam:   General Appearance: Appropriately dressed and in no acute distress       Head: Normocephalic, atraumatic and no dysmorphic features  Ear, Nose, and Throat: Moist mucous membranes  CVS: S1S2+  Resp: No SOB, no wheeze or rhonchi  GI: soft NT/ND  Extremities: No edema or cyanosis  Skin: No bruises or rashes     Neurological Exam:  Mental Status: Awake, alert and oriented x 1-2.  Able to follow simple verbal commands. Able to name and repeat. fluent speech. No obvious aphasia + dysarthria noted.   Cranial Nerves: PERRL, EOMI, VFFC, sensation V1-V3 intact,  no obvious facial asymmetry, equal elevation of palate, scm/trap 5/5, tongue is midline on protrusion. no obvious papilledema on fundoscopic exam. hearing is grossly intact.   Motor: DYSON at least 4/5   Sensation: Intact to light touch and pinprick throughout. no right/left confusion. no extinction to tactile on DSS.    Reflexes: 1+ throughout at biceps, brachioradialis, triceps, patellars and ankles bilaterally and equal. No clonus. R toe and L toe were both downgoing.  Coordination: No dysmetria on FNF    Gait: deferred     Data/Labs/Imaging which I personally reviewed.       LABS:                          8.9    6.55  )-----------( 232      ( 16 Apr 2024 07:02 )             30.7     04-16    140  |  106  |  7   ----------------------------<  128<H>  3.1<L>   |  25  |  0.86    Ca    8.5      16 Apr 2024 07:00  Phos  2.9     04-16  Mg     1.7     04-16    TPro  5.9<L>  /  Alb  2.8<L>  /  TBili  0.6  /  DBili  x   /  AST  13  /  ALT  12  /  AlkPhos  76  04-16    LIVER FUNCTIONS - ( 16 Apr 2024 07:00 )  Alb: 2.8 g/dL / Pro: 5.9 g/dL / ALK PHOS: 76 U/L / ALT: 12 U/L / AST: 13 U/L / GGT: x             Urinalysis Basic - ( 16 Apr 2024 07:00 )    Color: x / Appearance: x / SG: x / pH: x  Gluc: 128 mg/dL / Ketone: x  / Bili: x / Urobili: x   Blood: x / Protein: x / Nitrite: x   Leuk Esterase: x / RBC: x / WBC x   Sq Epi: x / Non Sq Epi: x / Bacteria: x            < from: CT Head No Cont (04.11.24 @ 16:16) >    ACC: [id number] EXAM:  CT BRAIN   ORDERED BY:  BRITTNEE MURCIA     PROCEDURE DATE:  04/11/2024          INTERPRETATION:  Exam Date: 4/11/2024 4:16 PM    CT head without IV contrast    CLINICAL INFORMATION: assess progression of SDH    TECHNIQUE: Contiguous axial sections were obtained through the head. Exam   was repeated secondary to motion. Coronal and sagittal reformats were   obtained.    COMPARISON: CT head 4/4/2024    FINDINGS:  No significant interval change in the size of the acute and subacute left   holohemispheric subdural hematoma measuring up to 2.3 cm in greatest   width. There is mass effect on the underlying parenchyma that appears   mildly worsened as compared to the prior exam. Of note, portions of the   subdural hematoma are lower in attenuation, compatible with evolution   over time. There is mild left to right midline shift, essentially   unchanged.    There is an area of chronic encephalomalacia within the right anterior   frontal lobe. There is an additional small areaof encephalomalacia   within the left posterior temporal lobe, unchanged since prior exam. Rest   of the findings are unchanged.    There are scattered mucosal inflammatory changes in the paranasal sinuses.    IMPRESSION:    No significant interval change in the size of the acute and subacute left   holohemispheric subdural hematoma measuring up to 2.3 cm in greatest   width. There is mass effect on the underlying parenchyma that appears   mildly worsened as compared to the prior exam. Of note, portions of the   subdural hematoma are lower in attenuation, compatible with evolution   over time. There is mild left to right midline shift, essentially   unchanged.  Rest of the chronic findings are unchanged.      --- End of Report ---            JUSTINE FUCHS MD; Attending Radiologist  This document has been electronically signed. Apr 11 2024  4:22PM    < end of copied text >      
Neurology        S: patient seen. family at bedside.  doesn't want to eat      Medications: MEDICATIONS  (STANDING):  atorvastatin 80 milliGRAM(s) Oral at bedtime  budesonide  80 MICROgram(s)/formoterol 4.5 MICROgram(s) Inhaler 2 Puff(s) Inhalation two times a day  dextrose 5% + sodium chloride 0.45%. 1000 milliLiter(s) (75 mL/Hr) IV Continuous <Continuous>  diltiazem    milliGRAM(s) Oral daily  finasteride 5 milliGRAM(s) Oral daily  furosemide    Tablet 20 milliGRAM(s) Oral daily  gabapentin 100 milliGRAM(s) Oral at bedtime  levETIRAcetam  IVPB 750 milliGRAM(s) IV Intermittent with breakfast  levETIRAcetam  IVPB 1000 milliGRAM(s) IV Intermittent at bedtime  magnesium sulfate  IVPB 2 Gram(s) IV Intermittent once  montelukast 10 milliGRAM(s) Oral daily  pantoprazole  Injectable 40 milliGRAM(s) IV Push daily  sodium phosphate 15 milliMole(s)/250 mL IVPB 15 milliMole(s) IV Intermittent once  tamsulosin 0.4 milliGRAM(s) Oral at bedtime  tiotropium 2.5 MICROgram(s) Inhaler 2 Puff(s) Inhalation daily    MEDICATIONS  (PRN):  acetaminophen     Tablet .. 650 milliGRAM(s) Oral every 6 hours PRN Temp greater or equal to 38C (100.4F), Mild Pain (1 - 3)  aluminum hydroxide/magnesium hydroxide/simethicone Suspension 30 milliLiter(s) Oral every 4 hours PRN Dyspepsia  melatonin 3 milliGRAM(s) Oral at bedtime PRN Insomnia  OLANZapine 2.5 milliGRAM(s) Oral every 6 hours PRN for agitation  ondansetron Injectable 4 milliGRAM(s) IV Push every 8 hours PRN Nausea and/or Vomiting       Vitals:  Vital Signs Last 24 Hrs  T(C): 36.8 (15 Apr 2024 05:19), Max: 37.2 (14 Apr 2024 21:07)  T(F): 98.3 (15 Apr 2024 05:19), Max: 98.9 (14 Apr 2024 21:07)  HR: 79 (15 Apr 2024 05:19) (79 - 93)  BP: 123/69 (15 Apr 2024 05:19) (123/69 - 165/78)  BP(mean): --  RR: 18 (15 Apr 2024 05:19) (18 - 18)  SpO2: 93% (15 Apr 2024 05:19) (93% - 96%)    Parameters below as of 15 Apr 2024 05:19  Patient On (Oxygen Delivery Method): room air          General Exam:   General Appearance: Appropriately dressed and in no acute distress       Head: Normocephalic, atraumatic and no dysmorphic features  Ear, Nose, and Throat: Moist mucous membranes  CVS: S1S2+  Resp: No SOB, no wheeze or rhonchi  GI: soft NT/ND  Extremities: No edema or cyanosis  Skin: No bruises or rashes     Neurological Exam:  Mental Status: Awake, alert and oriented x 1-2.  Able to follow simple verbal commands. Able to name and repeat. fluent speech. No obvious aphasia + dysarthria noted.   Cranial Nerves: PERRL, EOMI, VFFC, sensation V1-V3 intact,  no obvious facial asymmetry, equal elevation of palate, scm/trap 5/5, tongue is midline on protrusion. no obvious papilledema on fundoscopic exam. hearing is grossly intact.   Motor: DYSON at least 4/5   Sensation: Intact to light touch and pinprick throughout. no right/left confusion. no extinction to tactile on DSS.    Reflexes: 1+ throughout at biceps, brachioradialis, triceps, patellars and ankles bilaterally and equal. No clonus. R toe and L toe were both downgoing.  Coordination: No dysmetria on FNF    Gait: deferred     Data/Labs/Imaging which I personally reviewed.       LABS:                          8.5    6.30  )-----------( 260      ( 15 Apr 2024 07:14 )             29.3     04-15    142  |  108  |  10  ----------------------------<  121<H>  3.6   |  20<L>  |  0.81    Ca    8.1<L>      15 Apr 2024 07:14  Phos  2.3     04-15  Mg     1.8     04-15          Urinalysis Basic - ( 15 Apr 2024 07:14 )    Color: x / Appearance: x / SG: x / pH: x  Gluc: 121 mg/dL / Ketone: x  / Bili: x / Urobili: x   Blood: x / Protein: x / Nitrite: x   Leuk Esterase: x / RBC: x / WBC x   Sq Epi: x / Non Sq Epi: x / Bacteria: x              < from: CT Head No Cont (04.11.24 @ 16:16) >    ACC: [id number] EXAM:  CT BRAIN   ORDERED BY:  BRITTNEE MURCIA     PROCEDURE DATE:  04/11/2024          INTERPRETATION:  Exam Date: 4/11/2024 4:16 PM    CT head without IV contrast    CLINICAL INFORMATION: assess progression of SDH    TECHNIQUE: Contiguous axial sections were obtained through the head. Exam   was repeated secondary to motion. Coronal and sagittal reformats were   obtained.    COMPARISON: CT head 4/4/2024    FINDINGS:  No significant interval change in the size of the acute and subacute left   holohemispheric subdural hematoma measuring up to 2.3 cm in greatest   width. There is mass effect on the underlying parenchyma that appears   mildly worsened as compared to the prior exam. Of note, portions of the   subdural hematoma are lower in attenuation, compatible with evolution   over time. There is mild left to right midline shift, essentially   unchanged.    There is an area of chronic encephalomalacia within the right anterior   frontal lobe. There is an additional small areaof encephalomalacia   within the left posterior temporal lobe, unchanged since prior exam. Rest   of the findings are unchanged.    There are scattered mucosal inflammatory changes in the paranasal sinuses.    IMPRESSION:    No significant interval change in the size of the acute and subacute left   holohemispheric subdural hematoma measuring up to 2.3 cm in greatest   width. There is mass effect on the underlying parenchyma that appears   mildly worsened as compared to the prior exam. Of note, portions of the   subdural hematoma are lower in attenuation, compatible with evolution   over time. There is mild left to right midline shift, essentially   unchanged.  Rest of the chronic findings are unchanged.      --- End of Report ---            JUSTINE FUCHS MD; Attending Radiologist  This document has been electronically signed. Apr 11 2024  4:22PM    < end of copied text >      
Slick Up MD  Interventional Cardiology / Advance Heart Failure and Cardiac Transplant Specialist  [place] Office : [address] [place] [address] [place] [address]  Tel:   [place] Office : [address] [facility] [place] [address]  Tel: [phone]       Pt is lying in bed lethargic  	  MEDICATIONS:  diltiazem    milliGRAM(s) Oral daily  furosemide    Tablet 20 milliGRAM(s) Oral daily      budesonide  80 MICROgram(s)/formoterol 4.5 MICROgram(s) Inhaler 2 Puff(s) Inhalation two times a day  montelukast 10 milliGRAM(s) Oral daily  tiotropium 2.5 MICROgram(s) Inhaler 2 Puff(s) Inhalation daily    acetaminophen     Tablet .. 650 milliGRAM(s) Oral every 6 hours PRN  gabapentin 100 milliGRAM(s) Oral at bedtime  levETIRAcetam 750 milliGRAM(s) Oral with breakfast  levETIRAcetam 1000 milliGRAM(s) Oral at bedtime  melatonin 3 milliGRAM(s) Oral at bedtime PRN  OLANZapine 2.5 milliGRAM(s) Oral every 6 hours PRN  ondansetron Injectable 4 milliGRAM(s) IV Push every 8 hours PRN    aluminum hydroxide/magnesium hydroxide/simethicone Suspension 30 milliLiter(s) Oral every 4 hours PRN  pantoprazole    Tablet 40 milliGRAM(s) Oral before breakfast    atorvastatin 80 milliGRAM(s) Oral at bedtime  finasteride 5 milliGRAM(s) Oral daily    dextrose 5% + sodium chloride 0.45%. 1000 milliLiter(s) IV Continuous <Continuous>  magnesium sulfate  IVPB 2 Gram(s) IV Intermittent once  tamsulosin 0.4 milliGRAM(s) Oral at bedtime      PAST MEDICAL/SURGICAL HISTORY  PAST MEDICAL & SURGICAL HISTORY:  Afib      Asthmatic bronchitis , chronic      GERD (gastroesophageal reflux disease)      HLD (hyperlipidemia)      HTN (hypertension)      No significant past surgical history          SOCIAL HISTORY: Substance Use (street drugs): ( x ) never used  (  ) other:    FAMILY HISTORY:  FH: stroke (Mother, Sibling)           PHYSICAL EXAM:  T(C): 36.7 (04-18-24 @ 21:09), Max: 36.8 (04-18-24 @ 12:26)  HR: 68 (04-18-24 @ 21:09) (68 - 100)  BP: 147/62 (04-18-24 @ 21:09) (110/80 - 147/86)  RR: 18 (04-18-24 @ 21:09) (17 - 18)  SpO2: 97% (04-18-24 @ 21:09) (97% - 98%)  Wt(kg): --  I&O's Summary      EYES:   PERRLA   ENMT:   Moist mucous membranes, Good dentition, No lesions  Cardiovascular: Normal S1 S2, No JVD, No murmurs, No edema  Respiratory: Lungs clear to auscultation	  Gastrointestinal:  Soft, Non-tender, + BS	  Extremities: no edema                04-18    139  |  106  |  10  ----------------------------<  105<H>  3.5   |  22  |  0.94    Ca    8.5      18 Apr 2024 07:24  Mg     1.8     04-18      proBNP:   Lipid Profile:   HgA1c:   TSH:     Consultant(s) Notes Reviewed:  [x ] YES  [ ] NO    Care Discussed with Consultants/Other Providers [ x] YES  [ ] NO    Imaging Personally Reviewed independently:  [x] YES  [ ] NO    All labs, radiologic studies, vitals, orders and medications list reviewed. Patient is seen and examined at bedside. Case discussed with medical team.        
8y LEFT handed man pmh chronic Afib (taken off apixaban 1-2 weeks ago d/t recurrent falls), HTN, HLD, presumed GERD, BPH, asthma, anemia, prior chronic strokes, SDH w/ midline shift during recent admission discharged few days ago now coming for decreased intake a/f further w/u. pt noted with FTT. Noted to have poor intake   PT  afebrile, VSS, nontoxic appearing. Labs: chronic anemia, chem stable, UA: infectious ?contaminate. Abnormal U/A but pt with no systemic signs of infection->recommend to monitor off abx , urine cx in lab, even if positive not planning to treat unless any change in clinical status. SDH w/ midline shift during recent admission, CT head:  largely stable SDH  w/ midline shift. Will c/w Keppra. EEG performed -> negative. chronic, c/w diltiazem. Pt was taken off AC 2/2 falls recently OP however MRI last admission shows acute CVA. Pt now medically stable for discharge.   Patient transported with ambulance at 1700 Patient discharged medically cleared. Received phone call from transport company patient is Saturating 60% on O2 and is returning to floor.    Patient returned to floor at 1810 with family and transport Staff. Patient was on a nonrebreather at 98%. Fingers were cool to touch. B/l Lungs clear. Daughter denes any ingestion of food during transport. Patient is resting comfortable in stretcher. No signs or symptoms of cardiac or respiratory distress noted. Patient began to sleep during exam. Nonrebreather removed and noted to be 96 - 98% on Room air Vitals stable      Finding discussed with Dr Vaca and Patient is medically cleared to return home with transport.    Lynn Castillo NP  Internal Medicine NP 
Slick Up MD  Interventional Cardiology / Advance Heart Failure and Cardiac Transplant Specialist  [place] Office : [address] [place] [address] [place] [address]  Tel:   [place] Office : [address] [facility] [place] [address]  Tel: [phone]       patient is lying in bed comfortable, NAD  	  MEDICATIONS:  diltiazem    milliGRAM(s) Oral daily  furosemide    Tablet 20 milliGRAM(s) Oral daily      budesonide  80 MICROgram(s)/formoterol 4.5 MICROgram(s) Inhaler 2 Puff(s) Inhalation two times a day  montelukast 10 milliGRAM(s) Oral daily  tiotropium 2.5 MICROgram(s) Inhaler 2 Puff(s) Inhalation daily    acetaminophen     Tablet .. 650 milliGRAM(s) Oral every 6 hours PRN  gabapentin 100 milliGRAM(s) Oral at bedtime  levETIRAcetam 750 milliGRAM(s) Oral with breakfast  levETIRAcetam 1000 milliGRAM(s) Oral at bedtime  melatonin 3 milliGRAM(s) Oral at bedtime PRN  OLANZapine 2.5 milliGRAM(s) Oral every 6 hours PRN  ondansetron Injectable 4 milliGRAM(s) IV Push every 8 hours PRN    aluminum hydroxide/magnesium hydroxide/simethicone Suspension 30 milliLiter(s) Oral every 4 hours PRN  pantoprazole    Tablet 40 milliGRAM(s) Oral before breakfast    atorvastatin 80 milliGRAM(s) Oral at bedtime  finasteride 5 milliGRAM(s) Oral daily    dextrose 5% + sodium chloride 0.45%. 1000 milliLiter(s) IV Continuous <Continuous>  magnesium sulfate  IVPB 2 Gram(s) IV Intermittent once  potassium chloride  10 mEq/100 mL IVPB 10 milliEquivalent(s) IV Intermittent every 1 hour  tamsulosin 0.4 milliGRAM(s) Oral at bedtime      PAST MEDICAL/SURGICAL HISTORY  PAST MEDICAL & SURGICAL HISTORY:  Afib      Asthmatic bronchitis , chronic      GERD (gastroesophageal reflux disease)      HLD (hyperlipidemia)      HTN (hypertension)      No significant past surgical history          SOCIAL HISTORY: Substance Use (street drugs): ( x ) never used  (  ) other:    FAMILY HISTORY:  FH: stroke (Mother, Sibling)        REVIEW OF SYSTEMS:  CONSTITUTIONAL: No fever, weight loss, or fatigue  EYES: No eye pain, visual disturbances, or discharge  ENMT:  No difficulty hearing, tinnitus, vertigo; No sinus or throat pain  BREASTS: No pain, masses, or nipple discharge  GASTROINTESTINAL: No abdominal or epigastric pain. No nausea, vomiting, or hematemesis; No diarrhea or constipation. No melena or hematochezia.  GENITOURINARY: No dysuria, frequency, hematuria, or incontinence  NEUROLOGICAL: No headaches, memory loss, loss of strength, numbness, or tremors  ENDOCRINE: No heat or cold intolerance; No hair loss  MUSCULOSKELETAL: No joint pain or swelling; No muscle, back, or extremity pain  PSYCHIATRIC: No depression, anxiety, mood swings, or difficulty sleeping  HEME/LYMPH: No easy bruising, or bleeding gums       PHYSICAL EXAM:  T(C): 36.7 (04-16-24 @ 13:12), Max: 36.7 (04-16-24 @ 04:41)  HR: 75 (04-16-24 @ 13:12) (75 - 93)  BP: 146/76 (04-16-24 @ 13:12) (139/75 - 169/82)  RR: 18 (04-16-24 @ 13:12) (18 - 18)  SpO2: 97% (04-16-24 @ 13:12) (96% - 97%)  Wt(kg): --  I&O's Summary    15 Apr 2024 07:01  -  16 Apr 2024 07:00  --------------------------------------------------------  IN: 0 mL / OUT: 600 mL / NET: -600 mL          GENERAL: NAD  EYES:   PERRLA   ENMT:   Moist mucous membranes, Good dentition, No lesions  Cardiovascular: Normal S1 S2, No JVD, No murmurs, No edema  Respiratory: Lungs clear to auscultation	  Gastrointestinal:  Soft, Non-tender, + BS	  Extremities: no edema                                  8.9    6.55  )-----------( 232      ( 16 Apr 2024 07:02 )             30.7     04-16    140  |  106  |  7   ----------------------------<  128<H>  3.1<L>   |  25  |  0.86    Ca    8.5      16 Apr 2024 07:00  Phos  2.9     04-16  Mg     1.7     04-16    TPro  5.9<L>  /  Alb  2.8<L>  /  TBili  0.6  /  DBili  x   /  AST  13  /  ALT  12  /  AlkPhos  76  04-16    proBNP:   Lipid Profile:   HgA1c:   TSH:     Consultant(s) Notes Reviewed:  [x ] YES  [ ] NO    Care Discussed with Consultants/Other Providers [ x] YES  [ ] NO    Imaging Personally Reviewed independently:  [x] YES  [ ] NO    All labs, radiologic studies, vitals, orders and medications list reviewed. Patient is seen and examined at bedside. Case discussed with medical team.        
  Slick Up MD  Interventional Cardiology / Endovascular Specialist  [place] Office : [address] [place] [address] [place] [address]  Tel:   [place] Office : [address] [facility] [place] [address]  Tel: [phone]    patient is lying in bed comfortable, NAD  	  MEDICATIONS:  diltiazem    milliGRAM(s) Oral daily  furosemide    Tablet 20 milliGRAM(s) Oral daily      budesonide  80 MICROgram(s)/formoterol 4.5 MICROgram(s) Inhaler 2 Puff(s) Inhalation two times a day  montelukast 10 milliGRAM(s) Oral daily  tiotropium 2.5 MICROgram(s) Inhaler 2 Puff(s) Inhalation daily    acetaminophen     Tablet .. 650 milliGRAM(s) Oral every 6 hours PRN  gabapentin 100 milliGRAM(s) Oral at bedtime  levETIRAcetam  IVPB 750 milliGRAM(s) IV Intermittent with breakfast  levETIRAcetam  IVPB 1000 milliGRAM(s) IV Intermittent at bedtime  melatonin 3 milliGRAM(s) Oral at bedtime PRN  OLANZapine 2.5 milliGRAM(s) Oral every 6 hours PRN  ondansetron Injectable 4 milliGRAM(s) IV Push every 8 hours PRN    aluminum hydroxide/magnesium hydroxide/simethicone Suspension 30 milliLiter(s) Oral every 4 hours PRN  pantoprazole  Injectable 40 milliGRAM(s) IV Push daily    atorvastatin 80 milliGRAM(s) Oral at bedtime  finasteride 5 milliGRAM(s) Oral daily    dextrose 5% + sodium chloride 0.45%. 1000 milliLiter(s) IV Continuous <Continuous>  tamsulosin 0.4 milliGRAM(s) Oral at bedtime      PAST MEDICAL/SURGICAL HISTORY  PAST MEDICAL & SURGICAL HISTORY:  Afib      Asthmatic bronchitis , chronic      GERD (gastroesophageal reflux disease)      HLD (hyperlipidemia)      HTN (hypertension)      No significant past surgical history          SOCIAL HISTORY: Substance Use (street drugs): ( x ) never used  (  ) other:    FAMILY HISTORY:  FH: stroke (Mother, Sibling)        REVIEW OF SYSTEMS:  CONSTITUTIONAL: No fever, weight loss, or fatigue  EYES: No eye pain, visual disturbances, or discharge  ENMT:  No difficulty hearing, tinnitus, vertigo; No sinus or throat pain  BREASTS: No pain, masses, or nipple discharge  GASTROINTESTINAL: No abdominal or epigastric pain. No nausea, vomiting, or hematemesis; No diarrhea or constipation. No melena or hematochezia.  GENITOURINARY: No dysuria, frequency, hematuria, or incontinence  NEUROLOGICAL: No headaches, memory loss, loss of strength, numbness, or tremors  ENDOCRINE: No heat or cold intolerance; No hair loss  MUSCULOSKELETAL: No joint pain or swelling; No muscle, back, or extremity pain  PSYCHIATRIC: No depression, anxiety, mood swings, or difficulty sleeping  HEME/LYMPH: No easy bruising, or bleeding gums  All others negative    PHYSICAL EXAM:  T(C): 36.6 (04-13-24 @ 13:46), Max: 36.6 (04-13-24 @ 04:27)  HR: 71 (04-13-24 @ 13:46) (71 - 84)  BP: 150/73 (04-13-24 @ 13:46) (149/70 - 154/81)  RR: 18 (04-13-24 @ 13:46) (18 - 18)  SpO2: 98% (04-13-24 @ 13:46) (94% - 98%)  Wt(kg): --  I&O's Summary    12 Apr 2024 07:01  -  13 Apr 2024 07:00  --------------------------------------------------------  IN: 0 mL / OUT: 200 mL / NET: -200 mL          GENERAL: NAD  EYES:   PERRLA   ENMT:   Moist mucous membranes, Good dentition, No lesions  Cardiovascular: Normal S1 S2, No JVD, No murmurs, No edema  Respiratory: Lungs clear to auscultation	  Gastrointestinal:  Soft, Non-tender, + BS	  Extremities: no edema                                  8.6    7.35  )-----------( 290      ( 13 Apr 2024 07:23 )             29.6     04-13    147<H>  |  111<H>  |  20  ----------------------------<  62<L>  3.5   |  21<L>  |  0.89    Ca    8.6      13 Apr 2024 07:11  Mg     2.0     04-12      proBNP:   Lipid Profile:   HgA1c:   TSH:     Consultant(s) Notes Reviewed:  [x ] YES  [ ] NO    Care Discussed with Consultants/Other Providers [ x] YES  [ ] NO    Imaging Personally Reviewed independently:  [x] YES  [ ] NO    All labs, radiologic studies, vitals, orders and medications list reviewed. Patient is seen and examined at bedside. Case discussed with medical team.              
Neurology        S: patient seen.  no neuro changes         Medications: MEDICATIONS  (STANDING):  atorvastatin 80 milliGRAM(s) Oral at bedtime  budesonide  80 MICROgram(s)/formoterol 4.5 MICROgram(s) Inhaler 2 Puff(s) Inhalation two times a day  dextrose 5% + sodium chloride 0.45%. 1000 milliLiter(s) (75 mL/Hr) IV Continuous <Continuous>  diltiazem    milliGRAM(s) Oral daily  finasteride 5 milliGRAM(s) Oral daily  furosemide    Tablet 20 milliGRAM(s) Oral daily  gabapentin 100 milliGRAM(s) Oral at bedtime  levETIRAcetam 750 milliGRAM(s) Oral with breakfast  levETIRAcetam 1000 milliGRAM(s) Oral at bedtime  magnesium sulfate  IVPB 2 Gram(s) IV Intermittent once  montelukast 10 milliGRAM(s) Oral daily  pantoprazole    Tablet 40 milliGRAM(s) Oral before breakfast  tamsulosin 0.4 milliGRAM(s) Oral at bedtime  tiotropium 2.5 MICROgram(s) Inhaler 2 Puff(s) Inhalation daily    MEDICATIONS  (PRN):  acetaminophen     Tablet .. 650 milliGRAM(s) Oral every 6 hours PRN Temp greater or equal to 38C (100.4F), Mild Pain (1 - 3)  aluminum hydroxide/magnesium hydroxide/simethicone Suspension 30 milliLiter(s) Oral every 4 hours PRN Dyspepsia  melatonin 3 milliGRAM(s) Oral at bedtime PRN Insomnia  OLANZapine 2.5 milliGRAM(s) Oral every 6 hours PRN for agitation  ondansetron Injectable 4 milliGRAM(s) IV Push every 8 hours PRN Nausea and/or Vomiting       Vitals:  Vital Signs Last 24 Hrs  T(C): 36.8 (18 Apr 2024 12:26), Max: 36.8 (18 Apr 2024 12:26)  T(F): 98.2 (18 Apr 2024 12:26), Max: 98.2 (18 Apr 2024 12:26)  HR: 79 (18 Apr 2024 12:26) (79 - 100)  BP: 135/82 (18 Apr 2024 12:26) (135/82 - 156/82)  BP(mean): --  RR: 17 (18 Apr 2024 12:26) (17 - 18)  SpO2: 97% (18 Apr 2024 12:26) (96% - 97%)    Parameters below as of 18 Apr 2024 12:26  Patient On (Oxygen Delivery Method): room air                 General Exam:   General Appearance: Appropriately dressed and in no acute distress       Head: Normocephalic, atraumatic and no dysmorphic features  Ear, Nose, and Throat: Moist mucous membranes  CVS: S1S2+  Resp: No SOB, no wheeze or rhonchi  GI: soft NT/ND  Extremities: No edema or cyanosis  Skin: No bruises or rashes     Neurological Exam:  Mental Status: Awake, alert and oriented x 1-2.  Able to follow simple verbal commands. Able to name and repeat. fluent speech. No obvious aphasia + dysarthria noted.   Cranial Nerves: PERRL, EOMI, VFFC, sensation V1-V3 intact,  no obvious facial asymmetry, equal elevation of palate, scm/trap 5/5, tongue is midline on protrusion. no obvious papilledema on fundoscopic exam. hearing is grossly intact.   Motor: DYSON at least 4/5   Sensation: Intact to light touch and pinprick throughout. no right/left confusion. no extinction to tactile on DSS.    Reflexes: 1+ throughout at biceps, brachioradialis, triceps, patellars and ankles bilaterally and equal. No clonus. R toe and L toe were both downgoing.  Coordination: No dysmetria on FNF    Gait: deferred     Data/Labs/Imaging which I personally reviewed.       LABS:      04-18    139  |  106  |  10  ----------------------------<  105<H>  3.5   |  22  |  0.94    Ca    8.5      18 Apr 2024 07:24  Mg     1.8     04-18          Urinalysis Basic - ( 18 Apr 2024 07:24 )    Color: x / Appearance: x / SG: x / pH: x  Gluc: 105 mg/dL / Ketone: x  / Bili: x / Urobili: x   Blood: x / Protein: x / Nitrite: x   Leuk Esterase: x / RBC: x / WBC x   Sq Epi: x / Non Sq Epi: x / Bacteria: x                < from: CT Head No Cont (04.11.24 @ 16:16) >    ACC: [id number] EXAM:  CT BRAIN   ORDERED BY:  BRITTNEE MURCIA     PROCEDURE DATE:  04/11/2024          INTERPRETATION:  Exam Date: 4/11/2024 4:16 PM    CT head without IV contrast    CLINICAL INFORMATION: assess progression of SDH    TECHNIQUE: Contiguous axial sections were obtained through the head. Exam   was repeated secondary to motion. Coronal and sagittal reformats were   obtained.    COMPARISON: CT head 4/4/2024    FINDINGS:  No significant interval change in the size of the acute and subacute left   holohemispheric subdural hematoma measuring up to 2.3 cm in greatest   width. There is mass effect on the underlying parenchyma that appears   mildly worsened as compared to the prior exam. Of note, portions of the   subdural hematoma are lower in attenuation, compatible with evolution   over time. There is mild left to right midline shift, essentially   unchanged.    There is an area of chronic encephalomalacia within the right anterior   frontal lobe. There is an additional small areaof encephalomalacia   within the left posterior temporal lobe, unchanged since prior exam. Rest   of the findings are unchanged.    There are scattered mucosal inflammatory changes in the paranasal sinuses.    IMPRESSION:    No significant interval change in the size of the acute and subacute left   holohemispheric subdural hematoma measuring up to 2.3 cm in greatest   width. There is mass effect on the underlying parenchyma that appears   mildly worsened as compared to the prior exam. Of note, portions of the   subdural hematoma are lower in attenuation, compatible with evolution   over time. There is mild left to right midline shift, essentially   unchanged.  Rest of the chronic findings are unchanged.      --- End of Report ---            JUSTINE FUCHS MD; Attending Radiologist  This document has been electronically signed. Apr 11 2024  4:22PM    < end of copied text >      
Slick Up MD  Interventional Cardiology / Advance Heart Failure and Cardiac Transplant Specialist  [place] Office : [address] [place] [address] [place]. [address]  Tel:   [place] Office : [address] [facility] [place] [address]  Tel: [phone]       patient is lying in bed comfortable not in distress  	  MEDICATIONS:  diltiazem    milliGRAM(s) Oral daily  furosemide    Tablet 20 milliGRAM(s) Oral daily      budesonide  80 MICROgram(s)/formoterol 4.5 MICROgram(s) Inhaler 2 Puff(s) Inhalation two times a day  montelukast 10 milliGRAM(s) Oral daily  tiotropium 2.5 MICROgram(s) Inhaler 2 Puff(s) Inhalation daily    acetaminophen     Tablet .. 650 milliGRAM(s) Oral every 6 hours PRN  gabapentin 100 milliGRAM(s) Oral at bedtime  levETIRAcetam 750 milliGRAM(s) Oral with breakfast  levETIRAcetam 1000 milliGRAM(s) Oral at bedtime  melatonin 3 milliGRAM(s) Oral at bedtime PRN  OLANZapine 2.5 milliGRAM(s) Oral every 6 hours PRN  ondansetron Injectable 4 milliGRAM(s) IV Push every 8 hours PRN    aluminum hydroxide/magnesium hydroxide/simethicone Suspension 30 milliLiter(s) Oral every 4 hours PRN  pantoprazole    Tablet 40 milliGRAM(s) Oral before breakfast    atorvastatin 80 milliGRAM(s) Oral at bedtime  finasteride 5 milliGRAM(s) Oral daily    dextrose 5% + sodium chloride 0.45%. 1000 milliLiter(s) IV Continuous <Continuous>  magnesium sulfate  IVPB 2 Gram(s) IV Intermittent once  tamsulosin 0.4 milliGRAM(s) Oral at bedtime      PAST MEDICAL/SURGICAL HISTORY  PAST MEDICAL & SURGICAL HISTORY:  Afib      Asthmatic bronchitis , chronic      GERD (gastroesophageal reflux disease)      HLD (hyperlipidemia)      HTN (hypertension)      No significant past surgical history          SOCIAL HISTORY: Substance Use (street drugs): ( x ) never used  (  ) other:    FAMILY HISTORY:  FH: stroke (Mother, Sibling)               PHYSICAL EXAM:  T(C): 36.7 (04-17-24 @ 11:38), Max: 37.1 (04-17-24 @ 05:06)  HR: 85 (04-17-24 @ 11:38) (85 - 92)  BP: 147/71 (04-17-24 @ 11:38) (147/71 - 153/88)  RR: 18 (04-17-24 @ 11:38) (18 - 18)  SpO2: 95% (04-17-24 @ 11:38) (95% - 99%)  Wt(kg): --  I&O's Summary    16 Apr 2024 07:01  -  17 Apr 2024 07:00  --------------------------------------------------------  IN: 0 mL / OUT: 850 mL / NET: -850 mL          GENERAL: NAD  EYES:   PERRLA   ENMT:   Moist mucous membranes, Good dentition, No lesions  Cardiovascular: Normal S1 S2, No JVD, No murmurs, No edema  Respiratory: Lungs clear to auscultation	  Gastrointestinal:  Soft, Non-tender, + BS	  Extremities: no edema                                    8.9    6.55  )-----------( 232      ( 16 Apr 2024 07:02 )             30.7     04-17    139  |  105  |  6<L>  ----------------------------<  120<H>  3.5   |  24  |  0.88    Ca    8.9      17 Apr 2024 07:12  Phos  2.9     04-16  Mg     1.8     04-17    TPro  5.9<L>  /  Alb  2.8<L>  /  TBili  0.6  /  DBili  x   /  AST  13  /  ALT  12  /  AlkPhos  76  04-16    proBNP:   Lipid Profile:   HgA1c:   TSH:     Consultant(s) Notes Reviewed:  [x ] YES  [ ] NO    Care Discussed with Consultants/Other Providers [ x] YES  [ ] NO    Imaging Personally Reviewed independently:  [x] YES  [ ] NO    All labs, radiologic studies, vitals, orders and medications list reviewed. Patient is seen and examined at bedside. Case discussed with medical team.        
Slick Up MD  Interventional Cardiology / Advance Heart Failure and Cardiac Transplant Specialist  [place] Office : [address] [place] [address] [place]. [address]  Tel:   [place] Office : [address] [facility] [place] [address]  Tel: [phone]     patient is lying in bed comfortable, NAD    MEDICATIONS:  diltiazem    milliGRAM(s) Oral daily  furosemide    Tablet 20 milliGRAM(s) Oral daily      budesonide  80 MICROgram(s)/formoterol 4.5 MICROgram(s) Inhaler 2 Puff(s) Inhalation two times a day  montelukast 10 milliGRAM(s) Oral daily  tiotropium 2.5 MICROgram(s) Inhaler 2 Puff(s) Inhalation daily    acetaminophen     Tablet .. 650 milliGRAM(s) Oral every 6 hours PRN  gabapentin 100 milliGRAM(s) Oral at bedtime  levETIRAcetam  IVPB 750 milliGRAM(s) IV Intermittent with breakfast  levETIRAcetam  IVPB 1000 milliGRAM(s) IV Intermittent at bedtime  melatonin 3 milliGRAM(s) Oral at bedtime PRN  OLANZapine 2.5 milliGRAM(s) Oral every 6 hours PRN  ondansetron Injectable 4 milliGRAM(s) IV Push every 8 hours PRN    aluminum hydroxide/magnesium hydroxide/simethicone Suspension 30 milliLiter(s) Oral every 4 hours PRN  pantoprazole  Injectable 40 milliGRAM(s) IV Push daily    atorvastatin 80 milliGRAM(s) Oral at bedtime  finasteride 5 milliGRAM(s) Oral daily    dextrose 5% + sodium chloride 0.45%. 1000 milliLiter(s) IV Continuous <Continuous>  magnesium sulfate  IVPB 2 Gram(s) IV Intermittent once  sodium phosphate 15 milliMole(s)/250 mL IVPB 15 milliMole(s) IV Intermittent once  tamsulosin 0.4 milliGRAM(s) Oral at bedtime      PAST MEDICAL/SURGICAL HISTORY  PAST MEDICAL & SURGICAL HISTORY:  Afib      Asthmatic bronchitis , chronic      GERD (gastroesophageal reflux disease)      HLD (hyperlipidemia)      HTN (hypertension)      No significant past surgical history          SOCIAL HISTORY: Substance Use (street drugs): ( x ) never used  (  ) other:    FAMILY HISTORY:  FH: stroke (Mother, Sibling)        REVIEW OF SYSTEMS:  CONSTITUTIONAL: No fever, weight loss, or fatigue  EYES: No eye pain, visual disturbances, or discharge  ENMT:  No difficulty hearing, tinnitus, vertigo; No sinus or throat pain  BREASTS: No pain, masses, or nipple discharge  GASTROINTESTINAL: No abdominal or epigastric pain. No nausea, vomiting, or hematemesis; No diarrhea or constipation. No melena or hematochezia.  GENITOURINARY: No dysuria, frequency, hematuria, or incontinence  NEUROLOGICAL: No headaches, memory loss, loss of strength, numbness, or tremors  ENDOCRINE: No heat or cold intolerance; No hair loss  MUSCULOSKELETAL: No joint pain or swelling; No muscle, back, or extremity pain  PSYCHIATRIC: No depression, anxiety, mood swings, or difficulty sleeping  HEME/LYMPH: No easy bruising, or bleeding gums       PHYSICAL EXAM:  T(C): 36.4 (04-15-24 @ 13:06), Max: 37.2 (04-14-24 @ 21:07)  HR: 90 (04-15-24 @ 13:06) (79 - 90)  BP: 138/79 (04-15-24 @ 13:06) (123/69 - 138/79)  RR: 18 (04-15-24 @ 13:06) (18 - 18)  SpO2: 98% (04-15-24 @ 13:06) (93% - 98%)  Wt(kg): --  I&O's Summary    14 Apr 2024 07:01  -  15 Apr 2024 07:00  --------------------------------------------------------  IN: 900 mL / OUT: 1000 mL / NET: -100 mL        GENERAL: NAD  EYES:   PERRLA   ENMT:   Moist mucous membranes, Good dentition, No lesions  Cardiovascular: Normal S1 S2, No JVD, No murmurs, No edema  Respiratory: Lungs clear to auscultation	  Gastrointestinal:  Soft, Non-tender, + BS	  Extremities: no edema                                    8.5    6.30  )-----------( 260      ( 15 Apr 2024 07:14 )             29.3     04-15    142  |  108  |  10  ----------------------------<  121<H>  3.6   |  20<L>  |  0.81    Ca    8.1<L>      15 Apr 2024 07:14  Phos  2.3     04-15  Mg     1.8     04-15      proBNP:   Lipid Profile:   HgA1c:   TSH:     Consultant(s) Notes Reviewed:  [x ] YES  [ ] NO    Care Discussed with Consultants/Other Providers [ x] YES  [ ] NO    Imaging Personally Reviewed independently:  [x] YES  [ ] NO    All labs, radiologic studies, vitals, orders and medications list reviewed. Patient is seen and examined at bedside. Case discussed with medical team.        
  Slick Up MD  Interventional Cardiology / Endovascular Specialist  [place] Office : [address] [place] [address] [place] [address]  Tel:   [place] Office : [address] [facility] [place] [address]  Tel: [phone]    patient is lying in bed comfortable, NAD  	  MEDICATIONS:  diltiazem    milliGRAM(s) Oral daily  furosemide    Tablet 20 milliGRAM(s) Oral daily      budesonide  80 MICROgram(s)/formoterol 4.5 MICROgram(s) Inhaler 2 Puff(s) Inhalation two times a day  montelukast 10 milliGRAM(s) Oral daily  tiotropium 2.5 MICROgram(s) Inhaler 2 Puff(s) Inhalation daily    acetaminophen     Tablet .. 650 milliGRAM(s) Oral every 6 hours PRN  gabapentin 100 milliGRAM(s) Oral at bedtime  levETIRAcetam  IVPB 750 milliGRAM(s) IV Intermittent with breakfast  levETIRAcetam  IVPB 1000 milliGRAM(s) IV Intermittent at bedtime  melatonin 3 milliGRAM(s) Oral at bedtime PRN  OLANZapine 2.5 milliGRAM(s) Oral every 6 hours PRN  ondansetron Injectable 4 milliGRAM(s) IV Push every 8 hours PRN    aluminum hydroxide/magnesium hydroxide/simethicone Suspension 30 milliLiter(s) Oral every 4 hours PRN  pantoprazole  Injectable 40 milliGRAM(s) IV Push daily    atorvastatin 80 milliGRAM(s) Oral at bedtime  finasteride 5 milliGRAM(s) Oral daily    dextrose 5% + sodium chloride 0.45%. 1000 milliLiter(s) IV Continuous <Continuous>  tamsulosin 0.4 milliGRAM(s) Oral at bedtime      PAST MEDICAL/SURGICAL HISTORY  PAST MEDICAL & SURGICAL HISTORY:  Afib      Asthmatic bronchitis , chronic      GERD (gastroesophageal reflux disease)      HLD (hyperlipidemia)      HTN (hypertension)      No significant past surgical history          SOCIAL HISTORY: Substance Use (street drugs): ( x ) never used  (  ) other:    FAMILY HISTORY:  FH: stroke (Mother, Sibling)        REVIEW OF SYSTEMS:  CONSTITUTIONAL: No fever, weight loss, or fatigue  EYES: No eye pain, visual disturbances, or discharge  ENMT:  No difficulty hearing, tinnitus, vertigo; No sinus or throat pain  BREASTS: No pain, masses, or nipple discharge  GASTROINTESTINAL: No abdominal or epigastric pain. No nausea, vomiting, or hematemesis; No diarrhea or constipation. No melena or hematochezia.  GENITOURINARY: No dysuria, frequency, hematuria, or incontinence  NEUROLOGICAL: No headaches, memory loss, loss of strength, numbness, or tremors  ENDOCRINE: No heat or cold intolerance; No hair loss  MUSCULOSKELETAL: No joint pain or swelling; No muscle, back, or extremity pain  PSYCHIATRIC: No depression, anxiety, mood swings, or difficulty sleeping  HEME/LYMPH: No easy bruising, or bleeding gums  All others negative    PHYSICAL EXAM:  T(C): 37.2 (04-14-24 @ 21:07), Max: 37.2 (04-14-24 @ 21:07)  HR: 82 (04-14-24 @ 21:07) (82 - 93)  BP: 137/75 (04-14-24 @ 21:07) (137/75 - 165/78)  RR: 18 (04-14-24 @ 21:07) (18 - 18)  SpO2: 96% (04-14-24 @ 21:07) (94% - 96%)  Wt(kg): --  I&O's Summary    GENERAL: NAD  EYES:   PERRLA   ENMT:   Moist mucous membranes, Good dentition, No lesions  Cardiovascular: Normal S1 S2, No JVD, No murmurs, No edema  Respiratory: Lungs clear to auscultation	  Gastrointestinal:  Soft, Non-tender, + BS	  Extremities: no edema      13 Apr 2024 07:01  -  14 Apr 2024 07:00  --------------------------------------------------------  IN: 900 mL / OUT: 0 mL / NET: 900 mL    14 Apr 2024 07:01  -  14 Apr 2024 22:58  --------------------------------------------------------  IN: 0 mL / OUT: 0 mL / NET: 0 mL                                8.6    7.35  )-----------( 290      ( 13 Apr 2024 07:23 )             29.6     04-14    141  |  108  |  13  ----------------------------<  133<H>  3.2<L>   |  24  |  0.88    Ca    8.6      14 Apr 2024 07:14  Phos  2.3     04-14  Mg     1.9     04-14      proBNP:   Lipid Profile:   HgA1c:   TSH:     Consultant(s) Notes Reviewed:  [x ] YES  [ ] NO    Care Discussed with Consultants/Other Providers [ x] YES  [ ] NO    Imaging Personally Reviewed independently:  [x] YES  [ ] NO    All labs, radiologic studies, vitals, orders and medications list reviewed. Patient is seen and examined at bedside. Case discussed with medical team.              
Neurology        S: patient seen. family at bedside.  doesn't want to eat      Medications: MEDICATIONS  (STANDING):  atorvastatin 80 milliGRAM(s) Oral at bedtime  budesonide  80 MICROgram(s)/formoterol 4.5 MICROgram(s) Inhaler 2 Puff(s) Inhalation two times a day  dextrose 5% + sodium chloride 0.45%. 1000 milliLiter(s) (75 mL/Hr) IV Continuous <Continuous>  diltiazem    milliGRAM(s) Oral daily  finasteride 5 milliGRAM(s) Oral daily  furosemide    Tablet 20 milliGRAM(s) Oral daily  gabapentin 100 milliGRAM(s) Oral at bedtime  levETIRAcetam  IVPB 750 milliGRAM(s) IV Intermittent with breakfast  levETIRAcetam  IVPB 1000 milliGRAM(s) IV Intermittent at bedtime  montelukast 10 milliGRAM(s) Oral daily  pantoprazole  Injectable 40 milliGRAM(s) IV Push daily  tamsulosin 0.4 milliGRAM(s) Oral at bedtime  tiotropium 2.5 MICROgram(s) Inhaler 2 Puff(s) Inhalation daily    MEDICATIONS  (PRN):  acetaminophen     Tablet .. 650 milliGRAM(s) Oral every 6 hours PRN Temp greater or equal to 38C (100.4F), Mild Pain (1 - 3)  aluminum hydroxide/magnesium hydroxide/simethicone Suspension 30 milliLiter(s) Oral every 4 hours PRN Dyspepsia  melatonin 3 milliGRAM(s) Oral at bedtime PRN Insomnia  OLANZapine 2.5 milliGRAM(s) Oral every 6 hours PRN for agitation  ondansetron Injectable 4 milliGRAM(s) IV Push every 8 hours PRN Nausea and/or Vomiting       Vitals:  Vital Signs Last 24 Hrs  T(C): 37.2 (13 Apr 2024 20:38), Max: 37.2 (13 Apr 2024 20:38)  T(F): 98.9 (13 Apr 2024 20:38), Max: 98.9 (13 Apr 2024 20:38)  HR: 79 (13 Apr 2024 20:38) (71 - 79)  BP: 166/83 (13 Apr 2024 20:38) (150/73 - 166/83)  BP(mean): --  RR: 18 (13 Apr 2024 20:38) (18 - 18)  SpO2: 96% (13 Apr 2024 20:38) (96% - 98%)    Parameters below as of 13 Apr 2024 20:38  Patient On (Oxygen Delivery Method): room air           General Exam:   General Appearance: Appropriately dressed and in no acute distress       Head: Normocephalic, atraumatic and no dysmorphic features  Ear, Nose, and Throat: Moist mucous membranes  CVS: S1S2+  Resp: No SOB, no wheeze or rhonchi  GI: soft NT/ND  Extremities: No edema or cyanosis  Skin: No bruises or rashes     Neurological Exam:  Mental Status: Awake, alert and oriented x 1-2.  Able to follow simple verbal commands. Able to name and repeat. fluent speech. No obvious aphasia + dysarthria noted.   Cranial Nerves: PERRL, EOMI, VFFC, sensation V1-V3 intact,  no obvious facial asymmetry, equal elevation of palate, scm/trap 5/5, tongue is midline on protrusion. no obvious papilledema on fundoscopic exam. hearing is grossly intact.   Motor: DYSON at least 4/5   Sensation: Intact to light touch and pinprick throughout. no right/left confusion. no extinction to tactile on DSS.    Reflexes: 1+ throughout at biceps, brachioradialis, triceps, patellars and ankles bilaterally and equal. No clonus. R toe and L toe were both downgoing.  Coordination: No dysmetria on FNF    Gait: deferred     Data/Labs/Imaging which I personally reviewed.     Labs:     LABS:                          8.6    7.35  )-----------( 290      ( 13 Apr 2024 07:23 )             29.6     04-14    141  |  108  |  13  ----------------------------<  133<H>  3.2<L>   |  24  |  0.88    Ca    8.6      14 Apr 2024 07:14  Phos  2.3     04-14  Mg     1.9     04-14          Urinalysis Basic - ( 14 Apr 2024 07:14 )    Color: x / Appearance: x / SG: x / pH: x  Gluc: 133 mg/dL / Ketone: x  / Bili: x / Urobili: x   Blood: x / Protein: x / Nitrite: x   Leuk Esterase: x / RBC: x / WBC x   Sq Epi: x / Non Sq Epi: x / Bacteria: x          < from: CT Head No Cont (04.11.24 @ 16:16) >    ACC: [id number] EXAM:  CT BRAIN   ORDERED BY:  BRITTNEE MURCIA     PROCEDURE DATE:  04/11/2024          INTERPRETATION:  Exam Date: 4/11/2024 4:16 PM    CT head without IV contrast    CLINICAL INFORMATION: assess progression of SDH    TECHNIQUE: Contiguous axial sections were obtained through the head. Exam   was repeated secondary to motion. Coronal and sagittal reformats were   obtained.    COMPARISON: CT head 4/4/2024    FINDINGS:  No significant interval change in the size of the acute and subacute left   holohemispheric subdural hematoma measuring up to 2.3 cm in greatest   width. There is mass effect on the underlying parenchyma that appears   mildly worsened as compared to the prior exam. Of note, portions of the   subdural hematoma are lower in attenuation, compatible with evolution   over time. There is mild left to right midline shift, essentially   unchanged.    There is an area of chronic encephalomalacia within the right anterior   frontal lobe. There is an additional small areaof encephalomalacia   within the left posterior temporal lobe, unchanged since prior exam. Rest   of the findings are unchanged.    There are scattered mucosal inflammatory changes in the paranasal sinuses.    IMPRESSION:    No significant interval change in the size of the acute and subacute left   holohemispheric subdural hematoma measuring up to 2.3 cm in greatest   width. There is mass effect on the underlying parenchyma that appears   mildly worsened as compared to the prior exam. Of note, portions of the   subdural hematoma are lower in attenuation, compatible with evolution   over time. There is mild left to right midline shift, essentially   unchanged.  Rest of the chronic findings are unchanged.      --- End of Report ---            JUSTINE FUCHS MD; Attending Radiologist  This document has been electronically signed. Apr 11 2024  4:22PM    < end of copied text >      
    SUBJECTIVE / OVERNIGHT EVENTS:pt seen and examined  pts daughter next to pts bed   spoke with Ho = pts hcp - mentioned that pt c/o burning throat  which pt didnt complain to writer , as per pts daughter who is next to pts bed, she noticed whitish color over pts tongue  04-14-24    MEDICATIONS  (STANDING):  atorvastatin 80 milliGRAM(s) Oral at bedtime  budesonide  80 MICROgram(s)/formoterol 4.5 MICROgram(s) Inhaler 2 Puff(s) Inhalation two times a day  dextrose 5% + sodium chloride 0.45%. 1000 milliLiter(s) (75 mL/Hr) IV Continuous <Continuous>  diltiazem    milliGRAM(s) Oral daily  finasteride 5 milliGRAM(s) Oral daily  furosemide    Tablet 20 milliGRAM(s) Oral daily  gabapentin 100 milliGRAM(s) Oral at bedtime  levETIRAcetam  IVPB 750 milliGRAM(s) IV Intermittent with breakfast  levETIRAcetam  IVPB 1000 milliGRAM(s) IV Intermittent at bedtime  montelukast 10 milliGRAM(s) Oral daily  pantoprazole  Injectable 40 milliGRAM(s) IV Push daily  tamsulosin 0.4 milliGRAM(s) Oral at bedtime  tiotropium 2.5 MICROgram(s) Inhaler 2 Puff(s) Inhalation daily    MEDICATIONS  (PRN):  acetaminophen     Tablet .. 650 milliGRAM(s) Oral every 6 hours PRN Temp greater or equal to 38C (100.4F), Mild Pain (1 - 3)  aluminum hydroxide/magnesium hydroxide/simethicone Suspension 30 milliLiter(s) Oral every 4 hours PRN Dyspepsia  melatonin 3 milliGRAM(s) Oral at bedtime PRN Insomnia  OLANZapine 2.5 milliGRAM(s) Oral every 6 hours PRN for agitation  ondansetron Injectable 4 milliGRAM(s) IV Push every 8 hours PRN Nausea and/or Vomiting    Vital Signs Last 24 Hrs  T(C): 36.5 (04-14-24 @ 13:31), Max: 37.2 (04-13-24 @ 20:38)  T(F): 97.7 (04-14-24 @ 13:31), Max: 98.9 (04-13-24 @ 20:38)  HR: 93 (04-14-24 @ 13:31) (79 - 93)  BP: 165/78 (04-14-24 @ 13:31) (165/78 - 166/83)  BP(mean): --  RR: 18 (04-14-24 @ 13:31) (18 - 18)  SpO2: 94% (04-14-24 @ 13:31) (94% - 96%)            PHYSICAL EXAM:  GENERAL: NAD  EYES: EOMI, PERRLA  tongue erythematous   NECK: Supple, No JVD  CHEST/LUNG: dec breath sounds at bases  HEART:  S1 , S2 +  ABDOMEN: soft , bs+  EXTREMITIES:  no edema  NEUROLOGY:alert awake confused      LABS:  04-14    141  |  108  |  13  ----------------------------<  133<H>  3.2<L>   |  24  |  0.88    Ca    8.6      14 Apr 2024 07:14  Phos  2.3     04-14  Mg     1.9     04-14      Creatinine Trend: 0.88 <--, 0.91 <--, 0.89 <--, 1.11 <--, 1.27 <--                        8.6    7.35  )-----------( 290      ( 13 Apr 2024 07:23 )             29.6     Urine Studies:  Urinalysis Basic - ( 14 Apr 2024 07:14 )    Color:  / Appearance:  / SG:  / pH:   Gluc: 133 mg/dL / Ketone:   / Bili:  / Urobili:    Blood:  / Protein:  / Nitrite:    Leuk Esterase:  / RBC:  / WBC    Sq Epi:  / Non Sq Epi:  / Bacteria:                   RADIOLOGY & ADDITIONAL TESTS:    Imaging Personally Reviewed:    Consultant(s) Notes Reviewed:      Care Discussed with Consultants/Other Providers:  
    SUBJECTIVE / OVERNIGHT EVENTS:pt seen and examined  04-12-24    MEDICATIONS  (STANDING):  atorvastatin 80 milliGRAM(s) Oral at bedtime  budesonide  80 MICROgram(s)/formoterol 4.5 MICROgram(s) Inhaler 2 Puff(s) Inhalation two times a day  diltiazem    milliGRAM(s) Oral daily  finasteride 5 milliGRAM(s) Oral daily  furosemide    Tablet 20 milliGRAM(s) Oral daily  gabapentin 100 milliGRAM(s) Oral at bedtime  levETIRAcetam  IVPB 750 milliGRAM(s) IV Intermittent with breakfast  levETIRAcetam  IVPB 1000 milliGRAM(s) IV Intermittent at bedtime  montelukast 10 milliGRAM(s) Oral daily  pantoprazole  Injectable 40 milliGRAM(s) IV Push daily  sodium chloride 0.9%. 1000 milliLiter(s) (90 mL/Hr) IV Continuous <Continuous>  tamsulosin 0.4 milliGRAM(s) Oral at bedtime  tiotropium 2.5 MICROgram(s) Inhaler 2 Puff(s) Inhalation daily    MEDICATIONS  (PRN):  acetaminophen     Tablet .. 650 milliGRAM(s) Oral every 6 hours PRN Temp greater or equal to 38C (100.4F), Mild Pain (1 - 3)  aluminum hydroxide/magnesium hydroxide/simethicone Suspension 30 milliLiter(s) Oral every 4 hours PRN Dyspepsia  melatonin 3 milliGRAM(s) Oral at bedtime PRN Insomnia  OLANZapine 2.5 milliGRAM(s) Oral every 6 hours PRN for agitation  ondansetron Injectable 4 milliGRAM(s) IV Push every 8 hours PRN Nausea and/or Vomiting    T(C): 36.4 (04-12-24 @ 20:57), Max: 37.1 (04-12-24 @ 13:23)  HR: 79 (04-12-24 @ 20:57) (65 - 79)  BP: 149/70 (04-12-24 @ 20:57) (120/60 - 149/70)  RR: 18 (04-12-24 @ 20:57) (18 - 18)  SpO2: 95% (04-12-24 @ 20:57) (95% - 96%)    CAPILLARY BLOOD GLUCOSE        I&O's Summary        PHYSICAL EXAM:  GENERAL: NAD  EYES: EOMI, PERRLA  NECK: Supple, No JVD  CHEST/LUNG: dec breath sounds at bases  HEART:  S1 , S2 +  ABDOMEN: soft , bs+  EXTREMITIES:  no edema  NEUROLOGY:alert awake confused      LABS:                        8.4    8.70  )-----------( 281      ( 12 Apr 2024 11:03 )             29.4     04-12    146<H>  |  112<H>  |  27<H>  ----------------------------<  87  3.7   |  21<L>  |  1.11    Ca    8.0<L>      12 Apr 2024 06:57  Phos  3.5     04-11  Mg     2.0     04-12    TPro  7.2  /  Alb  3.5  /  TBili  0.8  /  DBili  x   /  AST  32  /  ALT  20  /  AlkPhos  91  04-11    PT/INR - ( 12 Apr 2024 06:57 )   PT: 12.5 sec;   INR: 1.14 ratio               Urinalysis Basic - ( 12 Apr 2024 06:57 )    Color: x / Appearance: x / SG: x / pH: x  Gluc: 87 mg/dL / Ketone: x  / Bili: x / Urobili: x   Blood: x / Protein: x / Nitrite: x   Leuk Esterase: x / RBC: x / WBC x   Sq Epi: x / Non Sq Epi: x / Bacteria: x        RADIOLOGY & ADDITIONAL TESTS:    Imaging Personally Reviewed:    Consultant(s) Notes Reviewed:      Care Discussed with Consultants/Other Providers:  
    SUBJECTIVE / OVERNIGHT EVENTS:pt seen and examined    MEDICATIONS  (STANDING):  atorvastatin 80 milliGRAM(s) Oral at bedtime  budesonide  80 MICROgram(s)/formoterol 4.5 MICROgram(s) Inhaler 2 Puff(s) Inhalation two times a day  dextrose 5% + sodium chloride 0.45%. 1000 milliLiter(s) (75 mL/Hr) IV Continuous <Continuous>  diltiazem    milliGRAM(s) Oral daily  finasteride 5 milliGRAM(s) Oral daily  furosemide    Tablet 20 milliGRAM(s) Oral daily  gabapentin 100 milliGRAM(s) Oral at bedtime  levETIRAcetam 750 milliGRAM(s) Oral with breakfast  levETIRAcetam 1000 milliGRAM(s) Oral at bedtime  magnesium sulfate  IVPB 2 Gram(s) IV Intermittent once  montelukast 10 milliGRAM(s) Oral daily  pantoprazole    Tablet 40 milliGRAM(s) Oral before breakfast  tamsulosin 0.4 milliGRAM(s) Oral at bedtime  tiotropium 2.5 MICROgram(s) Inhaler 2 Puff(s) Inhalation daily    MEDICATIONS  (PRN):  acetaminophen     Tablet .. 650 milliGRAM(s) Oral every 6 hours PRN Temp greater or equal to 38C (100.4F), Mild Pain (1 - 3)  aluminum hydroxide/magnesium hydroxide/simethicone Suspension 30 milliLiter(s) Oral every 4 hours PRN Dyspepsia  melatonin 3 milliGRAM(s) Oral at bedtime PRN Insomnia  OLANZapine 2.5 milliGRAM(s) Oral every 6 hours PRN for agitation  ondansetron Injectable 4 milliGRAM(s) IV Push every 8 hours PRN Nausea and/or Vomiting    Vital Signs Last 24 Hrs  T(C): 36.7 (04-17-24 @ 11:38), Max: 37.1 (04-17-24 @ 05:06)  T(F): 98 (04-17-24 @ 11:38), Max: 98.7 (04-17-24 @ 05:06)  HR: 85 (04-17-24 @ 11:38) (75 - 92)  BP: 147/71 (04-17-24 @ 11:38) (146/76 - 153/88)  BP(mean): --  RR: 18 (04-17-24 @ 11:38) (18 - 18)  SpO2: 95% (04-17-24 @ 11:38) (95% - 99%)      PHYSICAL EXAM:  GENERAL: NAD  EYES: EOMI, PERRLA  tongue erythematous   NECK: Supple, No JVD  CHEST/LUNG: dec breath sounds at bases  HEART:  S1 , S2 +  ABDOMEN: soft , bs+  EXTREMITIES:  no edema  NEUROLOGY:alert awake confused      LABS:  04-17    139  |  105  |  6<L>  ----------------------------<  120<H>  3.5   |  24  |  0.88    Ca    8.9      17 Apr 2024 07:12  Phos  2.9     04-16  Mg     1.8     04-17    TPro  5.9<L>  /  Alb  2.8<L>  /  TBili  0.6  /  DBili      /  AST  13  /  ALT  12  /  AlkPhos  76  04-16    Creatinine Trend: 0.88 <--, 0.86 <--, 0.81 <--, 0.88 <--, 0.91 <--, 0.89 <--, 1.11 <--, 1.27 <--                        8.9    6.55  )-----------( 232      ( 16 Apr 2024 07:02 )             30.7     Urine Studies:  Urinalysis Basic - ( 17 Apr 2024 07:12 )    Color:  / Appearance:  / SG:  / pH:   Gluc: 120 mg/dL / Ketone:   / Bili:  / Urobili:    Blood:  / Protein:  / Nitrite:    Leuk Esterase:  / RBC:  / WBC    Sq Epi:  / Non Sq Epi:  / Bacteria:               LIVER FUNCTIONS - ( 16 Apr 2024 07:00 )  Alb: 2.8 g/dL / Pro: 5.9 g/dL / ALK PHOS: 76 U/L / ALT: 12 U/L / AST: 13 U/L / GGT: x                 RADIOLOGY & ADDITIONAL TESTS:    Imaging Personally Reviewed:    Consultant(s) Notes Reviewed:      Care Discussed with Consultants/Other Providers:  
    SUBJECTIVE / OVERNIGHT EVENTS:pt seen and examined  04-13-24    MEDICATIONS  (STANDING):  atorvastatin 80 milliGRAM(s) Oral at bedtime  budesonide  80 MICROgram(s)/formoterol 4.5 MICROgram(s) Inhaler 2 Puff(s) Inhalation two times a day  dextrose 5% + sodium chloride 0.45%. 1000 milliLiter(s) (75 mL/Hr) IV Continuous <Continuous>  diltiazem    milliGRAM(s) Oral daily  finasteride 5 milliGRAM(s) Oral daily  furosemide    Tablet 20 milliGRAM(s) Oral daily  gabapentin 100 milliGRAM(s) Oral at bedtime  levETIRAcetam  IVPB 750 milliGRAM(s) IV Intermittent with breakfast  levETIRAcetam  IVPB 1000 milliGRAM(s) IV Intermittent at bedtime  montelukast 10 milliGRAM(s) Oral daily  pantoprazole  Injectable 40 milliGRAM(s) IV Push daily  tamsulosin 0.4 milliGRAM(s) Oral at bedtime  tiotropium 2.5 MICROgram(s) Inhaler 2 Puff(s) Inhalation daily    MEDICATIONS  (PRN):  acetaminophen     Tablet .. 650 milliGRAM(s) Oral every 6 hours PRN Temp greater or equal to 38C (100.4F), Mild Pain (1 - 3)  aluminum hydroxide/magnesium hydroxide/simethicone Suspension 30 milliLiter(s) Oral every 4 hours PRN Dyspepsia  melatonin 3 milliGRAM(s) Oral at bedtime PRN Insomnia  OLANZapine 2.5 milliGRAM(s) Oral every 6 hours PRN for agitation  ondansetron Injectable 4 milliGRAM(s) IV Push every 8 hours PRN Nausea and/or Vomiting    Vital Signs Last 24 Hrs  T(C): 37.2 (04-13-24 @ 20:38), Max: 37.2 (04-13-24 @ 20:38)  T(F): 98.9 (04-13-24 @ 20:38), Max: 98.9 (04-13-24 @ 20:38)  HR: 79 (04-13-24 @ 20:38) (71 - 84)  BP: 166/83 (04-13-24 @ 20:38) (150/73 - 166/83)  BP(mean): --  RR: 18 (04-13-24 @ 20:38) (18 - 18)  SpO2: 96% (04-13-24 @ 20:38) (94% - 98%)          PHYSICAL EXAM:  GENERAL: NAD  EYES: EOMI, PERRLA  NECK: Supple, No JVD  CHEST/LUNG: dec breath sounds at bases  HEART:  S1 , S2 +  ABDOMEN: soft , bs+  EXTREMITIES:  no edema  NEUROLOGY:alert awake confused    LABS:  04-13    147<H>  |  111<H>  |  20  ----------------------------<  62<L>  3.5   |  21<L>  |  0.89    Ca    8.6      13 Apr 2024 07:11  Mg     2.0     04-12      Creatinine Trend: 0.89 <--, 1.11 <--, 1.27 <--                        8.6    7.35  )-----------( 290      ( 13 Apr 2024 07:23 )             29.6     Urine Studies:  Urinalysis Basic - ( 13 Apr 2024 07:11 )    Color:  / Appearance:  / SG:  / pH:   Gluc: 62 mg/dL / Ketone:   / Bili:  / Urobili:    Blood:  / Protein:  / Nitrite:    Leuk Esterase:  / RBC:  / WBC    Sq Epi:  / Non Sq Epi:  / Bacteria:                 PT/INR - ( 12 Apr 2024 06:57 )   PT: 12.5 sec;   INR: 1.14 ratio                 RADIOLOGY & ADDITIONAL TESTS:    Imaging Personally Reviewed:    Consultant(s) Notes Reviewed:      Care Discussed with Consultants/Other Providers:  
    SUBJECTIVE / OVERNIGHT EVENTS:pt seen and examined  pts daughter next to pts bed     MEDICATIONS  (STANDING):  atorvastatin 80 milliGRAM(s) Oral at bedtime  budesonide  80 MICROgram(s)/formoterol 4.5 MICROgram(s) Inhaler 2 Puff(s) Inhalation two times a day  dextrose 5% + sodium chloride 0.45%. 1000 milliLiter(s) (75 mL/Hr) IV Continuous <Continuous>  diltiazem    milliGRAM(s) Oral daily  finasteride 5 milliGRAM(s) Oral daily  furosemide    Tablet 20 milliGRAM(s) Oral daily  gabapentin 100 milliGRAM(s) Oral at bedtime  levETIRAcetam  IVPB 750 milliGRAM(s) IV Intermittent with breakfast  levETIRAcetam  IVPB 1000 milliGRAM(s) IV Intermittent at bedtime  magnesium sulfate  IVPB 2 Gram(s) IV Intermittent once  montelukast 10 milliGRAM(s) Oral daily  pantoprazole  Injectable 40 milliGRAM(s) IV Push daily  tamsulosin 0.4 milliGRAM(s) Oral at bedtime  tiotropium 2.5 MICROgram(s) Inhaler 2 Puff(s) Inhalation daily    MEDICATIONS  (PRN):  acetaminophen     Tablet .. 650 milliGRAM(s) Oral every 6 hours PRN Temp greater or equal to 38C (100.4F), Mild Pain (1 - 3)  aluminum hydroxide/magnesium hydroxide/simethicone Suspension 30 milliLiter(s) Oral every 4 hours PRN Dyspepsia  melatonin 3 milliGRAM(s) Oral at bedtime PRN Insomnia  OLANZapine 2.5 milliGRAM(s) Oral every 6 hours PRN for agitation  ondansetron Injectable 4 milliGRAM(s) IV Push every 8 hours PRN Nausea and/or Vomiting    Vital Signs Last 24 Hrs  T(C): 36.6 (04-15-24 @ 21:02), Max: 36.8 (04-15-24 @ 05:19)  T(F): 97.9 (04-15-24 @ 21:02), Max: 98.3 (04-15-24 @ 05:19)  HR: 88 (04-15-24 @ 21:02) (79 - 90)  BP: 139/75 (04-15-24 @ 21:02) (123/69 - 139/75)  BP(mean): --  RR: 18 (04-15-24 @ 21:02) (18 - 18)  SpO2: 97% (04-15-24 @ 21:02) (93% - 98%)              PHYSICAL EXAM:  GENERAL: NAD  EYES: EOMI, PERRLA  tongue erythematous   NECK: Supple, No JVD  CHEST/LUNG: dec breath sounds at bases  HEART:  S1 , S2 +  ABDOMEN: soft , bs+  EXTREMITIES:  no edema  NEUROLOGY:alert awake confused      LABS:  LABS:  04-15    142  |  108  |  10  ----------------------------<  121<H>  3.6   |  20<L>  |  0.81    Ca    8.1<L>      15 Apr 2024 07:14  Phos  2.3     04-15  Mg     1.8     04-15      Creatinine Trend: 0.81 <--, 0.88 <--, 0.91 <--, 0.89 <--, 1.11 <--, 1.27 <--                        8.5    6.30  )-----------( 260      ( 15 Apr 2024 07:14 )             29.3     Urine Studies:  Urinalysis Basic - ( 15 Apr 2024 07:14 )    Color:  / Appearance:  / SG:  / pH:   Gluc: 121 mg/dL / Ketone:   / Bili:  / Urobili:    Blood:  / Protein:  / Nitrite:    Leuk Esterase:  / RBC:  / WBC    Sq Epi:  / Non Sq Epi:  / Bacteria:                             RADIOLOGY & ADDITIONAL TESTS:    Imaging Personally Reviewed:    Consultant(s) Notes Reviewed:      Care Discussed with Consultants/Other Providers:  
    SUBJECTIVE / OVERNIGHT EVENTS:pt seen and examined  pts family  next to pts bed     MEDICATIONS  (STANDING):  atorvastatin 80 milliGRAM(s) Oral at bedtime  budesonide  80 MICROgram(s)/formoterol 4.5 MICROgram(s) Inhaler 2 Puff(s) Inhalation two times a day  dextrose 5% + sodium chloride 0.45%. 1000 milliLiter(s) (75 mL/Hr) IV Continuous <Continuous>  diltiazem    milliGRAM(s) Oral daily  finasteride 5 milliGRAM(s) Oral daily  furosemide    Tablet 20 milliGRAM(s) Oral daily  gabapentin 100 milliGRAM(s) Oral at bedtime  levETIRAcetam  IVPB 1000 milliGRAM(s) IV Intermittent at bedtime  levETIRAcetam  IVPB 750 milliGRAM(s) IV Intermittent with breakfast  magnesium sulfate  IVPB 2 Gram(s) IV Intermittent once  montelukast 10 milliGRAM(s) Oral daily  pantoprazole  Injectable 40 milliGRAM(s) IV Push daily  potassium chloride  10 mEq/100 mL IVPB 10 milliEquivalent(s) IV Intermittent every 1 hour  tamsulosin 0.4 milliGRAM(s) Oral at bedtime  tiotropium 2.5 MICROgram(s) Inhaler 2 Puff(s) Inhalation daily    MEDICATIONS  (PRN):  acetaminophen     Tablet .. 650 milliGRAM(s) Oral every 6 hours PRN Temp greater or equal to 38C (100.4F), Mild Pain (1 - 3)  aluminum hydroxide/magnesium hydroxide/simethicone Suspension 30 milliLiter(s) Oral every 4 hours PRN Dyspepsia  melatonin 3 milliGRAM(s) Oral at bedtime PRN Insomnia  OLANZapine 2.5 milliGRAM(s) Oral every 6 hours PRN for agitation  ondansetron Injectable 4 milliGRAM(s) IV Push every 8 hours PRN Nausea and/or Vomiting    Vital Signs Last 24 Hrs  T(C): 36.7 (04-16-24 @ 13:12), Max: 36.7 (04-16-24 @ 04:41)  T(F): 98 (04-16-24 @ 13:12), Max: 98 (04-16-24 @ 04:41)  HR: 75 (04-16-24 @ 13:12) (75 - 93)  BP: 146/76 (04-16-24 @ 13:12) (139/75 - 169/82)  BP(mean): --  RR: 18 (04-16-24 @ 13:12) (18 - 18)  SpO2: 97% (04-16-24 @ 13:12) (96% - 97%)    PHYSICAL EXAM:  GENERAL: NAD  EYES: EOMI, PERRLA  tongue erythematous   NECK: Supple, No JVD  CHEST/LUNG: dec breath sounds at bases  HEART:  S1 , S2 +  ABDOMEN: soft , bs+  EXTREMITIES:  no edema  NEUROLOGY:alert awake confused      LABS:  04-16    140  |  106  |  7   ----------------------------<  128<H>  3.1<L>   |  25  |  0.86    Ca    8.5      16 Apr 2024 07:00  Phos  2.9     04-16  Mg     1.7     04-16    TPro  5.9<L>  /  Alb  2.8<L>  /  TBili  0.6  /  DBili      /  AST  13  /  ALT  12  /  AlkPhos  76  04-16    Creatinine Trend: 0.86 <--, 0.81 <--, 0.88 <--, 0.91 <--, 0.89 <--, 1.11 <--, 1.27 <--                        8.9    6.55  )-----------( 232      ( 16 Apr 2024 07:02 )             30.7     Urine Studies:  Urinalysis Basic - ( 16 Apr 2024 07:00 )    Color:  / Appearance:  / SG:  / pH:   Gluc: 128 mg/dL / Ketone:   / Bili:  / Urobili:    Blood:  / Protein:  / Nitrite:    Leuk Esterase:  / RBC:  / WBC    Sq Epi:  / Non Sq Epi:  / Bacteria:               LIVER FUNCTIONS - ( 16 Apr 2024 07:00 )  Alb: 2.8 g/dL / Pro: 5.9 g/dL / ALK PHOS: 76 U/L / ALT: 12 U/L / AST: 13 U/L / GGT: x                 RADIOLOGY & ADDITIONAL TESTS:    Imaging Personally Reviewed:    Consultant(s) Notes Reviewed:      Care Discussed with Consultants/Other Providers:  
    SUBJECTIVE / OVERNIGHT EVENTS:pt seen and examined  pts daughter next to pts bed   as per pts daughter , pt swallowing meds with no difficulty     MEDICATIONS  (STANDING):  atorvastatin 80 milliGRAM(s) Oral at bedtime  budesonide  80 MICROgram(s)/formoterol 4.5 MICROgram(s) Inhaler 2 Puff(s) Inhalation two times a day  dextrose 5% + sodium chloride 0.45%. 1000 milliLiter(s) (75 mL/Hr) IV Continuous <Continuous>  diltiazem    milliGRAM(s) Oral daily  finasteride 5 milliGRAM(s) Oral daily  furosemide    Tablet 20 milliGRAM(s) Oral daily  gabapentin 100 milliGRAM(s) Oral at bedtime  levETIRAcetam  IVPB 750 milliGRAM(s) IV Intermittent with breakfast  levETIRAcetam  IVPB 1000 milliGRAM(s) IV Intermittent at bedtime  magnesium sulfate  IVPB 2 Gram(s) IV Intermittent once  montelukast 10 milliGRAM(s) Oral daily  pantoprazole  Injectable 40 milliGRAM(s) IV Push daily  tamsulosin 0.4 milliGRAM(s) Oral at bedtime  tiotropium 2.5 MICROgram(s) Inhaler 2 Puff(s) Inhalation daily    MEDICATIONS  (PRN):  acetaminophen     Tablet .. 650 milliGRAM(s) Oral every 6 hours PRN Temp greater or equal to 38C (100.4F), Mild Pain (1 - 3)  aluminum hydroxide/magnesium hydroxide/simethicone Suspension 30 milliLiter(s) Oral every 4 hours PRN Dyspepsia  melatonin 3 milliGRAM(s) Oral at bedtime PRN Insomnia  OLANZapine 2.5 milliGRAM(s) Oral every 6 hours PRN for agitation  ondansetron Injectable 4 milliGRAM(s) IV Push every 8 hours PRN Nausea and/or Vomiting    Vital Signs Last 24 Hrs  T(C): 36.6 (04-15-24 @ 21:02), Max: 36.8 (04-15-24 @ 05:19)  T(F): 97.9 (04-15-24 @ 21:02), Max: 98.3 (04-15-24 @ 05:19)  HR: 88 (04-15-24 @ 21:02) (79 - 90)  BP: 139/75 (04-15-24 @ 21:02) (123/69 - 139/75)  BP(mean): --  RR: 18 (04-15-24 @ 21:02) (18 - 18)  SpO2: 97% (04-15-24 @ 21:02) (93% - 98%)                PHYSICAL EXAM:  GENERAL: NAD  EYES: EOMI, PERRLA  tongue erythematous   NECK: Supple, No JVD  CHEST/LUNG: dec breath sounds at bases  HEART:  S1 , S2 +  ABDOMEN: soft , bs+  EXTREMITIES:  no edema  NEUROLOGY:alert awake confused      LABS:  04-15    142  |  108  |  10  ----------------------------<  121<H>  3.6   |  20<L>  |  0.81    Ca    8.1<L>      15 Apr 2024 07:14  Phos  2.3     04-15  Mg     1.8     04-15      Creatinine Trend: 0.81 <--, 0.88 <--, 0.91 <--, 0.89 <--, 1.11 <--, 1.27 <--                        8.5    6.30  )-----------( 260      ( 15 Apr 2024 07:14 )             29.3     Urine Studies:  Urinalysis Basic - ( 15 Apr 2024 07:14 )    Color:  / Appearance:  / SG:  / pH:   Gluc: 121 mg/dL / Ketone:   / Bili:  / Urobili:    Blood:  / Protein:  / Nitrite:    Leuk Esterase:  / RBC:  / WBC    Sq Epi:  / Non Sq Epi:  / Bacteria:                                     RADIOLOGY & ADDITIONAL TESTS:    Imaging Personally Reviewed:    Consultant(s) Notes Reviewed:      Care Discussed with Consultants/Other Providers:

## 2024-04-18 NOTE — PROGRESS NOTE ADULT - ASSESSMENT
89 yo LEFT handed man with chronic Afib (taken off apixaban 1-2 weeks ago d/t recurrent falls and Hemorrhage), HTN, HLD, presumed GERD, BPH, asthma, anemia, prior chronic strokes, RMCA infarct and  SDH w/ midline shift during recent admission discharged few days ago now coming for decreased PO intake.  Prior Workup:   CTH: bilateral inferior frontsal and L occipitotemporal encephalomalacia c/w old infarcts   CTA H/N neg     CRP < 3  LDL 65  A1c 6.2  EEG no seizures   MRI with acute strokes in R MCA terriotry   4/3 CTH with actue L SDH 3mm  CTH 4/4 no change   4/3 RRT for concern for seziure activiyt; SDH on CTH ; EEG no seizures   EEG no seizures, L temporoccipital slowing   This admission:   CTH no change in L SDH 2.3cm in depht.  left to righ shift unchanged   UA +  EEG 4/16 no seizures found. slwoing noted   passed SS     Impression   1) chronic strokes as well as more recent R MCA infarct, likely embolic from Afib   2) recent L SDH with shift unchanged   3) AMS, decrease PO intake 2/2 toxic metabolic encephalopathy from UTI           - seizure ppx with keppra 1g BID ;  lowered to 750AM and 1g PM   - hold AC; DVT ppx okay   - Continue atorvastatin 80mg (goal LDL<70)   - f/u ID   - CTH if change in neuro exam   - PT/OT/SS/SLP, OOBC  - check FS, glucose control <180  - GI/DVT ppx   - Thank you for allowing me to participate in the care of this patient. Call with questions.   spoke with family 4/16  dc planning now [facility] ; now familiy wants home   Juventino Bashir MD  Vascular Neurology  Office: [phone]

## 2024-04-18 NOTE — PROGRESS NOTE ADULT - PROVIDER SPECIALTY LIST ADULT
Cardiology
Cardiology
Internal Medicine
Neurology
Cardiology
Neurology
Cardiology
Internal Medicine
Neurology
Neurology
Internal Medicine

## 2024-04-18 NOTE — DISCHARGE NOTE NURSING/CASE MANAGEMENT/SOCIAL WORK - PATIENT PORTAL LINK FT
You can access the FollowMyHealth Patient Portal offered by [facility] by registering at the following website: [email or web address]. By joining [facility]’s FollowMyHealth portal, you will also be able to view your health information using other applications (apps) compatible with our system.

## 2024-04-18 NOTE — DISCHARGE NOTE NURSING/CASE MANAGEMENT/SOCIAL WORK - NSDCPEFALRISK_GEN_ALL_CORE
For information on Fall & Injury Prevention, visit: [email or web address] OR  [email or web address] OR  https://www.cdc.gov/steadi/patient.html

## 2024-04-25 ENCOUNTER — APPOINTMENT (OUTPATIENT)
Dept: UROLOGY | Facility: CLINIC | Age: 89
End: 2024-04-25

## 2024-12-19 NOTE — ED ADULT NURSE NOTE - CHIEF COMPLAINT
83-year-old Male with a history of myelodysplastic syndrome on chemo (last session 12/18), CAD, CKD, HTN, HLD, who presents with positive blood cultures for stenotrophomonas maltophilia on 12/18/24 and referred to the ED for further evaluation this afternoon. Patient reports having blood drawn prior to his chemo session yesterday as well as left ankle xray 2/2 to pain. The patient reports mild left ankle pain and believes it may be sprained. Currently on Levaquin for a UTI. Denies fever, chills, nausea, vomiting, chest pain, shortness of breath, cough, abdominal pain    In the ED, patient afebrile, hemodynamically stable, s/p 1L LR bolus, minocycline 200mg, bactrim SS 1x, DS 1x.  The patient is a 89y Male complaining of weakness.  83-year-old Male with a history of myelodysplastic syndrome on chemo (last session 12/18), CAD, CKD, HTN, HLD, who presents with positive blood cultures for stenotrophomonas maltophilia on 12/18/24 and referred to the ED for further evaluation this afternoon. Patient reports having blood drawn prior to his chemo session yesterday as well as left ankle xray 2/2 to pain. The patient reports mild left ankle pain and believes it may be sprained. Currently on Levaquin for a UTI. Denies fever, chills, nausea, vomiting, chest pain, shortness of breath, cough, abdominal pain.    He had a previous positive culture for S. maltophilia and MRSE in June 2024 where he was seen and treated by the in-house ID team and where he was given vancomycin --> zosyn --> minocyline --> bactrim. In addition, he also has had multiple infections in the past E.faeclis (3/2024, 9/2023) and E. Coli (5/2023, 9/2022, 5/2022).     In the ED, patient afebrile, hemodynamically stable, s/p 1L LR bolus, minocycline 200mg, bactrim SS 1x, DS 1x.

## 2025-02-23 ENCOUNTER — INPATIENT (INPATIENT)
Facility: HOSPITAL | Age: 89
LOS: 2 days | Discharge: HOME | DRG: 556 | End: 2025-02-26
Attending: INTERNAL MEDICINE | Admitting: INTERNAL MEDICINE
Payer: MEDICARE

## 2025-02-23 VITALS
OXYGEN SATURATION: 100 % | TEMPERATURE: 98 F | HEART RATE: 98 BPM | SYSTOLIC BLOOD PRESSURE: 125 MMHG | DIASTOLIC BLOOD PRESSURE: 65 MMHG | RESPIRATION RATE: 18 BRPM

## 2025-02-23 DIAGNOSIS — N40.0 BENIGN PROSTATIC HYPERPLASIA WITHOUT LOWER URINARY TRACT SYMPTOMS: ICD-10-CM

## 2025-02-23 DIAGNOSIS — R29.898 OTHER SYMPTOMS AND SIGNS INVOLVING THE MUSCULOSKELETAL SYSTEM: ICD-10-CM

## 2025-02-23 DIAGNOSIS — I69.359 HEMIPLEGIA AND HEMIPARESIS FOLLOWING CEREBRAL INFARCTION AFFECTING UNSPECIFIED SIDE: ICD-10-CM

## 2025-02-23 DIAGNOSIS — I10 ESSENTIAL (PRIMARY) HYPERTENSION: ICD-10-CM

## 2025-02-23 DIAGNOSIS — S06.5XAA TRAUMATIC SUBDURAL HEMORRHAGE WITH LOSS OF CONSCIOUSNESS STATUS UNKNOWN, INITIAL ENCOUNTER: ICD-10-CM

## 2025-02-23 LAB
ALBUMIN SERPL ELPH-MCNC: 3.6 G/DL — SIGNIFICANT CHANGE UP (ref 3.3–5)
ALP SERPL-CCNC: 96 U/L — SIGNIFICANT CHANGE UP (ref 40–120)
ALT FLD-CCNC: 18 U/L — SIGNIFICANT CHANGE UP (ref 10–45)
ANION GAP SERPL CALC-SCNC: 11 MMOL/L — SIGNIFICANT CHANGE UP (ref 5–17)
ANISOCYTOSIS BLD QL: SIGNIFICANT CHANGE UP
APPEARANCE UR: CLEAR — SIGNIFICANT CHANGE UP
APTT BLD: 32.8 SEC — SIGNIFICANT CHANGE UP (ref 24.5–35.6)
AST SERPL-CCNC: 28 U/L — SIGNIFICANT CHANGE UP (ref 10–40)
BASOPHILS # BLD AUTO: 0.05 K/UL — SIGNIFICANT CHANGE UP (ref 0–0.2)
BASOPHILS NFR BLD AUTO: 0.8 % — SIGNIFICANT CHANGE UP (ref 0–2)
BILIRUB SERPL-MCNC: 0.7 MG/DL — SIGNIFICANT CHANGE UP (ref 0.2–1.2)
BILIRUB UR-MCNC: NEGATIVE — SIGNIFICANT CHANGE UP
BUN SERPL-MCNC: 21 MG/DL — SIGNIFICANT CHANGE UP (ref 7–23)
CALCIUM SERPL-MCNC: 9.8 MG/DL — SIGNIFICANT CHANGE UP (ref 8.4–10.5)
CHLORIDE SERPL-SCNC: 103 MMOL/L — SIGNIFICANT CHANGE UP (ref 96–108)
CO2 SERPL-SCNC: 26 MMOL/L — SIGNIFICANT CHANGE UP (ref 22–31)
COLOR SPEC: YELLOW — SIGNIFICANT CHANGE UP
CREAT SERPL-MCNC: 1.02 MG/DL — SIGNIFICANT CHANGE UP (ref 0.5–1.3)
DIFF PNL FLD: NEGATIVE — SIGNIFICANT CHANGE UP
EGFR: 66 ML/MIN/1.73M2 — SIGNIFICANT CHANGE UP
ELLIPTOCYTES BLD QL SMEAR: SLIGHT — SIGNIFICANT CHANGE UP
EOSINOPHIL # BLD AUTO: 1.04 K/UL — HIGH (ref 0–0.5)
EOSINOPHIL NFR BLD AUTO: 16.4 % — HIGH (ref 0–6)
FLUAV AG NPH QL: SIGNIFICANT CHANGE UP
FLUBV AG NPH QL: SIGNIFICANT CHANGE UP
GAS PNL BLDV: SIGNIFICANT CHANGE UP
GIANT PLATELETS BLD QL SMEAR: PRESENT — SIGNIFICANT CHANGE UP
GLUCOSE SERPL-MCNC: 98 MG/DL — SIGNIFICANT CHANGE UP (ref 70–99)
GLUCOSE UR QL: NEGATIVE MG/DL — SIGNIFICANT CHANGE UP
HCT VFR BLD CALC: 34.8 % — LOW (ref 39–50)
HGB BLD-MCNC: 10.6 G/DL — LOW (ref 13–17)
HYPOCHROMIA BLD QL: SLIGHT — SIGNIFICANT CHANGE UP
INR BLD: 1.03 RATIO — SIGNIFICANT CHANGE UP (ref 0.85–1.16)
KETONES UR-MCNC: NEGATIVE MG/DL — SIGNIFICANT CHANGE UP
LEUKOCYTE ESTERASE UR-ACNC: NEGATIVE — SIGNIFICANT CHANGE UP
LYMPHOCYTES # BLD AUTO: 2.33 K/UL — SIGNIFICANT CHANGE UP (ref 1–3.3)
LYMPHOCYTES # BLD AUTO: 36.7 % — SIGNIFICANT CHANGE UP (ref 13–44)
MACROCYTES BLD QL: SIGNIFICANT CHANGE UP
MANUAL SMEAR VERIFICATION: SIGNIFICANT CHANGE UP
MCHC RBC-ENTMCNC: 23.6 PG — LOW (ref 27–34)
MCHC RBC-ENTMCNC: 30.5 G/DL — LOW (ref 32–36)
MCV RBC AUTO: 77.3 FL — LOW (ref 80–100)
MICROCYTES BLD QL: SLIGHT — SIGNIFICANT CHANGE UP
MONOCYTES # BLD AUTO: 0.45 K/UL — SIGNIFICANT CHANGE UP (ref 0–0.9)
MONOCYTES NFR BLD AUTO: 7 % — SIGNIFICANT CHANGE UP (ref 2–14)
NEUTROPHILS # BLD AUTO: 2.49 K/UL — SIGNIFICANT CHANGE UP (ref 1.8–7.4)
NEUTROPHILS NFR BLD AUTO: 39.1 % — LOW (ref 43–77)
NITRITE UR-MCNC: NEGATIVE — SIGNIFICANT CHANGE UP
PH UR: 8 — SIGNIFICANT CHANGE UP (ref 5–8)
PLAT MORPH BLD: NORMAL — SIGNIFICANT CHANGE UP
PLATELET # BLD AUTO: 174 K/UL — SIGNIFICANT CHANGE UP (ref 150–400)
POIKILOCYTOSIS BLD QL AUTO: SLIGHT — SIGNIFICANT CHANGE UP
POLYCHROMASIA BLD QL SMEAR: SLIGHT — SIGNIFICANT CHANGE UP
POTASSIUM SERPL-MCNC: 4.4 MMOL/L — SIGNIFICANT CHANGE UP (ref 3.5–5.3)
POTASSIUM SERPL-SCNC: 4.4 MMOL/L — SIGNIFICANT CHANGE UP (ref 3.5–5.3)
PROT SERPL-MCNC: 6.9 G/DL — SIGNIFICANT CHANGE UP (ref 6–8.3)
PROT UR-MCNC: NEGATIVE MG/DL — SIGNIFICANT CHANGE UP
PROTHROM AB SERPL-ACNC: 11.8 SEC — SIGNIFICANT CHANGE UP (ref 9.9–13.4)
RBC # BLD: 4.5 M/UL — SIGNIFICANT CHANGE UP (ref 4.2–5.8)
RBC # FLD: 21.1 % — HIGH (ref 10.3–14.5)
RBC BLD AUTO: ABNORMAL
RSV RNA NPH QL NAA+NON-PROBE: SIGNIFICANT CHANGE UP
SARS-COV-2 RNA SPEC QL NAA+PROBE: SIGNIFICANT CHANGE UP
SODIUM SERPL-SCNC: 140 MMOL/L — SIGNIFICANT CHANGE UP (ref 135–145)
SP GR SPEC: 1.02 — SIGNIFICANT CHANGE UP (ref 1–1.03)
TROPONIN T, HIGH SENSITIVITY RESULT: 29 NG/L — SIGNIFICANT CHANGE UP (ref 0–51)
UROBILINOGEN FLD QL: 0.2 MG/DL — SIGNIFICANT CHANGE UP (ref 0.2–1)
WBC # BLD: 6.36 K/UL — SIGNIFICANT CHANGE UP (ref 3.8–10.5)
WBC # FLD AUTO: 6.36 K/UL — SIGNIFICANT CHANGE UP (ref 3.8–10.5)

## 2025-02-23 PROCEDURE — 70450 CT HEAD/BRAIN W/O DYE: CPT | Mod: 26,XU

## 2025-02-23 PROCEDURE — 70496 CT ANGIOGRAPHY HEAD: CPT | Mod: 26

## 2025-02-23 PROCEDURE — 70498 CT ANGIOGRAPHY NECK: CPT | Mod: 26

## 2025-02-23 PROCEDURE — 99221 1ST HOSP IP/OBS SF/LOW 40: CPT

## 2025-02-23 PROCEDURE — 71045 X-RAY EXAM CHEST 1 VIEW: CPT | Mod: 26

## 2025-02-23 PROCEDURE — 99285 EMERGENCY DEPT VISIT HI MDM: CPT

## 2025-02-23 RX ORDER — QUETIAPINE FUMARATE 25 MG/1
25 TABLET ORAL AT BEDTIME
Refills: 0 | Status: DISCONTINUED | OUTPATIENT
Start: 2025-02-23 | End: 2025-02-26

## 2025-02-23 RX ORDER — FUROSEMIDE 10 MG/ML
20 INJECTION INTRAMUSCULAR; INTRAVENOUS DAILY
Refills: 0 | Status: DISCONTINUED | OUTPATIENT
Start: 2025-02-23 | End: 2025-02-24

## 2025-02-23 RX ORDER — QUETIAPINE FUMARATE 25 MG/1
1 TABLET ORAL
Refills: 0 | DISCHARGE

## 2025-02-23 RX ORDER — GABAPENTIN 400 MG/1
100 CAPSULE ORAL AT BEDTIME
Refills: 0 | Status: DISCONTINUED | OUTPATIENT
Start: 2025-02-23 | End: 2025-02-26

## 2025-02-23 RX ORDER — METOPROLOL SUCCINATE 50 MG/1
0.5 TABLET, EXTENDED RELEASE ORAL
Refills: 0 | DISCHARGE

## 2025-02-23 RX ORDER — FLUTICASONE FUROATE, UMECLIDINIUM BROMIDE AND VILANTEROL TRIFENATATE 100; 62.5; 25 UG/1; UG/1; UG/1
1 POWDER RESPIRATORY (INHALATION)
Refills: 0 | DISCHARGE

## 2025-02-23 RX ORDER — METOPROLOL SUCCINATE 50 MG/1
12.5 TABLET, EXTENDED RELEASE ORAL DAILY
Refills: 0 | Status: DISCONTINUED | OUTPATIENT
Start: 2025-02-23 | End: 2025-02-26

## 2025-02-23 RX ORDER — ATORVASTATIN CALCIUM 80 MG/1
1 TABLET, FILM COATED ORAL
Refills: 0 | DISCHARGE

## 2025-02-23 RX ORDER — ATORVASTATIN CALCIUM 80 MG/1
40 TABLET, FILM COATED ORAL AT BEDTIME
Refills: 0 | Status: DISCONTINUED | OUTPATIENT
Start: 2025-02-23 | End: 2025-02-26

## 2025-02-23 RX ORDER — TAMSULOSIN HYDROCHLORIDE 0.4 MG/1
0.4 CAPSULE ORAL AT BEDTIME
Refills: 0 | Status: DISCONTINUED | OUTPATIENT
Start: 2025-02-23 | End: 2025-02-24

## 2025-02-23 RX ADMIN — GABAPENTIN 100 MILLIGRAM(S): 400 CAPSULE ORAL at 22:57

## 2025-02-23 RX ADMIN — METOPROLOL SUCCINATE 12.5 MILLIGRAM(S): 50 TABLET, EXTENDED RELEASE ORAL at 22:57

## 2025-02-23 RX ADMIN — TAMSULOSIN HYDROCHLORIDE 0.4 MILLIGRAM(S): 0.4 CAPSULE ORAL at 22:57

## 2025-02-23 RX ADMIN — ATORVASTATIN CALCIUM 40 MILLIGRAM(S): 80 TABLET, FILM COATED ORAL at 22:57

## 2025-02-23 RX ADMIN — QUETIAPINE FUMARATE 25 MILLIGRAM(S): 25 TABLET ORAL at 22:56

## 2025-02-23 NOTE — ED PROVIDER NOTE - CLINICAL SUMMARY MEDICAL DECISION MAKING FREE TEXT BOX
Elderly male who came in for possible stroke. EMS reports stated hypoxia and bradycardia; patient here with 100% O2. Taken off oxygen and patient appears stable. Possible stroke vs. infectious etiology of the symptoms. Will obtain labwork and CXR. Likely to require admission. May have had another head bleed vs. stroke.

## 2025-02-23 NOTE — ED PROVIDER NOTE - PHYSICAL EXAMINATION
PHYSICAL EXAM:  GENERAL: NAD, well-developed  HEAD:  Atraumatic, Normocephalic  EYES: EOMI, PERRL  NECK: Supple, No JVD  CHEST/LUNG: Clear to auscultation bilaterally; No wheeze  HEART: tachycardic  ABDOMEN: Soft, Nontender, Nondistended; Bowel sounds present  EXTREMITIES:  2+ Peripheral Pulses, No clubbing, cyanosis, or edema  PSYCH: AAOx1 (self)  NEUROLOGY: LUE 4+/5 strength, LLE 4/5 strength. All other strength is normal.  SKIN: No rashes or lesions

## 2025-02-23 NOTE — CONSULT NOTE ADULT - ASSESSMENT
- No neurosurgical intervention  - AEDs/ MRIs per neurology  - Given SDH significantly improved c/t 4/2024 no absolute neurosurgical contraindication to AP/AC for strokes i/s/o Afib, however given hx of multiple falls and SDH, there exists a risk of worsening intracranial hemorrhage, which can be associated with significant morbidity and mortality. The risks/ benefits of AC/AP need to be weighed by the primary team. If AC is to be started would recommend hep gtt, goal 50-70, no bolus, CTH when therapeutic or with any change in neurologic status.

## 2025-02-23 NOTE — ED ADULT NURSE NOTE - NSFALLHARMRISKINTERV_ED_ALL_ED

## 2025-02-23 NOTE — PATIENT PROFILE ADULT - PATIENT'S SEXUAL ORIENTATION
Gastrointestinal Esophagogastroduodenoscopy (EGD) - Upper Exam Discharge Instructions    1. Call Dr. Rebecca Guardado at [phone] for any problems or questions. 2. Contact the doctor's office for follow up appointment as directed. 3. Medication may cause drowsiness for several hours, therefore:  · Do not drive or operate machinery for remainder of the day. · No alcohol today. · Do not make any important or legal decisions for 24 hours. · Do not sign any legal documents for 24 hours. 5. Ordinarily, you may resume regular diet and activity after exam unless otherwise specified by your physician. 6. For mild soreness in your throat you may use Cepacol throat lozenges or warm salt-water gargles as needed. Any additional instructions:  RESTART pLAVIX TOMORROW, 12/7/19     Instructions given to Nenita Minors and other family members.   Instructions given by: Heterosexual

## 2025-02-23 NOTE — H&P ADULT - PROBLEM SELECTOR PLAN 3
[facility] is requesting DME orders for nebulizer machine with supplies.  He would like to  machine 1/8/22.   BP medications with hold parameters.

## 2025-02-23 NOTE — CONSULT NOTE ADULT - ASSESSMENT
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 No recent falls/headstrike per family   Left lower extremity weakness likely 2/2 acute ischemic stroke, found to have high grade stenosis vs occlusion of distal right A2 branch of the SUYAPA   Mechanism is likely cardioembolic in setting of known Afib, patient has been off anticoagulation due to hx of SDH and multiple falls. No inpatient stroke workup recommended at this time due to likely no change in management.   He may benefit from an antiplatelet, but will recommend to have neurosurgery clear patient for antithrombotic therapy     Recommendations:   -NSGY consult for mixed hyperdensity SDH  -Repeat CTH in 24hr  -Will defer MR Brain and TTE and further stroke workup for now because will likely not   -Monitor off AC/AP for now  - May benefit from aspirin 81mg daily but will defer to NSGY for antithrombotic clearance   -BP goal permissive up to  from stroke perspective        Discussed with stroke fellow Dr. Pham under supervision of attending Dr. Cooper  regarding decision against candidacy for Tenecteplase / thrombectomy. Will be formally staffed on morning rounds with attending. Recommendations will be complete once signed by attending. ROBBIE CERNA is a 89y (28-Oct-1925) man with a PMHx significant for Afib (not on AC due to falls and SDH), HTN, HLD, BPH, asthma, anemia, prior strokes, SDH wh is BIBEMS as code stroke for left sided weakness and slurred speech.   Per daughter, patient had runny nose and cough for the past few days but otherwise went to bed in usual state of health last night at 8AM. Family noticed symptoms (leaning to the left side) when patient woke up this morning and called EMS. She gave him aspirin 81mg this morning but patient is not on AC/AP due to history of SDH and multiple falls previously. They denied any recent fall or headstrike. Patient noted to be hypoxic and bradycardic to 30s in the field. Hypoxia and HR improved in the ED. He was noted to have left leg drift which is worse than baseline weakness per daughter. Patient usually ambulates with a cane. He is A&Ox2 at baseline (does not know date). Patient complaining of urinary retention and being unable to urinate in ED.     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 No recent falls/headstrike per family   Left lower extremity weakness likely 2/2 acute ischemic stroke, found to have high grade stenosis vs occlusion of distal right A2 branch of the SUYAPA. Mechanism is likely cardioembolic in setting of known Afib, patient has been off anticoagulation due to hx of SDH and multiple falls. No inpatient stroke workup recommended at this time due to likely no change in management. He may benefit from an antiplatelet for secondary stroke prevention, will recommend to have neurosurgery clear patient for antithrombotic therapy     Recommendations:   -NSGY consult for mixed density left SDH  -Repeat CTH in 24hr  -Will defer MR Brain and TTE and further stroke workup for now because will likely not   -Monitor off AC/AP for now  -May benefit from aspirin 81mg daily but will defer to NSGY for antithrombotic clearance   -A1C,Lipid panel and statin can be deferred due to patient's age   -BP goal: Allow for permissive HTN up to  from neurovascular perspective  -Neurochecks and VS q4  -SCD for DVT ppx  -PT/OT/SLP eval     Discussed with stroke fellow Dr. Pham under supervision of attending Dr. Cooper  regarding decision against candidacy for Tenecteplase / thrombectomy. Will be formally staffed on morning rounds with attending. Recommendations will be complete once signed by attending. ROBBIE CERNA is a 89y (28-Oct-1925) man with a PMHx significant for Afib (not on AC due to falls and SDH), HTN, HLD, BPH, asthma, anemia, prior strokes, SDH who is BIBEMS as code stroke for left sided weakness and slurred speech.   Per daughter, patient had runny nose and cough for the past few days but otherwise went to bed in usual state of health last night at 8AM. Family noticed symptoms (leaning to the left side) when patient woke up this morning and called EMS. She gave him aspirin 81mg this morning but patient is not on AC/AP due to history of SDH and multiple falls previously. They denied any recent fall or headstrike. Patient noted to be hypoxic and bradycardic to 30s in the field. Hypoxia and HR improved in the ED. He was noted to have left leg drift which is worse than baseline weakness per daughter. Patient usually ambulates with a cane. He is A&Ox2 at baseline (does not know date). Patient complaining of urinary retention and being unable to urinate in ED.     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 No recent falls/headstrike per family   Left lower extremity weakness likely 2/2 acute ischemic stroke, found to have high grade stenosis vs occlusion of distal right A2 branch of the SUYAPA. Mechanism is likely cardioembolic in setting of known Afib, patient has been off anticoagulation due to hx of SDH and multiple falls. No inpatient stroke workup recommended at this time due to likely no change in management. He may benefit from an antiplatelet for secondary stroke prevention, will recommend to have neurosurgery clear patient for antithrombotic therapy     Recommendations:   -NSGY consult for mixed density left SDH  -Repeat CTH in 24hr  -Will defer MR Brain and TTE and further stroke workup for now because will likely not   -Monitor off AC/AP for now  -May benefit from aspirin 81mg daily but will defer to NSGY for antithrombotic clearance   -A1C,Lipid panel and statin can be deferred due to patient's age   -BP goal: Allow for permissive HTN up to  from neurovascular perspective  -Neurochecks and VS q4  -SCD for DVT ppx  -PT/OT/SLP eval     Discussed with stroke fellow Dr. Pham under supervision of attending Dr. Cooper  regarding decision against candidacy for Tenecteplase / thrombectomy. Will be formally staffed on morning rounds with attending. Recommendations will be complete once signed by attending. ROBBIE CERNA is a 89y (28-Oct-1925) man with a PMHx significant for Afib (not on AC due to falls and SDH), HTN, HLD, BPH, asthma, anemia, prior strokes, SDH who is BIBEMS as code stroke for left sided weakness and slurred speech.   Per daughter, patient had runny nose and cough for the past few days but otherwise went to bed in usual state of health last night at 8AM. Family noticed symptoms (leaning to the left side) when patient woke up this morning and called EMS. She gave him aspirin 81mg this morning but patient is not on AC/AP due to history of SDH and multiple falls previously. They denied any recent fall or headstrike. Patient noted to be hypoxic and bradycardic to 30s in the field. Hypoxia and HR improved in the ED. He was noted to have left leg drift which is worse than baseline weakness per daughter. Patient usually ambulates with a cane. He is A&Ox2 at baseline (does not know date). Patient complaining of urinary retention and being unable to urinate in ED.     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 No recent falls/headstrike per family   Left lower extremity weakness likely 2/2 acute ischemic stroke, found to have high grade stenosis vs occlusion of distal right A2 branch of the SUYAPA. Mechanism is likely cardioembolic in setting of known Afib, patient has been off anticoagulation due to hx of SDH and multiple falls. No inpatient stroke workup recommended at this time due to likely no change in management. He may benefit from an antiplatelet for secondary stroke prevention, will recommend to have neurosurgery clear patient for antithrombotic therapy     Recommendations:   -NSGY consult for mixed density left SDH  -Repeat CTH in 24hr  -Will defer MR Brain and TTE and further stroke workup for now because will likely not   -Monitor off AC/AP for now  -May benefit from aspirin 81mg daily but will defer to NSGY for antithrombotic clearance   -A1C,Lipid panel and statin can be deferred due to patient's age  -Evaluate for and address any underlying infectious/toxic/metablic abnormalities per primary (consider UA)  -BP goal: Allow for permissive HTN up to  from neurovascular perspective  -Neurochecks and VS q4  -SCD for DVT ppx  -PT/OT/SLP eval     Discussed with stroke fellow Dr. Pham under supervision of attending Dr. Cooper  regarding decision against candidacy for Tenecteplase / thrombectomy. Will be formally staffed on morning rounds with attending. Recommendations will be complete once signed by attending.

## 2025-02-23 NOTE — H&P ADULT - PROBLEM SELECTOR PLAN 1
< from: CT Brain Stroke Protocol (02.23.25 @ 14:02) >    IMPRESSION:    Small left frontoparietal acute/recent subdural hematoma.    Multiple cerebral areas of encephalomalacia.    Extensive white matter small vessel ischemic disease.    Neurology & neurosurgery input appreciated.

## 2025-02-23 NOTE — CONSULT NOTE ADULT - ATTENDING COMMENTS
89M DNR/DNI, mult cormobidities including Afib, not on AC given hx of falls/ 2cm Lt frontoparietal SDH 4/2024, p/w L sided weakness/ dysarthria since the AM, NIHSS 2. Sxs now resolved, no recent fall. CTH w/ sig improvement in SDH since 4/2024, now 5mm. CTA w/ high grade stenosis vs occlusion of R SUYAPA. Exam:  AOx2, FC, no drift, no facial, no DYSON strong, symmetric.    - No neurosurgical intervention  - AEDs/ MRIs per neurology  - Given SDH significantly improved c/t 4/2024 no absolute neurosurgical contraindication to AP/AC for strokes i/s/o Afib, however given hx of multiple falls and SDH, there exists a risk of worsening intracranial hemorrhage, which can be associated with significant morbidity and mortality. The risks/ benefits of AC/AP need to be weighed by the primary team. If AC is to be started would recommend hep gtt, goal 50-70, no bolus, CTH when therapeutic or with any change in neurologic status.
DOS 2/24  Briefly   89 yo with Afib (not on AC due to falls and SDH), HTN, HLD, BPH, asthma, anemia, prior strokes, SDH who is BIBEMS as code stroke for left sided weakness and slurred speech.   Per daughter, patient had runny nose and cough for the past few days but otherwise went to bed in usual state of health last night at 8AM. Family noticed symptoms (leaning to the left side) when patient woke up this morning and called EMS. She gave him aspirin 81mg this morning but patient is not on AC/AP due to history of SDH and multiple falls previously. They denied any recent fall or headstrike. Patient noted to be hypoxic and bradycardic to 30s in the field. Hypoxia and HR improved in the ED. He was noted to have left leg drift which is worse than baseline weakness per daughter. Patient usually ambulates with a cane. He is A&Ox2 at baseline (does not know date). Patient complaining of urinary retention and being unable to urinate in ED.   LKW:  20:00 on 2/22  NIHSS: 2  Baseline mRS: 3  Not a Tenecteplase candidate due to out of window  Not a thrombectomy candidate due to no proximal LVO for intervention   2/23 CTH: R>L frontal encephalomalacia.  L posteriortemporal and occipotal encephalomalacia.  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 No recent falls/headstrike per family   2) new? Left lower extremity weakness r/o  acute ischemic stroke, found to have high grade stenosis vs occlusion of distal right A2 branch of the SUYAPA. Mechanism is likely cardioembolic in setting of known Afib, patient has been off anticoagulation due to hx of SDH and multiple falls.     Recommendations:   - NSGY consult for mixed density left SDH;   - Repeat CTH today for stability   - off AC/AP given SDH.    - ASA 81mg when celared by neurosx   - consider keppra 500mg BID for seiure ppx   - routine EEG  in or outpatient   - check orthostatics   - MRI brain with and w/o when able   - A1c and lipid panel   - TTE unlikely to    - telemetry  - PT/OT/SS/SLP, OOBC  - normotension   - check FS, glucose control <180  - GI/DVT ppx  - Counseling on diet, exercise, and medication adherence was done  - Counseling on smoking cessation and alcohol consumption offered when appropriate.  - Pain assessed and judicious use of narcotics when appropriate was discussed.    - Stroke education given when appropriate.  - Importance of fall prevention discussed.   - Differential diagnosis and plan of care discussed with patient and/or family and primary team  - Thank you for allowing me to participate in the care of this patient. Call with questions.   spoke with daughter at bedside   Juventino Bashir MD  Vascular Neurology  Office: [phone]

## 2025-02-23 NOTE — ED PROVIDER NOTE - PROGRESS NOTE DETAILS
[facility] PGY3 - Lab work grossly unremarkable.  CT head/CTA head and neck concerning for critical stenosis/filling defect of the distal right A2 branch of the SUYAPA without significant distal perfusion.  Also concerning for acute on chronic 5 mm SDH.  Evaluated by neurosurgery, no acute neurosurgical intervention at this time for direct contraindication to anticoagulation.  Per neurology recommending repeat 24-hour CTh, deferring MRI brain and TTE is unlikely to .  Admit to medicine for monitoring, PT/OT/SLP.

## 2025-02-23 NOTE — CONSULT NOTE ADULT - SUBJECTIVE AND OBJECTIVE BOX
Mani, Dalfranca  89M DNR/DNI, mult cormobidities including Afib, not on AC given hx of falls/ 2cm Lt frontoparietal SDH 4/2024, p/w L sided weakness/ dysarthria since the AM, NIHSS 2. Sxs now resolved, no recent fall. CTH w/ sig improvement in SDH since 4/2024, now 5mm. CTA w/ high grade stenosis vs occlusion of R SUYAPA. Exam:  AOx2, FC, no drift, no facial, no DYSON strong, symmetric.

## 2025-02-23 NOTE — ED ADULT NURSE NOTE - OBJECTIVE STATEMENT
89Y M AXO3 PMH of asthma, afib not on AC, and CVA presented to the ED via EMS from home c/o LLE weakness since 7:30 am this morning, 89Y M AXO2 (oriented to self and place) PMH of asthma, afib not on AC, and CVA presented to the ED via EMS from home c/o LLE weakness since 7:30 am this morning, Pt's daughter states pt woke up at 7:30 am and gave himself "nebulizer and felt weak since." EMS brought pt in on NRB due to sat being in "low 50s" on arrival pt did not require NRB O2 sat 100% on room air. Upon arrival to the ED, the pt is well appearing, has bilateral even and unlabored chest rise, and airway is patent. Upon assessment, pt has even and bilateral peripheral pulses, ROM, PERRLA, gross neuro intact, and soft, non-tender, non-distended abdomen. Pt denies fevers, chills, chest pain, SOB, n/v/d, headache, dizziness, urinary symptoms, and black or bloody stools. Comfort and safety provided, bed in lowest position, side rails up.

## 2025-02-23 NOTE — ED PROVIDER NOTE - OBJECTIVE STATEMENT
89yo M with chronic Afib (taken off apixaban 1-2 weeks ago d/t recurrent falls), HTN, HLD, presumed GERD, BPH, asthma, anemia, prior chronic strokes, SDH who presented via EMS for new LUE and LLE weakness and slurred speech. As per family, patient with runny nose and cough for the past few days. Woke up this morning with symptoms. Initially called as a code stroke, but cancelled due to patient having uncertain/unstable vitals (possible hypoxia and bradycardia).

## 2025-02-23 NOTE — H&P ADULT - TIME BILLING
Admission H&P including bedside history , examination , reviewing test results and treatment plan. Neurology & Neurosurgery Consults noted and Cardiology & Pulmonary consulted .D/W Patient , his daughter and ACP.

## 2025-02-23 NOTE — CONSULT NOTE ADULT - SUBJECTIVE AND OBJECTIVE BOX
Neurology - Consult Note    -  Spectra: [phone] ([facility]), [phone] ([facility])  -    HPI: Patient ROBBIE CERNA is a 89y (28-Oct-1925) man with a PMHx significant for Afib (not on AC due to falls and SDH), HTN, HLD, BPH, asthma, anemia, prior strokes, SDH who presented via EMS for new LUE and LLE weakness and slurred speech. As per family, patient with runny nose and cough for the past few days. Woke up this morning with symptoms.       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   CTA- possible high grade stenosis vs occlusion of R marginal brach of SUYAPA    LKW:  20:00 on 2/22  NIHSS: 2  Baseline mRS: 3      Review of Systems:  INCOMPLETE   CONSTITUTIONAL: No fevers or chills  EYES AND ENT: No visual changes or no throat pain   NECK: No pain or stiffness  RESPIRATORY: No hemoptysis or shortness of breath  CARDIOVASCULAR: No chest pain or palpitations  GASTROINTESTINAL: No melena or hematochezia  GENITOURINARY: No dysuria or hematuria  NEUROLOGICAL: +As stated in HPI above  SKIN: No itching, burning, rashes, or lesions   All other review of systems is negative unless indicated above.    Allergies:  No Known Allergies      PMHx/PSHx/Family Hx: As above, otherwise see below   Afib    Asthmatic bronchitis , chronic    GERD (gastroesophageal reflux disease)    HLD (hyperlipidemia)    HTN (hypertension)        Social Hx:  No current use of tobacco, alcohol, or illicit drugs  Lives with ***    Medications:  MEDICATIONS  (STANDING):    MEDICATIONS  (PRN):      Vitals:  T(C): 36.4 (02-23-25 @ 13:49), Max: 36.4 (02-23-25 @ 13:49)  HR: 98 (02-23-25 @ 13:49) (98 - 98)  BP: 125/65 (02-23-25 @ 13:49) (125/65 - 125/65)  RR: 18 (02-23-25 @ 13:49) (18 - 18)  SpO2: 100% (02-23-25 @ 13:49) (100% - 100%)    Physical Examination: INCOMPLETE  General - NAD  Cardiovascular - Peripheral pulses palpable, no edema  Eyes - Fundoscopy with flat, sharp optic discs and no hemorrhage or exudates; Fundoscopy not well visualized; Fundoscopy not performed due to safety precautions in the setting of the COVID-19 pandemic    Neurologic Exam:  Mental status - Awake, Alert, Oriented to person, place, and time. Speech fluent, repetition and naming intact. Follows simple and complex commands. Attention/concentration, recent and remote memory (including registration and recall), and fund of knowledge intact    Cranial nerves - PERRLA, VFF, EOMI, face sensation (V1-V3) intact b/l, facial strength intact without asymmetry b/l, hearing intact b/l, palate with symmetric elevation, trapezius OR sternocleidomastiod 5/5 strength b/l, tongue midline on protrusion with full lateral movement    Motor - Normal bulk and tone throughout. No pronator drift.  Strength testing            Deltoid      Biceps      Triceps     Wrist Extension    Wrist Flexion     Interossei         R            5                 5               5                     5                              5                        5                 5  L             5                 5               5                     5                              5                        5                 5              Hip Flexion    Hip Extension    Knee Flexion    Knee Extension    Dorsiflexion    Plantar Flexion  R              5                           5                       5                           5                            5                          5  L              5                           5                        5                           5                            5                          5    Sensation - Light touch/temperature OR pain/vibration intact throughout    DTR's -             Biceps      Triceps     Brachioradialis      Patellar    Ankle    Toes/plantar response  R             2+             2+                  2+                       2+            2+                 Down  L              2+             2+                 2+                        2+           2+                 Down    Coordination - Finger to Nose intact b/l. No tremors appreciated    Gait and station - Normal casual gait. Romberg (-)    Labs:                        10.6   6.36  )-----------( 174      ( 23 Feb 2025 13:53 )             34.8           CAPILLARY BLOOD GLUCOSE  88 (23 Feb 2025 13:51)      POCT Blood Glucose.: 88 mg/dL (23 Feb 2025 13:38)        PT/INR - ( 23 Feb 2025 13:53 )   PT: 11.8 sec;   INR: 1.03 ratio         PTT - ( 23 Feb 2025 13:53 )  PTT:32.8 sec      Radiology:       Neurology - Consult Note    -  Spectra: [phone] ([facility]), [phone] ([facility])  -    HPI: Patient ROBBIE CERNA is a 89y (28-Oct-1925) man with a PMHx significant for Afib (not on AC due to falls and SDH), HTN, HLD, BPH, asthma, anemia, prior strokes, SDH who presented via EMS for new LUE and LLE weakness and slurred speech. As per family, patient with runny nose and cough for the past few days. Woke up this morning with symptoms.       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    Allergies:  No Known Allergies      PMHx/PSHx/Family Hx: As above, otherwise see below   Afib  Asthmatic bronchitis , chronic  GERD (gastroesophageal reflux disease)  HLD (hyperlipidemia)  HTN (hypertension)      Social Hx:  No current use of tobacco, alcohol, or illicit drugs  Lives at home with family. Ambulates with walker    Medications:  MEDICATIONS  (STANDING):    MEDICATIONS  (PRN):      Vitals:  T(C): 36.4 (02-23-25 @ 13:49), Max: 36.4 (02-23-25 @ 13:49)  HR: 98 (02-23-25 @ 13:49) (98 - 98)  BP: 125/65 (02-23-25 @ 13:49) (125/65 - 125/65)  RR: 18 (02-23-25 @ 13:49) (18 - 18)  SpO2: 100% (02-23-25 @ 13:49) (100% - 100%)    Physical Examination: INCOMPLETE  General - NAD  Cardiovascular - Peripheral pulses palpable, no edema  Eyes - Fundoscopy with flat, sharp optic discs and no hemorrhage or exudates; Fundoscopy not well visualized; Fundoscopy not performed due to safety precautions in the setting of the COVID-19 pandemic    Neurologic Exam:  Mental status - Awake, Alert, Oriented to person, place, and time. Speech fluent, repetition and naming intact. Follows simple and complex commands. Attention/concentration, recent and remote memory (including registration and recall), and fund of knowledge intact    Cranial nerves - PERRLA, VFF, EOMI, face sensation (V1-V3) intact b/l, facial strength intact without asymmetry b/l, hearing intact b/l, palate with symmetric elevation, trapezius OR sternocleidomastiod 5/5 strength b/l, tongue midline on protrusion with full lateral movement    Motor - Normal bulk and tone throughout. No pronator drift.  Strength testing            Deltoid      Biceps      Triceps     Wrist Extension    Wrist Flexion     Interossei         R            5                 5               5                     5                              5                        5                 5  L             5                 5               5                     5                              5                        5                 5              Hip Flexion    Hip Extension    Knee Flexion    Knee Extension    Dorsiflexion    Plantar Flexion  R              5                           5                       5                           5                            5                          5  L              5                           5                        5                           5                            5                          5    Sensation - Light touch/temperature OR pain/vibration intact throughout    DTR's -             Biceps      Triceps     Brachioradialis      Patellar    Ankle    Toes/plantar response  R             2+             2+                  2+                       2+            2+                 Down  L              2+             2+                 2+                        2+           2+                 Down    Coordination - Finger to Nose intact b/l. No tremors appreciated    Gait and station - Normal casual gait. Romberg (-)    Labs:                        10.6   6.36  )-----------( 174      ( 23 Feb 2025 13:53 )             34.8         CAPILLARY BLOOD GLUCOSE  88 (23 Feb 2025 13:51)      POCT Blood Glucose.: 88 mg/dL (23 Feb 2025 13:38)        PT/INR - ( 23 Feb 2025 13:53 )   PT: 11.8 sec;   INR: 1.03 ratio         PTT - ( 23 Feb 2025 13:53 )  PTT:32.8 sec      Radiology:    CTH  Small left frontoparietal acute/recent subdural hematoma.  Multiple cerebral areas of encephalomalacia.  Extensive white matter small vessel ischemic disease.    CTA NECK:  No evidence of significant stenosis or occlusion.    CTA HEAD:  Critical stenosis/filling defect is seen in the distal right A2 branch of the SUYAPA without significant distal perfusion.  No evidence of aneurysm. Tiny aneurysms can be beyond the resolution of CTA technique.   Neurology - Consult Note    -  Spectra: [phone] ([facility]), [phone] ([facility])  -    HPI: Patient ROBBIE CERNA is a 89y (28-Oct-1925) man with a PMHx significant for Afib (not on AC due to falls and SDH), HTN, HLD, BPH, asthma, anemia, prior strokes, SDH wh is BIBEMS as code stroke for left sided weakness and slurred speech.   Per daughter, patient had runny nose and cough for the past few days but otherwise went to bed in usual state of health last night at 8AM. Family noticed symptoms (leaning to the left side) when patient woke up this morning and called EMS. She gave him aspirin 81mg this morning but patient is not on AC/AP due to history of SDH and multiple falls previously. They denied any recent fall or headstrike. Patient noted to be hypoxic and bradycardic to 30s in the field. Hypoxia and HR improved in the ED. He was noted to have left leg drift which is worse than baseline weakness per daughter. Patient usually ambulates with a cane. He is A&Ox2 at baseline (does not know date). Patient complaining of urinary retention and being unable to urinate in ED.     LKW:  20:00 on 2/22  NIHSS: 2  Baseline mRS: 3      Review of Systems:    CONSTITUTIONAL: No fevers or chills  EYES AND ENT: No visual changes or no throat pain   NECK: No pain or stiffness  RESPIRATORY: No hemoptysis or shortness of breath  CARDIOVASCULAR: No chest pain or palpitations  GASTROINTESTINAL: No melena or hematochezia  GENITOURINARY: No dysuria or hematuria  NEUROLOGICAL: +As stated in HPI above  SKIN: No itching, burning, rashes, or lesions   All other review of systems is negative unless indicated above.    Allergies:  No Known Allergies      PMHx/PSHx/Family Hx: As above, otherwise see below   Afib  Asthmatic bronchitis , chronic  GERD (gastroesophageal reflux disease)  HLD (hyperlipidemia)  HTN (hypertension)      Social Hx:  No current use of tobacco, alcohol, or illicit drugs  Lives at home with family. Ambulates with walker    Medications:  MEDICATIONS  (STANDING):    MEDICATIONS  (PRN):      Vitals:  T(C): 36.4 (02-23-25 @ 13:49), Max: 36.4 (02-23-25 @ 13:49)  HR: 98 (02-23-25 @ 13:49) (98 - 98)  BP: 125/65 (02-23-25 @ 13:49) (125/65 - 125/65)  RR: 18 (02-23-25 @ 13:49) (18 - 18)  SpO2: 100% (02-23-25 @ 13:49) (100% - 100%)    Physical Examination:   General - Initially on NRB, supplemental oxygen. No acute respiratory distress noted     Eyes - Normal sclera, surgical small pupils B/L. conjugate gaze     Neurologic Exam:  Mental status - Awake, Alert, Oriented to person and place. Reports date is Oct 1925. Speech is at baseline, no dysarthria, fluent, repetition and naming intact. Follows 1 step commands.     Cranial nerves - PERRL (1mm surgical pupils BL), VFF, EOMI, face sensation (V1-V3) intact b/l, facial strength intact without asymmetry b/l, hearing intact b/l, palate with symmetric elevation, traps grossly 5/5 strength b/l, tongue midline on protrusion with full lateral movement    Motor - Normal bulk and tone throughout. No pronator drift.  Strength testing  RUE- grossly 4+/5  LUE- grossly 4+/5  RLE - antigravity and do drift   4-/5  LLE - antigravity with mild drift. leg hits bed in 5 seconds     Sensation - Light touch and temp intact throughout    DTR's - Not assessed due to focused exam     Coordination - Finger to Nose intact b/l. Heel to shin difficult to assess due to lower extremity weakness    Gait and station - Not assessed due to fall risk     Labs:             POCT Blood Glucose.: 88 mg/dL (23 Feb 2025 13:38)                        10.6   6.36  )-----------( 174      ( 23 Feb 2025 13:53 )             34.8     02-23    140  |  103  |  21  ----------------------------<  98  4.4   |  26  |  1.02    Ca    9.8      23 Feb 2025 13:53    TPro  6.9  /  Alb  3.6  /  TBili  0.7  /  DBili  x   /  AST  28  /  ALT  18  /  AlkPhos  96  02-23    PT/INR - ( 23 Feb 2025 13:53 )   PT: 11.8 sec;   INR: 1.03 ratio         PTT - ( 23 Feb 2025 13:53 )  PTT:32.8 sec  Urinalysis Basic - ( 23 Feb 2025 13:53 )    Color: x / Appearance: x / SG: x / pH: x  Gluc: 98 mg/dL / Ketone: x  / Bili: x / Urobili: x   Blood: x / Protein: x / Nitrite: x   Leuk Esterase: x / RBC: x / WBC x   Sq Epi: x / Non Sq Epi: x / Bacteria: x    Radiology:    CTH  Small left frontoparietal acute/recent subdural hematoma.  Multiple cerebral areas of encephalomalacia.  Extensive white matter small vessel ischemic disease.    CTA NECK:  No evidence of significant stenosis or occlusion.    CTA HEAD:  Critical stenosis/filling defect is seen in the distal right A2 branch of the SUYAPA without significant distal perfusion.  No evidence of aneurysm. Tiny aneurysms can be beyond the resolution of CTA technique.   Neurology - Consult Note    -  Spectra: [phone] ([facility]), [phone] ([facility])  -    HPI: Patient ROBBIE CERNA is a 89y (28-Oct-1925) man with a PMHx significant for Afib (not on AC due to falls and SDH), HTN, HLD, BPH, asthma, anemia, prior strokes, SDH who is BIBEMS as code stroke for left sided weakness and slurred speech.   Per daughter, patient had runny nose and cough for the past few days but otherwise went to bed in usual state of health last night at 8AM. Family noticed symptoms (leaning to the left side) when patient woke up this morning and called EMS. She gave him aspirin 81mg this morning but patient is not on AC/AP due to history of SDH and multiple falls previously. They denied any recent fall or headstrike. Patient noted to be hypoxic and bradycardic to 30s in the field. Hypoxia and HR improved in the ED. He was noted to have left leg drift which is worse than baseline weakness per daughter. Patient usually ambulates with a cane. He is A&Ox2 at baseline (does not know date). Patient complaining of urinary retention and being unable to urinate in ED.     LKW:  20:00 on 2/22  NIHSS: 2  Baseline mRS: 3      Review of Systems:    CONSTITUTIONAL: No fevers or chills  EYES AND ENT: No visual changes or no throat pain   NECK: No pain or stiffness  RESPIRATORY: No hemoptysis or shortness of breath  CARDIOVASCULAR: No chest pain or palpitations  GASTROINTESTINAL: No melena or hematochezia  GENITOURINARY: No dysuria or hematuria  NEUROLOGICAL: +As stated in HPI above  SKIN: No itching, burning, rashes, or lesions   All other review of systems is negative unless indicated above.    Allergies:  No Known Allergies      PMHx/PSHx/Family Hx: As above, otherwise see below   Afib  Asthmatic bronchitis , chronic  GERD (gastroesophageal reflux disease)  HLD (hyperlipidemia)  HTN (hypertension)      Social Hx:  No current use of tobacco, alcohol, or illicit drugs  Lives at home with family. Ambulates with walker    Medications:  MEDICATIONS  (STANDING):    MEDICATIONS  (PRN):      Vitals:  T(C): 36.4 (02-23-25 @ 13:49), Max: 36.4 (02-23-25 @ 13:49)  HR: 98 (02-23-25 @ 13:49) (98 - 98)  BP: 125/65 (02-23-25 @ 13:49) (125/65 - 125/65)  RR: 18 (02-23-25 @ 13:49) (18 - 18)  SpO2: 100% (02-23-25 @ 13:49) (100% - 100%)    Physical Examination:   General - Initially on NRB, supplemental oxygen. No acute respiratory distress noted     Eyes - Normal sclera, surgical small pupils B/L. conjugate gaze     Neurologic Exam:  Mental status - Awake, Alert, Oriented to person and place. Reports date is Oct 1925. Speech is at baseline, no dysarthria, fluent, repetition and naming intact. Follows 1 step commands.     Cranial nerves - PERRL (1mm surgical pupils BL), VFF, EOMI, face sensation (V1-V3) intact b/l, facial strength intact without asymmetry b/l, hearing intact b/l, palate with symmetric elevation, traps grossly 5/5 strength b/l, tongue midline on protrusion with full lateral movement    Motor - Normal bulk and tone throughout. No pronator drift.  Strength testing  RUE- grossly 4+/5  LUE- grossly 4+/5  RLE - antigravity and do drift   4-/5  LLE - antigravity with mild drift. leg hits bed in 5 seconds     Sensation - Light touch and temp intact throughout    DTR's - Not assessed due to focused exam     Coordination - Finger to Nose intact b/l. Heel to shin difficult to assess due to lower extremity weakness    Gait and station - Not assessed due to fall risk     Labs:             POCT Blood Glucose.: 88 mg/dL (23 Feb 2025 13:38)                        10.6   6.36  )-----------( 174      ( 23 Feb 2025 13:53 )             34.8     02-23    140  |  103  |  21  ----------------------------<  98  4.4   |  26  |  1.02    Ca    9.8      23 Feb 2025 13:53    TPro  6.9  /  Alb  3.6  /  TBili  0.7  /  DBili  x   /  AST  28  /  ALT  18  /  AlkPhos  96  02-23    PT/INR - ( 23 Feb 2025 13:53 )   PT: 11.8 sec;   INR: 1.03 ratio         PTT - ( 23 Feb 2025 13:53 )  PTT:32.8 sec  Urinalysis Basic - ( 23 Feb 2025 13:53 )    Color: x / Appearance: x / SG: x / pH: x  Gluc: 98 mg/dL / Ketone: x  / Bili: x / Urobili: x   Blood: x / Protein: x / Nitrite: x   Leuk Esterase: x / RBC: x / WBC x   Sq Epi: x / Non Sq Epi: x / Bacteria: x    Radiology:    CTH  Small left frontoparietal acute/recent subdural hematoma.  Multiple cerebral areas of encephalomalacia.  Extensive white matter small vessel ischemic disease.    CTA NECK:  No evidence of significant stenosis or occlusion.    CTA HEAD:  Critical stenosis/filling defect is seen in the distal right A2 branch of the SUYAPA without significant distal perfusion.  No evidence of aneurysm. Tiny aneurysms can be beyond the resolution of CTA technique.

## 2025-02-23 NOTE — PATIENT PROFILE ADULT - FALL HARM RISK - HARM RISK INTERVENTIONS

## 2025-02-23 NOTE — H&P ADULT - ASSESSMENT
89y old  man with a PMHx significant for Afib (not on AC due to falls and SDH), HTN, HLD, BPH, asthma, anemia, prior strokes, SDH who is BIBEMS as code stroke for left sided weakness and slurred speech.   Per daughter, patient had runny nose and cough for the past few days but otherwise went to bed in usual state of health last night at 8AM. Family noticed symptoms (leaning to the left side) when patient woke up this morning and called EMS. She gave him aspirin 81mg this morning but patient is not on AC/AP due to history of SDH and multiple falls previously. They denied any recent fall or headstrike. Patient noted to be hypoxic and bradycardic to 30s in the field. Hypoxia and HR improved in the ED. He was noted to have left leg drift which is worse than baseline weakness per daughter. Patient usually ambulates with a cane. 89y old  man with a PMHx significant for Afib (not on AC due to falls and SDH), HTN, HLD, BPH, asthma, anemia, prior strokes, SDH who is BIBEMS left sided weakness and slurred speech. Per daughter, patient had runny nose and cough for the past few days but otherwise went to bed in usual state of health last night at 8AM. Family noticed symptoms (leaning to the left side) when patient woke up this morning and called EMS. She gave him aspirin 81mg this morning but patient is not on AC/AP due to history of SDH and multiple falls previously. They denied any recent fall or headstrike. Patient noted to be hypoxic and bradycardic to 30s in the field. Hypoxia and HR improved in the ED. He was noted to have left leg drift which is worse than baseline weakness per daughter. Patient usually ambulates with a cane.

## 2025-02-24 DIAGNOSIS — J45.909 UNSPECIFIED ASTHMA, UNCOMPLICATED: ICD-10-CM

## 2025-02-24 DIAGNOSIS — R09.02 HYPOXEMIA: ICD-10-CM

## 2025-02-24 LAB
CULTURE RESULTS: SIGNIFICANT CHANGE UP
FERRITIN SERPL-MCNC: 35 NG/ML — SIGNIFICANT CHANGE UP (ref 30–400)
FOLATE SERPL-MCNC: >20 NG/ML — SIGNIFICANT CHANGE UP
HCT VFR BLD CALC: 36.4 % — LOW (ref 39–50)
HGB BLD-MCNC: 11.1 G/DL — LOW (ref 13–17)
IRON SATN MFR SERPL: 90 UG/DL — SIGNIFICANT CHANGE UP (ref 45–165)
MCHC RBC-ENTMCNC: 23.6 PG — LOW (ref 27–34)
MCHC RBC-ENTMCNC: 30.5 G/DL — LOW (ref 32–36)
MCV RBC AUTO: 77.4 FL — LOW (ref 80–100)
NRBC BLD AUTO-RTO: 0 /100 WBCS — SIGNIFICANT CHANGE UP (ref 0–0)
PLATELET # BLD AUTO: 150 K/UL — SIGNIFICANT CHANGE UP (ref 150–400)
RBC # BLD: 4.7 M/UL — SIGNIFICANT CHANGE UP (ref 4.2–5.8)
RBC # FLD: 21 % — HIGH (ref 10.3–14.5)
SPECIMEN SOURCE: SIGNIFICANT CHANGE UP
TSH SERPL-MCNC: 2.07 UIU/ML — SIGNIFICANT CHANGE UP (ref 0.27–4.2)
VIT B12 SERPL-MCNC: 697 PG/ML — SIGNIFICANT CHANGE UP (ref 232–1245)
WBC # BLD: 6.74 K/UL — SIGNIFICANT CHANGE UP (ref 3.8–10.5)
WBC # FLD AUTO: 6.74 K/UL — SIGNIFICANT CHANGE UP (ref 3.8–10.5)

## 2025-02-24 PROCEDURE — 70450 CT HEAD/BRAIN W/O DYE: CPT | Mod: 26

## 2025-02-24 RX ORDER — MONTELUKAST SODIUM 10 MG/1
10 TABLET ORAL DAILY
Refills: 0 | Status: DISCONTINUED | OUTPATIENT
Start: 2025-02-24 | End: 2025-02-26

## 2025-02-24 RX ORDER — IPRATROPIUM BROMIDE AND ALBUTEROL SULFATE .5; 2.5 MG/3ML; MG/3ML
3 SOLUTION RESPIRATORY (INHALATION) EVERY 6 HOURS
Refills: 0 | Status: DISCONTINUED | OUTPATIENT
Start: 2025-02-24 | End: 2025-02-26

## 2025-02-24 RX ORDER — SODIUM CHLORIDE 0.65 %
1 AEROSOL, SPRAY (ML) NASAL EVERY 6 HOURS
Refills: 0 | Status: DISCONTINUED | OUTPATIENT
Start: 2025-02-24 | End: 2025-02-26

## 2025-02-24 RX ORDER — OLANZAPINE 10 MG/1
2.5 TABLET ORAL ONCE
Refills: 0 | Status: DISCONTINUED | OUTPATIENT
Start: 2025-02-24 | End: 2025-02-25

## 2025-02-24 RX ORDER — TIOTROPIUM BROMIDE INHALATION SPRAY 3.12 UG/1
2 SPRAY, METERED RESPIRATORY (INHALATION) DAILY
Refills: 0 | Status: DISCONTINUED | OUTPATIENT
Start: 2025-02-24 | End: 2025-02-26

## 2025-02-24 RX ORDER — TAMSULOSIN HYDROCHLORIDE 0.4 MG/1
0.8 CAPSULE ORAL AT BEDTIME
Refills: 0 | Status: DISCONTINUED | OUTPATIENT
Start: 2025-02-24 | End: 2025-02-24

## 2025-02-24 RX ADMIN — MONTELUKAST SODIUM 10 MILLIGRAM(S): 10 TABLET ORAL at 17:45

## 2025-02-24 RX ADMIN — GABAPENTIN 100 MILLIGRAM(S): 400 CAPSULE ORAL at 22:58

## 2025-02-24 RX ADMIN — FUROSEMIDE 20 MILLIGRAM(S): 10 INJECTION INTRAMUSCULAR; INTRAVENOUS at 05:13

## 2025-02-24 RX ADMIN — Medication 1 DOSE(S): at 17:43

## 2025-02-24 RX ADMIN — Medication 40 MILLIGRAM(S): at 05:13

## 2025-02-24 RX ADMIN — ATORVASTATIN CALCIUM 40 MILLIGRAM(S): 80 TABLET, FILM COATED ORAL at 22:58

## 2025-02-24 RX ADMIN — QUETIAPINE FUMARATE 25 MILLIGRAM(S): 25 TABLET ORAL at 22:58

## 2025-02-24 RX ADMIN — TIOTROPIUM BROMIDE INHALATION SPRAY 2 PUFF(S): 3.12 SPRAY, METERED RESPIRATORY (INHALATION) at 17:43

## 2025-02-24 NOTE — OCCUPATIONAL THERAPY INITIAL EVALUATION ADULT - ADL RETRAINING, OT EVAL
GOAL: Pt will be min A with toileting in 4 weeks, GOAL: Patient will be independent with UB dressing within 4 weeks

## 2025-02-24 NOTE — OCCUPATIONAL THERAPY INITIAL EVALUATION ADULT - NS ASR FOLLOW COMMAND OT EVAL
75% of the time/able to follow single-step instructions +dysarthric speech/75% of the time/able to follow single-step instructions

## 2025-02-24 NOTE — OCCUPATIONAL THERAPY INITIAL EVALUATION ADULT - LIVES WITH, PROFILE
Lives in house with daughter 12 steps to negotiate Lives in house with daughter 5 steps to enter +bilateral handrail, bed/bath on 2nd floor + 12 steps, +shower chair,

## 2025-02-24 NOTE — CONSULT NOTE ADULT - ASSESSMENT
EKG - afib RVR    Echo: < from: TTE W or WO Ultrasound Enhancing Agent (03.28.24 @ 09:05) >  Left ventricular cavity is mildly dilated. Left ventricular wall thickness is normal. Left ventricular systolic function is mildly decreased. There are no regional wall motion abnormalities seen    < end of copied text >        1) Left LE weakness: Improved , F/U neuro recs , agree Echo unlikely to      2) Afib - chronic, c/w metoprolol. was taken off in the past AC 2/2 falls would c/t hold A/c     3) Diastolic  CHF euvolemic ok to hold Lasix 20 mg po daily

## 2025-02-24 NOTE — CONSULT NOTE ADULT - PROBLEM SELECTOR RECOMMENDATION 2
By hx  -Start Advair 250/50 BID, Spiriva qd (home med Trelegy Ellipta, can resume on discharge). No objections to pt using own Trelegy if brought in from home & verified by Pharmacy  -Duoneb q6h PRN  -Flonase, saline nasal spray ordered. By hx  -Start Advair 250/50 BID, Spiriva qd (home med Trelegy Ellipta, can resume on discharge). No objections to pt using own Trelegy if brought in from home & verified by Pharmacy  -Duoneb q6h PRN  -Singulair 10 mg PO qd  -Saline nasal spray ordered.

## 2025-02-24 NOTE — OCCUPATIONAL THERAPY INITIAL EVALUATION ADULT - VISUAL ASSESSMENT: EYE MUSCLE BALANCE
IVÁN CALL  : [id number] 16:35:44  ACCOUNT:  [id number]  HOME PHONE:  [phone]  WORK PHONE:             Patient calling office requesting an appointment with Dr. Rico or Issac regarding \" cough and fluid he has on his lungs.\" Last CXR done 2017 showed resolution of pleural fluid in right lung and small amount of pleural fluid in left lung. He states he has been off steroids for last two and half weeks. Continues to take Furosemide 40 mg BID. Patient notified I will discuss further with Dr. Rico and f/u with him after speaking with ANR. Patient v/u.  Gaby Rai RN BSN     normal

## 2025-02-24 NOTE — CONSULT NOTE ADULT - SUBJECTIVE AND OBJECTIVE BOX
Slick Up MD  Interventional Cardiology / Endovascular Specialist  [place] Office : [phone] [place] [address] [place] [address]  Tel:   [place] Office : [address] [facility] [place] [address]  Tel: [phone]    HISTORY OF PRESENTING ILLNESS:  89y old  man with a PMHx significant for Afib (not on AC due to falls and SDH), HTN, HLD, BPH, asthma, anemia, prior strokes, SDH who is BIBEMS left sided weakness and slurred speech. Per daughter, patient had runny nose and cough for the past few days but otherwise went to bed in usual state of health last night at 8AM. Family noticed symptoms (leaning to the left side) when patient woke up this morning and called EMS. She gave him aspirin 81mg this morning but patient is not on AC/AP due to history of SDH and multiple falls previously. They denied any recent fall or headstrike. Patient noted to be hypoxic and bradycardic to 30s in the field. Hypoxia and HR improved in the ED. He was noted to have left leg drift which is worse than baseline weakness per daughter. Patient usually ambulates with a cane.  	  MEDICATIONS:  metoprolol succinate ER 12.5 milliGRAM(s) Oral daily      albuterol/ipratropium for Nebulization 3 milliLiter(s) Nebulizer every 6 hours PRN  fluticasone propionate/ salmeterol 250-50 MICROgram(s) Diskus 1 Dose(s) Inhalation two times a day  montelukast 10 milliGRAM(s) Oral daily  tiotropium 2.5 MICROgram(s) Inhaler 2 Puff(s) Inhalation daily    gabapentin 100 milliGRAM(s) Oral at bedtime  QUEtiapine 25 milliGRAM(s) Oral at bedtime    pantoprazole    Tablet 40 milliGRAM(s) Oral before breakfast    atorvastatin 40 milliGRAM(s) Oral at bedtime    sodium chloride 0.65% Nasal 1 Spray(s) Both Nostrils every 6 hours PRN      PAST MEDICAL/SURGICAL HISTORY  PAST MEDICAL & SURGICAL HISTORY:  Afib      Asthmatic bronchitis , chronic      GERD (gastroesophageal reflux disease)      HLD (hyperlipidemia)      HTN (hypertension)      No significant past surgical history          SOCIAL HISTORY: Substance Use (street drugs): ( x ) never used  (  ) other:    FAMILY HISTORY:  FH: stroke (Mother, Sibling)        REVIEW OF SYSTEMS:  CONSTITUTIONAL: No fever, weight loss, or fatigue  EYES: No eye pain, visual disturbances, or discharge  ENMT:  No difficulty hearing, tinnitus, vertigo; No sinus or throat pain  BREASTS: No pain, masses, or nipple discharge  GASTROINTESTINAL: No abdominal or epigastric pain. No nausea, vomiting, or hematemesis; No diarrhea or constipation. No melena or hematochezia.  GENITOURINARY: No dysuria, frequency, hematuria, or incontinence  NEUROLOGICAL: No headaches, memory loss, loss of strength, numbness, or tremors  ENDOCRINE: No heat or cold intolerance; No hair loss  MUSCULOSKELETAL: No joint pain or swelling; No muscle, back, or extremity pain  PSYCHIATRIC: No depression, anxiety, mood swings, or difficulty sleeping  HEME/LYMPH: No easy bruising, or bleeding gums  All others negative    PHYSICAL EXAM:  T(C): 36.6 (02-24-25 @ 20:37), Max: 36.6 (02-24-25 @ 20:37)  HR: 90 (02-24-25 @ 20:37) (60 - 98)  BP: 109/58 (02-24-25 @ 20:37) (94/59 - 133/82)  RR: 18 (02-24-25 @ 20:37) (18 - 18)  SpO2: 98% (02-24-25 @ 20:37) (95% - 98%)  Wt(kg): --  I&O's Summary    23 Feb 2025 07:01  -  24 Feb 2025 07:00  --------------------------------------------------------  IN: 0 mL / OUT: 500 mL / NET: -500 mL        EYES:   PERRLA   ENMT:   Moist mucous membranes, Good dentition, No lesions  Cardiovascular: Normal S1 S2, No JVD, No murmurs, No edema  Respiratory: Lungs clear to auscultation	  Gastrointestinal:  Soft, Non-tender, + BS	  Extremities: no edema                                11.1   6.74  )-----------( 150      ( 24 Feb 2025 07:06 )             36.4     02-23    140  |  103  |  21  ----------------------------<  98  4.4   |  26  |  1.02    Ca    9.8      23 Feb 2025 13:53    TPro  6.9  /  Alb  3.6  /  TBili  0.7  /  DBili  x   /  AST  28  /  ALT  18  /  AlkPhos  96  02-23    proBNP:   Lipid Profile:   HgA1c:   TSH: Thyroid Stimulating Hormone, Serum: 2.07 uIU/mL (02-24 @ 07:03)      Consultant(s) Notes Reviewed:  [x ] YES  [ ] NO    Care Discussed with Consultants/Other Providers [ x] YES  [ ] NO    Imaging Personally Reviewed independently:  [x] YES  [ ] NO    All labs, radiologic studies, vitals, orders and medications list reviewed. Patient is seen and examined at bedside. Case discussed with medical team.               [Postpartum Follow Up] : postpartum follow up [Complications:___] : complications include: [unfilled] [Delivery Date: ___] : on [unfilled] [Repeat C/S] : delivered by  section (repeat) [Breastfeeding] : currently nursing [Clean/Dry/Intact] : clean, dry and intact [Erythema] : not erythematous [Swelling] : not swollen [Dehiscence] : not dehisced [Healed] : healed [None] : no vaginal bleeding [Doing Well] : is doing well [No Sign of Infection] : is showing no signs of infection [FreeTextEntry9] : Pt with wound dehiscence here today for wound check. Pt still receiving iv ertipenem. doing well. Pt completed 2 weeks of antibiotics. No more abx needed. Pt had blood drawn by vns.  [de-identified] : pumping [de-identified] : Uterine incision completely healed. NO drainage from wound. Midline iv removed [de-identified] : Pt to [place] in 2 weeks for post partum. would do ct/hsg 2 month post partum. [place] May not come for post partum visit. Risks of traveling prior to complete evaluation discussed with patient

## 2025-02-24 NOTE — CONSULT NOTE ADULT - NS ATTEND AMEND GEN_ALL_CORE FT
pt seen and examined and dw pts daughter at bedside too :  h&p reviewed:  agree with above: ;  he is not in any resp distress:  he is on room air at this time  cxr with small layering elver effusions:  does not look infected to me:  has asthma:  but no wheezing:  cont current rx:  will follow his o2 saturation closely:

## 2025-02-24 NOTE — CONSULT NOTE ADULT - SUBJECTIVE AND OBJECTIVE BOX
Pulmonary Consult   25 @ 11:37    Patient is a 89y old  Male who presents with a chief complaint of leg weakness (2025 18:57)      HPI: 89 y/o M with a PMH of Afib (not on AC due to falls and SDH), HTN, HLD, BPH, asthma, anemia, prior strokes, SDH. Presents via EMS for L sided weakness and slurred speech, noted to be hypoxic and bradycardic in the field which improved in the ED. CT brain with small left frontoparietal acute/recent subdural hematoma. CXR with layering b/l effusions/atelectasis. Pulmonary called to consult for further evaluation.         ?FOLLOWING PRESENT  [ ] Hx of PE/DVT, [ ] Hx COPD, [ ] Hx of Asthma, [ ] Hx of Hospitalization, [ ]  Hx of BiPAP/CPAP use, [ ] Hx of ANNAMARIE    Allergies    No Known Allergies    Intolerances        PAST MEDICAL & SURGICAL HISTORY:  Afib      Asthmatic bronchitis , chronic      GERD (gastroesophageal reflux disease)      HLD (hyperlipidemia)      HTN (hypertension)      No significant past surgical history          FAMILY HISTORY:  FH: stroke (Mother, Sibling)        Social History: [  ] TOBACCO                  [  ] ETOH                                 [  ] IVDA/DRUGS    REVIEW OF SYSTEMS      General:	    Skin/Breast:  	  Ophthalmologic:  	  ENMT:	    Respiratory and Thorax:  	  Cardiovascular:	    Gastrointestinal:	    Genitourinary:	    Musculoskeletal:	    Neurological:	    Psychiatric:	    Hematology/Lymphatics:	    Endocrine:	    Allergic/Immunologic:	    MEDICATIONS  (STANDING):  atorvastatin 40 milliGRAM(s) Oral at bedtime  furosemide    Tablet 20 milliGRAM(s) Oral daily  gabapentin 100 milliGRAM(s) Oral at bedtime  metoprolol succinate ER 12.5 milliGRAM(s) Oral daily  pantoprazole    Tablet 40 milliGRAM(s) Oral before breakfast  QUEtiapine 25 milliGRAM(s) Oral at bedtime  tamsulosin 0.8 milliGRAM(s) Oral at bedtime    MEDICATIONS  (PRN):       Vital Signs Last 24 Hrs  T(C): 36.3 (2025 05:01), Max: 37 (2025 14:18)  T(F): 97.4 (2025 05:01), Max: 98.6 (2025 14:18)  HR: 60 (2025 09:37) (60 - 120)  BP: 104/58 (2025 09:37) (104/58 - 160/84)  BP(mean): 103 (2025 18:57) (103 - 111)  RR: 18 (2025 05:01) (17 - 18)  SpO2: 95% (2025 09:37) (95% - 100%)    Parameters below as of 2025 09:37  Patient On (Oxygen Delivery Method): room air    Orthostatic VS          I&O's Summary    2025 07:01  -  2025 07:00  --------------------------------------------------------  IN: 0 mL / OUT: 500 mL / NET: -500 mL        Physical Exam:   GENERAL: NAD, well-groomed, well-developed  HEENT: KATTY/   Atraumatic, Normocephalic  ENMT: No tonsillar erythema, exudates, or enlargement; Moist mucous membranes, Good dentition, No lesions  NECK: Supple, No JVD, Normal thyroid  CHEST/LUNG: Clear to auscultation bilaterally; No rales, rhonchi, wheezing, or rubs  CVS: Regular rate and rhythm; No murmurs, rubs, or gallops  GI: : Soft, Nontender, Nondistended; Bowel sounds present  NERVOUS SYSTEM:  Alert & Oriented X3, Good concentration; Motor Strength 5/5 B/L upper and lower extremities; DTRs 2+ intact and symmetric  EXTREMITIES:  2+ Peripheral Pulses, No clubbing, cyanosis, or edema  LYMPH: No lymphadenopathy noted  SKIN: No rashes or lesions  ENDOCRINOLOGY: No Thyromegaly  PSYCH: Appropriate    Labs:  7.2<54<4>>19<<7.405>>7.2<<3><<4><<5<<199>>                            11.1   6.74  )-----------( 150      ( 2025 07:06 )             36.4                         10.6   6.36  )-----------( 174      ( 2025 13:53 )             34.8     02-23    140  |  103  |  21  ----------------------------<  98  4.4   |  26  |  1.02    Ca    9.8      2025 13:53    TPro  6.9  /  Alb  3.6  /  TBili  0.7  /  DBili  x   /  AST  28  /  ALT  18  /  AlkPhos  96  -    CAPILLARY BLOOD GLUCOSE  88 (2025 13:51)      POCT Blood Glucose.: 88 mg/dL (2025 13:38)    LIVER FUNCTIONS - ( 2025 13:53 )  Alb: 3.6 g/dL / Pro: 6.9 g/dL / ALK PHOS: 96 U/L / ALT: 18 U/L / AST: 28 U/L / GGT: x           PT/INR - ( 2025 13:53 )   PT: 11.8 sec;   INR: 1.03 ratio         PTT - ( 2025 13:53 )  PTT:32.8 sec  Urinalysis Basic - ( 2025 17:00 )    Color: Yellow / Appearance: Clear / S.025 / pH: x  Gluc: x / Ketone: Negative mg/dL  / Bili: Negative / Urobili: 0.2 mg/dL   Blood: x / Protein: Negative mg/dL / Nitrite: Negative   Leuk Esterase: Negative / RBC: x / WBC x   Sq Epi: x / Non Sq Epi: x / Bacteria: x            Studies  Chest X-RAY  < from: Xray Chest 1 View- PORTABLE-Urgent (25 @ 15:17) >    ACC: [id number] EXAM:  XR CHEST PORTABLE URGENT 1V   ORDERED BY:  CLAUDETTE BLAND     PROCEDURE DATE:  2025          INTERPRETATION:  EXAMINATION: XR CHEST URGENT    CLINICAL INDICATION: Fever    TECHNIQUE: Single frontal portable view of the chest from 2025 3:17   PM    COMPARISON: Chest x-ray 2024.    FINDINGS:    The heart size is enlarged.  Bibasilar hazy opacities suggestive of atelectasis or layering effusions.  There is no pneumothorax.    IMPRESSION:  Atelectasis or layering bilateral pleural effusions.    --- End of Report ---    < end of copied text >                     Pulmonary Consult   25 @ 11:37    Patient is a 89y old  Male who presents with a chief complaint of leg weakness (2025 18:57)      HPI: 89 y/o M with a PMH of Afib (not on AC due to falls and SDH), HTN, HLD, BPH, asthma, anemia, prior strokes, SDH. Presents via EMS for L sided weakness and slurred speech, noted to be hypoxic and bradycardic in the field which improved in the ED. CT brain with small left frontoparietal acute/recent subdural hematoma. CXR with layering b/l effusions/atelectasis. Pulmonary called to consult for further evaluation. History limited from patient but pt's daughter at bedside assisting. Never smoker. +Hx of asthma, not on home O2, home med Trelegy Ellipta w/ PRN Albuterol, Flonase. Reports that pt had episode of hypoxia which has now resolved. Denies CP/SOB. O2 sats 97% RA.        ?FOLLOWING PRESENT  [x ] Hx of PE/DVT, [x ] Hx COPD, [y ] Hx of Asthma, [ x] Hx of Hospitalization, [x ]  Hx of BiPAP/CPAP use, [x ] Hx of ANNAMARIE    Allergies    No Known Allergies    Intolerances        PAST MEDICAL & SURGICAL HISTORY:  Afib      Asthmatic bronchitis , chronic      GERD (gastroesophageal reflux disease)      HLD (hyperlipidemia)      HTN (hypertension)      No significant past surgical history          FAMILY HISTORY:  FH: stroke (Mother, Sibling)        Social History: [  x] TOBACCO                  [ x ] ETOH                                 [x  ] IVDA/DRUGS    REVIEW OF SYSTEMS      General:	as above    Skin/Breast: x  	  Ophthalmologic:  x  	  ENMT:	x    Respiratory and Thorax: 	as above  	  Cardiovascular:	 x    Gastrointestinal:	 x    Genitourinary:	x    Musculoskeletal:	     Neurological:		as above    Psychiatric:	 x    Hematology/Lymphatics:	 x    Endocrine:	 x    Allergic/Immunologic:	 x    MEDICATIONS  (STANDING):  atorvastatin 40 milliGRAM(s) Oral at bedtime  furosemide    Tablet 20 milliGRAM(s) Oral daily  gabapentin 100 milliGRAM(s) Oral at bedtime  metoprolol succinate ER 12.5 milliGRAM(s) Oral daily  pantoprazole    Tablet 40 milliGRAM(s) Oral before breakfast  QUEtiapine 25 milliGRAM(s) Oral at bedtime  tamsulosin 0.8 milliGRAM(s) Oral at bedtime    MEDICATIONS  (PRN):       Vital Signs Last 24 Hrs  T(C): 36.3 (2025 05:01), Max: 37 (2025 14:18)  T(F): 97.4 (2025 05:01), Max: 98.6 (2025 14:18)  HR: 60 (2025 09:37) (60 - 120)  BP: 104/58 (2025 09:37) (104/58 - 160/84)  BP(mean): 103 (2025 18:57) (103 - 111)  RR: 18 (2025 05:01) (17 - 18)  SpO2: 95% (2025 09:37) (95% - 100%)    Parameters below as of 2025 09:37  Patient On (Oxygen Delivery Method): room air    Orthostatic VS          I&O's Summary    2025 07:01  -  2025 07:00  --------------------------------------------------------  IN: 0 mL / OUT: 500 mL / NET: -500 mL        Physical Exam:   GENERAL: NAD, well-groomed, well-developed  HEENT: KATTY/   Atraumatic, Normocephalic  ENMT: No tonsillar erythema, exudates, or enlargement; Moist mucous membranes, Good dentition, No lesions  NECK: Supple, No JVD, Normal thyroid  CHEST/LUNG: Clear to auscultation bilaterally  CVS: Regular rate and rhythm; No murmurs, rubs, or gallops  GI: : Soft, Nontender, Nondistended; Bowel sounds present  NERVOUS SYSTEM:  Alert & Oriented X1-2  EXTREMITIES:  2+ Peripheral Pulses, No clubbing, cyanosis, or edema  LYMPH: No lymphadenopathy noted  SKIN: No rashes or lesions  ENDOCRINOLOGY: No Thyromegaly  PSYCH: Appropriate    Labs:  7.2<54<4>>19<<7.405>>7.2<<3><<4><<5<<199>>                            11.1   6.74  )-----------( 150      ( 2025 07:06 )             36.4                         10.6   6.36  )-----------( 174      ( 2025 13:53 )             34.8         140  |  103  |  21  ----------------------------<  98  4.4   |  26  |  1.02    Ca    9.8      2025 13:53    TPro  6.9  /  Alb  3.6  /  TBili  0.7  /  DBili  x   /  AST  28  /  ALT  18  /  AlkPhos  96      CAPILLARY BLOOD GLUCOSE  88 (2025 13:51)      POCT Blood Glucose.: 88 mg/dL (2025 13:38)    LIVER FUNCTIONS - ( 2025 13:53 )  Alb: 3.6 g/dL / Pro: 6.9 g/dL / ALK PHOS: 96 U/L / ALT: 18 U/L / AST: 28 U/L / GGT: x           PT/INR - ( 2025 13:53 )   PT: 11.8 sec;   INR: 1.03 ratio         PTT - ( 2025 13:53 )  PTT:32.8 sec  Urinalysis Basic - ( 2025 17:00 )    Color: Yellow / Appearance: Clear / S.025 / pH: x  Gluc: x / Ketone: Negative mg/dL  / Bili: Negative / Urobili: 0.2 mg/dL   Blood: x / Protein: Negative mg/dL / Nitrite: Negative   Leuk Esterase: Negative / RBC: x / WBC x   Sq Epi: x / Non Sq Epi: x / Bacteria: x            Studies  Chest X-RAY  < from: Xray Chest 1 View- PORTABLE-Urgent (25 @ 15:17) >    ACC: [id number] EXAM:  XR CHEST PORTABLE URGENT 1V   ORDERED BY:  CLAUDETTE BLAND     PROCEDURE DATE:  2025          INTERPRETATION:  EXAMINATION: XR CHEST URGENT    CLINICAL INDICATION: Fever    TECHNIQUE: Single frontal portable view of the chest from 2025 3:17   PM    COMPARISON: Chest x-ray 2024.    FINDINGS:    The heart size is enlarged.  Bibasilar hazy opacities suggestive of atelectasis or layering effusions.  There is no pneumothorax.    IMPRESSION:  Atelectasis or layering bilateral pleural effusions.    --- End of Report ---    < end of copied text >

## 2025-02-24 NOTE — PHYSICAL THERAPY INITIAL EVALUATION ADULT - NSPTDISCHREC_GEN_A_CORE
If pt d/c home, pt would require Home PT, assist with ALL mobility, 24/7 supervision & DME: rolling walker, 3:1 commode, transport (polyfly) wheelchair, hospital bed/Sub-acute Rehab

## 2025-02-24 NOTE — PHYSICAL THERAPY INITIAL EVALUATION ADULT - TRANSFER TRAINING, PT EVAL
GOAL: Pt will perform all transfers with supervision using the least restrictive assistive device in 2 weeks.

## 2025-02-24 NOTE — OCCUPATIONAL THERAPY INITIAL EVALUATION ADULT - NSOTDISCHREC_GEN_A_CORE
however patient prefers home, recommend supervision/assist with all functional activity and home OT/Sub-acute Rehab

## 2025-02-24 NOTE — CONSULT NOTE ADULT - PROBLEM SELECTOR RECOMMENDATION 9
-Reportedly episode of hypoxia prior to arrival, now resolved. Seen on RA with O2 sats 97-99%. Pt with no c/o dyspnea  -CXR with mild congestion, small layering effusions/atelectasis  -Keep O>I as tolerated  -Keep sats >90% with O2 PRN.

## 2025-02-24 NOTE — PHYSICAL THERAPY INITIAL EVALUATION ADULT - PERTINENT HX OF CURRENT PROBLEM, REHAB EVAL
Pt is a 89 year old male with PMH significant for Afib (not on AC due to falls and SDH), HTN, HLD, BPH, asthma, anemia, prior strokes, SDH.  Pt BIBEMS left sided weakness and slurred speech. Per daughter, patient had runny nose and cough for the past few days but otherwise went to bed in usual state of health last night at 8AM. Family noticed symptoms (leaning to the left side) when patient woke up this morning and called EMS. She gave him aspirin 81mg this morning but patient is not on AC/AP due to history of SDH and multiple falls previously. They denied any recent fall or headstrike. Patient noted to be hypoxic and bradycardic to 30s in the field. Hypoxia and HR improved in the ED. He was noted to have left leg drift which is worse than baseline weakness per daughter. Patient usually ambulates with a cane.  Code stroke called in ED.  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 No recent falls/headstrike per family   Left lower extremity weakness likely 2/2 acute ischemic stroke, found to have high grade stenosis vs occlusion of distal right A2 branch of the SUYAPA. Mechanism is likely cardioembolic in setting of known Afib, patient has been off anticoagulation due to hx of SDH and multiple falls. No inpatient stroke workup recommended at this time due to likely no change in management. He may benefit from an antiplatelet for secondary stroke prevention, will recommend to have neurosurgery clear patient for antithrombotic therapy.  Per neurosurgery, no neurosurgical intervention, given SDH significantly improved c/t 4/2024 no absolute neurosurgical contraindication to AP/AC for strokes i/s/o Afib, however given hx of multiple falls and SDH, there exists a risk of worsening intracranial hemorrhage, which can be associated with significant morbidity and mortality.  CXR with layering b/l effusions/atelectasis. Pulmonary called to consult for further evaluation.   CTA NECK (2/23):  No evidence of significant stenosis or occlusion.  CTA HEAD: Critical stenosis/filling defect is seen in the distal right A2 branch of the SUYAPA without significant distal perfusion. No evidence of aneurysm. Tiny aneurysms can be beyond the resolution of CTA technique.  CXR(2/23): Atelectasis or layering bilateral pleural effusions.

## 2025-02-24 NOTE — OCCUPATIONAL THERAPY INITIAL EVALUATION ADULT - PERTINENT HX OF CURRENT PROBLEM, REHAB EVAL
89y old M prior strokes, SDH who is BIBEMS left sided weakness and slurred speech. Per daughter, patient had runny nose and cough for the past few days but otherwise went to bed in usual state of health last night at 8AM. Family noticed symptoms (leaning to the left side) when patient woke up this morning and called EMS. She gave him aspirin 81mg this morning but patient is not on AC/AP due to history of SDH and multiple falls previously. They denied any recent fall or headstrike. Patient noted to be hypoxic and bradycardic to 30s in the field. Hypoxia and HR improved in the ED. He was noted to have left leg drift which is worse than baseline weakness per daughter. Patient usually ambulates with a cane.  CT Brain Small left frontoparietal acute/recent subdural hematoma.Multiple cerebral areas of encephalomalacia. Extensive white matter small vessel ischemic disease.

## 2025-02-24 NOTE — OCCUPATIONAL THERAPY INITIAL EVALUATION ADULT - ADDITIONAL COMMENTS
with patient
**Pt has HHA 12hr/day 7 days a week, pt independent dressing self, walking around with cane, however requires supervision. Per pt daughter pt has rolling walker, wheelchair at home not currently using

## 2025-02-24 NOTE — PHYSICAL THERAPY INITIAL EVALUATION ADULT - ADDITIONAL COMMENTS
Pt has HHA 12hr/day 7 days a week, pt independent dressing self, walking around with cane, however requires supervision. Per pt daughter pt has rolling walker, wheelchair at home not currently using

## 2025-02-24 NOTE — CONSULT NOTE ADULT - ASSESSMENT
89 y/o M with a PMH of Afib (not on AC due to falls and SDH), HTN, HLD, BPH, asthma, anemia, prior strokes, SDH. Presents via EMS for L sided weakness and slurred speech, noted to be hypoxic and bradycardic in the field which improved in the ED. CT brain with small left frontoparietal acute/recent subdural hematoma. CXR with layering b/l effusions/atelectasis. Pulmonary called to consult for further evaluation.

## 2025-02-24 NOTE — OCCUPATIONAL THERAPY INITIAL EVALUATION ADULT - DIAGNOSIS, OT EVAL
Patient presents with decreased balance, strength, cognition (baseline) endurance impacting ability to perform ADLs and functional mobility

## 2025-02-25 ENCOUNTER — TRANSCRIPTION ENCOUNTER (OUTPATIENT)
Age: 89
End: 2025-02-25

## 2025-02-25 LAB
ANION GAP SERPL CALC-SCNC: 10 MMOL/L — SIGNIFICANT CHANGE UP (ref 5–17)
BUN SERPL-MCNC: 31 MG/DL — HIGH (ref 7–23)
CALCIUM SERPL-MCNC: 8.8 MG/DL — SIGNIFICANT CHANGE UP (ref 8.4–10.5)
CHLORIDE SERPL-SCNC: 102 MMOL/L — SIGNIFICANT CHANGE UP (ref 96–108)
CHLORIDE UR-SCNC: 33 MMOL/L — SIGNIFICANT CHANGE UP
CO2 SERPL-SCNC: 26 MMOL/L — SIGNIFICANT CHANGE UP (ref 22–31)
CREAT ?TM UR-MCNC: 98 MG/DL — SIGNIFICANT CHANGE UP
CREAT SERPL-MCNC: 1.56 MG/DL — HIGH (ref 0.5–1.3)
EGFR: 40 ML/MIN/1.73M2 — LOW
GLUCOSE SERPL-MCNC: 107 MG/DL — HIGH (ref 70–99)
HCT VFR BLD CALC: 28.1 % — LOW (ref 39–50)
HGB BLD-MCNC: 8.9 G/DL — LOW (ref 13–17)
MCHC RBC-ENTMCNC: 24.1 PG — LOW (ref 27–34)
MCHC RBC-ENTMCNC: 31.7 G/DL — LOW (ref 32–36)
MCV RBC AUTO: 75.9 FL — LOW (ref 80–100)
NRBC BLD AUTO-RTO: 0 /100 WBCS — SIGNIFICANT CHANGE UP (ref 0–0)
OSMOLALITY UR: 486 MOS/KG — SIGNIFICANT CHANGE UP (ref 300–900)
PLATELET # BLD AUTO: 139 K/UL — LOW (ref 150–400)
POTASSIUM SERPL-MCNC: 3.7 MMOL/L — SIGNIFICANT CHANGE UP (ref 3.5–5.3)
POTASSIUM SERPL-SCNC: 3.7 MMOL/L — SIGNIFICANT CHANGE UP (ref 3.5–5.3)
POTASSIUM UR-SCNC: 42 MMOL/L — SIGNIFICANT CHANGE UP
PROT ?TM UR-MCNC: 9 MG/DL — SIGNIFICANT CHANGE UP (ref 0–12)
PROT/CREAT UR-RTO: 0.1 RATIO — SIGNIFICANT CHANGE UP (ref 0–0.2)
RBC # BLD: 3.7 M/UL — LOW (ref 4.2–5.8)
RBC # FLD: 20.6 % — HIGH (ref 10.3–14.5)
SODIUM SERPL-SCNC: 138 MMOL/L — SIGNIFICANT CHANGE UP (ref 135–145)
SODIUM UR-SCNC: 50 MMOL/L — SIGNIFICANT CHANGE UP
UUN UR-MCNC: 792 MG/DL — SIGNIFICANT CHANGE UP
WBC # BLD: 5.94 K/UL — SIGNIFICANT CHANGE UP (ref 3.8–10.5)
WBC # FLD AUTO: 5.94 K/UL — SIGNIFICANT CHANGE UP (ref 3.8–10.5)

## 2025-02-25 PROCEDURE — 76770 US EXAM ABDO BACK WALL COMP: CPT | Mod: 26

## 2025-02-25 RX ORDER — SENNA 187 MG
2 TABLET ORAL AT BEDTIME
Refills: 0 | Status: DISCONTINUED | OUTPATIENT
Start: 2025-02-25 | End: 2025-02-26

## 2025-02-25 RX ADMIN — GABAPENTIN 100 MILLIGRAM(S): 400 CAPSULE ORAL at 21:02

## 2025-02-25 RX ADMIN — Medication 1 DOSE(S): at 05:12

## 2025-02-25 RX ADMIN — METOPROLOL SUCCINATE 12.5 MILLIGRAM(S): 50 TABLET, EXTENDED RELEASE ORAL at 05:11

## 2025-02-25 RX ADMIN — QUETIAPINE FUMARATE 25 MILLIGRAM(S): 25 TABLET ORAL at 21:02

## 2025-02-25 RX ADMIN — Medication 1 DOSE(S): at 17:31

## 2025-02-25 RX ADMIN — TIOTROPIUM BROMIDE INHALATION SPRAY 2 PUFF(S): 3.12 SPRAY, METERED RESPIRATORY (INHALATION) at 09:17

## 2025-02-25 RX ADMIN — Medication 40 MILLIGRAM(S): at 05:12

## 2025-02-25 RX ADMIN — ATORVASTATIN CALCIUM 40 MILLIGRAM(S): 80 TABLET, FILM COATED ORAL at 21:02

## 2025-02-25 RX ADMIN — MONTELUKAST SODIUM 10 MILLIGRAM(S): 10 TABLET ORAL at 09:17

## 2025-02-25 RX ADMIN — Medication 2 TABLET(S): at 21:50

## 2025-02-25 NOTE — DISCHARGE NOTE PROVIDER - NSFOLLOWUPCLINICS_GEN_ALL_ED_FT
A Urologist  Urology  .  [place]   Phone:   Fax:      A Urologist  Urology  .  [place]   Phone:   Fax:     [facility] [place]  Urology  [address] [place] [address], [place] [address]  Phone: [phone]  Fax:

## 2025-02-25 NOTE — CONSULT NOTE ADULT - SUBJECTIVE AND OBJECTIVE BOX
[facility]  - Ans Serv [phone], Office [phone]  Dr Restrepo/Dr Lane /Dr Chris to /Dr HELDER Webb/Dr Genaro Mcghee/Dr Rajinder Horne /Dr VÍCTOR Valdez  _______________________________________________________________________________________________  The patient seen and examined today.  HPI:  Patient is a 89 year old male with PMH of Afib (not on AC due to falls and SDH), HTN, HLD, BPH, asthma, anemia, prior strokes, SDH who is BIBEMS left sided weakness and slurred speech. The nephrology team was consulted for SAM. The patient was seen and examined earlier today. The patient reports he has been doing okay, denied any chest pain, shortness of breath, nausea, vomiting or abdominal pain.         PAST MEDICAL & SURGICAL HISTORY:  Afib      Asthmatic bronchitis , chronic      GERD (gastroesophageal reflux disease)      HLD (hyperlipidemia)      HTN (hypertension)      No significant past surgical history        Allergies :- No Known Allergies    Home Medications Reviewed  Hospital Medications:   MEDICATIONS  (STANDING):  atorvastatin 40 milliGRAM(s) Oral at bedtime  fluticasone propionate/ salmeterol 250-50 MICROgram(s) Diskus 1 Dose(s) Inhalation two times a day  gabapentin 100 milliGRAM(s) Oral at bedtime  metoprolol succinate ER 12.5 milliGRAM(s) Oral daily  montelukast 10 milliGRAM(s) Oral daily  pantoprazole    Tablet 40 milliGRAM(s) Oral before breakfast  QUEtiapine 25 milliGRAM(s) Oral at bedtime  tiotropium 2.5 MICROgram(s) Inhaler 2 Puff(s) Inhalation daily    SOCIAL HISTORY:  Denies ETOh,Smoking,   FAMILY HISTORY:  FH: stroke (Mother, Sibling)        REVIEW OF SYSTEMS:  All negative except as mentioned above.     VITALS:  T(F): 98.2 (02-25-25 @ 04:42), Max: 98.2 (02-25-25 @ 04:42)  HR: 66 (02-25-25 @ 04:42)  BP: 122/78 (02-25-25 @ 04:42)  RR: 18 (02-25-25 @ 04:42)  SpO2: 99% (02-25-25 @ 04:42)  Wt(kg): --    02-24 @ 07:01  -  02-25 @ 07:00  --------------------------------------------------------  IN: 220 mL / OUT: 150 mL / NET: 70 mL          PHYSICAL EXAM:  Constitutional: NAD  HEENT: anicteric sclera, oropharynx clear, MMM  Respiratory: Bilateral equal breath sounds , no wheezes, no crackles  Cardiovascular: S1, S2, Regular  Gastrointestinal: Bowel Sound present, soft, NT/ND  Extremities: No peripheral edema  Neurological: Alert and oriented x 3  Psychiatric: Normal mood, normal affect    Data:  02-25    138  |  102  |  31[H]  ----------------------------<  107[H]  3.7   |  26  |  1.56[H]    Ca    8.8      25 Feb 2025 07:05    TPro  6.9  /  Alb  3.6  /  TBili  0.7  /  DBili      /  AST  28  /  ALT  18  /  AlkPhos  96  02-23    Creatinine Trend: 1.56 <--, 1.02 <--                        8.9    5.94  )-----------( 139      ( 25 Feb 2025 07:09 )             28.1     Urine Studies:  Urinalysis Basic - ( 25 Feb 2025 07:05 )    Color:  / Appearance:  / SG:  / pH:   Gluc: 107 mg/dL / Ketone:   / Bili:  / Urobili:    Blood:  / Protein:  / Nitrite:    Leuk Esterase:  / RBC:  / WBC    Sq Epi:  / Non Sq Epi:  / Bacteria:

## 2025-02-25 NOTE — DISCHARGE NOTE PROVIDER - NSDCMRMEDTOKEN_GEN_ALL_CORE_FT
atorvastatin 40 mg oral tablet: 1 tab(s) orally once a day  Flomax 0.4 mg oral capsule: 2 cap(s) orally once a day  gabapentin 100 mg oral capsule: 1 cap(s) orally once a day (at bedtime)  Lasix 20 mg oral tablet: 1 tab(s) orally once a day  metoprolol succinate 25 mg oral tablet, extended release: 0.5 tab(s) orally once a day  pantoprazole 40 mg oral delayed release tablet: 1 tab(s) orally once a day  Seroquel 25 mg oral tablet: 1 tab(s) orally once a day (at bedtime)  Trelegy Ellipta 200 mcg-62.5 mcg-25 mcg/inh inhalation powder: 1 puff(s) inhaled once a day   atorvastatin 40 mg oral tablet: 1 tab(s) orally once a day  Flomax 0.4 mg oral capsule: 2 cap(s) orally once a day  gabapentin 100 mg oral capsule: 1 cap(s) orally once a day (at bedtime)  metoprolol succinate 25 mg oral tablet, extended release: 0.5 tab(s) orally once a day  pantoprazole 40 mg oral delayed release tablet: 1 tab(s) orally once a day  senna leaf extract oral tablet: 2 tab(s) orally once a day (at bedtime) As needed Constipation  Seroquel 25 mg oral tablet: 1 tab(s) orally once a day (at bedtime)  sodium chloride 0.65% nasal spray: 1 spray(s) nasal every 6 hours as needed for  dry nasal passages  Trelegy Ellipta 200 mcg-62.5 mcg-25 mcg/inh inhalation powder: 1 puff(s) inhaled once a day   albuterol 90 mcg/inh inhalation aerosol: 2 puff(s) inhaled every 6 hours as needed for Shortness of Breath and/or Wheezing  atorvastatin 40 mg oral tablet: 1 tab(s) orally once a day  Flomax 0.4 mg oral capsule: 2 cap(s) orally once a day  gabapentin 100 mg oral capsule: 1 cap(s) orally once a day (at bedtime)  metoprolol succinate 25 mg oral tablet, extended release: 0.5 tab(s) orally once a day  montelukast 10 mg oral tablet: 1 tab(s) orally once a day  pantoprazole 40 mg oral delayed release tablet: 1 tab(s) orally once a day  PT/OT at home: ICD 10: Z74  senna leaf extract oral tablet: 2 tab(s) orally once a day (at bedtime) As needed Constipation  Seroquel 25 mg oral tablet: 1 tab(s) orally once a day (at bedtime)  sodium chloride 0.65% nasal spray: 1 spray(s) nasal every 6 hours as needed for  dry nasal passages  Trelegy Ellipta 200 mcg-62.5 mcg-25 mcg/inh inhalation powder: 1 puff(s) inhaled once a day

## 2025-02-25 NOTE — DISCHARGE NOTE PROVIDER - PROVIDER TOKENS
PROVIDER:[TOKEN:[[id number]],FOLLOWUP:[1 week]],PROVIDER:[TOKEN:[[id number]],FOLLOWUP:[1 week]]

## 2025-02-25 NOTE — DISCHARGE NOTE PROVIDER - NSDCCPCAREPLAN_GEN_ALL_CORE_FT
PRINCIPAL DISCHARGE DIAGNOSIS  Diagnosis: SDH (subdural hematoma)  Assessment and Plan of Treatment: Neurology Follow-up: You will need close follow-up with a neurologist to monitor the SDH. Be aware of and immediately report any new or worsening neurological symptoms, such as increased weakness, changes in speech, headache, dizziness, or confusion.  Fall Precautions: Due to your history of falls and SDH, take precautions to prevent further falls. This includes removing tripping hazards in your home, ensuring adequate lighting, using assistive devices (cane/walker), and getting help with activities as needed.  Medication Review: Your current medications were reviewed and no new anticoagulants or antiplatelet agents were started due to your fall risk and history of SDH. Discuss the risks and benefits of anticoagulation for atrial fibrillation with your PCP and neurologist at follow-up.  EEG: You will need a routine EEG as an outpatient. Schedule this test as directed by your physician.      SECONDARY DISCHARGE DIAGNOSES  Diagnosis: BPH without urinary obstruction  Assessment and Plan of Treatment: Continue to follow up with your urologist    Diagnosis: Asthma  Assessment and Plan of Treatment: Medications: You will continue trelegy ellipta and, Singulairas precribed, and using saline nasal spray. You also have an albuterol inhaler to use as needed for shortness of breath or wheezing.  Be sure you understand the proper use of your inhalers.  Monitoring: Monitor for any worsening respiratory symptoms, such as increased shortness of breath, wheezing, or cough. Report any concerns to your physician    Diagnosis: SAM (acute kidney injury)  Assessment and Plan of Treatment: Hydration: Drink plenty of fluids as directed by your physician to maintain good kidney function.  Diet: Follow any dietary recommendations provided by your physician or a dietitian, especially regarding fluid and salt intake.  Monitoring: Monitor your urine output and report any significant decrease to your physician. Also, be aware of any signs of fluid overload, such as swelling in your legs or shortness of breath.  Medication Management: Your furosemide (Lasix) is being held at discharge but may be restarted by your PCP if needed. Ensure all your other medications are appropriately dosed based on your kidney function. Review all medications with your physician or pharmacist before discharge.

## 2025-02-25 NOTE — DISCHARGE NOTE PROVIDER - NSDCFUADDAPPT_GEN_ALL_CORE_FT
APPTS ARE READY TO BE MADE: [X] YES    Best Family or Patient Contact (if needed):    Additional Information about above appointments (if needed):    1:   2:   3:     Other comments or requests:    APPTS ARE READY TO BE MADE: [X] YES    Best Family or Patient Contact (if needed):    Additional Information about above appointments (if needed):    1:   2:   3:     Other comments or requests:   Patient is being discharged to rehab/hospice. Caregiver will arrange follow up.

## 2025-02-25 NOTE — DISCHARGE NOTE PROVIDER - HOSPITAL COURSE
HPI:  89y old  man with a PMHx significant for Afib (not on AC due to falls and SDH), HTN, HLD, BPH, asthma, anemia, prior strokes, SDH who is BIBEMS left sided weakness and slurred speech. Per daughter, patient had runny nose and cough for the past few days but otherwise went to bed in usual state of health last night at 8AM. Family noticed symptoms (leaning to the left side) when patient woke up this morning and called EMS. She gave him aspirin 81mg this morning but patient is not on AC/AP due to history of SDH and multiple falls previously. They denied any recent fall or headstrike. Patient noted to be hypoxic and bradycardic to 30s in the field. Hypoxia and HR improved in the ED. He was noted to have left leg drift which is worse than baseline weakness per daughter. Patient usually ambulates with a cane. (23 Feb 2025 18:57)    Hospital Course: CT of the head revealed a small left frontoparietal acute/recent subdural hematoma. 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     Patient hypoxia, reportedly present prior to arrival, resolved. CXR  showed mild congestion, small layering effusions, and atelectasis. Pulmonary was consulted. Patient asthma was managed with Advair 250/50 twice daily, Spiriva daily (home medication was fluticasone furoate/umeclidinium/vilanterol (Trelegy Ellipta), which could be resumed upon discharge; patient could use personal Trelegy from home if verified by pharmacy), Duoneb every 6 hours as needed, Singulair 10 mg by mouth daily, and saline nasal spray.     Patient subdural hematoma (SDH) was evaluated by neurology. CT of the head showed significant improvement in SDH since April 2024, now measuring 5 mm. 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 On exam, patient was alert and oriented to person and place, followed commands, had no pronator drift, no facial droop, no motor asymmetry, and had strong and symmetric movements of all extremities. No neurosurgical intervention was indicated. Antiepileptic drugs (AEDs) and magnetic resonance imaging (MRI) of the brain were to be performed per neurology recommendations. Given significant improvement in SDH compared to April 2024, there was no absolute neurosurgical contraindication to antiplatelet or anticoagulation therapy for stroke in the setting of atrial fibrillation. However, given patient history of multiple falls and SDH, there was a risk of worsening intracranial hemorrhage. Risks and benefits of anticoagulation/antiplatelet therapy were to be weighed by the primary team. If anticoagulation was initiated, heparin drip with a goal partial thromboplastin time (PTT) of 50-70 seconds (no bolus) would be recommended, as well as repeat CT of the head when therapeutic or with any change in neurologic status. Routine electroencephalogram (EEG) was recommended. Patient acute kidney injury (SAM), likely from hemodynamics and ischemia, was noted. Hypotension and an acute drop in hemoglobin were noted. Patient baseline creatinine was 1.0 mg/dL. Creatinine trended down from 1.56 mg/dL to 1.02 mg/dL. Patient was euvolemic on exam. The plan for SAM included checking urine electrolytes, renal ultrasound, encouraging oral intake, monitoring for urinary retention with bladder scanning, monitoring labs and urine output, avoiding nephrotoxins, and dosing medications based on estimated glomerular filtration rate (eGFR).      Important Medication Changes and Reason:    Active or Pending Issues Requiring Follow-up:    Advanced Directives:   [ ] Full code  [ ] DNR  [ ] Hospice    Discharge Diagnoses:         HPI:  89y old  man with a PMHx significant for Afib (not on AC due to falls and SDH), HTN, HLD, BPH, asthma, anemia, prior strokes, SDH who is BIBEMS left sided weakness and slurred speech. Per daughter, patient had runny nose and cough for the past few days but otherwise went to bed in usual state of health last night at 8AM. Family noticed symptoms (leaning to the left side) when patient woke up this morning and called EMS. She gave him aspirin 81mg this morning but patient is not on AC/AP due to history of SDH and multiple falls previously. They denied any recent fall or headstrike. Patient noted to be hypoxic and bradycardic to 30s in the field. Hypoxia and HR improved in the ED. He was noted to have left leg drift which is worse than baseline weakness per daughter. Patient usually ambulates with a cane. (23 Feb 2025 18:57)    Hospital Course: CT of the head revealed a small left frontoparietal acute/recent subdural hematoma. CTA of the head showed high-grade stenosis versus occlusion of the right SUYAPA. No neurosurgical intervention was indicated. Patient hypoxia, reportedly present prior to arrival, resolved. CXR  showed mild congestion, small layering effusions, and atelectasis. Pulmonary was consulted. Patient asthma was managed with Advair 250/50 twice daily, Spiriva daily (home medication was fluticasone furoate/umeclidinium/vilanterol (Trelegy Ellipta), which could be resumed upon discharge; patient could use personal Trelegy from home if verified by pharmacy), Duoneb every 6 hours as needed, Singulair 10 mg by mouth daily, and saline nasal spray. Routine EEG recommended as outpatient   Labwork was remarkable for SAM on 2/25 - likely from hemodynamics and ischemia. Patient was euvolemic on exam. The plan for SAM included checking urine electrolytes, renal ultrasound, encouraging oral intake, monitoring for urinary retention with bladder scanning, monitoring labs and urine output, avoiding nephrotoxins, and dosing medications based on eGFR.      Important Medication Changes and Reason:    Active or Pending Issues Requiring Follow-up:    Advanced Directives:   [ ] Full code  [ ] DNR  [ ] Hospice    Discharge Diagnoses:         HPI:  89y old  man with a PMHx significant for Afib (not on AC due to falls and SDH), HTN, HLD, BPH, asthma, anemia, prior strokes, SDH who is BIBEMS left sided weakness and slurred speech. Per daughter, patient had runny nose and cough for the past few days but otherwise went to bed in usual state of health last night at 8AM. Family noticed symptoms (leaning to the left side) when patient woke up this morning and called EMS. She gave him aspirin 81mg this morning but patient is not on AC/AP due to history of SDH and multiple falls previously. They denied any recent fall or headstrike. Patient noted to be hypoxic and bradycardic to 30s in the field. Hypoxia and HR improved in the ED. He was noted to have left leg drift which is worse than baseline weakness per daughter. Patient usually ambulates with a cane. (23 Feb 2025 18:57)    Hospital Course: CT of the head revealed a small left frontoparietal acute/recent subdural hematoma. CTA of the head showed high-grade stenosis versus occlusion of the right SUYAPA. No neurosurgical intervention was indicated. Patient hypoxia, reportedly present prior to arrival, resolved. CXR  showed mild congestion, small layering effusions, and atelectasis. Pulmonary was consulted. Patient asthma was managed with Advair 250/50 twice daily, Spiriva daily (home medication was fluticasone furoate/umeclidinium/vilanterol (Trelegy Ellipta), which could be resumed upon discharge; patient could use personal Trelegy from home if verified by pharmacy), Duoneb every 6 hours as needed, Singulair 10 mg by mouth daily, and saline nasal spray. Routine EEG recommended as outpatient   Labwork was remarkable for SAM on 2/25 - likely from hemodynamics and ischemia. Patient was euvolemic on exam. The plan for SAM included checking urine electrolytes, renal ultrasound, encouraging oral intake, monitoring for urinary retention with bladder scanning, monitoring labs and urine output, avoiding nephrotoxins, and dosing medications based on eGFR.      Important Medication Changes and Reason:  - lasix held on DC -> may resume under guidance of PCP  - SINGULAIR  - Allbuterol pump PRN SOB/wheezing     Active or Pending Issues Requiring Follow-up:  - Follow up with PCP   - Follow up Urology    Advanced Directives:   [X] Full code  [ ] DNR  [ ] Hospice    Discharge Diagnoses:         HPI:  89y old  man with a PMHx significant for Afib (not on AC due to falls and SDH), HTN, HLD, BPH, asthma, anemia, prior strokes, SDH who is BIBEMS left sided weakness and slurred speech. Per daughter, patient had runny nose and cough for the past few days but otherwise went to bed in usual state of health last night at 8AM. Family noticed symptoms (leaning to the left side) when patient woke up this morning and called EMS. She gave him aspirin 81mg this morning but patient is not on AC/AP due to history of SDH and multiple falls previously. They denied any recent fall or headstrike. Patient noted to be hypoxic and bradycardic to 30s in the field. Hypoxia and HR improved in the ED. He was noted to have left leg drift which is worse than baseline weakness per daughter. Patient usually ambulates with a cane. (23 Feb 2025 18:57)    Hospital Course: CT of the head revealed a small left frontoparietal acute/recent subdural hematoma. CTA of the head showed high-grade stenosis versus occlusion of the right SUYAPA. No neurosurgical intervention was indicated. Patient hypoxia, reportedly present prior to arrival, resolved. CXR  showed mild congestion, small layering effusions, and atelectasis. Pulmonary was consulted. Patient asthma was managed with Advair 250/50 twice daily, Spiriva daily (home medication was Trelegy Ellipta, which could be resumed upon discharge; Duoneb every 6 hours as needed, Singulair 10 mg by mouth daily, and saline nasal spray. Routine EEG recommended as outpatient   Labwork was remarkable for SAM on 2/25 - likely from hemodynamics and ischemia. Patient was euvolemic on exam. The plan for SAM included checking urine electrolytes, renal ultrasound, encouraging oral intake, monitoring for urinary retention with bladder scanning, monitoring labs and urine output, avoiding nephrotoxins, and dosing medications based on eGFR. Creatinine improved on 2/26    Discharge/Dispo/Med rec discussed with attending Dr. House. Patient medically cleared for discharge home with outpatient follow up     Important Medication Changes and Reason:  - lasix held on DC -> may resume under guidance of PCP  - Singulair 10 mg by mouth daily  - Albuterol pump PRN SOB/wheezing     Active or Pending Issues Requiring Follow-up:  - Follow up with PCP   - Follow up Urology for optimized urinary retention regimen    Advanced Directives:   [X] Full code  [ ] DNR  [ ] Hospice    Discharge Diagnoses:

## 2025-02-25 NOTE — DISCHARGE NOTE PROVIDER - CARE PROVIDER_API CALL
Sherry Chavez  Internal Medicine  [address], [address]  [place] [address]  Phone: [phone]  Fax: [phone]  Follow Up Time: 1 week    Juventino Bashir  Neurology  [address], [address]  [place] [address]  Phone: [phone]  Fax: [phone]  Follow Up Time: 1 week

## 2025-02-25 NOTE — CONSULT NOTE ADULT - ASSESSMENT
89 year old male with PMH of Afib (not on AC due to falls and SDH), HTN, HLD, BPH, asthma, anemia, prior strokes, SDH who is BIBEMS left sided weakness and slurred speech. The nephrology team was consulted for SAM.     SAM   likely from hemodynamics and ischemia  --- hypotension noted as well as acute drop in hbg this morning   baseline SCr 1  Creatinine Trend: 1.56 <--, 1.02 <--  euvolemic on examination     plan   check urine lytes   check renal sonogram   encourage PO intake   monitor for urinary retention; check bladder scan   Monitor labs and urine output. Avoid nephrotoxins. Dose medications as per eGFR.    If any questions, please feel free to contact me     Slick Jacob  Nephrology Attending  Cell #[phone]

## 2025-02-26 ENCOUNTER — TRANSCRIPTION ENCOUNTER (OUTPATIENT)
Age: 89
End: 2025-02-26

## 2025-02-26 VITALS
DIASTOLIC BLOOD PRESSURE: 78 MMHG | RESPIRATION RATE: 18 BRPM | OXYGEN SATURATION: 99 % | TEMPERATURE: 98 F | SYSTOLIC BLOOD PRESSURE: 151 MMHG | HEART RATE: 74 BPM

## 2025-02-26 LAB
ANION GAP SERPL CALC-SCNC: 11 MMOL/L — SIGNIFICANT CHANGE UP (ref 5–17)
BLD GP AB SCN SERPL QL: NEGATIVE — SIGNIFICANT CHANGE UP
BUN SERPL-MCNC: 27 MG/DL — HIGH (ref 7–23)
CALCIUM SERPL-MCNC: 9 MG/DL — SIGNIFICANT CHANGE UP (ref 8.4–10.5)
CHLORIDE SERPL-SCNC: 105 MMOL/L — SIGNIFICANT CHANGE UP (ref 96–108)
CO2 SERPL-SCNC: 25 MMOL/L — SIGNIFICANT CHANGE UP (ref 22–31)
CREAT SERPL-MCNC: 1.27 MG/DL — SIGNIFICANT CHANGE UP (ref 0.5–1.3)
EGFR: 51 ML/MIN/1.73M2 — LOW
GLUCOSE SERPL-MCNC: 101 MG/DL — HIGH (ref 70–99)
HCT VFR BLD CALC: 29.1 % — LOW (ref 39–50)
HGB BLD-MCNC: 8.9 G/DL — LOW (ref 13–17)
MCHC RBC-ENTMCNC: 23.9 PG — LOW (ref 27–34)
MCHC RBC-ENTMCNC: 30.6 G/DL — LOW (ref 32–36)
MCV RBC AUTO: 78 FL — LOW (ref 80–100)
NRBC BLD AUTO-RTO: 0 /100 WBCS — SIGNIFICANT CHANGE UP (ref 0–0)
PLATELET # BLD AUTO: 149 K/UL — LOW (ref 150–400)
POTASSIUM SERPL-MCNC: 4 MMOL/L — SIGNIFICANT CHANGE UP (ref 3.5–5.3)
POTASSIUM SERPL-SCNC: 4 MMOL/L — SIGNIFICANT CHANGE UP (ref 3.5–5.3)
RBC # BLD: 3.73 M/UL — LOW (ref 4.2–5.8)
RBC # FLD: 21 % — HIGH (ref 10.3–14.5)
RH IG SCN BLD-IMP: NEGATIVE — SIGNIFICANT CHANGE UP
SODIUM SERPL-SCNC: 141 MMOL/L — SIGNIFICANT CHANGE UP (ref 135–145)
WBC # BLD: 5.37 K/UL — SIGNIFICANT CHANGE UP (ref 3.8–10.5)
WBC # FLD AUTO: 5.37 K/UL — SIGNIFICANT CHANGE UP (ref 3.8–10.5)

## 2025-02-26 PROCEDURE — 87086 URINE CULTURE/COLONY COUNT: CPT

## 2025-02-26 PROCEDURE — 84156 ASSAY OF PROTEIN URINE: CPT

## 2025-02-26 PROCEDURE — 70450 CT HEAD/BRAIN W/O DYE: CPT | Mod: MC

## 2025-02-26 PROCEDURE — 83935 ASSAY OF URINE OSMOLALITY: CPT

## 2025-02-26 PROCEDURE — 80048 BASIC METABOLIC PNL TOTAL CA: CPT

## 2025-02-26 PROCEDURE — 81003 URINALYSIS AUTO W/O SCOPE: CPT

## 2025-02-26 PROCEDURE — 80053 COMPREHEN METABOLIC PANEL: CPT

## 2025-02-26 PROCEDURE — 87637 SARSCOV2&INF A&B&RSV AMP PRB: CPT

## 2025-02-26 PROCEDURE — 85730 THROMBOPLASTIN TIME PARTIAL: CPT

## 2025-02-26 PROCEDURE — 85014 HEMATOCRIT: CPT

## 2025-02-26 PROCEDURE — 84133 ASSAY OF URINE POTASSIUM: CPT

## 2025-02-26 PROCEDURE — 97162 PT EVAL MOD COMPLEX 30 MIN: CPT

## 2025-02-26 PROCEDURE — 82435 ASSAY OF BLOOD CHLORIDE: CPT

## 2025-02-26 PROCEDURE — 84295 ASSAY OF SERUM SODIUM: CPT

## 2025-02-26 PROCEDURE — 82607 VITAMIN B-12: CPT

## 2025-02-26 PROCEDURE — 82436 ASSAY OF URINE CHLORIDE: CPT

## 2025-02-26 PROCEDURE — 84443 ASSAY THYROID STIM HORMONE: CPT

## 2025-02-26 PROCEDURE — 82728 ASSAY OF FERRITIN: CPT

## 2025-02-26 PROCEDURE — 71045 X-RAY EXAM CHEST 1 VIEW: CPT

## 2025-02-26 PROCEDURE — 83605 ASSAY OF LACTIC ACID: CPT

## 2025-02-26 PROCEDURE — 36415 COLL VENOUS BLD VENIPUNCTURE: CPT

## 2025-02-26 PROCEDURE — 83540 ASSAY OF IRON: CPT

## 2025-02-26 PROCEDURE — 85018 HEMOGLOBIN: CPT

## 2025-02-26 PROCEDURE — 82962 GLUCOSE BLOOD TEST: CPT

## 2025-02-26 PROCEDURE — 87040 BLOOD CULTURE FOR BACTERIA: CPT

## 2025-02-26 PROCEDURE — 82803 BLOOD GASES ANY COMBINATION: CPT

## 2025-02-26 PROCEDURE — 86850 RBC ANTIBODY SCREEN: CPT

## 2025-02-26 PROCEDURE — 84300 ASSAY OF URINE SODIUM: CPT

## 2025-02-26 PROCEDURE — 70498 CT ANGIOGRAPHY NECK: CPT | Mod: MC

## 2025-02-26 PROCEDURE — 70496 CT ANGIOGRAPHY HEAD: CPT | Mod: MC

## 2025-02-26 PROCEDURE — 85610 PROTHROMBIN TIME: CPT

## 2025-02-26 PROCEDURE — 85027 COMPLETE CBC AUTOMATED: CPT

## 2025-02-26 PROCEDURE — 82330 ASSAY OF CALCIUM: CPT

## 2025-02-26 PROCEDURE — 99285 EMERGENCY DEPT VISIT HI MDM: CPT

## 2025-02-26 PROCEDURE — 86900 BLOOD TYPING SEROLOGIC ABO: CPT

## 2025-02-26 PROCEDURE — 86901 BLOOD TYPING SEROLOGIC RH(D): CPT

## 2025-02-26 PROCEDURE — 84484 ASSAY OF TROPONIN QUANT: CPT

## 2025-02-26 PROCEDURE — 82947 ASSAY GLUCOSE BLOOD QUANT: CPT

## 2025-02-26 PROCEDURE — 84132 ASSAY OF SERUM POTASSIUM: CPT

## 2025-02-26 PROCEDURE — 84540 ASSAY OF URINE/UREA-N: CPT

## 2025-02-26 PROCEDURE — 85025 COMPLETE CBC W/AUTO DIFF WBC: CPT

## 2025-02-26 PROCEDURE — 82746 ASSAY OF FOLIC ACID SERUM: CPT

## 2025-02-26 PROCEDURE — 82570 ASSAY OF URINE CREATININE: CPT

## 2025-02-26 PROCEDURE — 97166 OT EVAL MOD COMPLEX 45 MIN: CPT

## 2025-02-26 PROCEDURE — 76770 US EXAM ABDO BACK WALL COMP: CPT

## 2025-02-26 PROCEDURE — 94640 AIRWAY INHALATION TREATMENT: CPT

## 2025-02-26 RX ORDER — SENNA 187 MG
2 TABLET ORAL
Qty: 0 | Refills: 0 | DISCHARGE
Start: 2025-02-26

## 2025-02-26 RX ORDER — SODIUM CHLORIDE 0.65 %
1 AEROSOL, SPRAY (ML) NASAL
Qty: 0 | Refills: 0 | DISCHARGE
Start: 2025-02-26

## 2025-02-26 RX ORDER — MONTELUKAST SODIUM 10 MG/1
1 TABLET ORAL
Qty: 30 | Refills: 0
Start: 2025-02-26 | End: 2025-03-27

## 2025-02-26 RX ORDER — ALBUTEROL SULFATE 2.5 MG/3ML
2 VIAL, NEBULIZER (ML) INHALATION
Qty: 1 | Refills: 0
Start: 2025-02-26 | End: 2025-03-27

## 2025-02-26 RX ORDER — TAMSULOSIN HYDROCHLORIDE 0.4 MG/1
0.8 CAPSULE ORAL AT BEDTIME
Refills: 0 | Status: DISCONTINUED | OUTPATIENT
Start: 2025-02-26 | End: 2025-02-26

## 2025-02-26 RX ORDER — ALBUTEROL SULFATE 2.5 MG/3ML
2 VIAL, NEBULIZER (ML) INHALATION EVERY 6 HOURS
Refills: 0 | Status: DISCONTINUED | OUTPATIENT
Start: 2025-02-26 | End: 2025-02-26

## 2025-02-26 RX ADMIN — TIOTROPIUM BROMIDE INHALATION SPRAY 2 PUFF(S): 3.12 SPRAY, METERED RESPIRATORY (INHALATION) at 12:57

## 2025-02-26 RX ADMIN — Medication 40 MILLIGRAM(S): at 05:30

## 2025-02-26 RX ADMIN — METOPROLOL SUCCINATE 12.5 MILLIGRAM(S): 50 TABLET, EXTENDED RELEASE ORAL at 05:29

## 2025-02-26 RX ADMIN — MONTELUKAST SODIUM 10 MILLIGRAM(S): 10 TABLET ORAL at 12:57

## 2025-02-26 RX ADMIN — Medication 1 DOSE(S): at 05:29

## 2025-02-26 NOTE — CHART NOTE - NSCHARTNOTEFT_GEN_A_CORE
Request from Dr. House to facilitate patient discharge. Medication reconciliation reviewed, revised, and resolved with Dr. House who had medically cleared patient for discharge with follow-up as advised. Please refer to discharge note for detailed hospital course. Patient is currently stable for discharge to home at this time.

## 2025-02-26 NOTE — PROGRESS NOTE ADULT - REASON FOR ADMISSION
leg weakness

## 2025-02-26 NOTE — PROGRESS NOTE ADULT - PROBLEM SELECTOR PLAN 2
By hx  -Start Advair 250/50 BID, Spiriva qd (home med Trelegy Ellipta, can resume on discharge). No objections to pt using own Trelegy if brought in from home & verified by Pharmacy  -Duoneb q6h PRN  -Singulair 10 mg PO qd  -Saline nasal spray ordered.  2/26: seems pretty good : alert and awake and responding to questions pretty well!
By hx  -Start Advair 250/50 BID, Spiriva qd (home med Trelegy Ellipta, can resume on discharge). No objections to pt using own Trelegy if brought in from home & verified by Pharmacy  -Duoneb q6h PRN  -Singulair 10 mg PO qd  -Saline nasal spray ordered.

## 2025-02-26 NOTE — DISCHARGE NOTE NURSING/CASE MANAGEMENT/SOCIAL WORK - PATIENT PORTAL LINK FT
You can access the FollowMyHealth Patient Portal offered by [facility] by registering at the following website: [email or web address]. By joining [facility]’s FollowMyHealth portal, you will also be able to view your health information using other applications (apps) compatible with our system.

## 2025-02-26 NOTE — PROGRESS NOTE ADULT - PROVIDER SPECIALTY LIST ADULT
Nephrology
Internal Medicine
Cardiology
Neurology
Cardiology
Internal Medicine
Neurology
Pulmonology
Internal Medicine
Pulmonology

## 2025-02-26 NOTE — PROGRESS NOTE ADULT - SUBJECTIVE AND OBJECTIVE BOX
Slick Up MD  Interventional Cardiology / Endovascular Specialist  [place] Office : [address] [place] [address] [place] [address]  Tel:   [place] Office : [address] [facility] [place] [address]  Tel: [phone]    Pt sitting in bed in NAD   	  MEDICATIONS:  metoprolol succinate ER 12.5 milliGRAM(s) Oral daily      albuterol    90 MICROgram(s) HFA Inhaler 2 Puff(s) Inhalation every 6 hours PRN  fluticasone propionate/ salmeterol 250-50 MICROgram(s) Diskus 1 Dose(s) Inhalation two times a day  montelukast 10 milliGRAM(s) Oral daily  tiotropium 2.5 MICROgram(s) Inhaler 2 Puff(s) Inhalation daily    gabapentin 100 milliGRAM(s) Oral at bedtime  QUEtiapine 25 milliGRAM(s) Oral at bedtime    pantoprazole    Tablet 40 milliGRAM(s) Oral before breakfast  senna 2 Tablet(s) Oral at bedtime PRN    atorvastatin 40 milliGRAM(s) Oral at bedtime    sodium chloride 0.65% Nasal 1 Spray(s) Both Nostrils every 6 hours PRN  tamsulosin 0.8 milliGRAM(s) Oral at bedtime      PAST MEDICAL/SURGICAL HISTORY  PAST MEDICAL & SURGICAL HISTORY:  Afib      Asthmatic bronchitis , chronic      GERD (gastroesophageal reflux disease)      HLD (hyperlipidemia)      HTN (hypertension)      No significant past surgical history          SOCIAL HISTORY: Substance Use (street drugs): ( x ) never used  (  ) other:    FAMILY HISTORY:  FH: stroke (Mother, Sibling)        REVIEW OF SYSTEMS:  CONSTITUTIONAL: No fever, weight loss, or fatigue  EYES: No eye pain, visual disturbances, or discharge  ENMT:  No difficulty hearing, tinnitus, vertigo; No sinus or throat pain  BREASTS: No pain, masses, or nipple discharge  GASTROINTESTINAL: No abdominal or epigastric pain. No nausea, vomiting, or hematemesis; No diarrhea or constipation. No melena or hematochezia.  GENITOURINARY: No dysuria, frequency, hematuria, or incontinence  NEUROLOGICAL: No headaches, memory loss, loss of strength, numbness, or tremors  ENDOCRINE: No heat or cold intolerance; No hair loss  MUSCULOSKELETAL: No joint pain or swelling; No muscle, back, or extremity pain  PSYCHIATRIC: No depression, anxiety, mood swings, or difficulty sleeping  HEME/LYMPH: No easy bruising, or bleeding gums  All others negative    PHYSICAL EXAM:  T(C): 36.6 (02-26-25 @ 12:33), Max: 36.6 (02-26-25 @ 05:22)  HR: 74 (02-26-25 @ 12:33) (61 - 93)  BP: 151/78 (02-26-25 @ 12:33) (106/60 - 151/78)  RR: 18 (02-26-25 @ 12:33) (18 - 18)  SpO2: 99% (02-26-25 @ 12:33) (94% - 99%)  Wt(kg): --  I&O's Summary    25 Feb 2025 07:01  -  26 Feb 2025 07:00  --------------------------------------------------------  IN: 300 mL / OUT: 1250 mL / NET: -950 mL    26 Feb 2025 07:01  -  26 Feb 2025 14:52  --------------------------------------------------------  IN: 0 mL / OUT: 400 mL / NET: -400 mL        EYES:   PERRLA   ENMT:   Moist mucous membranes, Good dentition, No lesions  Cardiovascular: Normal S1 S2, No JVD, No murmurs, No edema  Respiratory: Lungs clear to auscultation	  Gastrointestinal:  Soft, Non-tender, + BS	  Extremities: no edema                              8.9    5.37  )-----------( 149      ( 26 Feb 2025 07:14 )             29.1     02-26    141  |  105  |  27[H]  ----------------------------<  101[H]  4.0   |  25  |  1.27    Ca    9.0      26 Feb 2025 07:14      proBNP:   Lipid Profile:   HgA1c:   TSH:     Consultant(s) Notes Reviewed:  [x ] YES  [ ] NO    Care Discussed with Consultants/Other Providers [ x] YES  [ ] NO    Imaging Personally Reviewed independently:  [x] YES  [ ] NO    All labs, radiologic studies, vitals, orders and medications list reviewed. Patient is seen and examined at bedside. Case discussed with medical team.              
Date of Service  : 25     INTERVAL HPI/OVERNIGHT EVENTS:  I feel fine and so fine.   Vital Signs Last 24 Hrs  T(C): 36.4 (2025 13:26), Max: 36.9 (2025 17:48)  T(F): 97.5 (2025 13:26), Max: 98.5 (2025 17:48)  HR: 70 (2025 13:26) (60 - 120)  BP: 94/- (2025 13:26) (94/- - 160/84)  BP(mean): 103 (2025 18:57) (103 - 111)  RR: 18 (:26) (18 - 18)  SpO2: 98% (:26) (95% - 98%)    Parameters below as of 2025 13:26  Patient On (Oxygen Delivery Method): room air      I&O's Summary    2025 07:01  -  2025 07:00  --------------------------------------------------------  IN: 0 mL / OUT: 500 mL / NET: -500 mL      MEDICATIONS  (STANDING):  atorvastatin 40 milliGRAM(s) Oral at bedtime  fluticasone propionate/ salmeterol 250-50 MICROgram(s) Diskus 1 Dose(s) Inhalation two times a day  gabapentin 100 milliGRAM(s) Oral at bedtime  metoprolol succinate ER 12.5 milliGRAM(s) Oral daily  montelukast 10 milliGRAM(s) Oral daily  pantoprazole    Tablet 40 milliGRAM(s) Oral before breakfast  QUEtiapine 25 milliGRAM(s) Oral at bedtime  tamsulosin 0.8 milliGRAM(s) Oral at bedtime  tiotropium 2.5 MICROgram(s) Inhaler 2 Puff(s) Inhalation daily    MEDICATIONS  (PRN):  albuterol/ipratropium for Nebulization 3 milliLiter(s) Nebulizer every 6 hours PRN Shortness of Breath  sodium chloride 0.65% Nasal 1 Spray(s) Both Nostrils every 6 hours PRN Nasal Congestion    LABS:                        11.1   6.74  )-----------( 150      ( 2025 07:06 )             36.4         140  |  103  |  21  ----------------------------<  98  4.4   |  26  |  1.02    Ca    9.8      2025 13:53    TPro  6.9  /  Alb  3.6  /  TBili  0.7  /  DBili  x   /  AST  28  /  ALT  18  /  AlkPhos  96  02-23    PT/INR - ( 2025 13:53 )   PT: 11.8 sec;   INR: 1.03 ratio         PTT - ( 2025 13:53 )  PTT:32.8 sec  Urinalysis Basic - ( 2025 17:00 )    Color: Yellow / Appearance: Clear / S.025 / pH: x  Gluc: x / Ketone: Negative mg/dL  / Bili: Negative / Urobili: 0.2 mg/dL   Blood: x / Protein: Negative mg/dL / Nitrite: Negative   Leuk Esterase: Negative / RBC: x / WBC x   Sq Epi: x / Non Sq Epi: x / Bacteria: x      CAPILLARY BLOOD GLUCOSE            Urinalysis Basic - ( 2025 17:00 )    Color: Yellow / Appearance: Clear / S.025 / pH: x  Gluc: x / Ketone: Negative mg/dL  / Bili: Negative / Urobili: 0.2 mg/dL   Blood: x / Protein: Negative mg/dL / Nitrite: Negative   Leuk Esterase: Negative / RBC: x / WBC x   Sq Epi: x / Non Sq Epi: x / Bacteria: x      REVIEW OF SYSTEMS:  CONSTITUTIONAL: No fever, weight loss, or fatigue  EYES: No eye pain, visual disturbances, or discharge  ENMT:  No difficulty hearing, tinnitus, vertigo; No sinus or throat pain  NECK: No pain or stiffness  RESPIRATORY: No cough, wheezing, chills or hemoptysis; No shortness of breath  CARDIOVASCULAR: No chest pain, palpitations, dizziness, or leg swelling  GASTROINTESTINAL: No abdominal or epigastric pain. No nausea, vomiting, or hematemesis; No diarrhea or constipation. No melena or hematochezia.  GENITOURINARY: No dysuria, frequency, hematuria, or incontinence  NEUROLOGICAL: No headaches, memory loss, loss of strength, numbness, or tremors    Consultant(s) Notes Reviewed:  [x ] YES  [ ] NO    PHYSICAL EXAM:  GENERAL: NAD, well-groomed, well-developed,not in any distress ,  HEAD:  Atraumatic, Normocephalic  NECK: Supple, No JVD, Normal thyroid  NERVOUS SYSTEM:  Alert & Oriented X3, No focal deficit   CHEST/LUNG: Good air entry bilateral with no  rales, rhonchi, wheezing, or rubs  HEART: Regular rate and rhythm; No murmurs, rubs, or gallops  ABDOMEN: Soft, Nontender, Nondistended; Bowel sounds present  EXTREMITIES:  2+ Peripheral Pulses, No clubbing, cyanosis, or edema    Care Discussed with Consultants/Other Providers [ x] YES  [ ] NO
Date of Service  : 02-26-25     INTERVAL HPI/OVERNIGHT EVENTS: I feel better.   Vital Signs Last 24 Hrs  T(C): 36.6 (26 Feb 2025 12:33), Max: 36.6 (26 Feb 2025 05:22)  T(F): 97.9 (26 Feb 2025 12:33), Max: 97.9 (26 Feb 2025 05:22)  HR: 74 (26 Feb 2025 12:33) (61 - 93)  BP: 151/78 (26 Feb 2025 12:33) (106/60 - 151/78)  BP(mean): --  RR: 18 (26 Feb 2025 12:33) (18 - 18)  SpO2: 99% (26 Feb 2025 12:33) (94% - 99%)    Parameters below as of 26 Feb 2025 12:33  Patient On (Oxygen Delivery Method): room air      I&O's Summary    25 Feb 2025 07:01  -  26 Feb 2025 07:00  --------------------------------------------------------  IN: 300 mL / OUT: 1250 mL / NET: -950 mL    26 Feb 2025 07:01  -  26 Feb 2025 16:14  --------------------------------------------------------  IN: 0 mL / OUT: 400 mL / NET: -400 mL      MEDICATIONS  (STANDING):  atorvastatin 40 milliGRAM(s) Oral at bedtime  fluticasone propionate/ salmeterol 250-50 MICROgram(s) Diskus 1 Dose(s) Inhalation two times a day  gabapentin 100 milliGRAM(s) Oral at bedtime  metoprolol succinate ER 12.5 milliGRAM(s) Oral daily  montelukast 10 milliGRAM(s) Oral daily  pantoprazole    Tablet 40 milliGRAM(s) Oral before breakfast  QUEtiapine 25 milliGRAM(s) Oral at bedtime  tamsulosin 0.8 milliGRAM(s) Oral at bedtime  tiotropium 2.5 MICROgram(s) Inhaler 2 Puff(s) Inhalation daily    MEDICATIONS  (PRN):  albuterol    90 MICROgram(s) HFA Inhaler 2 Puff(s) Inhalation every 6 hours PRN Shortness of Breath and/or Wheezing  senna 2 Tablet(s) Oral at bedtime PRN Constipation  sodium chloride 0.65% Nasal 1 Spray(s) Both Nostrils every 6 hours PRN Nasal Congestion    LABS:                        8.9    5.37  )-----------( 149      ( 26 Feb 2025 07:14 )             29.1     02-26    141  |  105  |  27[H]  ----------------------------<  101[H]  4.0   |  25  |  1.27    Ca    9.0      26 Feb 2025 07:14        Urinalysis Basic - ( 26 Feb 2025 07:14 )    Color: x / Appearance: x / SG: x / pH: x  Gluc: 101 mg/dL / Ketone: x  / Bili: x / Urobili: x   Blood: x / Protein: x / Nitrite: x   Leuk Esterase: x / RBC: x / WBC x   Sq Epi: x / Non Sq Epi: x / Bacteria: x      CAPILLARY BLOOD GLUCOSE            Urinalysis Basic - ( 26 Feb 2025 07:14 )    Color: x / Appearance: x / SG: x / pH: x  Gluc: 101 mg/dL / Ketone: x  / Bili: x / Urobili: x   Blood: x / Protein: x / Nitrite: x   Leuk Esterase: x / RBC: x / WBC x   Sq Epi: x / Non Sq Epi: x / Bacteria: x      REVIEW OF SYSTEMS:  CONSTITUTIONAL: No fever, weight loss, or fatigue  EYES: No eye pain, visual disturbances, or discharge  ENMT:  No difficulty hearing, tinnitus, vertigo; No sinus or throat pain  NECK: No pain or stiffness  RESPIRATORY: No cough, wheezing, chills or hemoptysis; No shortness of breath  CARDIOVASCULAR: No chest pain, palpitations, dizziness, or leg swelling  GASTROINTESTINAL: No abdominal or epigastric pain. No nausea, vomiting, or hematemesis; No diarrhea or constipation. No melena or hematochezia.  GENITOURINARY: No dysuria, frequency, hematuria, or incontinence  NEUROLOGICAL: No headaches, memory loss, loss of strength, numbness, or tremors    Consultant(s) Notes Reviewed:  [x ] YES  [ ] NO    PHYSICAL EXAM:  GENERAL: NAD, well-groomed, well-developed,not in any distress ,  HEAD:  Atraumatic, Normocephalic  NECK: Supple, No JVD, Normal thyroid  NERVOUS SYSTEM:  Alert & Oriented X3, No focal deficit   CHEST/LUNG: Good air entry bilateral with no  rales, rhonchi, wheezing, or rubs  HEART: Regular rate and rhythm; No murmurs, rubs, or gallops  ABDOMEN: Soft, Nontender, Nondistended; Bowel sounds present  EXTREMITIES:  2+ Peripheral Pulses, No clubbing, cyanosis, or edema    Care Discussed with Consultants/Other Providers [ x] YES  [ ] NO
Neurology Progress Note    S: Patient seen and examined. No new events overnight. patient denied CP, SOB, HA or pain.     Medication:   Medications: MEDICATIONS  (STANDING):  atorvastatin 40 milliGRAM(s) Oral at bedtime  fluticasone propionate/ salmeterol 250-50 MICROgram(s) Diskus 1 Dose(s) Inhalation two times a day  gabapentin 100 milliGRAM(s) Oral at bedtime  metoprolol succinate ER 12.5 milliGRAM(s) Oral daily  montelukast 10 milliGRAM(s) Oral daily  pantoprazole    Tablet 40 milliGRAM(s) Oral before breakfast  QUEtiapine 25 milliGRAM(s) Oral at bedtime  tamsulosin 0.8 milliGRAM(s) Oral at bedtime  tiotropium 2.5 MICROgram(s) Inhaler 2 Puff(s) Inhalation daily    MEDICATIONS  (PRN):  albuterol    90 MICROgram(s) HFA Inhaler 2 Puff(s) Inhalation every 6 hours PRN Shortness of Breath and/or Wheezing  senna 2 Tablet(s) Oral at bedtime PRN Constipation  sodium chloride 0.65% Nasal 1 Spray(s) Both Nostrils every 6 hours PRN Nasal Congestion       Vitals:  Vital Signs Last 24 Hrs  T(C): 36.6 (26 Feb 2025 12:33), Max: 36.6 (26 Feb 2025 05:22)  T(F): 97.9 (26 Feb 2025 12:33), Max: 97.9 (26 Feb 2025 05:22)  HR: 74 (26 Feb 2025 12:33) (61 - 93)  BP: 151/78 (26 Feb 2025 12:33) (106/60 - 151/78)  BP(mean): --  RR: 18 (26 Feb 2025 12:33) (18 - 18)  SpO2: 99% (26 Feb 2025 12:33) (94% - 99%)    Parameters below as of 26 Feb 2025 12:33  Patient On (Oxygen Delivery Method): room air            General Exam:   General Appearance: Appropriately dressed and in no acute distress       Head: Normocephalic, atraumatic and no dysmorphic features  Ear, Nose, and Throat: Moist mucous membranes  CVS: S1S2+  Resp: No SOB, no wheeze or rhonchi  Abd: soft NTND  Extremities: No edema, no cyanosis  Skin: No bruises, no rashes     Neurological Exam:    Mental status - Awake, Alert, Oriented to person and place. Reports date is Oct 1925. Speech is at baseline, no dysarthria, fluent, repetition and naming intact. Follows 1 step commands.     Cranial nerves - PERRL (1mm surgical pupils BL), VFF, EOMI, face sensation (V1-V3) intact b/l, facial strength intact without asymmetry b/l, hearing intact b/l, palate with symmetric elevation, traps grossly 5/5 strength b/l, tongue midline on protrusion with full lateral movement    Motor - Normal bulk and tone throughout. No pronator drift.  Strength testing  RUE- grossly 4+/5  LUE- grossly 4+/5  RLE - antigravity and do drift   4-/5  LLE - antigravity with mild drift. leg hits bed in 5 seconds     Sensation - Light touch and temp intact throughout    DTR's - Not assessed due to focused exam     Coordination - Finger to Nose intact b/l. Heel to shin difficult to assess due to lower extremity weakness    Gait and station - Not assessed due to fall risk     I personally reviewed the below data/images/labs:       LABS:                          8.9    5.37  )-----------( 149      ( 26 Feb 2025 07:14 )             29.1     02-26    141  |  105  |  27[H]  ----------------------------<  101[H]  4.0   |  25  |  1.27    Ca    9.0      26 Feb 2025 07:14          Urinalysis Basic - ( 26 Feb 2025 07:14 )    Color: x / Appearance: x / SG: x / pH: x  Gluc: 101 mg/dL / Ketone: x  / Bili: x / Urobili: x   Blood: x / Protein: x / Nitrite: x   Leuk Esterase: x / RBC: x / WBC x   Sq Epi: x / Non Sq Epi: x / Bacteria: x       CTA NECK:  No evidence of significant stenosis or occlusion.    CTA HEAD:  Critical stenosis/filling defect is seen in the distal right A2 branch of the SUYAPA without significant distal perfusion.  No evidence of aneurysm. Tiny aneurysms can be beyond the resolution of CTA technique.    
Neurology Progress Note    S: Patient seen and examined. No new events overnight. patient denied CP, SOB, HA or pain.     Medication:  albuterol/ipratropium for Nebulization 3 milliLiter(s) Nebulizer every 6 hours PRN  atorvastatin 40 milliGRAM(s) Oral at bedtime  fluticasone propionate/ salmeterol 250-50 MICROgram(s) Diskus 1 Dose(s) Inhalation two times a day  gabapentin 100 milliGRAM(s) Oral at bedtime  metoprolol succinate ER 12.5 milliGRAM(s) Oral daily  montelukast 10 milliGRAM(s) Oral daily  pantoprazole    Tablet 40 milliGRAM(s) Oral before breakfast  QUEtiapine 25 milliGRAM(s) Oral at bedtime  sodium chloride 0.65% Nasal 1 Spray(s) Both Nostrils every 6 hours PRN  tiotropium 2.5 MICROgram(s) Inhaler 2 Puff(s) Inhalation daily      Vitals:  Vital Signs Last 24 Hrs  T(C): 36.3 (25 Feb 2025 11:36), Max: 36.8 (25 Feb 2025 04:42)  T(F): 97.4 (25 Feb 2025 11:36), Max: 98.2 (25 Feb 2025 04:42)  HR: 71 (25 Feb 2025 11:36) (66 - 90)  BP: 111/76 (25 Feb 2025 11:36) (100/68 - 122/78)  BP(mean): --  RR: 18 (25 Feb 2025 11:36) (18 - 18)  SpO2: 95% (25 Feb 2025 11:36) (95% - 99%)    Parameters below as of 25 Feb 2025 11:36  Patient On (Oxygen Delivery Method): room air        General Exam:   General Appearance: Appropriately dressed and in no acute distress       Head: Normocephalic, atraumatic and no dysmorphic features  Ear, Nose, and Throat: Moist mucous membranes  CVS: S1S2+  Resp: No SOB, no wheeze or rhonchi  Abd: soft NTND  Extremities: No edema, no cyanosis  Skin: No bruises, no rashes     Neurological Exam:    Mental status - Awake, Alert, Oriented to person and place. Reports date is Oct 1925. Speech is at baseline, no dysarthria, fluent, repetition and naming intact. Follows 1 step commands.     Cranial nerves - PERRL (1mm surgical pupils BL), VFF, EOMI, face sensation (V1-V3) intact b/l, facial strength intact without asymmetry b/l, hearing intact b/l, palate with symmetric elevation, traps grossly 5/5 strength b/l, tongue midline on protrusion with full lateral movement    Motor - Normal bulk and tone throughout. No pronator drift.  Strength testing  RUE- grossly 4+/5  LUE- grossly 4+/5  RLE - antigravity and do drift   4-/5  LLE - antigravity with mild drift. leg hits bed in 5 seconds     Sensation - Light touch and temp intact throughout    DTR's - Not assessed due to focused exam     Coordination - Finger to Nose intact b/l. Heel to shin difficult to assess due to lower extremity weakness    Gait and station - Not assessed due to fall risk     I personally reviewed the below data/images/labs:      CBC Full  -  ( 25 Feb 2025 07:09 )  WBC Count : 5.94 K/uL  RBC Count : 3.70 M/uL  Hemoglobin : 8.9 g/dL  Hematocrit : 28.1 %  Platelet Count - Automated : 139 K/uL  Mean Cell Volume : 75.9 fl  Mean Cell Hemoglobin : 24.1 pg  Mean Cell Hemoglobin Concentration : 31.7 g/dL  Auto Neutrophil # : x  Auto Lymphocyte # : x  Auto Monocyte # : x  Auto Eosinophil # : x  Auto Basophil # : x  Auto Neutrophil % : x  Auto Lymphocyte % : x  Auto Monocyte % : x  Auto Eosinophil % : x  Auto Basophil % : x    02-25    138  |  102  |  31[H]  ----------------------------<  107[H]  3.7   |  26  |  1.56[H]    Ca    8.8      25 Feb 2025 07:05          Urinalysis Basic - ( 25 Feb 2025 07:05 )    Color: x / Appearance: x / SG: x / pH: x  Gluc: 107 mg/dL / Ketone: x  / Bili: x / Urobili: x   Blood: x / Protein: x / Nitrite: x   Leuk Esterase: x / RBC: x / WBC x   Sq Epi: x / Non Sq Epi: x / Bacteria: x        CTH  Small left frontoparietal acute/recent subdural hematoma.  Multiple cerebral areas of encephalomalacia.  Extensive white matter small vessel ischemic disease.    CTA NECK:  No evidence of significant stenosis or occlusion.    CTA HEAD:  Critical stenosis/filling defect is seen in the distal right A2 branch of the SUYAPA without significant distal perfusion.  No evidence of aneurysm. Tiny aneurysms can be beyond the resolution of CTA technique.    
  Slick Up MD  Interventional Cardiology / Endovascular Specialist  [place] Office : [address] [place] [address] [place] [address]  Tel:   [place] Office : [address] [facility] [place] [address]  Tel: [phone]    Pt sitting in bed in NAD   	  MEDICATIONS:  metoprolol succinate ER 12.5 milliGRAM(s) Oral daily      albuterol    90 MICROgram(s) HFA Inhaler 2 Puff(s) Inhalation every 6 hours PRN  fluticasone propionate/ salmeterol 250-50 MICROgram(s) Diskus 1 Dose(s) Inhalation two times a day  montelukast 10 milliGRAM(s) Oral daily  tiotropium 2.5 MICROgram(s) Inhaler 2 Puff(s) Inhalation daily    gabapentin 100 milliGRAM(s) Oral at bedtime  QUEtiapine 25 milliGRAM(s) Oral at bedtime    pantoprazole    Tablet 40 milliGRAM(s) Oral before breakfast  senna 2 Tablet(s) Oral at bedtime PRN    atorvastatin 40 milliGRAM(s) Oral at bedtime    sodium chloride 0.65% Nasal 1 Spray(s) Both Nostrils every 6 hours PRN  tamsulosin 0.8 milliGRAM(s) Oral at bedtime      PAST MEDICAL/SURGICAL HISTORY  PAST MEDICAL & SURGICAL HISTORY:  Afib      Asthmatic bronchitis , chronic      GERD (gastroesophageal reflux disease)      HLD (hyperlipidemia)      HTN (hypertension)      No significant past surgical history          SOCIAL HISTORY: Substance Use (street drugs): ( x ) never used  (  ) other:    FAMILY HISTORY:  FH: stroke (Mother, Sibling)        REVIEW OF SYSTEMS:  CONSTITUTIONAL: No fever, weight loss, or fatigue  EYES: No eye pain, visual disturbances, or discharge  ENMT:  No difficulty hearing, tinnitus, vertigo; No sinus or throat pain  BREASTS: No pain, masses, or nipple discharge  GASTROINTESTINAL: No abdominal or epigastric pain. No nausea, vomiting, or hematemesis; No diarrhea or constipation. No melena or hematochezia.  GENITOURINARY: No dysuria, frequency, hematuria, or incontinence  NEUROLOGICAL: No headaches, memory loss, loss of strength, numbness, or tremors  ENDOCRINE: No heat or cold intolerance; No hair loss  MUSCULOSKELETAL: No joint pain or swelling; No muscle, back, or extremity pain  PSYCHIATRIC: No depression, anxiety, mood swings, or difficulty sleeping  HEME/LYMPH: No easy bruising, or bleeding gums  All others negative    PHYSICAL EXAM:  T(C): 36.6 (02-26-25 @ 12:33), Max: 36.6 (02-26-25 @ 05:22)  HR: 74 (02-26-25 @ 12:33) (61 - 93)  BP: 151/78 (02-26-25 @ 12:33) (106/60 - 151/78)  RR: 18 (02-26-25 @ 12:33) (18 - 18)  SpO2: 99% (02-26-25 @ 12:33) (94% - 99%)  Wt(kg): --  I&O's Summary    25 Feb 2025 07:01  -  26 Feb 2025 07:00  --------------------------------------------------------  IN: 300 mL / OUT: 1250 mL / NET: -950 mL    26 Feb 2025 07:01  -  26 Feb 2025 14:55  --------------------------------------------------------  IN: 0 mL / OUT: 400 mL / NET: -400 mL      EYES:   PERRLA   ENMT:   Moist mucous membranes, Good dentition, No lesions  Cardiovascular: Normal S1 S2, No JVD, No murmurs, No edema  Respiratory: Lungs clear to auscultation	  Gastrointestinal:  Soft, Non-tender, + BS	  Extremities: no edema                            8.9    5.37  )-----------( 149      ( 26 Feb 2025 07:14 )             29.1     02-26    141  |  105  |  27[H]  ----------------------------<  101[H]  4.0   |  25  |  1.27    Ca    9.0      26 Feb 2025 07:14      proBNP:   Lipid Profile:   HgA1c:   TSH:     Consultant(s) Notes Reviewed:  [x ] YES  [ ] NO    Care Discussed with Consultants/Other Providers [ x] YES  [ ] NO    Imaging Personally Reviewed independently:  [x] YES  [ ] NO    All labs, radiologic studies, vitals, orders and medications list reviewed. Patient is seen and examined at bedside. Case discussed with medical team.              
Date of Service  : 02-25-25     INTERVAL HPI/OVERNIGHT EVENTS: I feel fine so want to go home.   Vital Signs Last 24 Hrs  T(C): 36.3 (25 Feb 2025 20:43), Max: 36.8 (25 Feb 2025 04:42)  T(F): 97.3 (25 Feb 2025 20:43), Max: 98.2 (25 Feb 2025 04:42)  HR: 93 (25 Feb 2025 20:43) (66 - 93)  BP: 142/67 (25 Feb 2025 20:43) (111/76 - 142/67)  BP(mean): --  RR: 18 (25 Feb 2025 20:43) (18 - 18)  SpO2: 94% (25 Feb 2025 20:43) (94% - 99%)    Parameters below as of 25 Feb 2025 20:43  Patient On (Oxygen Delivery Method): room air      I&O's Summary    24 Feb 2025 07:01  -  25 Feb 2025 07:00  --------------------------------------------------------  IN: 220 mL / OUT: 150 mL / NET: 70 mL    25 Feb 2025 07:01  -  25 Feb 2025 21:43  --------------------------------------------------------  IN: 0 mL / OUT: 650 mL / NET: -650 mL      MEDICATIONS  (STANDING):  atorvastatin 40 milliGRAM(s) Oral at bedtime  fluticasone propionate/ salmeterol 250-50 MICROgram(s) Diskus 1 Dose(s) Inhalation two times a day  gabapentin 100 milliGRAM(s) Oral at bedtime  metoprolol succinate ER 12.5 milliGRAM(s) Oral daily  montelukast 10 milliGRAM(s) Oral daily  pantoprazole    Tablet 40 milliGRAM(s) Oral before breakfast  QUEtiapine 25 milliGRAM(s) Oral at bedtime  tiotropium 2.5 MICROgram(s) Inhaler 2 Puff(s) Inhalation daily    MEDICATIONS  (PRN):  albuterol/ipratropium for Nebulization 3 milliLiter(s) Nebulizer every 6 hours PRN Shortness of Breath  senna 2 Tablet(s) Oral at bedtime PRN Constipation  sodium chloride 0.65% Nasal 1 Spray(s) Both Nostrils every 6 hours PRN Nasal Congestion    LABS:                        8.9    5.94  )-----------( 139      ( 25 Feb 2025 07:09 )             28.1     02-25    138  |  102  |  31[H]  ----------------------------<  107[H]  3.7   |  26  |  1.56[H]    Ca    8.8      25 Feb 2025 07:05        Urinalysis Basic - ( 25 Feb 2025 07:05 )    Color: x / Appearance: x / SG: x / pH: x  Gluc: 107 mg/dL / Ketone: x  / Bili: x / Urobili: x   Blood: x / Protein: x / Nitrite: x   Leuk Esterase: x / RBC: x / WBC x   Sq Epi: x / Non Sq Epi: x / Bacteria: x      CAPILLARY BLOOD GLUCOSE            Urinalysis Basic - ( 25 Feb 2025 07:05 )    Color: x / Appearance: x / SG: x / pH: x  Gluc: 107 mg/dL / Ketone: x  / Bili: x / Urobili: x   Blood: x / Protein: x / Nitrite: x   Leuk Esterase: x / RBC: x / WBC x   Sq Epi: x / Non Sq Epi: x / Bacteria: x      REVIEW OF SYSTEMS:  CONSTITUTIONAL: No fever, weight loss, or fatigue  EYES: No eye pain, visual disturbances, or discharge  ENMT:  No difficulty hearing, tinnitus, vertigo; No sinus or throat pain  NECK: No pain or stiffness  RESPIRATORY: No cough, wheezing, chills or hemoptysis; No shortness of breath  CARDIOVASCULAR: No chest pain, palpitations, dizziness, or leg swelling  GASTROINTESTINAL: No abdominal or epigastric pain. No nausea, vomiting, or hematemesis; No diarrhea or constipation. No melena or hematochezia.  GENITOURINARY: No dysuria, frequency, hematuria, or incontinence  NEUROLOGICAL: No headaches, memory loss, loss of strength, numbness, or tremors  SKIN: No itching, burning, rashes, or lesions   ENDOCRINE: No heat or cold intolerance; No hair loss  MUSCULOSKELETAL: No joint pain or swelling; No muscle, back, or extremity pain  PSYCHIATRIC: No depression, anxiety, mood swings, or difficulty sleeping  HEME/LYMPH: No easy bruising, or bleeding gums  ALLERY AND IMMUNOLOGIC: No hives or eczema    RADIOLOGY & ADDITIONAL TESTS:    Consultant(s) Notes Reviewed:  [x ] YES  [ ] NO    PHYSICAL EXAM:  GENERAL: NAD, well-groomed, well-developed,not in any distress ,  HEAD:  Atraumatic, Normocephalic  EYES: EOMI, PERRLA, conjunctiva and sclera clear  ENMT: No tonsillar erythema, exudates, or enlargement; Moist mucous membranes, Good dentition, No lesions  NECK: Supple, No JVD, Normal thyroid  NERVOUS SYSTEM:  Alert & Oriented X3, No focal deficit   CHEST/LUNG: Good air entry bilateral with no  rales, rhonchi, wheezing, or rubs  HEART: Regular rate and rhythm; No murmurs, rubs, or gallops  ABDOMEN: Soft, Nontender, Nondistended; Bowel sounds present  EXTREMITIES:  2+ Peripheral Pulses, No clubbing, cyanosis, or edema  SKIN: No rashes or lesions    Care Discussed with Consultants/Other Providers [ x] YES  [ ] NO
  Patient seen and examined  no complaints    No Known Allergies    Hospital Medications:   MEDICATIONS  (STANDING):  atorvastatin 40 milliGRAM(s) Oral at bedtime  fluticasone propionate/ salmeterol 250-50 MICROgram(s) Diskus 1 Dose(s) Inhalation two times a day  gabapentin 100 milliGRAM(s) Oral at bedtime  metoprolol succinate ER 12.5 milliGRAM(s) Oral daily  montelukast 10 milliGRAM(s) Oral daily  pantoprazole    Tablet 40 milliGRAM(s) Oral before breakfast  QUEtiapine 25 milliGRAM(s) Oral at bedtime  tamsulosin 0.8 milliGRAM(s) Oral at bedtime  tiotropium 2.5 MICROgram(s) Inhaler 2 Puff(s) Inhalation daily        VITALS:  T(F): 97.9 (02-26-25 @ 05:22), Max: 97.9 (02-26-25 @ 05:22)  HR: 61 (02-26-25 @ 05:22)  BP: 128/83 (02-26-25 @ 05:22)  RR: 18 (02-26-25 @ 05:22)  SpO2: 97% (02-26-25 @ 05:22)  Wt(kg): --    02-25 @ 07:01  -  02-26 @ 07:00  --------------------------------------------------------  IN: 300 mL / OUT: 1250 mL / NET: -950 mL      PHYSICAL EXAM:  Constitutional: NAD  HEENT: anicteric sclera, oropharynx clear, MMM  Respiratory: Bilateral equal breath sounds , no wheezes, no crackles  Cardiovascular: S1, S2, Regular  Gastrointestinal: Bowel Sound present, soft, NT/ND  Extremities: No peripheral edema  Neurological: Alert and oriented x 3  Psychiatric: Normal mood, normal affect    LABS:  02-26    141  |  105  |  27[H]  ----------------------------<  101[H]  4.0   |  25  |  1.27    Ca    9.0      26 Feb 2025 07:14      Creatinine Trend: 1.27 <--, 1.56 <--, 1.02 <--                        8.9    5.37  )-----------( 149      ( 26 Feb 2025 07:14 )             29.1     Urine Studies:  Urinalysis Basic - ( 26 Feb 2025 07:14 )    Color:  / Appearance:  / SG:  / pH:   Gluc: 101 mg/dL / Ketone:   / Bili:  / Urobili:    Blood:  / Protein:  / Nitrite:    Leuk Esterase:  / RBC:  / WBC    Sq Epi:  / Non Sq Epi:  / Bacteria:       Creatinine, Random Urine: 98 mg/dL (02-25 @ 11:39)  Protein/Creatinine Ratio Calculation: 0.1 Ratio (02-25 @ 11:39)  Chloride, Random Urine: 33 mmol/L (02-25 @ 11:39)  Sodium, Random Urine: 50 mmol/L (02-25 @ 11:39)  Osmolality, Random Urine: 486 mos/kg (02-25 @ 11:39)  Potassium, Random Urine: 42 mmol/L (02-25 @ 11:39)    RADIOLOGY & ADDITIONAL STUDIES:  
Date of Service: 02-25-25 @ 13:43    Patient is a 89y old  Male who presents with a chief complaint of leg weakness (25 Feb 2025 10:38)      Any change in ROS:   No new respiratory events overnight. Denies SOB/CP.      MEDICATIONS  (STANDING):  atorvastatin 40 milliGRAM(s) Oral at bedtime  fluticasone propionate/ salmeterol 250-50 MICROgram(s) Diskus 1 Dose(s) Inhalation two times a day  gabapentin 100 milliGRAM(s) Oral at bedtime  metoprolol succinate ER 12.5 milliGRAM(s) Oral daily  montelukast 10 milliGRAM(s) Oral daily  pantoprazole    Tablet 40 milliGRAM(s) Oral before breakfast  QUEtiapine 25 milliGRAM(s) Oral at bedtime  tiotropium 2.5 MICROgram(s) Inhaler 2 Puff(s) Inhalation daily    MEDICATIONS  (PRN):  albuterol/ipratropium for Nebulization 3 milliLiter(s) Nebulizer every 6 hours PRN Shortness of Breath  sodium chloride 0.65% Nasal 1 Spray(s) Both Nostrils every 6 hours PRN Nasal Congestion    Vital Signs Last 24 Hrs  T(C): 36.3 (25 Feb 2025 11:36), Max: 36.8 (25 Feb 2025 04:42)  T(F): 97.4 (25 Feb 2025 11:36), Max: 98.2 (25 Feb 2025 04:42)  HR: 71 (25 Feb 2025 11:36) (66 - 90)  BP: 111/76 (25 Feb 2025 11:36) (100/68 - 122/78)  BP(mean): --  RR: 18 (25 Feb 2025 11:36) (18 - 18)  SpO2: 95% (25 Feb 2025 11:36) (95% - 99%)    Parameters below as of 25 Feb 2025 11:36  Patient On (Oxygen Delivery Method): room air        I&O's Summary    24 Feb 2025 07:01  -  25 Feb 2025 07:00  --------------------------------------------------------  IN: 220 mL / OUT: 150 mL / NET: 70 mL          Physical Exam:   GENERAL: NAD, well-groomed, well-developed  HEENT: KATTY/   Atraumatic, Normocephalic  ENMT: No tonsillar erythema, exudates, or enlargement; Moist mucous membranes, Good dentition, No lesions  NECK: Supple, No JVD, Normal thyroid  CHEST/LUNG: Clear to auscultaion, ; No rales, rhonchi, wheezing, or rubs  CVS: Regular rate and rhythm; No murmurs, rubs, or gallops  GI: : Soft, Nontender, Nondistended; Bowel sounds present  NERVOUS SYSTEM:  Alert & Oriented X3, Good concentration; Motor Strength 5/5 B/L upper and lower extremities; DTRs 2+ intact and symmetric  EXTREMITIES:  2+ Peripheral Pulses, No clubbing, cyanosis, or edema  LYMPH: No lymphadenopathy noted  SKIN: No rashes or lesions  ENDOCRINOLOGY: No Thyromegaly  PSYCH: Appropriate    Labs:  33                            8.9    5.94  )-----------( 139      ( 25 Feb 2025 07:09 )             28.1                         11.1   6.74  )-----------( 150      ( 24 Feb 2025 07:06 )             36.4                         10.6   6.36  )-----------( 174      ( 23 Feb 2025 13:53 )             34.8     02-25    138  |  102  |  31[H]  ----------------------------<  107[H]  3.7   |  26  |  1.56[H]  02-23    140  |  103  |  21  ----------------------------<  98  4.4   |  26  |  1.02    Ca    8.8      25 Feb 2025 07:05  Ca    9.8      23 Feb 2025 13:53    TPro  6.9  /  Alb  3.6  /  TBili  0.7  /  DBili  x   /  AST  28  /  ALT  18  /  AlkPhos  96  02-23    CAPILLARY BLOOD GLUCOSE          LIVER FUNCTIONS - ( 23 Feb 2025 13:53 )  Alb: 3.6 g/dL / Pro: 6.9 g/dL / ALK PHOS: 96 U/L / ALT: 18 U/L / AST: 28 U/L / GGT: x           PT/INR - ( 23 Feb 2025 13:53 )   PT: 11.8 sec;   INR: 1.03 ratio         PTT - ( 23 Feb 2025 13:53 )  PTT:32.8 sec  Urinalysis Basic - ( 25 Feb 2025 07:05 )    Color: x / Appearance: x / SG: x / pH: x  Gluc: 107 mg/dL / Ketone: x  / Bili: x / Urobili: x   Blood: x / Protein: x / Nitrite: x   Leuk Esterase: x / RBC: x / WBC x   Sq Epi: x / Non Sq Epi: x / Bacteria: x            RECENT CULTURES:  02-23 @ 17:00 Clean Catch Clean Catch (Midstream)                <10,000 CFU/mL Normal Urogenital Alda    02-23 @ 13:55 .Blood Blood-Peripheral                No growth at 24 hours    02-23 @ 13:40 .Blood Blood-Peripheral                No growth at 24 hours        Studies  < from: Xray Chest 1 View- PORTABLE-Urgent (02.23.25 @ 15:17) >    ACC: [id number] EXAM:  XR CHEST PORTABLE URGENT 1V   ORDERED BY:  CLAUDETTE BLAND     PROCEDURE DATE:  02/23/2025          INTERPRETATION:  EXAMINATION: XR CHEST URGENT    CLINICAL INDICATION: Fever    TECHNIQUE: Single frontal portable view of the chest from 2/23/2025 3:17   PM    COMPARISON: Chest x-ray 4/11/2024.    FINDINGS:    The heart size is enlarged.  Bibasilar hazy opacities suggestive of atelectasis or layering effusions.  There is no pneumothorax.    IMPRESSION:  Atelectasis or layering bilateral pleural effusions.    --- End of Report ---      < end of copied text >              
Date of Service: 25 @ 16:22    Patient is a 89y old  Male who presents with a chief complaint of leg weakness (2025 14:51)      Any change in ROS: pt seems pretty good:  no sob:  no cough :  no phlegm      MEDICATIONS  (STANDING):  atorvastatin 40 milliGRAM(s) Oral at bedtime  fluticasone propionate/ salmeterol 250-50 MICROgram(s) Diskus 1 Dose(s) Inhalation two times a day  gabapentin 100 milliGRAM(s) Oral at bedtime  metoprolol succinate ER 12.5 milliGRAM(s) Oral daily  montelukast 10 milliGRAM(s) Oral daily  pantoprazole    Tablet 40 milliGRAM(s) Oral before breakfast  QUEtiapine 25 milliGRAM(s) Oral at bedtime  tamsulosin 0.8 milliGRAM(s) Oral at bedtime  tiotropium 2.5 MICROgram(s) Inhaler 2 Puff(s) Inhalation daily    MEDICATIONS  (PRN):  albuterol    90 MICROgram(s) HFA Inhaler 2 Puff(s) Inhalation every 6 hours PRN Shortness of Breath and/or Wheezing  senna 2 Tablet(s) Oral at bedtime PRN Constipation  sodium chloride 0.65% Nasal 1 Spray(s) Both Nostrils every 6 hours PRN Nasal Congestion    Vital Signs Last 24 Hrs  T(C): 36.6 (2025 12:33), Max: 36.6 (2025 05:22)  T(F): 97.9 (2025 12:33), Max: 97.9 (2025 05:22)  HR: 74 (2025 12:33) (61 - 93)  BP: 151/78 (2025 12:33) (106/60 - 151/78)  BP(mean): --  RR: 18 (2025 12:33) (18 - 18)  SpO2: 99% (2025 12:33) (94% - 99%)    Parameters below as of 2025 12:33  Patient On (Oxygen Delivery Method): room air        I&O's Summary    2025 07:01  -  2025 07:00  --------------------------------------------------------  IN: 300 mL / OUT: 1250 mL / NET: -950 mL    2025 07:01  -  2025 16:22  --------------------------------------------------------  IN: 0 mL / OUT: 400 mL / NET: -400 mL          Physical Exam:   GENERAL: NAD, well-groomed, well-developed  HEENT: KATTY/   Atraumatic, Normocephalic  ENMT: No tonsillar erythema, exudates, or enlargement; Moist mucous membranes, Good dentition, No lesions  NECK: Supple, No JVD, Normal thyroid  CHEST/LUNG: Clear to auscultaion  CVS: Regular rate and rhythm; No murmurs, rubs, or gallops  GI: : Soft, Nontender, Nondistended; Bowel sounds present  NERVOUS SYSTEM:  Alert & Oriented X3  EXTREMITIES:  2+ Peripheral Pulses, No clubbing, cyanosis, or edema  LYMPH: No lymphadenopathy noted  SKIN: No rashes or lesions  ENDOCRINOLOGY: No Thyromegaly  PSYCH: Appropriate    Labs:  33                            8.9    5.37  )-----------( 149      ( 2025 07:14 )             29.1                         8.9    5.94  )-----------( 139      ( 2025 07:09 )             28.1                         11.1   6.74  )-----------( 150      ( 2025 07:06 )             36.4                         10.6   6.36  )-----------( 174      ( 2025 13:53 )             34.8         141  |  105  |  27[H]  ----------------------------<  101[H]  4.0   |  25  |  1.27      138  |  102  |  31[H]  ----------------------------<  107[H]  3.7   |  26  |  1.56[H]      140  |  103  |  21  ----------------------------<  98  4.4   |  26  |  1.02    Ca    9.0      2025 07:14  Ca    8.8      2025 07:05    TPro  6.9  /  Alb  3.6  /  TBili  0.7  /  DBili  x   /  AST  28  /  ALT  18  /  AlkPhos  96      CAPILLARY BLOOD GLUCOSE              Urinalysis Basic - ( 2025 07:14 )    Color: x / Appearance: x / SG: x / pH: x  Gluc: 101 mg/dL / Ketone: x  / Bili: x / Urobili: x   Blood: x / Protein: x / Nitrite: x   Leuk Esterase: x / RBC: x / WBC x   Sq Epi: x / Non Sq Epi: x / Bacteria: x            RECENT CULTURES:   @ 17:00 Clean Catch Clean Catch (Midstream)     rad< from: Xray Chest 1 View- PORTABLE-Urgent (25 @ 15:17) >  COMPARISON: Chest x-ray 2024.    FINDINGS:    The heart size is enlarged.  Bibasilar hazy opacities suggestive of atelectasis or layering effusions.  There is no pneumothorax.    IMPRESSION:  Atelectasis or layering bilateral pleural effusions.    --- End of Report ---          ROSENDO MILLS MD; Resident Radiologist  This document has been electronically signed.  ANNE NEWBY MD; Attending Radiologist  This document has been electronically signed. 2025  4:16PM    < end of copied text >  < from: CT Head No Cont (25 @ 15:06) >    1.  No significant change, without interval rebleeding.  2.  Stable chronic infarcts and encephalomalacic cavities.  3.  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        Patient Name: ROBBIE CERNA  MRN: [id number], : [birth year]    < end of copied text >             <10,000 CFU/mL Normal Urogenital Alda     @ 13:55 .Blood Blood-Peripheral                No growth at 48 Hours     @ 13:40 .Blood Blood-Peripheral                No growth at 48 Hours          RESPIRATORY CULTURES:          Studies  Chest X-RAY  CT SCAN Chest   Venous Dopplers: LE:   CT Abdomen  Others

## 2025-02-26 NOTE — PROGRESS NOTE ADULT - PROBLEM SELECTOR PLAN 1
-Reportedly episode of hypoxia prior to arrival, now resolved. Seen on RA with O2 sats 97-99%. Pt with no c/o dyspnea  -CXR with mild congestion, small layering effusions/atelectasis  -Keep O>I as tolerated  -Keep sats >90% with O2 PRN.  2/26: resolved:  no sob:  no wheezing:  for dc today
-Reportedly episode of hypoxia prior to arrival, now resolved. Seen on RA with O2 sats 97-99%. Pt with no c/o dyspnea  -CXR with mild congestion, small layering effusions/atelectasis  -Keep O>I as tolerated  -Keep sats >90% with O2 PRN.

## 2025-02-26 NOTE — PROGRESS NOTE ADULT - ASSESSMENT
89y old  man with a PMHx significant for Afib (not on AC due to falls and SDH), HTN, HLD, BPH, asthma, anemia, prior strokes, SDH who is BIBEMS left sided weakness and slurred speech. Per daughter, patient had runny nose and cough for the past few days but otherwise went to bed in usual state of health last night at 8AM. Family noticed symptoms (leaning to the left side) when patient woke up this morning and called EMS. She gave him aspirin 81mg this morning but patient is not on AC/AP due to history of SDH and multiple falls previously. They denied any recent fall or headstrike. Patient noted to be hypoxic and bradycardic to 30s in the field. Hypoxia and HR improved in the ED. He was noted to have left leg drift which is worse than baseline weakness per daughter. Patient usually ambulates with a cane.       Problem/Plan - 1:  ·  Problem: Weakness of both legs.   ·  Plan: Rpt CT scan noted.    < from: CT Brain Stroke Protocol (02.23.25 @ 14:02) >    IMPRESSION:    Small left frontoparietal acute/recent subdural hematoma.    Multiple cerebral areas of encephalomalacia.    Extensive white matter small vessel ischemic disease.    Neurology & neurosurgery input appreciated.     Problem/Plan - 2:  ·  Problem: SDH (subdural hematoma).   ·  Plan: Stable .  neurosurgery helping.     < from: CT Head No Cont (02.24.25 @ 15:06) >    1.  No significant change, without interval rebleeding.  2.  Stable chronic infarcts and encephalomalacic cavities.  3.  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    < end of copied text >     Problem/Plan - 3:  ·  Problem: HTN (hypertension).   ·  Plan: BP low so holding  BP medications .      Problem/Plan - 4:  ·  Problem: BPH without urinary obstruction.   ·  Plan: Continue home med.     Problem/Plan - 5:  ·  Problem: CVA, old, hemiparesis.   ·  Plan: Walks with cane.     Problem/Plan - 5:  ·  Problem: SAM .   ·  Plan: Renal help appreciated. .    D/W Daughter & ACP .   Dispo : DC planning.       
Echo: < from: TTE W or WO Ultrasound Enhancing Agent (03.28.24 @ 09:05) >  Left ventricular cavity is mildly dilated. Left ventricular wall thickness is normal. Left ventricular systolic function is mildly decreased. There are no regional wall motion abnormalities seen    < end of copied text >        1) Left LE weakness: Improved , F/U neuro recs , agree Echo unlikely to      2) Afib - chronic, c/w metoprolol. was taken off in the past AC 2/2 falls would c/t hold A/c     3) Diastolic  CHF euvolemic ok to hold Lasix 20 mg po daily   
89 year old male with PMH of Afib (not on AC due to falls and SDH), HTN, HLD, BPH, asthma, anemia, prior strokes, SDH who is BIBEMS left sided weakness and slurred speech. The nephrology team was consulted for SAM.     SAM   likely from hemodynamics and ischemia  --- hypotension noted as well as acute drop in hbg this morning   baseline SCr 1mg/dl  Creatinine Trend:1.27<-- 1.56 <--, 1.02 <--euvolemic on examination   plan   Urine lytes reviewed  Renal US--> reviewed--> no hydro or retention  off lasix  encourage PO intake   Monitor labs and urine output. Avoid nephrotoxins. Dose medications as per eGFR.    If any questions, please feel free to contact me       Dr Webb  [phone]  
89y old  man with a PMHx significant for Afib (not on AC due to falls and SDH), HTN, HLD, BPH, asthma, anemia, prior strokes, SDH who is BIBEMS left sided weakness and slurred speech. Per daughter, patient had runny nose and cough for the past few days but otherwise went to bed in usual state of health last night at 8AM. Family noticed symptoms (leaning to the left side) when patient woke up this morning and called EMS. She gave him aspirin 81mg this morning but patient is not on AC/AP due to history of SDH and multiple falls previously. They denied any recent fall or headstrike. Patient noted to be hypoxic and bradycardic to 30s in the field. Hypoxia and HR improved in the ED. He was noted to have left leg drift which is worse than baseline weakness per daughter. Patient usually ambulates with a cane.       Problem/Plan - 1:  ·  Problem: Weakness of both legs.   ·  Plan: Rpt CT scan pending.    < from: CT Brain Stroke Protocol (02.23.25 @ 14:02) >    IMPRESSION:    Small left frontoparietal acute/recent subdural hematoma.    Multiple cerebral areas of encephalomalacia.    Extensive white matter small vessel ischemic disease.    Neurology & neurosurgery input appreciated.     Problem/Plan - 2:  ·  Problem: SDH (subdural hematoma).   ·  Plan: Awaiting repeat CT scan at 2 PM      Problem/Plan - 3:  ·  Problem: HTN (hypertension).   ·  Plan: BP low so holding  BP medications .      Problem/Plan - 4:  ·  Problem: BPH without urinary obstruction.   ·  Plan: Continue home med.     Problem/Plan - 5:  ·  Problem: CVA, old, hemiparesis.   ·  Plan: Walks with cane.      D/W Daughter . 
Echo: < from: TTE W or WO Ultrasound Enhancing Agent (03.28.24 @ 09:05) >  Left ventricular cavity is mildly dilated. Left ventricular wall thickness is normal. Left ventricular systolic function is mildly decreased. There are no regional wall motion abnormalities seen    < end of copied text >        1) Left LE weakness: Improved , F/U neuro recs , agree Echo unlikely to      2) Afib - chronic, c/w metoprolol. was taken off in the past AC 2/2 falls would c/t hold A/c     3) Diastolic  CHF euvolemic ok to hold Lasix 20 mg po daily 
  89 yo with Afib (not on AC due to falls and SDH), HTN, HLD, BPH, asthma, anemia, prior strokes, SDH who is BIBEMS as code stroke for left sided weakness and slurred speech.   Per daughter, patient had runny nose and cough for the past few days but otherwise went to bed in usual state of health last night at 8AM. Family noticed symptoms (leaning to the left side) when patient woke up this morning and called EMS. She gave him aspirin 81mg this morning but patient is not on AC/AP due to history of SDH and multiple falls previously. They denied any recent fall or headstrike. Patient noted to be hypoxic and bradycardic to 30s in the field. Hypoxia and HR improved in the ED. He was noted to have left leg drift which is worse than baseline weakness per daughter. Patient usually ambulates with a cane. He is A&Ox2 at baseline (does not know date). Patient complaining of urinary retention and being unable to urinate in ED.   LKW:  20:00 on 2/22  NIHSS: 2  Baseline mRS: 3  Not a Tenecteplase candidate due to out of window  Not a thrombectomy candidate due to no proximal LVO for intervention   2/23 CTH: R>L frontal encephalomalacia.  L posteriortemporal and occipotal encephalomalacia.  MVD>  small acute L FP 5mm SDH   CTA H/N R A2 critical stenosis   b12    o/e mild facial DYSON at least 4/5   CTH 2/24 ; no change. 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 No recent falls/headstrike per family   2) new? Left lower extremity weakness r/o  acute ischemic stroke, found to have high grade stenosis vs occlusion of distal right A2 branch of the SUYAPA. Mechanism is likely cardioembolic in setting of known Afib, patient has been off anticoagulation due to hx of SDH and multiple falls.     Recommendations:   - NSGY  for mixed density left SDH;   - off AC/AP given SDH.    - ASA 81mg when celared by neurosx   - consider keppra 500mg BID for seiure ppx   - routine EEG  in or outpatient   - check orthostatics   - MRI brain with and w/o when able   - A1c and lipid panel   - TTE unlikely to    - telemetry  - PT/OT/SS/SLP, OOBC  - normotension   - check FS, glucose control <180  - GI/DVT ppx      Juventino Bashir MD  Vascular Neurology  Office: [phone] .   
89y old  man with a PMHx significant for Afib (not on AC due to falls and SDH), HTN, HLD, BPH, asthma, anemia, prior strokes, SDH who is BIBEMS left sided weakness and slurred speech. Per daughter, patient had runny nose and cough for the past few days but otherwise went to bed in usual state of health last night at 8AM. Family noticed symptoms (leaning to the left side) when patient woke up this morning and called EMS. She gave him aspirin 81mg this morning but patient is not on AC/AP due to history of SDH and multiple falls previously. They denied any recent fall or headstrike. Patient noted to be hypoxic and bradycardic to 30s in the field. Hypoxia and HR improved in the ED. He was noted to have left leg drift which is worse than baseline weakness per daughter. Patient usually ambulates with a cane.       Problem/Plan - 1:  ·  Problem: Weakness of both legs.   ·  Plan: Rpt CT scan noted.    < from: CT Brain Stroke Protocol (02.23.25 @ 14:02) >    IMPRESSION:    Small left frontoparietal acute/recent subdural hematoma.    Multiple cerebral areas of encephalomalacia.    Extensive white matter small vessel ischemic disease.    Neurology & neurosurgery input appreciated.     Problem/Plan - 2:  ·  Problem: SDH (subdural hematoma).   ·  Plan: Stable .  neurosurgery helping.     < from: CT Head No Cont (02.24.25 @ 15:06) >    1.  No significant change, without interval rebleeding.  2.  Stable chronic infarcts and encephalomalacic cavities.  3.  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    < end of copied text >     Problem/Plan - 3:  ·  Problem: HTN (hypertension).   ·  Plan: BP low so holding  BP medications .      Problem/Plan - 4:  ·  Problem: BPH without urinary obstruction.   ·  Plan: Continue home med.     Problem/Plan - 5:  ·  Problem: CVA, old, hemiparesis.   ·  Plan: Walks with cane.     Problem/Plan - 5:  ·  Problem: SAM .   ·  Plan: Renal help appreciated. .    Resolved.   D/W Daughter & ACP .   Dispo : DC planning home today as refusing LISANDRA.         
  89 yo with Afib (not on AC due to falls and SDH), HTN, HLD, BPH, asthma, anemia, prior strokes, SDH who is BIBEMS as code stroke for left sided weakness and slurred speech.   Per daughter, patient had runny nose and cough for the past few days but otherwise went to bed in usual state of health last night at 8AM. Family noticed symptoms (leaning to the left side) when patient woke up this morning and called EMS. She gave him aspirin 81mg this morning but patient is not on AC/AP due to history of SDH and multiple falls previously. They denied any recent fall or headstrike. Patient noted to be hypoxic and bradycardic to 30s in the field. Hypoxia and HR improved in the ED. He was noted to have left leg drift which is worse than baseline weakness per daughter. Patient usually ambulates with a cane. He is A&Ox2 at baseline (does not know date). Patient complaining of urinary retention and being unable to urinate in ED.   LKW:  20:00 on 2/22  NIHSS: 2  Baseline mRS: 3  Not a Tenecteplase candidate due to out of window  Not a thrombectomy candidate due to no proximal LVO for intervention   2/23 CTH: R>L frontal encephalomalacia.  L posteriortemporal and occipotal encephalomalacia.  MVD>  small acute L FP 5mm SDH   CTA H/N R A2 critical stenosis   b12    o/e mild facial DYSON at least 4/5   CTH 2/24 ; no change. 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 No recent falls/headstrike per family   2) new? Left lower extremity weakness r/o  acute ischemic stroke, found to have high grade stenosis vs occlusion of distal right A2 branch of the SUYAPA. Mechanism is likely cardioembolic in setting of known Afib, patient has been off anticoagulation due to hx of SDH and multiple falls.     Recommendations:   - NSGY  for mixed density left SDH;   - off AC/AP given SDH.    - ASA 81mg when cleared by neurosx   - consider keppra 500mg BID for seiure ppx   - routine EEG  in or outpatient   - check orthostatics   - MRI brain with and w/o when able can be outpatient   - A1c and lipid panel   - TTE unlikely to    - telemetry  - PT/OT/SS/SLP, OOBC  - normotension   - check FS, glucose control <180  - GI/DVT ppx      Juventino Bashir MD  Vascular Neurology  Office: [phone] .   
89 y/o M with a PMH of Afib (not on AC due to falls and SDH), HTN, HLD, BPH, asthma, anemia, prior strokes, SDH. Presents via EMS for L sided weakness and slurred speech, noted to be hypoxic and bradycardic in the field which improved in the ED. CT brain with small left frontoparietal acute/recent subdural hematoma. CXR with layering b/l effusions/atelectasis. Pulmonary called to consult for further evaluation.     
89 y/o M with a PMH of Afib (not on AC due to falls and SDH), HTN, HLD, BPH, asthma, anemia, prior strokes, SDH. Presents via EMS for L sided weakness and slurred speech, noted to be hypoxic and bradycardic in the field which improved in the ED. CT brain with small left frontoparietal acute/recent subdural hematoma. CXR with layering b/l effusions/atelectasis. Pulmonary called to consult for further evaluation.

## 2025-02-26 NOTE — DISCHARGE NOTE NURSING/CASE MANAGEMENT/SOCIAL WORK - NSDCPEFALRISK_GEN_ALL_CORE
For information on Fall & Injury Prevention, visit: [email or web address] OR  [email or web address] OR  https://www.cdc.gov/steadi/patient.html

## 2025-02-26 NOTE — DISCHARGE NOTE NURSING/CASE MANAGEMENT/SOCIAL WORK - FINANCIAL ASSISTANCE
[facility] provides services at a reduced cost to those who are determined to be eligible through [facility]’s financial assistance program. Information regarding [facility]’s financial assistance program can be found by going to [email or web address] or by calling [phone].

## 2025-05-13 NOTE — PATIENT PROFILE ADULT - FUNCTIONAL ASSESSMENT - DAILY ACTIVITY 3.
Regular rate and rhythm, Heart sounds S1 S2 present, no murmurs, rubs or gallops 3 = A little assistance 1 = Total assistance

## 2025-08-20 ENCOUNTER — APPOINTMENT (OUTPATIENT)
Dept: UROLOGY | Facility: CLINIC | Age: 89
End: 2025-08-20
Payer: MEDICARE

## 2025-08-20 VITALS
BODY MASS INDEX: 21.21 KG/M2 | HEIGHT: 66 IN | WEIGHT: 132 LBS | SYSTOLIC BLOOD PRESSURE: 152 MMHG | DIASTOLIC BLOOD PRESSURE: 79 MMHG | TEMPERATURE: 97.4 F | HEART RATE: 86 BPM | RESPIRATION RATE: 17 BRPM

## 2025-08-20 DIAGNOSIS — R39.9 UNSPECIFIED SYMPTOMS AND SIGNS INVOLVING THE GENITOURINARY SYSTEM: ICD-10-CM

## 2025-08-20 DIAGNOSIS — N13.8 BENIGN PROSTATIC HYPERPLASIA WITH LOWER URINARY TRACT SYMPMS: ICD-10-CM

## 2025-08-20 DIAGNOSIS — R33.9 RETENTION OF URINE, UNSPECIFIED: ICD-10-CM

## 2025-08-20 DIAGNOSIS — N40.1 BENIGN PROSTATIC HYPERPLASIA WITH LOWER URINARY TRACT SYMPMS: ICD-10-CM

## 2025-08-20 DIAGNOSIS — R35.0 FREQUENCY OF MICTURITION: ICD-10-CM

## 2025-08-20 PROCEDURE — 99214 OFFICE O/P EST MOD 30 MIN: CPT

## 2025-08-20 PROCEDURE — 51798 US URINE CAPACITY MEASURE: CPT

## 2025-08-20 RX ORDER — CALCIUM CITRATE/VITAMIN D3 315MG-6.25
TABLET ORAL
Refills: 0 | Status: ACTIVE | COMMUNITY

## 2025-08-20 RX ORDER — ALBUTEROL SULFATE 2.5 MG/3ML
(2.5 MG/3ML) SOLUTION RESPIRATORY (INHALATION)
Refills: 0 | Status: ACTIVE | COMMUNITY

## 2025-08-20 RX ORDER — QUETIAPINE FUMARATE 25 MG/1
25 TABLET ORAL
Refills: 0 | Status: ACTIVE | COMMUNITY

## 2025-08-20 RX ORDER — METOPROLOL TARTRATE 25 MG/1
25 TABLET ORAL
Refills: 0 | Status: ACTIVE | COMMUNITY

## 2025-08-20 RX ORDER — FLUTICASONE PROPIONATE 50 UG/1
50 SPRAY NASAL
Refills: 0 | Status: ACTIVE | COMMUNITY

## 2025-08-20 RX ORDER — ATORVASTATIN CALCIUM 40 MG/1
40 TABLET, FILM COATED ORAL
Refills: 0 | Status: ACTIVE | COMMUNITY

## 2025-08-20 RX ORDER — B-COMPLEX WITH VITAMIN C
TABLET ORAL
Refills: 0 | Status: ACTIVE | COMMUNITY

## 2025-08-20 RX ORDER — TAMSULOSIN HYDROCHLORIDE 0.4 MG/1
0.4 CAPSULE ORAL
Qty: 90 | Refills: 3 | Status: ACTIVE | COMMUNITY

## 2025-08-20 RX ORDER — VIT A/VIT C/VIT E/ZINC/COPPER 4296-226
CAPSULE ORAL
Refills: 0 | Status: ACTIVE | COMMUNITY

## 2025-08-20 RX ORDER — GABAPENTIN 100 MG/1
100 CAPSULE ORAL
Refills: 0 | Status: ACTIVE | COMMUNITY

## 2025-08-21 LAB
APPEARANCE: CLEAR
BACTERIA: NEGATIVE /HPF
BILIRUBIN URINE: NEGATIVE
BLOOD URINE: NEGATIVE
CAST: 0 /LPF
COLOR: YELLOW
EPITHELIAL CELLS: 0 /HPF
GLUCOSE QUALITATIVE U: NEGATIVE MG/DL
KETONES URINE: NEGATIVE MG/DL
LEUKOCYTE ESTERASE URINE: ABNORMAL
MICROSCOPIC-UA: NORMAL
NITRITE URINE: NEGATIVE
PH URINE: 6
PROTEIN URINE: NORMAL MG/DL
RED BLOOD CELLS URINE: 0 /HPF
SPECIFIC GRAVITY URINE: 1.02
UROBILINOGEN URINE: 0.2 MG/DL
WHITE BLOOD CELLS URINE: 1 /HPF

## 2025-08-22 ENCOUNTER — OUTPATIENT (OUTPATIENT)
Dept: OUTPATIENT SERVICES | Facility: HOSPITAL | Age: 89
LOS: 1 days | End: 2025-08-22
Payer: MEDICARE

## 2025-08-22 ENCOUNTER — APPOINTMENT (OUTPATIENT)
Dept: ULTRASOUND IMAGING | Facility: IMAGING CENTER | Age: 89
End: 2025-08-22
Payer: MEDICARE

## 2025-08-22 DIAGNOSIS — N40.1 BENIGN PROSTATIC HYPERPLASIA WITH LOWER URINARY TRACT SYMPTOMS: ICD-10-CM

## 2025-08-22 LAB — BACTERIA UR CULT: ABNORMAL

## 2025-08-22 PROCEDURE — 76857 US EXAM PELVIC LIMITED: CPT | Mod: 26

## 2025-08-22 PROCEDURE — 76857 US EXAM PELVIC LIMITED: CPT

## 2025-08-22 RX ORDER — FINASTERIDE 5 MG/1
5 TABLET, FILM COATED ORAL DAILY
Qty: 90 | Refills: 3 | Status: ACTIVE | COMMUNITY
Start: 2025-08-20 | End: 1900-01-01

## 2025-08-22 RX ORDER — AMPICILLIN 500 MG/1
500 CAPSULE ORAL 4 TIMES DAILY
Qty: 20 | Refills: 0 | Status: ACTIVE | COMMUNITY
Start: 2025-08-22 | End: 1900-01-01